# Patient Record
Sex: MALE | Race: OTHER | HISPANIC OR LATINO
[De-identification: names, ages, dates, MRNs, and addresses within clinical notes are randomized per-mention and may not be internally consistent; named-entity substitution may affect disease eponyms.]

---

## 2016-12-31 NOTE — ED PROVIDER NOTE - ATTENDING CONTRIBUTION TO CARE
Paresh:  I have independently evaluated the patient and have documented in the appropriate sections above.  I agree with the exam and plan as noted above.

## 2016-12-31 NOTE — ED ADULT NURSE NOTE - PMH
Agitation    Depression    HLD (hyperlipidemia)    HTN (hypertension)    Stroke    Syncope  2/2013, 9/2010

## 2016-12-31 NOTE — ED PROVIDER NOTE - OBJECTIVE STATEMENT
72 y/o male with hx of stroke with significant residual deficits on soft diet coming in a few hours after peg tube dislodgement. no other complaints. no fever/chills/abd pain/drainage. 72 y/o male with hx of stroke with significant residual deficits on soft diet coming in a few hours after peg tube dislodgement. no other complaints. no fever/chills/abd pain/drainage.    PEG tube. 72 y/o male with hx of stroke with significant residual deficits on soft diet coming in a few hours after peg tube dislodgement. no other complaints. no fever/chills/abd pain/drainage.    PEG tube.    Yoder:  PEG tube malfunction.  No other complaints

## 2016-12-31 NOTE — ED ADULT NURSE NOTE - OBJECTIVE STATEMENT
pt brought in by family for peg tube out.  Pt is nonverbal and family found peg tube out some bleeding noted at the site.  MD attempted to replace in the ED AND SURGERY ALSO CAME DOWN TO REPLACE UNSUCCESSFUL.  ivl PLACED AND LABS SENT Yoko pt brought in by family for peg tube out.  Pt is nonverbal and family found peg tube out some bleeding noted at the site.  MD attempted to replace in the ED AND SURGERY ALSO CAME DOWN TO REPLACE UNSUCCESSFUL.  IV PLACED AND LABS SENT  Pt HAS Significant residuals form past CVA Pt moves extremities but is nonverbal  mPULEORN

## 2016-12-31 NOTE — ED PROVIDER NOTE - PHYSICAL EXAMINATION
Yoder:  General: No distress.  Mentation at baseline.   HEENT: WNL  Chest/Lungs: CTAB, No wheeze, No retractions, No increased work of breathing, Normal rate  Heart: S1S2 RRR, No M/R/G, Pules equal Bilaterally in upper and lower extremities distally  Abd: soft, NT/ND, No guarding, No rebound.  No hernias, no palpable masses.  Extrem: FROM in all joints, no significant edema noted, No ulcers.  Cap refil < 2sec.  Skin: No rash noted, warm dry.  Neuro:   No new focal deficits.  Psychiatric: No evidence of delusions. No SI/HI.

## 2017-01-01 NOTE — H&P ADULT. - PROBLEM SELECTOR PLAN 2
S/P Code stroke today while in ED   Head CT negative for new infarcts, bleed, or hemorrhage  Continue with ASA and Plavix  Neurology to follow  Neuro checks every 4 hours this is being worked up outpatient.  during that Previous hospitalization CT of the abdomen was performed which noted thickening of the gastric wall adjacent to the resection margin concerning for recurrent disease as well as abnormal gastrohepatic adenopathy and several peritoneal masses. The prostate was also enlarged with nodular infiltration of the left seminal vesicle. He underwent an upper endoscopy which noted large hiatal hernia, gastritis, and no endoscopic nodularity. EUS was recommended and was planned with Dr. Bowers.    For his new gastrohepatic adenopathy and peritoneal disease in the left upper   quadrant and pelvis. He is also scheduled to have a prostate biopsy.

## 2017-01-01 NOTE — H&P ADULT. - PROBLEM SELECTOR PLAN 5
Nutrtion consult ordered to address PEG tube  feeding  Aspiration precautions VTE prophylaxis: Heparin 5000 units every 12 hrs

## 2017-01-01 NOTE — H&P ADULT. - COMMENTS
limited ROS but he is denying any complaints. Patient only Oriented x 1.5 so limited ROS but he is denying any complaints.

## 2017-01-01 NOTE — H&P ADULT. - MENTAL STATUS
Awake and non- verbal AAOx1.5 - Oriented to person and place( he could no say he was in the hospital but when given the choice he could identify that he was here)

## 2017-01-01 NOTE — H&P ADULT. - ASSESSMENT
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

## 2017-01-01 NOTE — H&P ADULT. - PROBLEM SELECTOR PLAN 1
WBC 11.6 lactate 6.0 + chills/ rigors  Follow up blood cultures x 2 done in ED  Follow up urine cultures  Received Vancomycin and Zosyn in ED and continue IV antibiotics  Continue IV fluids  [facility] called  CBC in am Case D/W with GI Fellow Dr. Esquivel. Pt to be evaluated to endoscopic reinsertion of PEG but she would also like a Calorie count to see if patient is able to keep up with is nutritional needs with the dysphagia diet.    will get Speech/Swallow re-eval for best diet recommendation, in the meantime will give a Dysphagia 1 diet with honey liquids.

## 2017-01-01 NOTE — H&P ADULT. - HISTORY OF PRESENT ILLNESS
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  Per chart review and per report the patient does eat at home but he calorie count is not sufficient enough and that is why they which to have the PEG tube replaced.    The patient does not have any complaints. Denies any abdominal pain. No nausea, no vomiting, no fever, no chills.     There was no family at bedside. I called family (wife and son) but there was no answer. I left message to clarify patients current diet and wishes regarding PEG placement.

## 2017-01-02 NOTE — DIETITIAN INITIAL EVALUATION ADULT. - NS AS NUTRI INTERV ED CONTENT
Discussed tips to increase calorie intake as well as fluids.  also discussed benefit of MVI as well as Vit D./Recommended modifications

## 2017-01-02 NOTE — DIETITIAN INITIAL EVALUATION ADULT. - PROBLEM SELECTOR PLAN 1
Case D/W with GI Fellow Dr. Esquivel. Pt to be evaluated to endoscopic reinsertion of PEG but she would also like a Calorie count to see if patient is able to keep up with is nutritional needs with the dysphagia diet.    will get Speech/Swallow re-eval for best diet recommendation, in the meantime will give a Dysphagia 1 diet with honey liquids.

## 2017-01-02 NOTE — DIETITIAN INITIAL EVALUATION ADULT. - ENERGY NEEDS
Ht 66 inches,  pounds, low weight 118 pounds in August 2016,  pounds.  BMI 20.7  Dxd with a dislodged PEG tube. Calorie count in progress 01/01-01/03  Skin intact

## 2017-01-02 NOTE — DIETITIAN INITIAL EVALUATION ADULT. - OTHER INFO
Patient referred for calorie counts and assessment after PEG tube displaced.  Patient is mostly nonverbal s/p stroke and patients health care proxy available to assist with nutrition history.  As per family, patient was eating very well at home and wife was very diligent in preparing pureed foods with flavor.  PEG tube in place for water only to ensure hydration needs.  Patient dislikes thickened beverages and only accepts thickened milk.  At home, patient was eating farina, grits, pudding, chicken, beef, potatoes.  Dislikes yogurt.  Loves rice but not allowed at this time.  Hx of a sacral stage 2 wound which is now healed.  Family was scheduled to meet with GI to evaluate removing PEG tube but tube was dislodged few days ago.  Calorie count in progress and awaiting swallow evaluation.  Family bringing in some food from home.  Requests gravy on the side for more flavor as well as Mrs. REJI rothman.  Good weight gain 10 pounds over last 5 months.  Had hx of malnutrition diagnosis last August but this is no longer an issue.

## 2017-01-02 NOTE — DIETITIAN INITIAL EVALUATION ADULT. - PROBLEM SELECTOR PLAN 2
this is being worked up outpatient.  during that Previous hospitalization CT of the abdomen was performed which noted thickening of the gastric wall adjacent to the resection margin concerning for recurrent disease as well as abnormal gastrohepatic adenopathy and several peritoneal masses. The prostate was also enlarged with nodular infiltration of the left seminal vesicle. He underwent an upper endoscopy which noted large hiatal hernia, gastritis, and no endoscopic nodularity. EUS was recommended and was planned with Dr. Bowers.    For his new gastrohepatic adenopathy and peritoneal disease in the left upper   quadrant and pelvis. He is also scheduled to have a prostate biopsy.

## 2017-01-03 NOTE — SWALLOW BEDSIDE ASSESSMENT ADULT - SLP GENERAL OBSERVATIONS
Patient encountered supine in bed, asleep on contact, easily rousable to name. Assessment performed in Maori by this clinician who is a conversational speaker. Pt A&Ox1 (able to state first name only), grossly oriented to time and place (stated January independently, able to state hospital given phonemic cue). Vocal quality WNL in conversation. Pt primarily responsive communicator, producing 1-3 word phrases, 1-2 sentences noted throughout assessment. Possible oral and/or verbal apraxia component given intermittent groping and ability to complete tasks  when not prompted. Affect and eye contact appropriate. Patient encountered supine in bed, asleep on contact, easily rousable to name. Assessment performed in Syriac by this clinician who is a conversational speaker. Upon education re: role of SLP and swallowing assessment, pt noted pulling up hospital gown and pointing to PEG site, stating "yes, this thing." Pt A&Ox1 (able to state first name only), grossly oriented to time and place (stated January independently, able to state hospital given phonemic cue). Vocal quality WNL in conversation. Pt primarily responsive communicator, producing 1-3 word phrases, 1-2 sentences noted throughout assessment. Possible oral and/or verbal apraxia component given intermittent groping and ability to complete tasks  when not prompted. Affect and eye contact appropriate.

## 2017-01-03 NOTE — SWALLOW BEDSIDE ASSESSMENT ADULT - ASR SWALLOW LABIAL MOBILITY
impaired retraction on R impaired retraction on R, impaired pursing given verbal cue, however pt noted pursing lips adequately in conversation; possible apraxia component

## 2017-01-03 NOTE — PHYSICAL THERAPY INITIAL EVALUATION ADULT - DISCHARGE DISPOSITION, PT EVAL
to increase functional mobility , strength, balance, endurane, fall prevention education continue ;pt and pt son Joe via phone aware pt needs assist with all functional activtiy all options discussed ,plan for pt to return home with assist and Home PT , per Joe someone is always home with pt to assist (mother , himself and other family )/home w/ home PT/home w/ assist

## 2017-01-03 NOTE — PHYSICAL THERAPY INITIAL EVALUATION ADULT - GENERAL OBSERVATIONS, REHAB EVAL
pt received in b/s cahir with chair alarm active ; pt nad alert ;pt wished a Happy Birthday recent bday ; pt agreeable for PT , offered pt free  services pt refuse , PT speak Lao phrases ;pt r hand weak from previous CVA ; dressing abd c,d I

## 2017-01-03 NOTE — SWALLOW BEDSIDE ASSESSMENT ADULT - COMMENTS
Dx: 1) PEG status. 2) Gastric mass.  -Being worked up outpatient. Previous hospitalization CT of the abdomen was performed which noted thickening of the gastric wall adjacent to the resection margin concerning for recurrent disease as well as abnormal gastrohepatic adenopathy and several peritoneal masses. The prostate was also enlarged with nodular infiltration of the left seminal vesicle. He underwent an upper endoscopy which noted large hiatal hernia, gastritis, and no endoscopic nodularity. EUS was recommended and was planned with Dr. Bowers. For his new gastrohepatic adenopathy and peritoneal disease in the left upper quadrant and pelvis. He is also scheduled to have a prostate biopsy. HC: 1/2 Seen by GI; given pt able to tolerate some PO would check calorie count, nutriton eval, repeat speech and swallow eval. If evidence of persistent oropharyngeal dysphagia, would replace PEG. Seen by RD, pt is mostly nonverbal s/p stroke and pt's HCP available to assist with nutrition history. As per family, pt was eating very well at home and wife was very diligent in preparing pureed foods with flavor. PEG tube in place for water only to ensure hydration needs. Pt dislikes thickened beverages and only accepts thickened milk. At home, pt was eating farina, grits, pudding, chicken, beef, potatoes. Dislikes yogurt. Loves rice but not allowed at this time. Hx of a sacral stage 2 wound which is now healed.  Family was scheduled to meet with GI to evaluate removing PEG tube but tube was dislodged few days ago. Calorie count in progress and awaiting swallow evaluation. Family bringing in some food from home. Requests gravy on the side for more flavor as well as Mrs. REJI rothman. Good weight gain 10 pounds over last 5 months. Had hx of malnutrition diagnosis last August but this is no longer an issue.    *Refer to Addendum for Swallow Hx* Dx: 1) PEG status. 2) Gastric mass. -Being worked up outpatient. Previous hospitalization CT of the abdomen was performed which noted thickening of the gastric wall adjacent to the resection margin concerning for recurrent disease as well as abnormal gastrohepatic adenopathy and several peritoneal masses. The prostate was also enlarged with nodular infiltration of the left seminal vesicle. He underwent an upper endoscopy which noted large hiatal hernia, gastritis, and no endoscopic nodularity. EUS was recommended and was planned with Dr. Bowers. For his new gastrohepatic adenopathy and peritoneal disease in the left upper quadrant and pelvis. He is also scheduled to have a prostate biopsy. HC: 1/2 Seen by GI; given pt able to tolerate some PO would check calorie count, nutriton eval, repeat speech and swallow eval. If evidence of persistent oropharyngeal dysphagia, would replace PEG. Seen by RD, pt is mostly nonverbal s/p stroke and pt's HCP available to assist with nutrition history. As per family, pt was eating very well at home and wife was very diligent in preparing pureed foods with flavor. PEG tube in place for water only to ensure hydration needs. Pt dislikes thickened beverages and only accepts thickened milk. At home, pt was eating farina, grits, pudding, chicken, beef, potatoes. Dislikes yogurt. Loves rice but not allowed at this time. Hx of a sacral stage 2 wound which is now healed.  Family was scheduled to meet with GI to evaluate removing PEG tube but tube was dislodged few days ago. Calorie count in progress and awaiting swallow evaluation. Family bringing in some food from home. Requests gravy on the side for more flavor as well as Mrs. REJI rothman. Good weight gain 10 pounds over last 5 months. Had hx of malnutrition diagnosis last August but this is no longer an issue.    *Refer to Addendum for Swallow Hx*

## 2017-01-03 NOTE — SWALLOW BEDSIDE ASSESSMENT ADULT - SWALLOW EVAL: DIAGNOSIS
Patient known to this service with h/o dysphagia s/p PEG, on modified diet s/p most recent MBS. Now presents with 1) oral and suspected pharyngeal dysphagia that appears improved from previous exam, characterized by impaired oral management greatest with chewables, delayed pharyngeal swallow trigger, and reduced hyolaryngeal elevation upon palpation. Nectar thickened liquids not trialed given h/o silent aspiration on previous MBS. Patient known to this service with h/o dysphagia s/p PEG, on modified diet s/p most recent MBS. Now presents with 1) oral and suspected pharyngeal dysphagia that appears improved from previous exam, characterized by impaired oral management greatest with chewables, delayed pharyngeal swallow trigger, and reduced hyolaryngeal elevation upon palpation. Given h/o silent penetration with mechanical soft items, would favor objective study to assess candidacy for dietary upgrade. Nectar thickened liquids not trialed given h/o silent aspiration on previous MBS. 2) Expressive and receptive language deficits.

## 2017-01-03 NOTE — PHYSICAL THERAPY INITIAL EVALUATION ADULT - ADDITIONAL COMMENTS
pt lives in [address] floor [address] with rail to enter , pt used std cane PTA ; lives with spouse in apt and spouse works 2 days a week , spouse assist as PRN with adl's and personal care   son Joe [phone] pt representative pt lives in second floor [address] with rail to enter , pt used std cane PTA ; lives with spouse/son Joe and his family  in apt and pt spouse works 2 days a week , spouse assist as PRN with adl's and personal care ; per pt [place] someone is always home to assist ;son informed and pt ,pt needs assist with all functional activity at this time for safety ,mild intermittent unsteady with amb with std cane 80ft x 2 cg of1  ridge Diaz [phone] pt representative/health care agent

## 2017-01-03 NOTE — SWALLOW BEDSIDE ASSESSMENT ADULT - SWALLOW EVAL: PATIENT/FAMILY GOALS STATEMENT
Pt with limited verbal output, however pt initially denied dysphagia 1 diet PTA, stating pt's wife provides regular food. Upon further interview endorses pureed foods PTA. Pt endorses having participated in swallow therapy. D/w pt's son Joe via telephone, endorses dysphagia diet PTA, states food was "pureed but not too thick." Pt has been on an oral diet for several months now, pt's son unsure of exactly how many.

## 2017-01-03 NOTE — SWALLOW BEDSIDE ASSESSMENT ADULT - SWALLOW EVAL: RECOMMENDED FEEDING/EATING TECHNIQUES
alternate food with liquid/position upright (90 degrees)/small sips/bites/check mouth frequently for oral residue/pocketing/maintain upright posture during/after eating for 30 mins/crush medication (when feasible)

## 2017-01-03 NOTE — PHYSICAL THERAPY INITIAL EVALUATION ADULT - PRECAUTIONS/LIMITATIONS, REHAB EVAL
admitted on 12/31s/p PEG tube falling out. admitted on 12/31s/p PEG tube falling out. Patient with Agitation ,Depression , HLD ,HTN, Stroke and Syncope. Met with admitted on 12/31s/p PEG tube falling out. Patient with Agitation ,Depression , HLD ,HTN, Stroke and Syncope. Met with patients wife Lumen and friend at bedside. Patient and wife both Occitan admitted on 12/31s/p PEG tube falling out. Patient with Agitation ,Depression , HLD ,HTN, Stroke and Syncope. per CM note Met with patients wife Lumen and friend at bedside. Patient and wife both Bulgarian  speaking ,pt self medicates and hydrates through PEG and has pureed diet PO ,

## 2017-01-03 NOTE — SWALLOW BEDSIDE ASSESSMENT ADULT - ORAL PHASE
Decreased anterior-posterior movement of the bolus/Delayed oral transit time/Stasis in lateral sulci/trace stasis in R lateral sulcus, cleared via lingual sweep + reswallow vs liquid wash Delayed oral transit time Delayed oral transit time/Decreased anterior-posterior movement of the bolus/min stasis in R lateral sulcus adherent to dentition, requiring lingual sweep and liquid washx3 to clear/Stasis in lateral sulci

## 2017-01-03 NOTE — SWALLOW BEDSIDE ASSESSMENT ADULT - ORAL PREPARATORY PHASE
trace R sided labial residue, delayed clearance possibly suggestive of reduced perioral sensation Within functional limits Decreased mastication ability/Bolus falls into right lateral sulci

## 2017-01-03 NOTE — SWALLOW BEDSIDE ASSESSMENT ADULT - ASR SWALLOW ASPIRATION MONITOR
upper respiratory infection/cough/throat clearing/pneumonia/Monitor for s/s aspiration/laryngeal penetration. If noted:  D/C p.o. intake, provide non-oral nutrition/hydration/meds, and contact this service @ [phone]/fever/change of breathing pattern/gurgly voice

## 2017-01-03 NOTE — PHYSICAL THERAPY INITIAL EVALUATION ADULT - IMPAIRMENTS FOUND, PT EVAL
muscle strength/aerobic capacity/endurance/tone/joint integrity and mobility/gait, locomotion, and balance

## 2017-01-03 NOTE — SWALLOW BEDSIDE ASSESSMENT ADULT - SLP PERTINENT HISTORY OF CURRENT PROBLEM
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 Per chart review and per report the patient does eat at home but he calorie count is not sufficient enough and that is why they which to have the PEG tube replaced. The patient does not have any complaints. Denies any abdominal pain. No N/V, no fever, no chills. Message left with family to clarify patients current diet and wishes regarding PEG placement.

## 2017-01-03 NOTE — PHYSICAL THERAPY INITIAL EVALUATION ADULT - MANUAL MUSCLE TESTING RESULTS, REHAB EVAL
r shoulder 3-/5 , elbow 3-/5 , fingers 2+/5 flexion , extension 3-/5 ; L ue 3+/5 , L LE 3+/5 , R LE 3/5

## 2017-01-03 NOTE — PHYSICAL THERAPY INITIAL EVALUATION ADULT - ACTIVE RANGE OF MOTION EXAMINATION, REHAB EVAL
Right LE Active ROM was WFL (within functional limits)/Left UE Active ROM was WFL (within functional limits)/Left LE Active ROM was WFL (within functional limits)/r shoulder to 90 degrees flexion and abd to 80 degrees , elbow wfl's aarom , finger flexion half the rom aarom , extension aarom to wfl's

## 2017-01-03 NOTE — PHYSICAL THERAPY INITIAL EVALUATION ADULT - BRACE/ORTHOTICS
tried amb with rolling walker pt r hand slip as decrease  strength ; pt amb with std cane 80 ft x2 intermittent mild unsteady need cg of1 pt understood

## 2017-01-03 NOTE — PHYSICAL THERAPY INITIAL EVALUATION ADULT - IMPAIRMENTS CONTRIBUTING TO GAIT DEVIATIONS, PT EVAL
decreased strength/pt amb with std cane 80 ft x2 with cg of1 intermittent close supervision pt intermittent mild unsteady regain balance on own with cg of1 and std cane/impaired motor control/impaired postural control/abnormal muscle tone/impaired balance

## 2017-01-03 NOTE — PHYSICAL THERAPY INITIAL EVALUATION ADULT - IMPAIRED TRANSFERS: SIT/STAND, REHAB EVAL
decreased strength/pt sit-stand with or w/o std cane cg of1 steady/impaired balance/impaired motor control/impaired postural control/abnormal muscle tone

## 2017-01-03 NOTE — PHYSICAL THERAPY INITIAL EVALUATION ADULT - TRANSFER SAFETY CONCERNS NOTED: SIT/STAND, REHAB EVAL
losing balance/decreased weight-shifting ability/decreased balance during turns/decreased step length

## 2017-01-03 NOTE — PHYSICAL THERAPY INITIAL EVALUATION ADULT - GAIT DEVIATIONS NOTED, PT EVAL
decreased weight-shifting ability/decreased stride length/decreased step length/decreased ni/increased time in double stance

## 2017-01-04 NOTE — SWALLOW VFSS/MBS ASSESSMENT ADULT - MODE OF PRESENTATION
fed by clinician spoon/fed by clinician self fed/cup/fed by clinician self fed/cup spoon/fed by clinician/1/4 tsp spoon/fed by clinician/cup/self fed

## 2017-01-04 NOTE — SWALLOW VFSS/MBS ASSESSMENT ADULT - RECOMMENDED CONSISTENCY
Had lengthy discussion with pt's son (in both English and Greek) to determine whether family could safety manage patient's dysphagia at home. After > 60 min discussion, the son indicated that he would like to proceed with PEG replacement and then provide pt with limited QOL oral feeds, upon discharge to home. It was made clear to the son that a p.o. diet could NOT BE RECOMMENDED IF THE FOLLOWING GUIDELINES WERE NOT ADHERED TO. The son acknowledged aspiration risk/related complications if the following diet and plan were not followed: Dysphagia I diet with honey thick fluids. MD, please include these guidelines in the D/C plan as well: 1) Full assist with meals, 2)  ALL food/liquids must be given by teaspoon,  at slow rate 3) Encourage repeat, dry swallows for every bite and sip 4 ) Alternate food and liquids 5) Aspiration precautions. Monitor for s/s aspiration/laryngeal penetration. If noted:  D/C p.o. intake, provide non-oral nutrition/hydration/meds Had lengthy discussion with pt's son (in both English and English) to determine whether family could safety manage patient's dysphagia at home. After > 60 min discussion, the son indicated that he would like to proceed with PEG replacement and then provide pt with limited QOL oral feeds, upon discharge to home. Given the degree of management it will take for safe feeding, it is expected that pt will require supplemental enteral nutrition and hydration. It was made clear to the son that a p.o. diet could NOT BE RECOMMENDED IF THE FOLLOWING GUIDELINES WERE NOT ADHERED TO. The son acknowledged aspiration risk/related complications if the following diet and plan were not followed: Dysphagia I diet with honey thick fluids. MD, please include these guidelines in the D/C plan as well: 1) Full assist with meals, 2)  ALL food/liquids must be given by teaspoon,  at slow rate 3) Encourage repeat, dry swallows for every bite and sip 4 ) Alternate food and liquids 5) Aspiration precautions. Monitor for s/s aspiration/laryngeal penetration. If noted:  D/C p.o. intake, provide non-oral nutrition/hydration/meds

## 2017-01-04 NOTE — SWALLOW VFSS/MBS ASSESSMENT ADULT - ADDITIONAL INFORMATION
There appeared to be straightening of the normal cervical lordosis. There was calcification of the thyroid cartilage. + presence of dental hardware. There appeared to be straightening of the normal cervical lordosis. There was calcification of the thyroid cartilage. + presence of dental hardware.    Disorders: reduced lingual strength/ROM/Rate of motion, reduced BOT to posterior pharyngeal wall contact, delay in trigger of the swallow reflex, reduced hyo-laryngeal excursion, reduced laryngeal closure, reduced pharyngeal contractility, reduced supraglottic sensation, reduced subglottic sensation.     Strategies: pt unable to utilize independently- intake of controlled volumes (tsp), at slow rate, successive swallow, and texture alternation.

## 2017-01-04 NOTE — SWALLOW VFSS/MBS ASSESSMENT ADULT - RESIDUE IN VALLECULAE
Moderate there was spillover of residue from the valleculae to the hypopharynx/Moderate/Severe Mild/Trace Trace/Mild Mild Moderate/spillover of vallecular residue into the hypopharynx post swallow/Mild Moderate/Mild

## 2017-01-04 NOTE — SWALLOW VFSS/MBS ASSESSMENT ADULT - THE ABOVE FINDINGS WERE DISCUSSED WITH
Discussed at length with the patient's son (both in English and then in Yemeni via  phone # [phone]. Reviewed above with Dr. Medina [phone]

## 2017-01-04 NOTE — SWALLOW VFSS/MBS ASSESSMENT ADULT - LARYNGEAL PENETRATION DURING THE SWALLOW - SILENT
Trace/over the superior laryngeal surface of the arytenoids Trace/over the arytenoid over the laryngeal surface of the arytenoids during the swallow, with residue in this location/Trace Trace/over the laryngeal surface of the arytenoid during cup drinking

## 2017-01-04 NOTE — SWALLOW VFSS/MBS ASSESSMENT ADULT - CONSISTENCIES ADMINISTERED
puree thin puree thick honey thick nectar thick single sip via cup/nectar thick mech soft hard solid

## 2017-01-04 NOTE — SWALLOW VFSS/MBS ASSESSMENT ADULT - DIAGNOSTIC IMPRESSIONS
Patient presents with persistent jules-pharyngeal dysphagia with impaired oral management, marked delay in trigger of the swallow reflex, impaired pharyngeal clearance with post swallow residue for all p.o. textures. Airway protection remains compromised with silent aspiration of nectar thick liquids.

## 2017-01-04 NOTE — SWALLOW VFSS/MBS ASSESSMENT ADULT - ORAL PHASE COMMENTS
Oral transit time noted to be 5.6  seconds. Maximal spillover to the level of the valleculae with passive spillover along the posterior surface of the epiglottis. There was at least minimal residue noted in the visualized portion of the oral cavity. Oral transit time noted to be 10.5 seconds. Maximal spillover to the level of the valleculae and pyriform sinuses.  Mild to moderate oral residue Oral transit time noted to be 3.7 seconds. Moderate spillover to the level of the valleculae and along the a-e folds to the pyriform sinuses. There was at least mild residue noted in the visualized region of the oral cavity. Oral transit time noted to be 4.2  seconds. Maximal spillover to the level of the pyriform sinuses Moderate spillover to the level of the pyriform sinuses. There was mild silent laryngeal penetration and aspiration before the swallow was triggered. There was residue in the laryngeal vestibule and ventricle. There was spillover of oral residue to the pharynx There appeared to be at least mild oral residue post swallow. Pharyngeal spillover was WFL for texture; however there appeared to be trace passive overflow from the a-e folds into the superior laryngeal vestibule

## 2017-01-04 NOTE — SWALLOW VFSS/MBS ASSESSMENT ADULT - ORAL PREP COMMENTS
Noted tongue pumping during attempts at bolus formation and oral transit. There was spillover into the lateral sulci Noted tongue pumping during attempts at bolus formation and oral transit. There was spillover into the lateral sulci. Noted tongue pumping during attempts at bolus formation and oral transit. There was spillover into the lateral sulcus. Noted tongue pumping during attempts at bolus formation and oral transit. A majority of bolus preparation and transport took place off fluoro to limit exposure A majority of oral prep and transfer occurred off fluoro to limit exposure.

## 2017-01-04 NOTE — SWALLOW VFSS/MBS ASSESSMENT ADULT - ORAL PHASE
Residue in oral cavity/Reduced anterior - posterior transport/Incomplete tongue to palate contact/Delayed oral transit time Delayed oral transit time/Residue in oral cavity/Incomplete tongue to palate contact/Reduced anterior - posterior transport Reduced anterior - posterior transport/Residue in oral cavity/Incomplete tongue to palate contact Incomplete tongue to palate contact/Delayed oral transit time/Reduced anterior - posterior transport/Uncontrolled bolus / spillover in jules-pharynx/Uncontrolled bolus / spillover in hypopharynx/Residue in oral cavity Reduced anterior - posterior transport/Residue in oral cavity/Uncontrolled bolus / spillover in hypopharynx/Delayed oral transit time/Incomplete tongue to palate contact/Uncontrolled bolus / spillover in jules-pharynx/Laryngeal penetration before swallow - silent Residue in oral cavity/Incomplete tongue to palate contact/Reduced anterior - posterior transport Delayed oral transit time/Residue in oral cavity/Reduced anterior - posterior transport

## 2017-01-06 ENCOUNTER — TRANSCRIPTION ENCOUNTER (OUTPATIENT)
Age: 72
End: 2017-01-06

## 2017-01-06 RX ORDER — CLOPIDOGREL BISULFATE 75 MG/1
1 TABLET, FILM COATED ORAL
Qty: 0 | Refills: 0 | COMMUNITY
Start: 2017-01-06

## 2017-01-06 RX ORDER — MIRTAZAPINE 45 MG/1
1 TABLET, ORALLY DISINTEGRATING ORAL
Qty: 0 | Refills: 0 | COMMUNITY
Start: 2017-01-06

## 2017-01-06 RX ORDER — GABAPENTIN 400 MG/1
1 CAPSULE ORAL
Qty: 0 | Refills: 0 | COMMUNITY
Start: 2017-01-06

## 2017-01-06 RX ORDER — ASPIRIN/CALCIUM CARB/MAGNESIUM 324 MG
1 TABLET ORAL
Qty: 0 | Refills: 0 | COMMUNITY
Start: 2017-01-06

## 2017-01-06 RX ORDER — QUETIAPINE FUMARATE 200 MG/1
1 TABLET, FILM COATED ORAL
Qty: 0 | Refills: 0 | COMMUNITY
Start: 2017-01-06

## 2017-01-06 RX ORDER — LISINOPRIL 2.5 MG/1
1 TABLET ORAL
Qty: 0 | Refills: 0 | COMMUNITY
Start: 2017-01-06

## 2017-01-06 NOTE — DISCHARGE NOTE ADULT - INSTRUCTIONS
dysphagia 1 with honey thickened liquids dysphagia 1 with honey thickened liquids  · Recommended Consistencies	Had lengthy discussion with pt's son (in both English and Tajik) to determine whether family could safety manage patient's dysphagia at home. After > 60 min discussion, the son indicated that he would like to proceed with PEG replacement and then provide pt with limited QOL oral feeds, upon discharge to home. Given the degree of management it will take for safe feeding, it is expected that pt will require supplemental enteral nutrition and hydration. It was made clear to the son that a p.o. diet could NOT BE RECOMMENDED IF THE FOLLOWING GUIDELINES WERE NOT ADHERED TO. The son acknowledged aspiration risk/related complications if the following diet and plan were not followed: Dysphagia I diet with honey thick fluids. MD, please include these guidelines in the D/C plan as well: 1) Full assist with meals, 2)  ALL food/liquids must be given by teaspoon,  at slow rate 3) Encourage repeat, dry swallows for every bite and sip 4 ) Alternate food and liquids 5) Aspiration precautions. Monitor for s/s aspiration/laryngeal penetration. If noted:  D/C p.o. intake, provide non-oral nutrition/hydration/meds dysphagia 1 with honey thickened liquids  · Recommended Consistencies	Had lengthy discussion with pt's son (in both English and Irish) to determine whether family could safety manage patient's dysphagia at home. After > 60 min discussion, the son indicated that he would like to proceed with PEG replacement and then provide pt with limited QOL oral feeds, upon discharge to home. Given the degree of management it will take for safe feeding, it is expected that pt will require supplemental enteral nutrition and hydration. It was made clear to the son that a p.o. diet could NOT BE RECOMMENDED IF THE FOLLOWING GUIDELINES WERE NOT ADHERED TO. The son acknowledged aspiration risk/related complications if the following diet and plan were not followed: Dysphagia I diet with honey thick fluids. MD, please include these guidelines in the D/C plan as well: 1) Full assist with meals, 2)  ALL food/liquids must be given by teaspoon,  at slow rate 3) Encourage repeat, dry swallows for every bite and sip 4 ) Alternate food and liquids 5) Aspiration precautions. Monitor for s/s aspiration/laryngeal penetration. If noted:  D/C p.o. intake, provide non-oral nutrition/hydration/meds  Peg bolus feeds: Jevity 240cc q 6h

## 2017-01-06 NOTE — DISCHARGE NOTE ADULT - PLAN OF CARE
PEG replaced bolus feeds of jevity 240cc q 6h; may use for medications also take prescribed medication; f/u with PCP comfort feed with dysphagia instructions; PEG feeds take prescribed medication; f/u PCP

## 2017-01-06 NOTE — DISCHARGE NOTE ADULT - HOME CARE AGENCY
[facility] [phone] for RN assessment & PT, [place] [id number] [phone] for Jevity feeds. SOC one day after discharge [facility] [phone] for RN assessment & PT SOC one day after discharge

## 2017-01-06 NOTE — DISCHARGE NOTE ADULT - MEDICATION SUMMARY - MEDICATIONS TO TAKE
I will START or STAY ON the medications listed below when I get home from the hospital:    aspirin 81 mg oral tablet, chewable  -- 1 tab(s) by mouth once a day  -- Indication: For antiplatelet    acetaminophen 325 mg oral tablet  -- 2 tab(s) by mouth every 6 hours, As needed, Moderate Pain  -- Indication: For Pain    lisinopril 2.5 mg oral tablet  -- 1 tab(s) by mouth once a day  -- Indication: For blood pressure    gabapentin 300 mg oral capsule  -- 1 cap(s) by mouth 3 times a day  -- Indication: For chronic pain    mirtazapine 15 mg oral tablet  -- 1 tab(s) by mouth once a day  -- Indication: For depression    atorvastatin 80 mg oral tablet  -- 1 tab(s) by mouth once a day (at bedtime)  -- Indication: For cholesterol    clopidogrel 75 mg oral tablet  -- 1 tab(s) by mouth once a day  -- Indication: For blood thinner    QUEtiapine 25 mg oral tablet  -- 1 tab(s) by mouth once a day (at bedtime)  -- Indication: For Psyche med    metoprolol tartrate 75 mg oral tablet  -- 1 tab(s) by mouth 2 times a day  -- Indication: For blood pressure and heart rate    amLODIPine 5 mg oral tablet  -- 1 tab(s) by mouth once a day  -- Indication: For blood pressure    famotidine 20 mg oral tablet  -- 1 tab(s) by mouth once a day  -- Indication: For GI    polyethylene glycol 3350 oral powder for reconstitution  -- 17 gram(s) by mouth once a day  -- Indication: For constipation    senna 8.8 mg/5 mL oral syrup  -- 5 milliliter(s) by mouth once a day (at bedtime)   Hold for loose stools/diarrhea  -- Indication: For stool softener    Doc-Q-Lace 10 mg/mL oral liquid  -- 10 milliliter(s) by mouth 2 times a day  Hold for loose stools/diarrhea   -- Medication should be taken with plenty of water.    -- Indication: For stool softener

## 2017-01-06 NOTE — DISCHARGE NOTE ADULT - HOSPITAL COURSE
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  Per chart review and per report the patient does eat at home but he calorie count is not sufficient enough and that is why they which to have the PEG tube replaced.    The patient does not have any complaints. Denies any abdominal pain. No nausea, no vomiting, no fever, no chills.     There was no family at bedside. I called family (wife and son) but there was no answer. I left message to clarify patients current diet and wishes regarding PEG placement.1/3/ Pt on calorie count to see if PEG needed  1/4 as per dietary pt is meeting calorie needs with po intake; MBS scheduled for 3pm today  pt failed MBS, will likely need PEG, NPO for now  1/6 PEG tube placement today; nutrition consult for feeding/free water  1/8 Nutrition consult; Javity 240 q 6 hours with current diet. ( bolus feed to be started at 100 cc/hr, increased by 50 cc q every bolus to goal of 240.  D/C planning tomorrow  1/9 family needs to be educated on PEG feeds; d/c planning tomorrow

## 2017-01-06 NOTE — DISCHARGE NOTE ADULT - CARE PROVIDER_API CALL
Jose Medina (MD), Internal Medicine  [address] [place] [address]  Phone: [phone]  Fax: [phone]

## 2017-01-06 NOTE — DISCHARGE NOTE ADULT - CARE PLAN
Principal Discharge DX:	PEG (percutaneous endoscopic gastrostomy) adjustment/replacement/removal  Goal:	PEG replaced  Instructions for follow-up, activity and diet:	bolus feeds of jevity 240cc q 6h; may use for medications also  Secondary Diagnosis:	HTN (hypertension)  Instructions for follow-up, activity and diet:	take prescribed medication; f/u with PCP  Secondary Diagnosis:	HLD (hyperlipidemia)  Instructions for follow-up, activity and diet:	take prescribed medication; f/u with PCP  Secondary Diagnosis:	Dysphagia  Instructions for follow-up, activity and diet:	comfort feed with dysphagia instructions; PEG feeds Principal Discharge DX:	PEG (percutaneous endoscopic gastrostomy) adjustment/replacement/removal  Goal:	PEG replaced  Instructions for follow-up, activity and diet:	bolus feeds of jevity 240cc q 6h; may use for medications also  Secondary Diagnosis:	HTN (hypertension)  Instructions for follow-up, activity and diet:	take prescribed medication; f/u with PCP  Secondary Diagnosis:	HLD (hyperlipidemia)  Instructions for follow-up, activity and diet:	take prescribed medication; f/u with PCP  Secondary Diagnosis:	Dysphagia  Instructions for follow-up, activity and diet:	comfort feed with dysphagia instructions; PEG feeds  Secondary Diagnosis:	Stroke  Instructions for follow-up, activity and diet:	take prescribed medication; f/u PCP

## 2017-01-06 NOTE — DISCHARGE NOTE ADULT - PATIENT PORTAL LINK FT
“You can access the [facility] Patient Portal, offered by [facility], by registering with the following website: [email or web address]”

## 2017-01-12 ENCOUNTER — APPOINTMENT (OUTPATIENT)
Dept: RADIOLOGY | Facility: HOSPITAL | Age: 72
End: 2017-01-12

## 2017-01-12 ENCOUNTER — APPOINTMENT (OUTPATIENT)
Dept: SPEECH THERAPY | Facility: HOSPITAL | Age: 72
End: 2017-01-12

## 2017-01-16 ENCOUNTER — RESULT REVIEW (OUTPATIENT)
Age: 72
End: 2017-01-16

## 2017-01-17 ENCOUNTER — APPOINTMENT (OUTPATIENT)
Dept: GASTROENTEROLOGY | Facility: HOSPITAL | Age: 72
End: 2017-01-17

## 2017-01-17 ENCOUNTER — OUTPATIENT (OUTPATIENT)
Dept: OUTPATIENT SERVICES | Facility: HOSPITAL | Age: 72
LOS: 1 days | Discharge: HOME | End: 2017-01-17
Payer: MEDICARE

## 2017-01-17 DIAGNOSIS — D49.0 NEOPLASM OF UNSPECIFIED BEHAVIOR OF DIGESTIVE SYSTEM: Chronic | ICD-10-CM

## 2017-01-17 DIAGNOSIS — R93.3 ABNORMAL FINDINGS ON DIAGNOSTIC IMAGING OF OTHER PARTS OF DIGESTIVE TRACT: ICD-10-CM

## 2017-01-17 DIAGNOSIS — K25.2 ACUTE GASTRIC ULCER WITH BOTH HEMORRHAGE AND PERFORATION: Chronic | ICD-10-CM

## 2017-01-17 PROCEDURE — 43242 EGD US FINE NEEDLE BX/ASPIR: CPT | Mod: GC

## 2017-01-17 PROCEDURE — 88341 IMHCHEM/IMCYTCHM EA ADD ANTB: CPT | Mod: 26,59

## 2017-01-17 PROCEDURE — 88342 IMHCHEM/IMCYTCHM 1ST ANTB: CPT | Mod: 26,59

## 2017-01-17 PROCEDURE — 88173 CYTOPATH EVAL FNA REPORT: CPT | Mod: 26

## 2017-01-17 PROCEDURE — 88305 TISSUE EXAM BY PATHOLOGIST: CPT | Mod: 26

## 2017-01-17 PROCEDURE — 88360 TUMOR IMMUNOHISTOCHEM/MANUAL: CPT | Mod: 26

## 2017-01-22 ENCOUNTER — INPATIENT (INPATIENT)
Facility: HOSPITAL | Age: 72
LOS: 21 days | Discharge: HOME | DRG: 543 | End: 2017-02-13
Attending: INTERNAL MEDICINE | Admitting: INTERNAL MEDICINE
Payer: MEDICARE

## 2017-01-22 VITALS
DIASTOLIC BLOOD PRESSURE: 91 MMHG | SYSTOLIC BLOOD PRESSURE: 161 MMHG | RESPIRATION RATE: 18 BRPM | TEMPERATURE: 97 F | OXYGEN SATURATION: 96 % | HEART RATE: 75 BPM

## 2017-01-22 DIAGNOSIS — K25.2 ACUTE GASTRIC ULCER WITH BOTH HEMORRHAGE AND PERFORATION: Chronic | ICD-10-CM

## 2017-01-22 DIAGNOSIS — D49.0 NEOPLASM OF UNSPECIFIED BEHAVIOR OF DIGESTIVE SYSTEM: Chronic | ICD-10-CM

## 2017-01-22 DIAGNOSIS — I10 ESSENTIAL (PRIMARY) HYPERTENSION: ICD-10-CM

## 2017-01-22 DIAGNOSIS — D49.0 NEOPLASM OF UNSPECIFIED BEHAVIOR OF DIGESTIVE SYSTEM: ICD-10-CM

## 2017-01-22 DIAGNOSIS — F32.9 MAJOR DEPRESSIVE DISORDER, SINGLE EPISODE, UNSPECIFIED: ICD-10-CM

## 2017-01-22 DIAGNOSIS — I63.9 CEREBRAL INFARCTION, UNSPECIFIED: ICD-10-CM

## 2017-01-22 LAB
ALBUMIN SERPL ELPH-MCNC: 4.1 G/DL — SIGNIFICANT CHANGE UP (ref 3.3–5)
ALP SERPL-CCNC: 96 U/L — SIGNIFICANT CHANGE UP (ref 40–120)
ALT FLD-CCNC: 23 U/L RC — SIGNIFICANT CHANGE UP (ref 10–45)
ANION GAP SERPL CALC-SCNC: 12 MMOL/L — SIGNIFICANT CHANGE UP (ref 5–17)
APPEARANCE UR: CLEAR — SIGNIFICANT CHANGE UP
AST SERPL-CCNC: 24 U/L — SIGNIFICANT CHANGE UP (ref 10–40)
BASOPHILS # BLD AUTO: 0 K/UL — SIGNIFICANT CHANGE UP (ref 0–0.2)
BASOPHILS NFR BLD AUTO: 0.8 % — SIGNIFICANT CHANGE UP (ref 0–2)
BILIRUB SERPL-MCNC: 1 MG/DL — SIGNIFICANT CHANGE UP (ref 0.2–1.2)
BILIRUB UR-MCNC: NEGATIVE — SIGNIFICANT CHANGE UP
BUN SERPL-MCNC: 18 MG/DL — SIGNIFICANT CHANGE UP (ref 7–23)
CALCIUM SERPL-MCNC: 9.5 MG/DL — SIGNIFICANT CHANGE UP (ref 8.4–10.5)
CHLORIDE SERPL-SCNC: 101 MMOL/L — SIGNIFICANT CHANGE UP (ref 96–108)
CO2 SERPL-SCNC: 27 MMOL/L — SIGNIFICANT CHANGE UP (ref 22–31)
COLOR SPEC: SIGNIFICANT CHANGE UP
CREAT SERPL-MCNC: 1.18 MG/DL — SIGNIFICANT CHANGE UP (ref 0.5–1.3)
DIFF PNL FLD: NEGATIVE — SIGNIFICANT CHANGE UP
EOSINOPHIL # BLD AUTO: 0.2 K/UL — SIGNIFICANT CHANGE UP (ref 0–0.5)
EOSINOPHIL NFR BLD AUTO: 3.5 % — SIGNIFICANT CHANGE UP (ref 0–6)
GAS PNL BLDV: SIGNIFICANT CHANGE UP
GLUCOSE SERPL-MCNC: 144 MG/DL — HIGH (ref 70–99)
GLUCOSE UR QL: NEGATIVE — SIGNIFICANT CHANGE UP
HCT VFR BLD CALC: 40.6 % — SIGNIFICANT CHANGE UP (ref 39–50)
HGB BLD-MCNC: 13.2 G/DL — SIGNIFICANT CHANGE UP (ref 13–17)
KETONES UR-MCNC: NEGATIVE — SIGNIFICANT CHANGE UP
LEUKOCYTE ESTERASE UR-ACNC: NEGATIVE — SIGNIFICANT CHANGE UP
LIDOCAIN IGE QN: 33 U/L — SIGNIFICANT CHANGE UP (ref 7–60)
LYMPHOCYTES # BLD AUTO: 1.2 K/UL — SIGNIFICANT CHANGE UP (ref 1–3.3)
LYMPHOCYTES # BLD AUTO: 22.5 % — SIGNIFICANT CHANGE UP (ref 13–44)
MCHC RBC-ENTMCNC: 31.1 PG — SIGNIFICANT CHANGE UP (ref 27–34)
MCHC RBC-ENTMCNC: 32.4 GM/DL — SIGNIFICANT CHANGE UP (ref 32–36)
MCV RBC AUTO: 95.7 FL — SIGNIFICANT CHANGE UP (ref 80–100)
MONOCYTES # BLD AUTO: 0.4 K/UL — SIGNIFICANT CHANGE UP (ref 0–0.9)
MONOCYTES NFR BLD AUTO: 7.8 % — SIGNIFICANT CHANGE UP (ref 2–14)
NEUTROPHILS # BLD AUTO: 3.6 K/UL — SIGNIFICANT CHANGE UP (ref 1.8–7.4)
NEUTROPHILS NFR BLD AUTO: 65.4 % — SIGNIFICANT CHANGE UP (ref 43–77)
NITRITE UR-MCNC: NEGATIVE — SIGNIFICANT CHANGE UP
PH UR: 7 — SIGNIFICANT CHANGE UP (ref 4.8–8)
PLATELET # BLD AUTO: 185 K/UL — SIGNIFICANT CHANGE UP (ref 150–400)
POTASSIUM SERPL-MCNC: 4.7 MMOL/L — SIGNIFICANT CHANGE UP (ref 3.5–5.3)
POTASSIUM SERPL-SCNC: 4.7 MMOL/L — SIGNIFICANT CHANGE UP (ref 3.5–5.3)
PROT SERPL-MCNC: 8.1 G/DL — SIGNIFICANT CHANGE UP (ref 6–8.3)
PROT UR-MCNC: 100 MG/DL
RBC # BLD: 4.24 M/UL — SIGNIFICANT CHANGE UP (ref 4.2–5.8)
RBC # FLD: 11.8 % — SIGNIFICANT CHANGE UP (ref 10.3–14.5)
SODIUM SERPL-SCNC: 140 MMOL/L — SIGNIFICANT CHANGE UP (ref 135–145)
SP GR SPEC: 1.01 — LOW (ref 1.01–1.02)
UROBILINOGEN FLD QL: NEGATIVE — SIGNIFICANT CHANGE UP
WBC # BLD: 5.5 K/UL — SIGNIFICANT CHANGE UP (ref 3.8–10.5)
WBC # FLD AUTO: 5.5 K/UL — SIGNIFICANT CHANGE UP (ref 3.8–10.5)

## 2017-01-22 PROCEDURE — 70450 CT HEAD/BRAIN W/O DYE: CPT | Mod: 26

## 2017-01-22 PROCEDURE — 71010: CPT | Mod: 26

## 2017-01-22 PROCEDURE — 99285 EMERGENCY DEPT VISIT HI MDM: CPT

## 2017-01-22 PROCEDURE — 74177 CT ABD & PELVIS W/CONTRAST: CPT | Mod: 26

## 2017-01-22 RX ORDER — AMLODIPINE BESYLATE 2.5 MG/1
5 TABLET ORAL DAILY
Qty: 0 | Refills: 0 | Status: DISCONTINUED | OUTPATIENT
Start: 2017-01-22 | End: 2017-01-23

## 2017-01-22 RX ORDER — MIRTAZAPINE 45 MG/1
15 TABLET, ORALLY DISINTEGRATING ORAL DAILY
Qty: 0 | Refills: 0 | Status: DISCONTINUED | OUTPATIENT
Start: 2017-01-22 | End: 2017-01-23

## 2017-01-22 RX ORDER — CLOPIDOGREL BISULFATE 75 MG/1
75 TABLET, FILM COATED ORAL DAILY
Qty: 0 | Refills: 0 | Status: DISCONTINUED | OUTPATIENT
Start: 2017-01-22 | End: 2017-02-13

## 2017-01-22 RX ORDER — FAMOTIDINE 10 MG/ML
20 INJECTION INTRAVENOUS DAILY
Qty: 0 | Refills: 0 | Status: DISCONTINUED | OUTPATIENT
Start: 2017-01-22 | End: 2017-01-23

## 2017-01-22 RX ORDER — GABAPENTIN 400 MG/1
300 CAPSULE ORAL THREE TIMES A DAY
Qty: 0 | Refills: 0 | Status: DISCONTINUED | OUTPATIENT
Start: 2017-01-22 | End: 2017-01-23

## 2017-01-22 RX ORDER — HEPARIN SODIUM 5000 [USP'U]/ML
5000 INJECTION INTRAVENOUS; SUBCUTANEOUS EVERY 12 HOURS
Qty: 0 | Refills: 0 | Status: DISCONTINUED | OUTPATIENT
Start: 2017-01-22 | End: 2017-02-13

## 2017-01-22 RX ORDER — POLYETHYLENE GLYCOL 3350 17 G/17G
17 POWDER, FOR SOLUTION ORAL DAILY
Qty: 0 | Refills: 0 | Status: DISCONTINUED | OUTPATIENT
Start: 2017-01-22 | End: 2017-01-23

## 2017-01-22 RX ORDER — METOPROLOL TARTRATE 50 MG
50 TABLET ORAL
Qty: 0 | Refills: 0 | Status: DISCONTINUED | OUTPATIENT
Start: 2017-01-22 | End: 2017-01-23

## 2017-01-22 RX ORDER — HYDROMORPHONE HYDROCHLORIDE 2 MG/ML
0.5 INJECTION INTRAMUSCULAR; INTRAVENOUS; SUBCUTANEOUS EVERY 6 HOURS
Qty: 0 | Refills: 0 | Status: DISCONTINUED | OUTPATIENT
Start: 2017-01-22 | End: 2017-01-22

## 2017-01-22 RX ORDER — SODIUM CHLORIDE 9 MG/ML
500 INJECTION, SOLUTION INTRAVENOUS ONCE
Qty: 0 | Refills: 0 | Status: COMPLETED | OUTPATIENT
Start: 2017-01-22 | End: 2017-01-22

## 2017-01-22 RX ORDER — SENNA PLUS 8.6 MG/1
5 TABLET ORAL AT BEDTIME
Qty: 0 | Refills: 0 | Status: DISCONTINUED | OUTPATIENT
Start: 2017-01-22 | End: 2017-01-23

## 2017-01-22 RX ORDER — ASPIRIN/CALCIUM CARB/MAGNESIUM 324 MG
81 TABLET ORAL DAILY
Qty: 0 | Refills: 0 | Status: DISCONTINUED | OUTPATIENT
Start: 2017-01-22 | End: 2017-02-13

## 2017-01-22 RX ORDER — QUETIAPINE FUMARATE 200 MG/1
25 TABLET, FILM COATED ORAL AT BEDTIME
Qty: 0 | Refills: 0 | Status: DISCONTINUED | OUTPATIENT
Start: 2017-01-22 | End: 2017-01-23

## 2017-01-22 RX ORDER — LISINOPRIL 2.5 MG/1
2.5 TABLET ORAL DAILY
Qty: 0 | Refills: 0 | Status: DISCONTINUED | OUTPATIENT
Start: 2017-01-22 | End: 2017-01-23

## 2017-01-22 RX ORDER — MORPHINE SULFATE 50 MG/1
2 CAPSULE, EXTENDED RELEASE ORAL ONCE
Qty: 0 | Refills: 0 | Status: DISCONTINUED | OUTPATIENT
Start: 2017-01-22 | End: 2017-01-22

## 2017-01-22 RX ORDER — ATORVASTATIN CALCIUM 80 MG/1
80 TABLET, FILM COATED ORAL AT BEDTIME
Qty: 0 | Refills: 0 | Status: DISCONTINUED | OUTPATIENT
Start: 2017-01-22 | End: 2017-01-23

## 2017-01-22 RX ORDER — ACETAMINOPHEN 500 MG
650 TABLET ORAL EVERY 6 HOURS
Qty: 0 | Refills: 0 | Status: DISCONTINUED | OUTPATIENT
Start: 2017-01-22 | End: 2017-02-13

## 2017-01-22 RX ORDER — SODIUM CHLORIDE 9 MG/ML
1000 INJECTION INTRAMUSCULAR; INTRAVENOUS; SUBCUTANEOUS
Qty: 0 | Refills: 0 | Status: DISCONTINUED | OUTPATIENT
Start: 2017-01-22 | End: 2017-02-01

## 2017-01-22 RX ADMIN — SODIUM CHLORIDE 500 MILLILITER(S): 9 INJECTION, SOLUTION INTRAVENOUS at 13:34

## 2017-01-22 RX ADMIN — MORPHINE SULFATE 2 MILLIGRAM(S): 50 CAPSULE, EXTENDED RELEASE ORAL at 19:33

## 2017-01-22 RX ADMIN — SODIUM CHLORIDE 50 MILLILITER(S): 9 INJECTION INTRAMUSCULAR; INTRAVENOUS; SUBCUTANEOUS at 22:05

## 2017-01-22 NOTE — ED PROVIDER NOTE - MEDICAL DECISION MAKING DETAILS
Elderly man s/p CVA c peg recently found to have gastric CA pw lethargy and fatigue c confusion.  No organic cause but family c difficulty managing pt at home; will admit for hydration and SW eval.  --BMM

## 2017-01-22 NOTE — ED ADULT NURSE NOTE - OBJECTIVE STATEMENT
[id number] 72 yr old BM brought to Er by family for further eval and tx of not feeling well today. "was shaking earlier'. Hx of CVA in past with residual right sided weakness, slow speech and peg tube. denies pain 2270 72 yr old BM brought to ER by family for further eval and tx of not feeling well today. "was shaking earlier'. Hx of CVA in past with residual right sided weakness, difficulty speaking and peg tube. c/o pain on right side of body 1240 72 yr old BM brought to ER by family for further eval and tx of not feeling well today. "was shaking earlier'. Hx of CVA in past with residual right sided weakness, difficulty speaking and peg tube. c/o pain on right side of abd TTP diffusely. Peg tube noted. Was admitted to hospital last wk because peg tube fell out per family and had to be replaced at that time 1240 72 yr old BM brought to ER by family for further eval and tx of not feeling well today. "was shaking earlier'. Hx of CVA in past with residual right sided weakness, difficulty speaking and peg tube. c/o pain on right side of abd TTP diffusely. Peg tube noted. +BS. Was admitted to hospital last wk because peg tube fell out per family and had to be replaced at that time. coarse breath sounds

## 2017-01-22 NOTE — ED PROVIDER NOTE - OBJECTIVE STATEMENT
71 yo male with PMHx of HTN, HLD, CVA with residual dysphagia s/p PEG, Gastric tumor p/w diffuse abdominal pain and worsening confusion.  The patient's family reports that he had an endoscopy one week ago with biopsy.  Since then he has been c/o diffuse body pain, worse in the RUQ of the abdomen, associated with subjective fevers and cough.  The patient's family reports that he has also been increasingly confused, described as "not making sense." Denies CP, SOB, NVD, dysuria.  The patient's family also reports that he is still tolerating liquids PO, but has had some coughing/choking with liquids recently.

## 2017-01-22 NOTE — ED ADULT NURSE REASSESSMENT NOTE - NS ED NURSE REASSESS COMMENT FT1
pt resting in bed, wife c/o "chest pain all over", PA made aware, repeat ekg done, sinus rhythm, dispo pending

## 2017-01-22 NOTE — H&P ADULT. - PMH
Agitation    Depression    HLD (hyperlipidemia)    HTN (hypertension)    Prostate hypertrophy    Stroke    Syncope  2/2013, 9/2010

## 2017-01-22 NOTE — ED ADULT NURSE REASSESSMENT NOTE - NS ED NURSE REASSESS COMMENT FT1
pt resting in bed, wife c/o pt feeling agitated, however upon assessment, pt appears calm, +tahcypneic but 97% room air, otherwise vitals stable, O2 nasal cannuula applied, pt with mumbling/garbled speech, unintelligible sounds, PA present, awaiting CT

## 2017-01-22 NOTE — ED PROVIDER NOTE - CARE PLAN
Principal Discharge DX:	Gastric tumor Principal Discharge DX:	Gastric tumor  Secondary Diagnosis:	Lethargy  Secondary Diagnosis:	Confusion

## 2017-01-22 NOTE — H&P ADULT. - ASSESSMENT
73 yo male with PMHx of HTN, HLD, CVA with residual dysphagia s/p PEG, Gastric tumor p/w diffuse abdominal pain and worsening confusion.  The patient's family reports that he had an endoscopy one week ago with biopsy.  Since then he has been c/o diffuse body pain, worse in the RUQ of the abdomen, associated with subjective fevers and cough.  The patient's family reports that he has also been increasingly confused, described as "not making sense." Denies CP, SOB, NVD, dysuria.  The patient's family also reports that he is still tolerating liquids PO, but has had some coughing/choking with liquids recently.

## 2017-01-23 LAB
ANION GAP SERPL CALC-SCNC: 15 MMOL/L — SIGNIFICANT CHANGE UP (ref 5–17)
BUN SERPL-MCNC: 15 MG/DL — SIGNIFICANT CHANGE UP (ref 7–23)
CALCIUM SERPL-MCNC: 9.8 MG/DL — SIGNIFICANT CHANGE UP (ref 8.4–10.5)
CHLORIDE SERPL-SCNC: 103 MMOL/L — SIGNIFICANT CHANGE UP (ref 96–108)
CO2 SERPL-SCNC: 23 MMOL/L — SIGNIFICANT CHANGE UP (ref 22–31)
CREAT SERPL-MCNC: 1.05 MG/DL — SIGNIFICANT CHANGE UP (ref 0.5–1.3)
CULTURE RESULTS: SIGNIFICANT CHANGE UP
GLUCOSE SERPL-MCNC: 82 MG/DL — SIGNIFICANT CHANGE UP (ref 70–99)
HCT VFR BLD CALC: 39.6 % — SIGNIFICANT CHANGE UP (ref 39–50)
HGB BLD-MCNC: 13.2 G/DL — SIGNIFICANT CHANGE UP (ref 13–17)
MCHC RBC-ENTMCNC: 30.9 PG — SIGNIFICANT CHANGE UP (ref 27–34)
MCHC RBC-ENTMCNC: 33.3 GM/DL — SIGNIFICANT CHANGE UP (ref 32–36)
MCV RBC AUTO: 92.7 FL — SIGNIFICANT CHANGE UP (ref 80–100)
PLATELET # BLD AUTO: 197 K/UL — SIGNIFICANT CHANGE UP (ref 150–400)
POTASSIUM SERPL-MCNC: 4.3 MMOL/L — SIGNIFICANT CHANGE UP (ref 3.5–5.3)
POTASSIUM SERPL-SCNC: 4.3 MMOL/L — SIGNIFICANT CHANGE UP (ref 3.5–5.3)
RBC # BLD: 4.27 M/UL — SIGNIFICANT CHANGE UP (ref 4.2–5.8)
RBC # FLD: 12.7 % — SIGNIFICANT CHANGE UP (ref 10.3–14.5)
SODIUM SERPL-SCNC: 141 MMOL/L — SIGNIFICANT CHANGE UP (ref 135–145)
SPECIMEN SOURCE: SIGNIFICANT CHANGE UP
WBC # BLD: 4.6 K/UL — SIGNIFICANT CHANGE UP (ref 3.8–10.5)
WBC # FLD AUTO: 4.6 K/UL — SIGNIFICANT CHANGE UP (ref 3.8–10.5)

## 2017-01-23 RX ORDER — GABAPENTIN 400 MG/1
300 CAPSULE ORAL THREE TIMES A DAY
Qty: 0 | Refills: 0 | Status: DISCONTINUED | OUTPATIENT
Start: 2017-01-23 | End: 2017-02-13

## 2017-01-23 RX ORDER — LISINOPRIL 2.5 MG/1
2.5 TABLET ORAL DAILY
Qty: 0 | Refills: 0 | Status: DISCONTINUED | OUTPATIENT
Start: 2017-01-23 | End: 2017-02-09

## 2017-01-23 RX ORDER — QUETIAPINE FUMARATE 200 MG/1
25 TABLET, FILM COATED ORAL AT BEDTIME
Qty: 0 | Refills: 0 | Status: DISCONTINUED | OUTPATIENT
Start: 2017-01-23 | End: 2017-02-13

## 2017-01-23 RX ORDER — ATORVASTATIN CALCIUM 80 MG/1
80 TABLET, FILM COATED ORAL AT BEDTIME
Qty: 0 | Refills: 0 | Status: DISCONTINUED | OUTPATIENT
Start: 2017-01-23 | End: 2017-02-13

## 2017-01-23 RX ORDER — SENNA PLUS 8.6 MG/1
5 TABLET ORAL AT BEDTIME
Qty: 0 | Refills: 0 | Status: DISCONTINUED | OUTPATIENT
Start: 2017-01-23 | End: 2017-02-13

## 2017-01-23 RX ORDER — AMLODIPINE BESYLATE 2.5 MG/1
5 TABLET ORAL DAILY
Qty: 0 | Refills: 0 | Status: DISCONTINUED | OUTPATIENT
Start: 2017-01-23 | End: 2017-02-13

## 2017-01-23 RX ORDER — MIRTAZAPINE 45 MG/1
15 TABLET, ORALLY DISINTEGRATING ORAL DAILY
Qty: 0 | Refills: 0 | Status: DISCONTINUED | OUTPATIENT
Start: 2017-01-23 | End: 2017-02-13

## 2017-01-23 RX ORDER — FAMOTIDINE 10 MG/ML
20 INJECTION INTRAVENOUS DAILY
Qty: 0 | Refills: 0 | Status: DISCONTINUED | OUTPATIENT
Start: 2017-01-23 | End: 2017-02-13

## 2017-01-23 RX ORDER — GABAPENTIN 400 MG/1
300 CAPSULE ORAL THREE TIMES A DAY
Qty: 0 | Refills: 0 | Status: DISCONTINUED | OUTPATIENT
Start: 2017-01-23 | End: 2017-01-23

## 2017-01-23 RX ORDER — METOPROLOL TARTRATE 50 MG
50 TABLET ORAL
Qty: 0 | Refills: 0 | Status: DISCONTINUED | OUTPATIENT
Start: 2017-01-23 | End: 2017-02-13

## 2017-01-23 RX ORDER — POLYETHYLENE GLYCOL 3350 17 G/17G
17 POWDER, FOR SOLUTION ORAL DAILY
Qty: 0 | Refills: 0 | Status: DISCONTINUED | OUTPATIENT
Start: 2017-01-23 | End: 2017-02-13

## 2017-01-23 RX ADMIN — Medication 50 MILLIGRAM(S): at 17:31

## 2017-01-23 RX ADMIN — GABAPENTIN 300 MILLIGRAM(S): 400 CAPSULE ORAL at 21:25

## 2017-01-23 RX ADMIN — QUETIAPINE FUMARATE 25 MILLIGRAM(S): 200 TABLET, FILM COATED ORAL at 21:25

## 2017-01-23 RX ADMIN — AMLODIPINE BESYLATE 5 MILLIGRAM(S): 2.5 TABLET ORAL at 06:16

## 2017-01-23 RX ADMIN — LISINOPRIL 2.5 MILLIGRAM(S): 2.5 TABLET ORAL at 06:16

## 2017-01-23 RX ADMIN — Medication 50 MILLIGRAM(S): at 01:15

## 2017-01-23 RX ADMIN — Medication 81 MILLIGRAM(S): at 11:55

## 2017-01-23 RX ADMIN — HEPARIN SODIUM 5000 UNIT(S): 5000 INJECTION INTRAVENOUS; SUBCUTANEOUS at 06:17

## 2017-01-23 RX ADMIN — POLYETHYLENE GLYCOL 3350 17 GRAM(S): 17 POWDER, FOR SOLUTION ORAL at 13:08

## 2017-01-23 RX ADMIN — FAMOTIDINE 20 MILLIGRAM(S): 10 INJECTION INTRAVENOUS at 11:55

## 2017-01-23 RX ADMIN — HEPARIN SODIUM 5000 UNIT(S): 5000 INJECTION INTRAVENOUS; SUBCUTANEOUS at 17:31

## 2017-01-23 RX ADMIN — SENNA PLUS 5 MILLILITER(S): 8.6 TABLET ORAL at 21:25

## 2017-01-23 RX ADMIN — GABAPENTIN 300 MILLIGRAM(S): 400 CAPSULE ORAL at 13:07

## 2017-01-23 RX ADMIN — ATORVASTATIN CALCIUM 80 MILLIGRAM(S): 80 TABLET, FILM COATED ORAL at 21:25

## 2017-01-23 RX ADMIN — CLOPIDOGREL BISULFATE 75 MILLIGRAM(S): 75 TABLET, FILM COATED ORAL at 11:55

## 2017-01-23 RX ADMIN — GABAPENTIN 300 MILLIGRAM(S): 400 CAPSULE ORAL at 01:10

## 2017-01-23 RX ADMIN — GABAPENTIN 300 MILLIGRAM(S): 400 CAPSULE ORAL at 06:17

## 2017-01-23 RX ADMIN — MIRTAZAPINE 15 MILLIGRAM(S): 45 TABLET, ORALLY DISINTEGRATING ORAL at 13:08

## 2017-01-23 NOTE — PATIENT PROFILE ADULT. - LANGUAGE ASSISTANCE NEEDED
Pt mostly nonverbal, but family accepts/Yes-Patient/Caregiver accepts free interpretation services...

## 2017-01-24 LAB
ANION GAP SERPL CALC-SCNC: 17 MMOL/L — SIGNIFICANT CHANGE UP (ref 5–17)
BUN SERPL-MCNC: 21 MG/DL — SIGNIFICANT CHANGE UP (ref 7–23)
CALCIUM SERPL-MCNC: 9.7 MG/DL — SIGNIFICANT CHANGE UP (ref 8.4–10.5)
CHLORIDE SERPL-SCNC: 104 MMOL/L — SIGNIFICANT CHANGE UP (ref 96–108)
CO2 SERPL-SCNC: 20 MMOL/L — LOW (ref 22–31)
CREAT SERPL-MCNC: 1.16 MG/DL — SIGNIFICANT CHANGE UP (ref 0.5–1.3)
GLUCOSE SERPL-MCNC: 87 MG/DL — SIGNIFICANT CHANGE UP (ref 70–99)
HCT VFR BLD CALC: 36.9 % — LOW (ref 39–50)
HGB BLD-MCNC: 12.4 G/DL — LOW (ref 13–17)
MCHC RBC-ENTMCNC: 30.8 PG — SIGNIFICANT CHANGE UP (ref 27–34)
MCHC RBC-ENTMCNC: 33.6 GM/DL — SIGNIFICANT CHANGE UP (ref 32–36)
MCV RBC AUTO: 91.6 FL — SIGNIFICANT CHANGE UP (ref 80–100)
PLATELET # BLD AUTO: 202 K/UL — SIGNIFICANT CHANGE UP (ref 150–400)
POTASSIUM SERPL-MCNC: 4.1 MMOL/L — SIGNIFICANT CHANGE UP (ref 3.5–5.3)
POTASSIUM SERPL-SCNC: 4.1 MMOL/L — SIGNIFICANT CHANGE UP (ref 3.5–5.3)
RBC # BLD: 4.03 M/UL — LOW (ref 4.2–5.8)
RBC # FLD: 12.6 % — SIGNIFICANT CHANGE UP (ref 10.3–14.5)
SODIUM SERPL-SCNC: 141 MMOL/L — SIGNIFICANT CHANGE UP (ref 135–145)
WBC # BLD: 3.9 K/UL — SIGNIFICANT CHANGE UP (ref 3.8–10.5)
WBC # FLD AUTO: 3.9 K/UL — SIGNIFICANT CHANGE UP (ref 3.8–10.5)

## 2017-01-24 RX ADMIN — SENNA PLUS 5 MILLILITER(S): 8.6 TABLET ORAL at 22:23

## 2017-01-24 RX ADMIN — HEPARIN SODIUM 5000 UNIT(S): 5000 INJECTION INTRAVENOUS; SUBCUTANEOUS at 05:42

## 2017-01-24 RX ADMIN — GABAPENTIN 300 MILLIGRAM(S): 400 CAPSULE ORAL at 13:39

## 2017-01-24 RX ADMIN — GABAPENTIN 300 MILLIGRAM(S): 400 CAPSULE ORAL at 05:35

## 2017-01-24 RX ADMIN — CLOPIDOGREL BISULFATE 75 MILLIGRAM(S): 75 TABLET, FILM COATED ORAL at 13:37

## 2017-01-24 RX ADMIN — AMLODIPINE BESYLATE 5 MILLIGRAM(S): 2.5 TABLET ORAL at 05:35

## 2017-01-24 RX ADMIN — Medication 50 MILLIGRAM(S): at 18:24

## 2017-01-24 RX ADMIN — Medication 50 MILLIGRAM(S): at 05:35

## 2017-01-24 RX ADMIN — LISINOPRIL 2.5 MILLIGRAM(S): 2.5 TABLET ORAL at 05:35

## 2017-01-24 RX ADMIN — POLYETHYLENE GLYCOL 3350 17 GRAM(S): 17 POWDER, FOR SOLUTION ORAL at 13:37

## 2017-01-24 RX ADMIN — MIRTAZAPINE 15 MILLIGRAM(S): 45 TABLET, ORALLY DISINTEGRATING ORAL at 13:37

## 2017-01-24 RX ADMIN — QUETIAPINE FUMARATE 25 MILLIGRAM(S): 200 TABLET, FILM COATED ORAL at 22:23

## 2017-01-24 RX ADMIN — Medication 81 MILLIGRAM(S): at 13:38

## 2017-01-24 RX ADMIN — HEPARIN SODIUM 5000 UNIT(S): 5000 INJECTION INTRAVENOUS; SUBCUTANEOUS at 18:24

## 2017-01-24 RX ADMIN — ATORVASTATIN CALCIUM 80 MILLIGRAM(S): 80 TABLET, FILM COATED ORAL at 22:23

## 2017-01-24 RX ADMIN — GABAPENTIN 300 MILLIGRAM(S): 400 CAPSULE ORAL at 22:23

## 2017-01-24 RX ADMIN — FAMOTIDINE 20 MILLIGRAM(S): 10 INJECTION INTRAVENOUS at 13:38

## 2017-01-24 NOTE — DIETITIAN INITIAL EVALUATION ADULT. - ORAL INTAKE PTA
Po intake PTA unclear at this time. Pt with PEG. Per H&P pt received enteral nutrition PTA. Possible po intake of dysphagia fluids PTA per chart review.

## 2017-01-24 NOTE — PHYSICAL THERAPY INITIAL EVALUATION ADULT - PERTINENT HX OF CURRENT PROBLEM, REHAB EVAL
73 yo male with PMHx of HTN, HLD, CVA with residual dysphagia s/p PEG, Gastric tumor p/w diffuse abdominal pain and worsening confusion.  The patient's family reports that he had an endoscopy one week ago with biopsy.  Since then he has been c/o diffuse body pain, worse in the RUQ of the abdomen, associated with subjective fevers and cough. contd below:

## 2017-01-24 NOTE — DIETITIAN INITIAL EVALUATION ADULT. - OTHER INFO
Nutrition consult received for enteral nutrition recommendations.  Pt admitted with abdominal pain and worsening confusion. Noted with hx including, HTN, HLD, CVA with residual dysphagia s/p PEG, Gastric tumor per H&P. Pt currently NPO with Jevity 1.2 infusing @20ml/hr x24 hrs. No N+V noted. No BM since 1/22 admission. NKFA noted in chart. Pt with hx of dysphagia per previous admission speech/swallow evaluation notes.

## 2017-01-24 NOTE — DIETITIAN INITIAL EVALUATION ADULT. - ENERGY NEEDS
Height: 66 inches, Weight: 131.6 pounds, BMI: 21.2 kg/m2 IBW: 142 pounds (+/-10%), %IBW: 93%  No edema, no pressure ulcers noted in RN flowsheets.

## 2017-01-24 NOTE — PHYSICAL THERAPY INITIAL EVALUATION ADULT - ADDITIONAL COMMENTS
contd from above:  The patient's family reports that he has also been increasingly confused, described as "not making sense." Denies CP, SOB, NVD, dysuria.  The patient's family also reports that he is still tolerating liquids PO, but has had some coughing/choking with liquids recently. CT abd: s/p partial gastrectomy with worsening focal gastric thickening; CT head: (-), CXR(-)    social history: pt poor historian, Luxembourger speaking but appears confused. pt states lives with wife in private house, with stairs and ambulates with straight cane, questionable reliability due to confusion

## 2017-01-24 NOTE — PHYSICAL THERAPY INITIAL EVALUATION ADULT - DISCHARGE DISPOSITION, PT EVAL
home w/ home PT/for balance, gait and strengthening and assistance for all functional activities, rolling walker

## 2017-01-24 NOTE — DIETITIAN INITIAL EVALUATION ADULT. - NS AS NUTRI INTERV ENTERAL NUTRITION
Recommend advancing as tolerated by 10ml/hr q4-6hrs to Jevity 1.2 @ 55ml/hr x 24 hrs. At goal, feed provides: 1584 kcal, 73 g protein (~26.5 kcal/kg, 1.2 g protein/kg based ). Discussed with medicine NP. RD to remain available for further nutritional interventions as indicated/requested by medical team/pt. Recommend advancing as tolerated by 10ml/hr q4-6hrs to Jevity 1.2 @ 55ml/hr x 24 hrs. At goal, feed provides: 1584 kcal, 73 g protein (~26.5 kcal/kg, 1.2 g protein/kg based ). Discussed with medicine NP(#[id number]). RD to remain available for further nutritional interventions as indicated/requested by medical team/pt. Recommend advancing as tolerated by 10ml/hr q4-6hrs to Jevity 1.2 @ 55ml/hr x 24 hrs. At goal, feed provides: 1584 kcal, 73 g protein (~26.5 kcal/kg, 1.2 g protein/kg based on dosing wt of 59.7kg). Discussed with medicine NP(#[id number]). RD to remain available for further nutritional interventions as indicated/requested by medical team/pt.

## 2017-01-24 NOTE — PHYSICAL THERAPY INITIAL EVALUATION ADULT - ACTIVE RANGE OF MOTION EXAMINATION, REHAB EVAL
bilateral  lower extremity Active ROM was WFL (within functional limits)/right shoulder flex 80 degrees, left shoulder flex 100 degrees/bilateral upper extremity Active ROM was WFL (within functional limits)

## 2017-01-24 NOTE — DIETITIAN INITIAL EVALUATION ADULT. - NS FNS REASON FOR WEIGHT CHANG
Weight History: Per previous RD notes, pt with UBW of ~135 pounds. Pt admitted with dosing wt of 131.6 pounds (~3 lb wt gain from January 2, 2017 wt of 128 lb). Per previous RD notes, pt with ~10 lb weight gain from August 2016 to January 2017 (noted with significant wt loss prior to August 2016 admission).

## 2017-01-24 NOTE — DIETITIAN INITIAL EVALUATION ADULT. - PT NOT SOURCE
other (specify)/pt with limited participation in RD interview. per H&P and flowsheets, pt confused and "mostly non-verbal". Pt declined  phone.

## 2017-01-24 NOTE — DIETITIAN INITIAL EVALUATION ADULT. - SOURCE
chart/medical record, including previous RD notes from January 2 2017 and August 2016/patient/other (specify) patient/other (specify)/chart/medical record, including previous RD notes from January 2 2017 and August 2016; RN Case Manager; NP

## 2017-01-25 ENCOUNTER — CHART COPY (OUTPATIENT)
Age: 72
End: 2017-01-25

## 2017-01-25 LAB
ANION GAP SERPL CALC-SCNC: 15 MMOL/L — SIGNIFICANT CHANGE UP (ref 5–17)
BASOPHILS # BLD AUTO: 0.03 K/UL — SIGNIFICANT CHANGE UP (ref 0–0.2)
BASOPHILS NFR BLD AUTO: 0.7 % — SIGNIFICANT CHANGE UP (ref 0–2)
BUN SERPL-MCNC: 17 MG/DL — SIGNIFICANT CHANGE UP (ref 7–23)
CALCIUM SERPL-MCNC: 9.3 MG/DL — SIGNIFICANT CHANGE UP (ref 8.4–10.5)
CHLORIDE SERPL-SCNC: 102 MMOL/L — SIGNIFICANT CHANGE UP (ref 96–108)
CO2 SERPL-SCNC: 22 MMOL/L — SIGNIFICANT CHANGE UP (ref 22–31)
CREAT SERPL-MCNC: 1.12 MG/DL — SIGNIFICANT CHANGE UP (ref 0.5–1.3)
EOSINOPHIL # BLD AUTO: 0.3 K/UL — SIGNIFICANT CHANGE UP (ref 0–0.5)
EOSINOPHIL NFR BLD AUTO: 7.3 % — HIGH (ref 0–6)
GLUCOSE SERPL-MCNC: 114 MG/DL — HIGH (ref 70–99)
HCT VFR BLD CALC: 36.1 % — LOW (ref 39–50)
HGB BLD-MCNC: 12.1 G/DL — LOW (ref 13–17)
IMM GRANULOCYTES NFR BLD AUTO: 0 % — SIGNIFICANT CHANGE UP (ref 0–1.5)
LYMPHOCYTES # BLD AUTO: 1.26 K/UL — SIGNIFICANT CHANGE UP (ref 1–3.3)
LYMPHOCYTES # BLD AUTO: 30.8 % — SIGNIFICANT CHANGE UP (ref 13–44)
MCHC RBC-ENTMCNC: 30.9 PG — SIGNIFICANT CHANGE UP (ref 27–34)
MCHC RBC-ENTMCNC: 33.5 GM/DL — SIGNIFICANT CHANGE UP (ref 32–36)
MCV RBC AUTO: 92.1 FL — SIGNIFICANT CHANGE UP (ref 80–100)
MONOCYTES # BLD AUTO: 0.5 K/UL — SIGNIFICANT CHANGE UP (ref 0–0.9)
MONOCYTES NFR BLD AUTO: 12.2 % — SIGNIFICANT CHANGE UP (ref 2–14)
NEUTROPHILS # BLD AUTO: 2 K/UL — SIGNIFICANT CHANGE UP (ref 1.8–7.4)
NEUTROPHILS NFR BLD AUTO: 49 % — SIGNIFICANT CHANGE UP (ref 43–77)
PLATELET # BLD AUTO: 183 K/UL — SIGNIFICANT CHANGE UP (ref 150–400)
POTASSIUM SERPL-MCNC: 3.7 MMOL/L — SIGNIFICANT CHANGE UP (ref 3.5–5.3)
POTASSIUM SERPL-SCNC: 3.7 MMOL/L — SIGNIFICANT CHANGE UP (ref 3.5–5.3)
PSA FLD-MCNC: 36.19 NG/ML — HIGH (ref 0–4)
RBC # BLD: 3.92 M/UL — LOW (ref 4.2–5.8)
RBC # FLD: 12.7 % — SIGNIFICANT CHANGE UP (ref 10.3–14.5)
SODIUM SERPL-SCNC: 139 MMOL/L — SIGNIFICANT CHANGE UP (ref 135–145)
WBC # BLD: 4.09 K/UL — SIGNIFICANT CHANGE UP (ref 3.8–10.5)
WBC # FLD AUTO: 4.09 K/UL — SIGNIFICANT CHANGE UP (ref 3.8–10.5)

## 2017-01-25 RX ADMIN — AMLODIPINE BESYLATE 5 MILLIGRAM(S): 2.5 TABLET ORAL at 05:56

## 2017-01-25 RX ADMIN — POLYETHYLENE GLYCOL 3350 17 GRAM(S): 17 POWDER, FOR SOLUTION ORAL at 14:37

## 2017-01-25 RX ADMIN — HEPARIN SODIUM 5000 UNIT(S): 5000 INJECTION INTRAVENOUS; SUBCUTANEOUS at 17:20

## 2017-01-25 RX ADMIN — HEPARIN SODIUM 5000 UNIT(S): 5000 INJECTION INTRAVENOUS; SUBCUTANEOUS at 05:56

## 2017-01-25 RX ADMIN — Medication 50 MILLIGRAM(S): at 05:56

## 2017-01-25 RX ADMIN — CLOPIDOGREL BISULFATE 75 MILLIGRAM(S): 75 TABLET, FILM COATED ORAL at 14:37

## 2017-01-25 RX ADMIN — ATORVASTATIN CALCIUM 80 MILLIGRAM(S): 80 TABLET, FILM COATED ORAL at 22:31

## 2017-01-25 RX ADMIN — Medication 81 MILLIGRAM(S): at 14:37

## 2017-01-25 RX ADMIN — FAMOTIDINE 20 MILLIGRAM(S): 10 INJECTION INTRAVENOUS at 14:36

## 2017-01-25 RX ADMIN — GABAPENTIN 300 MILLIGRAM(S): 400 CAPSULE ORAL at 14:36

## 2017-01-25 RX ADMIN — QUETIAPINE FUMARATE 25 MILLIGRAM(S): 200 TABLET, FILM COATED ORAL at 22:31

## 2017-01-25 RX ADMIN — MIRTAZAPINE 15 MILLIGRAM(S): 45 TABLET, ORALLY DISINTEGRATING ORAL at 14:37

## 2017-01-25 RX ADMIN — Medication 50 MILLIGRAM(S): at 17:20

## 2017-01-25 RX ADMIN — LISINOPRIL 2.5 MILLIGRAM(S): 2.5 TABLET ORAL at 05:56

## 2017-01-25 RX ADMIN — SENNA PLUS 5 MILLILITER(S): 8.6 TABLET ORAL at 22:31

## 2017-01-25 RX ADMIN — GABAPENTIN 300 MILLIGRAM(S): 400 CAPSULE ORAL at 22:31

## 2017-01-25 RX ADMIN — GABAPENTIN 300 MILLIGRAM(S): 400 CAPSULE ORAL at 05:57

## 2017-01-25 NOTE — SWALLOW BEDSIDE ASSESSMENT ADULT - SWALLOW EVAL: RECOMMENDED FEEDING/EATING TECHNIQUES
MD, please include these guidelines in the D/C plan as well: 1) Full assist with meals, 2)  ALL food/liquids must be given by teaspoon,  at slow rate 3) Encourage repeat, dry swallows for every bite and sip 4 ) Alternate food and liquids

## 2017-01-25 NOTE — SWALLOW BEDSIDE ASSESSMENT ADULT - SLP PERTINENT HISTORY OF CURRENT PROBLEM
73 yo M with PMH HTN, HLD, CVA with residual dysphagia s/p PEG, Gastric tumor p/w diffuse abdominal pain and worsening confusion. Pt's family reports that he had an endoscopy one week ago with biopsy. Since then he has been c/o diffuse body pain, worse in the RUQ of the abdomen, associated with subjective fevers and cough. Pt's family reports that he has also been increasingly confused, described as "not making sense." Denies CP, SOB, NVD, dysuria. Family also reports that he is still tolerating liquids PO, but has had some coughing/choking with liquids recently. Dx: Gastric tumor.

## 2017-01-25 NOTE — SWALLOW BEDSIDE ASSESSMENT ADULT - ORAL PHASE
trace post swallow residue in lateral sulci, cleared via repeat swallow or liquid wash/Decreased anterior-posterior movement of the bolus/Delayed oral transit time/Stasis in lateral sulci

## 2017-01-25 NOTE — SWALLOW BEDSIDE ASSESSMENT ADULT - DIET PRIOR TO ADMI
Primary means of nutrition/hydration/medications via PEG, +dysphagia 1 with honey thickened liquids QOL feeds

## 2017-01-25 NOTE — SWALLOW BEDSIDE ASSESSMENT ADULT - CONSISTENCIES ADMINISTERED
puree thick/puree thin/honey thick chewables and nectar thickened liquids not administered 2/2 recent MBS findings

## 2017-01-25 NOTE — SWALLOW BEDSIDE ASSESSMENT ADULT - SWALLOW EVAL: RECOMMENDED DIET
Dysphagia 1 with honey thickened liquids with 100% assistance and supervision. Would recommend primary nutrition/hydration/medications be provided via PEG given reported poor PO acceptance and decreased appetite; case d/w EMERALD Falcon RD in agreement. CRISTIAN Cope made aware

## 2017-01-25 NOTE — SWALLOW BEDSIDE ASSESSMENT ADULT - ASR SWALLOW RECOMMEND DIAG
Does not appear clinically indicated given recent MBS 1/4/16. If medical team concerned for aspiration, consider repeat study to r/o silent aspiration as cannot subjectively r/o at bedside.

## 2017-01-25 NOTE — SWALLOW BEDSIDE ASSESSMENT ADULT - SWALLOW EVAL: DIAGNOSIS
Patient known to this service with h/o dysphagia s/p PEG, now presents with oral and pharyngeal dysphagia c/w most recent MBS (1/4/16), characterized by impaired but functional oral management with administered consistencies, delayed pharyngeal swallow trigger, reduced hyolaryngeal elevation upon palpation, and s/s of pharyngeal retention. Patient known to this service with h/o dysphagia s/p PEG, now presents with 1) oral and pharyngeal dysphagia c/w most recent MBS (1/4/16), characterized by impaired but functional oral management with administered consistencies, delayed pharyngeal swallow trigger, reduced hyolaryngeal elevation upon palpation, and s/s of pharyngeal retention. 2) Suspect aphasia. Consider formal speech and language evaluation.

## 2017-01-25 NOTE — SWALLOW BEDSIDE ASSESSMENT ADULT - COMMENTS
HC: 1/22 CXR: Clear lungs. CT Head: No acute intracranial hemorrhage, mass effect, or evidence of acute territorial infarct. Chronic microvascular ischemic changes. Old infarcts suspected as described above. CT A/P: Status post partial gastrectomy with worsening focal gastric thickening at the surgical site, adjacent gastrohepatic lymphadenopathy, enlarging peritoneal masses and new hepatic lesions suggestive of local recurrence and metastatic disease. Stable suprarenal abdominal aneurysm. Markedly distended urinary bladder. Correlate clinically. Per body of report re: LOWER CHEST: Dependent change and atelectasis. The heart is enlarged. 1/23 Seen by Onc, pt with new R liver lsions, other findings as per CT. Will need to obtain further records as to path. Await results. 1/24 Seen by GI, Rx on c eval-> ? bx liver lesion; will defer. Rx outpt f/u with primary GI. C/w PEG feeds. Per Heme/onc, pt with GIST tumor progression and metastasis, should start Gleevac.    *See Addendum for Swallow History**

## 2017-01-25 NOTE — SWALLOW BEDSIDE ASSESSMENT ADULT - ASR SWALLOW ASPIRATION MONITOR
cough/throat clearing/upper respiratory infection/change of breathing pattern/gurgly voice/pneumonia/Monitor for s/s aspiration/laryngeal penetration. If noted:  D/C p.o. intake, provide non-oral nutrition/hydration/meds, and contact this service @ x00/fever

## 2017-01-25 NOTE — SWALLOW BEDSIDE ASSESSMENT ADULT - SLP GENERAL OBSERVATIONS
Pt encountered supine in bed, HOB elevated 30 degrees, receiving tube feeds via PEG. Evaluation performed in Ukrainian by this clinician who is a conversational speaker. Pt positioned upright for purpose of evaluation, TFs held for duration of assessment and reinitiated following. Pt A&Ox1, grossly to time and place (responded in the negative to foil [beach], responded in the affirmative to hospital, stated January as month given phonemic cue). +Paucity of output. Pt with attempts at respond verbally to questions, however word-findings deficits noted. Pt with intermittent groping upon attempts to communicate as well as perform oral tasks, possible verbal and oral apraxia components. Pt able to follow basic 1-step directives with ~75% accuracy. Vocal quality WNL in conversation.

## 2017-01-25 NOTE — SWALLOW BEDSIDE ASSESSMENT ADULT - SWALLOW EVAL: PATIENT/FAMILY GOALS STATEMENT
Case d/w pt's niece Melisa via telephone who called pt's spouse and provided translation for clinician. Niece and spouse endorsed pt/family strict adherence to swallow guidelines recommended s/p MBS (1/4/16) and primary nutrition/hydration/medications provided via PEG. Per niece pt with dx of new tumor, corresponds with decreased PO intake and reported decreased appetite. Pt's spouse provides dysphagia 1 and honey thickened liquid pleasure feeds when requested, however pt has been taking ~3-4 tspns of each per day. Re: H&P report of coughing/choking with liquids, pt's spouse states she misunderstood the question; pt coughs in general; unrelated to PO intake. Also states pt was coughing frequently on day of admission, however had not taken anything by mouth that day. Pt's niece educated at length re: recommendations, niece/spouse in agreement that pt should receive primary nutrition via enteral feeds.

## 2017-01-26 LAB
ANION GAP SERPL CALC-SCNC: 12 MMOL/L — SIGNIFICANT CHANGE UP (ref 5–17)
BUN SERPL-MCNC: 15 MG/DL — SIGNIFICANT CHANGE UP (ref 7–23)
CALCIUM SERPL-MCNC: 9 MG/DL — SIGNIFICANT CHANGE UP (ref 8.4–10.5)
CHLORIDE SERPL-SCNC: 104 MMOL/L — SIGNIFICANT CHANGE UP (ref 96–108)
CO2 SERPL-SCNC: 25 MMOL/L — SIGNIFICANT CHANGE UP (ref 22–31)
CREAT SERPL-MCNC: 0.96 MG/DL — SIGNIFICANT CHANGE UP (ref 0.5–1.3)
GLUCOSE SERPL-MCNC: 107 MG/DL — HIGH (ref 70–99)
HCT VFR BLD CALC: 35.3 % — LOW (ref 39–50)
HGB BLD-MCNC: 11.8 G/DL — LOW (ref 13–17)
MCHC RBC-ENTMCNC: 30.8 PG — SIGNIFICANT CHANGE UP (ref 27–34)
MCHC RBC-ENTMCNC: 33.4 GM/DL — SIGNIFICANT CHANGE UP (ref 32–36)
MCV RBC AUTO: 92.2 FL — SIGNIFICANT CHANGE UP (ref 80–100)
PLATELET # BLD AUTO: 178 K/UL — SIGNIFICANT CHANGE UP (ref 150–400)
POTASSIUM SERPL-MCNC: 3.8 MMOL/L — SIGNIFICANT CHANGE UP (ref 3.5–5.3)
POTASSIUM SERPL-SCNC: 3.8 MMOL/L — SIGNIFICANT CHANGE UP (ref 3.5–5.3)
RBC # BLD: 3.83 M/UL — LOW (ref 4.2–5.8)
RBC # FLD: 12.9 % — SIGNIFICANT CHANGE UP (ref 10.3–14.5)
SODIUM SERPL-SCNC: 141 MMOL/L — SIGNIFICANT CHANGE UP (ref 135–145)
WBC # BLD: 4.47 K/UL — SIGNIFICANT CHANGE UP (ref 3.8–10.5)
WBC # FLD AUTO: 4.47 K/UL — SIGNIFICANT CHANGE UP (ref 3.8–10.5)

## 2017-01-26 RX ORDER — IMATINIB MESYLATE 400 MG/1
400 TABLET, FILM COATED ORAL DAILY
Qty: 0 | Refills: 0 | Status: DISCONTINUED | OUTPATIENT
Start: 2017-01-26 | End: 2017-02-09

## 2017-01-26 RX ADMIN — FAMOTIDINE 20 MILLIGRAM(S): 10 INJECTION INTRAVENOUS at 11:18

## 2017-01-26 RX ADMIN — Medication 50 MILLIGRAM(S): at 17:39

## 2017-01-26 RX ADMIN — ATORVASTATIN CALCIUM 80 MILLIGRAM(S): 80 TABLET, FILM COATED ORAL at 23:18

## 2017-01-26 RX ADMIN — HEPARIN SODIUM 5000 UNIT(S): 5000 INJECTION INTRAVENOUS; SUBCUTANEOUS at 17:39

## 2017-01-26 RX ADMIN — QUETIAPINE FUMARATE 25 MILLIGRAM(S): 200 TABLET, FILM COATED ORAL at 23:19

## 2017-01-26 RX ADMIN — SENNA PLUS 5 MILLILITER(S): 8.6 TABLET ORAL at 23:16

## 2017-01-26 RX ADMIN — Medication 50 MILLIGRAM(S): at 05:25

## 2017-01-26 RX ADMIN — HEPARIN SODIUM 5000 UNIT(S): 5000 INJECTION INTRAVENOUS; SUBCUTANEOUS at 05:25

## 2017-01-26 RX ADMIN — Medication 81 MILLIGRAM(S): at 11:17

## 2017-01-26 RX ADMIN — CLOPIDOGREL BISULFATE 75 MILLIGRAM(S): 75 TABLET, FILM COATED ORAL at 11:18

## 2017-01-26 RX ADMIN — POLYETHYLENE GLYCOL 3350 17 GRAM(S): 17 POWDER, FOR SOLUTION ORAL at 11:19

## 2017-01-26 RX ADMIN — AMLODIPINE BESYLATE 5 MILLIGRAM(S): 2.5 TABLET ORAL at 05:25

## 2017-01-26 RX ADMIN — GABAPENTIN 300 MILLIGRAM(S): 400 CAPSULE ORAL at 23:17

## 2017-01-26 RX ADMIN — MIRTAZAPINE 15 MILLIGRAM(S): 45 TABLET, ORALLY DISINTEGRATING ORAL at 11:19

## 2017-01-26 RX ADMIN — LISINOPRIL 2.5 MILLIGRAM(S): 2.5 TABLET ORAL at 05:25

## 2017-01-26 RX ADMIN — GABAPENTIN 300 MILLIGRAM(S): 400 CAPSULE ORAL at 05:25

## 2017-01-26 RX ADMIN — GABAPENTIN 300 MILLIGRAM(S): 400 CAPSULE ORAL at 13:37

## 2017-01-27 LAB
ANION GAP SERPL CALC-SCNC: 12 MMOL/L — SIGNIFICANT CHANGE UP (ref 5–17)
BUN SERPL-MCNC: 18 MG/DL — SIGNIFICANT CHANGE UP (ref 7–23)
CALCIUM SERPL-MCNC: 8.8 MG/DL — SIGNIFICANT CHANGE UP (ref 8.4–10.5)
CHLORIDE SERPL-SCNC: 102 MMOL/L — SIGNIFICANT CHANGE UP (ref 96–108)
CO2 SERPL-SCNC: 27 MMOL/L — SIGNIFICANT CHANGE UP (ref 22–31)
CREAT SERPL-MCNC: 1.06 MG/DL — SIGNIFICANT CHANGE UP (ref 0.5–1.3)
GLUCOSE SERPL-MCNC: 96 MG/DL — SIGNIFICANT CHANGE UP (ref 70–99)
POTASSIUM SERPL-MCNC: 4.1 MMOL/L — SIGNIFICANT CHANGE UP (ref 3.5–5.3)
POTASSIUM SERPL-SCNC: 4.1 MMOL/L — SIGNIFICANT CHANGE UP (ref 3.5–5.3)
SODIUM SERPL-SCNC: 141 MMOL/L — SIGNIFICANT CHANGE UP (ref 135–145)

## 2017-01-27 RX ADMIN — ATORVASTATIN CALCIUM 80 MILLIGRAM(S): 80 TABLET, FILM COATED ORAL at 21:56

## 2017-01-27 RX ADMIN — GABAPENTIN 300 MILLIGRAM(S): 400 CAPSULE ORAL at 05:51

## 2017-01-27 RX ADMIN — HEPARIN SODIUM 5000 UNIT(S): 5000 INJECTION INTRAVENOUS; SUBCUTANEOUS at 18:36

## 2017-01-27 RX ADMIN — GABAPENTIN 300 MILLIGRAM(S): 400 CAPSULE ORAL at 21:56

## 2017-01-27 RX ADMIN — Medication 50 MILLIGRAM(S): at 18:37

## 2017-01-27 RX ADMIN — SODIUM CHLORIDE 50 MILLILITER(S): 9 INJECTION INTRAMUSCULAR; INTRAVENOUS; SUBCUTANEOUS at 22:55

## 2017-01-27 RX ADMIN — HEPARIN SODIUM 5000 UNIT(S): 5000 INJECTION INTRAVENOUS; SUBCUTANEOUS at 05:51

## 2017-01-27 RX ADMIN — AMLODIPINE BESYLATE 5 MILLIGRAM(S): 2.5 TABLET ORAL at 05:51

## 2017-01-27 RX ADMIN — SENNA PLUS 5 MILLILITER(S): 8.6 TABLET ORAL at 21:56

## 2017-01-27 RX ADMIN — Medication 50 MILLIGRAM(S): at 05:51

## 2017-01-27 RX ADMIN — Medication 81 MILLIGRAM(S): at 13:07

## 2017-01-27 RX ADMIN — CLOPIDOGREL BISULFATE 75 MILLIGRAM(S): 75 TABLET, FILM COATED ORAL at 13:07

## 2017-01-27 RX ADMIN — MIRTAZAPINE 15 MILLIGRAM(S): 45 TABLET, ORALLY DISINTEGRATING ORAL at 13:07

## 2017-01-27 RX ADMIN — QUETIAPINE FUMARATE 25 MILLIGRAM(S): 200 TABLET, FILM COATED ORAL at 21:58

## 2017-01-27 RX ADMIN — LISINOPRIL 2.5 MILLIGRAM(S): 2.5 TABLET ORAL at 05:50

## 2017-01-27 RX ADMIN — POLYETHYLENE GLYCOL 3350 17 GRAM(S): 17 POWDER, FOR SOLUTION ORAL at 13:07

## 2017-01-27 RX ADMIN — FAMOTIDINE 20 MILLIGRAM(S): 10 INJECTION INTRAVENOUS at 13:07

## 2017-01-27 RX ADMIN — IMATINIB MESYLATE 400 MILLIGRAM(S): 400 TABLET, FILM COATED ORAL at 13:07

## 2017-01-27 RX ADMIN — GABAPENTIN 300 MILLIGRAM(S): 400 CAPSULE ORAL at 13:07

## 2017-01-28 RX ADMIN — IMATINIB MESYLATE 400 MILLIGRAM(S): 400 TABLET, FILM COATED ORAL at 13:17

## 2017-01-28 RX ADMIN — AMLODIPINE BESYLATE 5 MILLIGRAM(S): 2.5 TABLET ORAL at 05:58

## 2017-01-28 RX ADMIN — FAMOTIDINE 20 MILLIGRAM(S): 10 INJECTION INTRAVENOUS at 11:40

## 2017-01-28 RX ADMIN — Medication 50 MILLIGRAM(S): at 06:03

## 2017-01-28 RX ADMIN — GABAPENTIN 300 MILLIGRAM(S): 400 CAPSULE ORAL at 13:22

## 2017-01-28 RX ADMIN — Medication 81 MILLIGRAM(S): at 11:39

## 2017-01-28 RX ADMIN — GABAPENTIN 300 MILLIGRAM(S): 400 CAPSULE ORAL at 06:01

## 2017-01-28 RX ADMIN — CLOPIDOGREL BISULFATE 75 MILLIGRAM(S): 75 TABLET, FILM COATED ORAL at 11:40

## 2017-01-28 RX ADMIN — POLYETHYLENE GLYCOL 3350 17 GRAM(S): 17 POWDER, FOR SOLUTION ORAL at 11:41

## 2017-01-28 RX ADMIN — HEPARIN SODIUM 5000 UNIT(S): 5000 INJECTION INTRAVENOUS; SUBCUTANEOUS at 05:58

## 2017-01-28 RX ADMIN — GABAPENTIN 300 MILLIGRAM(S): 400 CAPSULE ORAL at 23:13

## 2017-01-28 RX ADMIN — SENNA PLUS 5 MILLILITER(S): 8.6 TABLET ORAL at 23:23

## 2017-01-28 RX ADMIN — Medication 50 MILLIGRAM(S): at 17:43

## 2017-01-28 RX ADMIN — HEPARIN SODIUM 5000 UNIT(S): 5000 INJECTION INTRAVENOUS; SUBCUTANEOUS at 17:43

## 2017-01-28 RX ADMIN — MIRTAZAPINE 15 MILLIGRAM(S): 45 TABLET, ORALLY DISINTEGRATING ORAL at 13:22

## 2017-01-28 RX ADMIN — ATORVASTATIN CALCIUM 80 MILLIGRAM(S): 80 TABLET, FILM COATED ORAL at 23:12

## 2017-01-28 RX ADMIN — QUETIAPINE FUMARATE 25 MILLIGRAM(S): 200 TABLET, FILM COATED ORAL at 23:13

## 2017-01-28 RX ADMIN — LISINOPRIL 2.5 MILLIGRAM(S): 2.5 TABLET ORAL at 05:58

## 2017-01-29 LAB
ANION GAP SERPL CALC-SCNC: 14 MMOL/L — SIGNIFICANT CHANGE UP (ref 5–17)
BUN SERPL-MCNC: 19 MG/DL — SIGNIFICANT CHANGE UP (ref 7–23)
CALCIUM SERPL-MCNC: 8.6 MG/DL — SIGNIFICANT CHANGE UP (ref 8.4–10.5)
CHLORIDE SERPL-SCNC: 105 MMOL/L — SIGNIFICANT CHANGE UP (ref 96–108)
CO2 SERPL-SCNC: 23 MMOL/L — SIGNIFICANT CHANGE UP (ref 22–31)
CREAT SERPL-MCNC: 1.31 MG/DL — HIGH (ref 0.5–1.3)
GLUCOSE SERPL-MCNC: 105 MG/DL — HIGH (ref 70–99)
HCT VFR BLD CALC: 32.8 % — LOW (ref 39–50)
HGB BLD-MCNC: 10.8 G/DL — LOW (ref 13–17)
MCHC RBC-ENTMCNC: 30.9 PG — SIGNIFICANT CHANGE UP (ref 27–34)
MCHC RBC-ENTMCNC: 32.9 GM/DL — SIGNIFICANT CHANGE UP (ref 32–36)
MCV RBC AUTO: 94 FL — SIGNIFICANT CHANGE UP (ref 80–100)
PLATELET # BLD AUTO: 168 K/UL — SIGNIFICANT CHANGE UP (ref 150–400)
POTASSIUM SERPL-MCNC: 4.4 MMOL/L — SIGNIFICANT CHANGE UP (ref 3.5–5.3)
POTASSIUM SERPL-SCNC: 4.4 MMOL/L — SIGNIFICANT CHANGE UP (ref 3.5–5.3)
RBC # BLD: 3.49 M/UL — LOW (ref 4.2–5.8)
RBC # FLD: 13.3 % — SIGNIFICANT CHANGE UP (ref 10.3–14.5)
SODIUM SERPL-SCNC: 142 MMOL/L — SIGNIFICANT CHANGE UP (ref 135–145)
WBC # BLD: 4.42 K/UL — SIGNIFICANT CHANGE UP (ref 3.8–10.5)
WBC # FLD AUTO: 4.42 K/UL — SIGNIFICANT CHANGE UP (ref 3.8–10.5)

## 2017-01-29 RX ADMIN — Medication 50 MILLIGRAM(S): at 05:20

## 2017-01-29 RX ADMIN — GABAPENTIN 300 MILLIGRAM(S): 400 CAPSULE ORAL at 22:29

## 2017-01-29 RX ADMIN — IMATINIB MESYLATE 400 MILLIGRAM(S): 400 TABLET, FILM COATED ORAL at 13:10

## 2017-01-29 RX ADMIN — POLYETHYLENE GLYCOL 3350 17 GRAM(S): 17 POWDER, FOR SOLUTION ORAL at 13:11

## 2017-01-29 RX ADMIN — Medication 81 MILLIGRAM(S): at 13:10

## 2017-01-29 RX ADMIN — HEPARIN SODIUM 5000 UNIT(S): 5000 INJECTION INTRAVENOUS; SUBCUTANEOUS at 05:19

## 2017-01-29 RX ADMIN — AMLODIPINE BESYLATE 5 MILLIGRAM(S): 2.5 TABLET ORAL at 05:19

## 2017-01-29 RX ADMIN — MIRTAZAPINE 15 MILLIGRAM(S): 45 TABLET, ORALLY DISINTEGRATING ORAL at 13:11

## 2017-01-29 RX ADMIN — GABAPENTIN 300 MILLIGRAM(S): 400 CAPSULE ORAL at 13:10

## 2017-01-29 RX ADMIN — LISINOPRIL 2.5 MILLIGRAM(S): 2.5 TABLET ORAL at 07:34

## 2017-01-29 RX ADMIN — GABAPENTIN 300 MILLIGRAM(S): 400 CAPSULE ORAL at 05:17

## 2017-01-29 RX ADMIN — SENNA PLUS 5 MILLILITER(S): 8.6 TABLET ORAL at 23:09

## 2017-01-29 RX ADMIN — ATORVASTATIN CALCIUM 80 MILLIGRAM(S): 80 TABLET, FILM COATED ORAL at 22:27

## 2017-01-29 RX ADMIN — CLOPIDOGREL BISULFATE 75 MILLIGRAM(S): 75 TABLET, FILM COATED ORAL at 13:10

## 2017-01-29 RX ADMIN — FAMOTIDINE 20 MILLIGRAM(S): 10 INJECTION INTRAVENOUS at 13:10

## 2017-01-29 RX ADMIN — HEPARIN SODIUM 5000 UNIT(S): 5000 INJECTION INTRAVENOUS; SUBCUTANEOUS at 18:30

## 2017-01-29 RX ADMIN — Medication 50 MILLIGRAM(S): at 18:30

## 2017-01-29 RX ADMIN — QUETIAPINE FUMARATE 25 MILLIGRAM(S): 200 TABLET, FILM COATED ORAL at 22:27

## 2017-01-30 LAB
HCT VFR BLD CALC: 34 % — LOW (ref 39–50)
HGB BLD-MCNC: 11.2 G/DL — LOW (ref 13–17)
MCHC RBC-ENTMCNC: 31 PG — SIGNIFICANT CHANGE UP (ref 27–34)
MCHC RBC-ENTMCNC: 32.9 GM/DL — SIGNIFICANT CHANGE UP (ref 32–36)
MCV RBC AUTO: 94.2 FL — SIGNIFICANT CHANGE UP (ref 80–100)
PLATELET # BLD AUTO: 157 K/UL — SIGNIFICANT CHANGE UP (ref 150–400)
RBC # BLD: 3.61 M/UL — LOW (ref 4.2–5.8)
RBC # FLD: 13 % — SIGNIFICANT CHANGE UP (ref 10.3–14.5)
WBC # BLD: 5.73 K/UL — SIGNIFICANT CHANGE UP (ref 3.8–10.5)
WBC # FLD AUTO: 5.73 K/UL — SIGNIFICANT CHANGE UP (ref 3.8–10.5)

## 2017-01-30 PROCEDURE — 74000: CPT | Mod: 26

## 2017-01-30 RX ORDER — HYDROMORPHONE HYDROCHLORIDE 2 MG/ML
0.5 INJECTION INTRAMUSCULAR; INTRAVENOUS; SUBCUTANEOUS EVERY 6 HOURS
Qty: 0 | Refills: 0 | Status: DISCONTINUED | OUTPATIENT
Start: 2017-01-30 | End: 2017-02-02

## 2017-01-30 RX ADMIN — Medication 81 MILLIGRAM(S): at 11:28

## 2017-01-30 RX ADMIN — HEPARIN SODIUM 5000 UNIT(S): 5000 INJECTION INTRAVENOUS; SUBCUTANEOUS at 06:21

## 2017-01-30 RX ADMIN — AMLODIPINE BESYLATE 5 MILLIGRAM(S): 2.5 TABLET ORAL at 06:22

## 2017-01-30 RX ADMIN — Medication 50 MILLIGRAM(S): at 06:21

## 2017-01-30 RX ADMIN — CLOPIDOGREL BISULFATE 75 MILLIGRAM(S): 75 TABLET, FILM COATED ORAL at 11:28

## 2017-01-30 RX ADMIN — GABAPENTIN 300 MILLIGRAM(S): 400 CAPSULE ORAL at 06:20

## 2017-01-30 RX ADMIN — IMATINIB MESYLATE 400 MILLIGRAM(S): 400 TABLET, FILM COATED ORAL at 12:15

## 2017-01-30 RX ADMIN — ATORVASTATIN CALCIUM 80 MILLIGRAM(S): 80 TABLET, FILM COATED ORAL at 21:27

## 2017-01-30 RX ADMIN — Medication 50 MILLIGRAM(S): at 17:45

## 2017-01-30 RX ADMIN — POLYETHYLENE GLYCOL 3350 17 GRAM(S): 17 POWDER, FOR SOLUTION ORAL at 11:29

## 2017-01-30 RX ADMIN — SODIUM CHLORIDE 50 MILLILITER(S): 9 INJECTION INTRAMUSCULAR; INTRAVENOUS; SUBCUTANEOUS at 06:22

## 2017-01-30 RX ADMIN — FAMOTIDINE 20 MILLIGRAM(S): 10 INJECTION INTRAVENOUS at 11:28

## 2017-01-30 RX ADMIN — MIRTAZAPINE 15 MILLIGRAM(S): 45 TABLET, ORALLY DISINTEGRATING ORAL at 13:20

## 2017-01-30 RX ADMIN — GABAPENTIN 300 MILLIGRAM(S): 400 CAPSULE ORAL at 21:27

## 2017-01-30 RX ADMIN — QUETIAPINE FUMARATE 25 MILLIGRAM(S): 200 TABLET, FILM COATED ORAL at 21:27

## 2017-01-30 RX ADMIN — SENNA PLUS 5 MILLILITER(S): 8.6 TABLET ORAL at 21:27

## 2017-01-30 RX ADMIN — GABAPENTIN 300 MILLIGRAM(S): 400 CAPSULE ORAL at 14:16

## 2017-01-30 RX ADMIN — HEPARIN SODIUM 5000 UNIT(S): 5000 INJECTION INTRAVENOUS; SUBCUTANEOUS at 17:45

## 2017-01-30 RX ADMIN — LISINOPRIL 2.5 MILLIGRAM(S): 2.5 TABLET ORAL at 06:21

## 2017-01-31 LAB
ANION GAP SERPL CALC-SCNC: 14 MMOL/L — SIGNIFICANT CHANGE UP (ref 5–17)
BUN SERPL-MCNC: 19 MG/DL — SIGNIFICANT CHANGE UP (ref 7–23)
CALCIUM SERPL-MCNC: 8.5 MG/DL — SIGNIFICANT CHANGE UP (ref 8.4–10.5)
CHLORIDE SERPL-SCNC: 104 MMOL/L — SIGNIFICANT CHANGE UP (ref 96–108)
CO2 SERPL-SCNC: 22 MMOL/L — SIGNIFICANT CHANGE UP (ref 22–31)
CREAT SERPL-MCNC: 1.31 MG/DL — HIGH (ref 0.5–1.3)
GLUCOSE SERPL-MCNC: 93 MG/DL — SIGNIFICANT CHANGE UP (ref 70–99)
HCT VFR BLD CALC: 34.3 % — LOW (ref 39–50)
HGB BLD-MCNC: 11.2 G/DL — LOW (ref 13–17)
MCHC RBC-ENTMCNC: 30.6 PG — SIGNIFICANT CHANGE UP (ref 27–34)
MCHC RBC-ENTMCNC: 32.7 GM/DL — SIGNIFICANT CHANGE UP (ref 32–36)
MCV RBC AUTO: 93.7 FL — SIGNIFICANT CHANGE UP (ref 80–100)
PLATELET # BLD AUTO: 170 K/UL — SIGNIFICANT CHANGE UP (ref 150–400)
POTASSIUM SERPL-MCNC: 4.4 MMOL/L — SIGNIFICANT CHANGE UP (ref 3.5–5.3)
POTASSIUM SERPL-SCNC: 4.4 MMOL/L — SIGNIFICANT CHANGE UP (ref 3.5–5.3)
RBC # BLD: 3.66 M/UL — LOW (ref 4.2–5.8)
RBC # FLD: 13.2 % — SIGNIFICANT CHANGE UP (ref 10.3–14.5)
SODIUM SERPL-SCNC: 140 MMOL/L — SIGNIFICANT CHANGE UP (ref 135–145)
WBC # BLD: 5.04 K/UL — SIGNIFICANT CHANGE UP (ref 3.8–10.5)
WBC # FLD AUTO: 5.04 K/UL — SIGNIFICANT CHANGE UP (ref 3.8–10.5)

## 2017-01-31 RX ADMIN — ATORVASTATIN CALCIUM 80 MILLIGRAM(S): 80 TABLET, FILM COATED ORAL at 22:06

## 2017-01-31 RX ADMIN — GABAPENTIN 300 MILLIGRAM(S): 400 CAPSULE ORAL at 22:05

## 2017-01-31 RX ADMIN — Medication 50 MILLIGRAM(S): at 06:07

## 2017-01-31 RX ADMIN — MIRTAZAPINE 15 MILLIGRAM(S): 45 TABLET, ORALLY DISINTEGRATING ORAL at 13:09

## 2017-01-31 RX ADMIN — LISINOPRIL 2.5 MILLIGRAM(S): 2.5 TABLET ORAL at 06:07

## 2017-01-31 RX ADMIN — HEPARIN SODIUM 5000 UNIT(S): 5000 INJECTION INTRAVENOUS; SUBCUTANEOUS at 06:07

## 2017-01-31 RX ADMIN — FAMOTIDINE 20 MILLIGRAM(S): 10 INJECTION INTRAVENOUS at 13:08

## 2017-01-31 RX ADMIN — GABAPENTIN 300 MILLIGRAM(S): 400 CAPSULE ORAL at 13:52

## 2017-01-31 RX ADMIN — SENNA PLUS 5 MILLILITER(S): 8.6 TABLET ORAL at 22:05

## 2017-01-31 RX ADMIN — HYDROMORPHONE HYDROCHLORIDE 0.5 MILLIGRAM(S): 2 INJECTION INTRAMUSCULAR; INTRAVENOUS; SUBCUTANEOUS at 13:55

## 2017-01-31 RX ADMIN — AMLODIPINE BESYLATE 5 MILLIGRAM(S): 2.5 TABLET ORAL at 06:07

## 2017-01-31 RX ADMIN — HYDROMORPHONE HYDROCHLORIDE 0.5 MILLIGRAM(S): 2 INJECTION INTRAMUSCULAR; INTRAVENOUS; SUBCUTANEOUS at 14:03

## 2017-01-31 RX ADMIN — POLYETHYLENE GLYCOL 3350 17 GRAM(S): 17 POWDER, FOR SOLUTION ORAL at 13:09

## 2017-01-31 RX ADMIN — QUETIAPINE FUMARATE 25 MILLIGRAM(S): 200 TABLET, FILM COATED ORAL at 22:06

## 2017-01-31 RX ADMIN — Medication 81 MILLIGRAM(S): at 13:08

## 2017-01-31 RX ADMIN — Medication 50 MILLIGRAM(S): at 17:23

## 2017-01-31 RX ADMIN — SODIUM CHLORIDE 50 MILLILITER(S): 9 INJECTION INTRAMUSCULAR; INTRAVENOUS; SUBCUTANEOUS at 06:09

## 2017-01-31 RX ADMIN — HEPARIN SODIUM 5000 UNIT(S): 5000 INJECTION INTRAVENOUS; SUBCUTANEOUS at 17:23

## 2017-01-31 RX ADMIN — IMATINIB MESYLATE 400 MILLIGRAM(S): 400 TABLET, FILM COATED ORAL at 13:09

## 2017-01-31 RX ADMIN — GABAPENTIN 300 MILLIGRAM(S): 400 CAPSULE ORAL at 06:08

## 2017-01-31 RX ADMIN — CLOPIDOGREL BISULFATE 75 MILLIGRAM(S): 75 TABLET, FILM COATED ORAL at 13:08

## 2017-02-01 LAB
ANION GAP SERPL CALC-SCNC: 9 MMOL/L — SIGNIFICANT CHANGE UP (ref 5–17)
BUN SERPL-MCNC: 20 MG/DL — SIGNIFICANT CHANGE UP (ref 7–23)
CALCIUM SERPL-MCNC: 8.4 MG/DL — SIGNIFICANT CHANGE UP (ref 8.4–10.5)
CHLORIDE SERPL-SCNC: 105 MMOL/L — SIGNIFICANT CHANGE UP (ref 96–108)
CO2 SERPL-SCNC: 26 MMOL/L — SIGNIFICANT CHANGE UP (ref 22–31)
CREAT SERPL-MCNC: 1.51 MG/DL — HIGH (ref 0.5–1.3)
GLUCOSE SERPL-MCNC: 91 MG/DL — SIGNIFICANT CHANGE UP (ref 70–99)
HCT VFR BLD CALC: 35.5 % — LOW (ref 39–50)
HGB BLD-MCNC: 11.1 G/DL — LOW (ref 13–17)
INR BLD: 1.06 RATIO — SIGNIFICANT CHANGE UP (ref 0.88–1.16)
MCHC RBC-ENTMCNC: 29.9 PG — SIGNIFICANT CHANGE UP (ref 27–34)
MCHC RBC-ENTMCNC: 31.3 GM/DL — LOW (ref 32–36)
MCV RBC AUTO: 95.7 FL — SIGNIFICANT CHANGE UP (ref 80–100)
PLATELET # BLD AUTO: 174 K/UL — SIGNIFICANT CHANGE UP (ref 150–400)
POTASSIUM SERPL-MCNC: 4.6 MMOL/L — SIGNIFICANT CHANGE UP (ref 3.5–5.3)
POTASSIUM SERPL-SCNC: 4.6 MMOL/L — SIGNIFICANT CHANGE UP (ref 3.5–5.3)
PROTHROM AB SERPL-ACNC: 12 SEC — SIGNIFICANT CHANGE UP (ref 10–13.1)
RBC # BLD: 3.71 M/UL — LOW (ref 4.2–5.8)
RBC # FLD: 13.5 % — SIGNIFICANT CHANGE UP (ref 10.3–14.5)
SODIUM SERPL-SCNC: 140 MMOL/L — SIGNIFICANT CHANGE UP (ref 135–145)
WBC # BLD: 5.31 K/UL — SIGNIFICANT CHANGE UP (ref 3.8–10.5)
WBC # FLD AUTO: 5.31 K/UL — SIGNIFICANT CHANGE UP (ref 3.8–10.5)

## 2017-02-01 RX ORDER — SODIUM CHLORIDE 9 MG/ML
1000 INJECTION INTRAMUSCULAR; INTRAVENOUS; SUBCUTANEOUS
Qty: 0 | Refills: 0 | Status: DISCONTINUED | OUTPATIENT
Start: 2017-02-01 | End: 2017-02-01

## 2017-02-01 RX ORDER — SODIUM CHLORIDE 9 MG/ML
1000 INJECTION INTRAMUSCULAR; INTRAVENOUS; SUBCUTANEOUS
Qty: 0 | Refills: 0 | Status: DISCONTINUED | OUTPATIENT
Start: 2017-02-01 | End: 2017-02-02

## 2017-02-01 RX ADMIN — Medication 81 MILLIGRAM(S): at 12:12

## 2017-02-01 RX ADMIN — HEPARIN SODIUM 5000 UNIT(S): 5000 INJECTION INTRAVENOUS; SUBCUTANEOUS at 05:57

## 2017-02-01 RX ADMIN — HEPARIN SODIUM 5000 UNIT(S): 5000 INJECTION INTRAVENOUS; SUBCUTANEOUS at 18:27

## 2017-02-01 RX ADMIN — CLOPIDOGREL BISULFATE 75 MILLIGRAM(S): 75 TABLET, FILM COATED ORAL at 12:11

## 2017-02-01 RX ADMIN — FAMOTIDINE 20 MILLIGRAM(S): 10 INJECTION INTRAVENOUS at 12:11

## 2017-02-01 RX ADMIN — MIRTAZAPINE 15 MILLIGRAM(S): 45 TABLET, ORALLY DISINTEGRATING ORAL at 12:11

## 2017-02-01 RX ADMIN — IMATINIB MESYLATE 400 MILLIGRAM(S): 400 TABLET, FILM COATED ORAL at 12:11

## 2017-02-01 RX ADMIN — QUETIAPINE FUMARATE 25 MILLIGRAM(S): 200 TABLET, FILM COATED ORAL at 21:33

## 2017-02-01 RX ADMIN — ATORVASTATIN CALCIUM 80 MILLIGRAM(S): 80 TABLET, FILM COATED ORAL at 21:32

## 2017-02-01 RX ADMIN — SENNA PLUS 5 MILLILITER(S): 8.6 TABLET ORAL at 21:33

## 2017-02-01 RX ADMIN — SODIUM CHLORIDE 50 MILLILITER(S): 9 INJECTION INTRAMUSCULAR; INTRAVENOUS; SUBCUTANEOUS at 05:57

## 2017-02-01 RX ADMIN — LISINOPRIL 2.5 MILLIGRAM(S): 2.5 TABLET ORAL at 05:57

## 2017-02-01 RX ADMIN — AMLODIPINE BESYLATE 5 MILLIGRAM(S): 2.5 TABLET ORAL at 05:57

## 2017-02-01 RX ADMIN — Medication 50 MILLIGRAM(S): at 05:57

## 2017-02-01 RX ADMIN — Medication 50 MILLIGRAM(S): at 18:27

## 2017-02-01 RX ADMIN — GABAPENTIN 300 MILLIGRAM(S): 400 CAPSULE ORAL at 13:09

## 2017-02-01 RX ADMIN — GABAPENTIN 300 MILLIGRAM(S): 400 CAPSULE ORAL at 21:33

## 2017-02-01 RX ADMIN — SODIUM CHLORIDE 75 MILLILITER(S): 9 INJECTION INTRAMUSCULAR; INTRAVENOUS; SUBCUTANEOUS at 12:27

## 2017-02-01 RX ADMIN — GABAPENTIN 300 MILLIGRAM(S): 400 CAPSULE ORAL at 05:57

## 2017-02-01 RX ADMIN — POLYETHYLENE GLYCOL 3350 17 GRAM(S): 17 POWDER, FOR SOLUTION ORAL at 12:11

## 2017-02-02 LAB
ANION GAP SERPL CALC-SCNC: 8 MMOL/L — SIGNIFICANT CHANGE UP (ref 5–17)
BUN SERPL-MCNC: 18 MG/DL — SIGNIFICANT CHANGE UP (ref 7–23)
CALCIUM SERPL-MCNC: 8.4 MG/DL — SIGNIFICANT CHANGE UP (ref 8.4–10.5)
CHLORIDE SERPL-SCNC: 104 MMOL/L — SIGNIFICANT CHANGE UP (ref 96–108)
CO2 SERPL-SCNC: 27 MMOL/L — SIGNIFICANT CHANGE UP (ref 22–31)
CREAT SERPL-MCNC: 1.26 MG/DL — SIGNIFICANT CHANGE UP (ref 0.5–1.3)
GLUCOSE SERPL-MCNC: 97 MG/DL — SIGNIFICANT CHANGE UP (ref 70–99)
HCT VFR BLD CALC: 34.2 % — LOW (ref 39–50)
HGB BLD-MCNC: 11.1 G/DL — LOW (ref 13–17)
MCHC RBC-ENTMCNC: 30.6 PG — SIGNIFICANT CHANGE UP (ref 27–34)
MCHC RBC-ENTMCNC: 32.5 GM/DL — SIGNIFICANT CHANGE UP (ref 32–36)
MCV RBC AUTO: 94.2 FL — SIGNIFICANT CHANGE UP (ref 80–100)
PLATELET # BLD AUTO: 167 K/UL — SIGNIFICANT CHANGE UP (ref 150–400)
POTASSIUM SERPL-MCNC: 4.3 MMOL/L — SIGNIFICANT CHANGE UP (ref 3.5–5.3)
POTASSIUM SERPL-SCNC: 4.3 MMOL/L — SIGNIFICANT CHANGE UP (ref 3.5–5.3)
RBC # BLD: 3.63 M/UL — LOW (ref 4.2–5.8)
RBC # FLD: 13.5 % — SIGNIFICANT CHANGE UP (ref 10.3–14.5)
SODIUM SERPL-SCNC: 139 MMOL/L — SIGNIFICANT CHANGE UP (ref 135–145)
WBC # BLD: 4.51 K/UL — SIGNIFICANT CHANGE UP (ref 3.8–10.5)
WBC # FLD AUTO: 4.51 K/UL — SIGNIFICANT CHANGE UP (ref 3.8–10.5)

## 2017-02-02 RX ORDER — IMATINIB MESYLATE 400 MG/1
1 TABLET, FILM COATED ORAL
Qty: 0 | Refills: 0 | COMMUNITY
Start: 2017-02-02

## 2017-02-02 RX ORDER — METOPROLOL TARTRATE 50 MG
1 TABLET ORAL
Qty: 30 | Refills: 0 | OUTPATIENT
Start: 2017-02-02 | End: 2017-02-17

## 2017-02-02 RX ORDER — SODIUM CHLORIDE 9 MG/ML
1000 INJECTION INTRAMUSCULAR; INTRAVENOUS; SUBCUTANEOUS
Qty: 0 | Refills: 0 | Status: DISCONTINUED | OUTPATIENT
Start: 2017-02-02 | End: 2017-02-05

## 2017-02-02 RX ADMIN — GABAPENTIN 300 MILLIGRAM(S): 400 CAPSULE ORAL at 21:53

## 2017-02-02 RX ADMIN — Medication 81 MILLIGRAM(S): at 13:07

## 2017-02-02 RX ADMIN — CLOPIDOGREL BISULFATE 75 MILLIGRAM(S): 75 TABLET, FILM COATED ORAL at 13:08

## 2017-02-02 RX ADMIN — MIRTAZAPINE 15 MILLIGRAM(S): 45 TABLET, ORALLY DISINTEGRATING ORAL at 13:08

## 2017-02-02 RX ADMIN — ATORVASTATIN CALCIUM 80 MILLIGRAM(S): 80 TABLET, FILM COATED ORAL at 21:53

## 2017-02-02 RX ADMIN — POLYETHYLENE GLYCOL 3350 17 GRAM(S): 17 POWDER, FOR SOLUTION ORAL at 13:08

## 2017-02-02 RX ADMIN — HEPARIN SODIUM 5000 UNIT(S): 5000 INJECTION INTRAVENOUS; SUBCUTANEOUS at 17:25

## 2017-02-02 RX ADMIN — AMLODIPINE BESYLATE 5 MILLIGRAM(S): 2.5 TABLET ORAL at 06:04

## 2017-02-02 RX ADMIN — LISINOPRIL 2.5 MILLIGRAM(S): 2.5 TABLET ORAL at 06:04

## 2017-02-02 RX ADMIN — Medication 50 MILLIGRAM(S): at 06:04

## 2017-02-02 RX ADMIN — GABAPENTIN 300 MILLIGRAM(S): 400 CAPSULE ORAL at 06:04

## 2017-02-02 RX ADMIN — HYDROMORPHONE HYDROCHLORIDE 0.5 MILLIGRAM(S): 2 INJECTION INTRAMUSCULAR; INTRAVENOUS; SUBCUTANEOUS at 09:35

## 2017-02-02 RX ADMIN — GABAPENTIN 300 MILLIGRAM(S): 400 CAPSULE ORAL at 13:08

## 2017-02-02 RX ADMIN — FAMOTIDINE 20 MILLIGRAM(S): 10 INJECTION INTRAVENOUS at 13:07

## 2017-02-02 RX ADMIN — QUETIAPINE FUMARATE 25 MILLIGRAM(S): 200 TABLET, FILM COATED ORAL at 21:53

## 2017-02-02 RX ADMIN — Medication 50 MILLIGRAM(S): at 17:25

## 2017-02-02 RX ADMIN — HYDROMORPHONE HYDROCHLORIDE 0.5 MILLIGRAM(S): 2 INJECTION INTRAMUSCULAR; INTRAVENOUS; SUBCUTANEOUS at 09:14

## 2017-02-02 RX ADMIN — IMATINIB MESYLATE 400 MILLIGRAM(S): 400 TABLET, FILM COATED ORAL at 13:07

## 2017-02-02 RX ADMIN — HEPARIN SODIUM 5000 UNIT(S): 5000 INJECTION INTRAVENOUS; SUBCUTANEOUS at 06:04

## 2017-02-03 RX ADMIN — GABAPENTIN 300 MILLIGRAM(S): 400 CAPSULE ORAL at 21:34

## 2017-02-03 RX ADMIN — IMATINIB MESYLATE 400 MILLIGRAM(S): 400 TABLET, FILM COATED ORAL at 11:29

## 2017-02-03 RX ADMIN — SENNA PLUS 5 MILLILITER(S): 8.6 TABLET ORAL at 00:19

## 2017-02-03 RX ADMIN — Medication 81 MILLIGRAM(S): at 11:29

## 2017-02-03 RX ADMIN — HEPARIN SODIUM 5000 UNIT(S): 5000 INJECTION INTRAVENOUS; SUBCUTANEOUS at 18:36

## 2017-02-03 RX ADMIN — SENNA PLUS 5 MILLILITER(S): 8.6 TABLET ORAL at 21:34

## 2017-02-03 RX ADMIN — LISINOPRIL 2.5 MILLIGRAM(S): 2.5 TABLET ORAL at 06:15

## 2017-02-03 RX ADMIN — MIRTAZAPINE 15 MILLIGRAM(S): 45 TABLET, ORALLY DISINTEGRATING ORAL at 11:29

## 2017-02-03 RX ADMIN — AMLODIPINE BESYLATE 5 MILLIGRAM(S): 2.5 TABLET ORAL at 06:15

## 2017-02-03 RX ADMIN — ATORVASTATIN CALCIUM 80 MILLIGRAM(S): 80 TABLET, FILM COATED ORAL at 21:34

## 2017-02-03 RX ADMIN — GABAPENTIN 300 MILLIGRAM(S): 400 CAPSULE ORAL at 06:15

## 2017-02-03 RX ADMIN — POLYETHYLENE GLYCOL 3350 17 GRAM(S): 17 POWDER, FOR SOLUTION ORAL at 11:29

## 2017-02-03 RX ADMIN — Medication 50 MILLIGRAM(S): at 06:15

## 2017-02-03 RX ADMIN — HEPARIN SODIUM 5000 UNIT(S): 5000 INJECTION INTRAVENOUS; SUBCUTANEOUS at 06:15

## 2017-02-03 RX ADMIN — FAMOTIDINE 20 MILLIGRAM(S): 10 INJECTION INTRAVENOUS at 11:29

## 2017-02-03 RX ADMIN — QUETIAPINE FUMARATE 25 MILLIGRAM(S): 200 TABLET, FILM COATED ORAL at 21:34

## 2017-02-03 RX ADMIN — GABAPENTIN 300 MILLIGRAM(S): 400 CAPSULE ORAL at 14:15

## 2017-02-03 RX ADMIN — Medication 50 MILLIGRAM(S): at 18:36

## 2017-02-03 RX ADMIN — CLOPIDOGREL BISULFATE 75 MILLIGRAM(S): 75 TABLET, FILM COATED ORAL at 11:29

## 2017-02-04 ENCOUNTER — TRANSCRIPTION ENCOUNTER (OUTPATIENT)
Age: 72
End: 2017-02-04

## 2017-02-04 RX ADMIN — FAMOTIDINE 20 MILLIGRAM(S): 10 INJECTION INTRAVENOUS at 12:50

## 2017-02-04 RX ADMIN — GABAPENTIN 300 MILLIGRAM(S): 400 CAPSULE ORAL at 05:54

## 2017-02-04 RX ADMIN — CLOPIDOGREL BISULFATE 75 MILLIGRAM(S): 75 TABLET, FILM COATED ORAL at 12:51

## 2017-02-04 RX ADMIN — MIRTAZAPINE 15 MILLIGRAM(S): 45 TABLET, ORALLY DISINTEGRATING ORAL at 12:52

## 2017-02-04 RX ADMIN — AMLODIPINE BESYLATE 5 MILLIGRAM(S): 2.5 TABLET ORAL at 05:53

## 2017-02-04 RX ADMIN — HEPARIN SODIUM 5000 UNIT(S): 5000 INJECTION INTRAVENOUS; SUBCUTANEOUS at 18:35

## 2017-02-04 RX ADMIN — LISINOPRIL 2.5 MILLIGRAM(S): 2.5 TABLET ORAL at 05:53

## 2017-02-04 RX ADMIN — ATORVASTATIN CALCIUM 80 MILLIGRAM(S): 80 TABLET, FILM COATED ORAL at 21:52

## 2017-02-04 RX ADMIN — Medication 50 MILLIGRAM(S): at 18:35

## 2017-02-04 RX ADMIN — SENNA PLUS 5 MILLILITER(S): 8.6 TABLET ORAL at 21:52

## 2017-02-04 RX ADMIN — Medication 81 MILLIGRAM(S): at 12:51

## 2017-02-04 RX ADMIN — Medication 50 MILLIGRAM(S): at 05:53

## 2017-02-04 RX ADMIN — GABAPENTIN 300 MILLIGRAM(S): 400 CAPSULE ORAL at 21:52

## 2017-02-04 RX ADMIN — HEPARIN SODIUM 5000 UNIT(S): 5000 INJECTION INTRAVENOUS; SUBCUTANEOUS at 05:54

## 2017-02-04 RX ADMIN — QUETIAPINE FUMARATE 25 MILLIGRAM(S): 200 TABLET, FILM COATED ORAL at 21:52

## 2017-02-04 RX ADMIN — GABAPENTIN 300 MILLIGRAM(S): 400 CAPSULE ORAL at 12:54

## 2017-02-04 RX ADMIN — IMATINIB MESYLATE 400 MILLIGRAM(S): 400 TABLET, FILM COATED ORAL at 18:33

## 2017-02-04 RX ADMIN — POLYETHYLENE GLYCOL 3350 17 GRAM(S): 17 POWDER, FOR SOLUTION ORAL at 12:52

## 2017-02-04 NOTE — DISCHARGE NOTE ADULT - CARE PROVIDER_API CALL
Goldberg, Gary D (MD), Urology  [address] [place] [address]  Phone: [phone]  Fax: [phone] Goldberg, Gary D (MD), Urology  [address] [place] [address] [address]  [place] [address]  Phone: [phone]  Fax: [phone]    Malik Orosco), Hematology; Internal Medicine; Medical Oncology  [facility] [address] [place] [address]  Phone: [phone]  Fax: [phone]    Colin Garcia), Hematology; Internal Medicine; Medical Oncology  [address] [facility] [address] [place] [address]  Phone: [phone]  Fax: [phone]

## 2017-02-04 NOTE — DISCHARGE NOTE ADULT - CARE PLAN
Principal Discharge DX:	Gastric tumor  Goal:	Disease management  Instructions for follow-up, activity and diet:	Continue Gleevec as directed  Take your pain medication as directed for pain management  Secondary Diagnosis:	Dysphagia, unspecified type  Secondary Diagnosis:	Depression  Secondary Diagnosis:	Essential hypertension  Secondary Diagnosis:	Hyperlipidemia, unspecified hyperlipidemia type  Secondary Diagnosis:	Cerebrovascular accident (CVA), unspecified mechanism  Secondary Diagnosis:	Prostate enlargement Principal Discharge DX:	Gastric tumor  Goal:	Disease management  Instructions for follow-up, activity and diet:	Continue Gleevec as directed  Take your pain medication as directed for pain management  Please Follow with Oncology  Secondary Diagnosis:	Dysphagia, unspecified type  Secondary Diagnosis:	Depression  Secondary Diagnosis:	Essential hypertension  Secondary Diagnosis:	Hyperlipidemia, unspecified hyperlipidemia type  Secondary Diagnosis:	Cerebrovascular accident (CVA), unspecified mechanism  Secondary Diagnosis:	Prostate enlargement Principal Discharge DX:	Gastric tumor  Goal:	Disease management  Instructions for follow-up, activity and diet:	Continue Gleevec 400mg oral daily as directed  Take your pain medication as directed for pain management  Please Follow with Oncology Dr Orosco  #[phone] next week please call and make appointment  / Radha  Secondary Diagnosis:	Dysphagia, unspecified type  Instructions for follow-up, activity and diet:	continue Feeding as ordered  Secondary Diagnosis:	Depression  Instructions for follow-up, activity and diet:	Continue with current medication  Secondary Diagnosis:	Essential hypertension  Instructions for follow-up, activity and diet:	Low salt diet  Activity as tolerated.  Take all medication as prescribed.  Follow up with your medical doctor for routine blood pressure monitoring at your next visit.  Notify your doctor if you have any of the following symptoms:   Dizziness, Lightheadedness, Blurry vision, Headache, Chest pain, Shortness of breath  Secondary Diagnosis:	Hyperlipidemia, unspecified hyperlipidemia type  Secondary Diagnosis:	Cerebrovascular accident (CVA), unspecified mechanism  Goal:	History  Instructions for follow-up, activity and diet:	continue with Plavix and Aspirin  Secondary Diagnosis:	Prostate enlargement  Instructions for follow-up, activity and diet:	Please follow up outpatient with Urology call and make appointment Dr Goldberg Principal Discharge DX:	Gastric tumor  Goal:	Disease management  Instructions for follow-up, activity and diet:	Continue Gleevec 400mg oral daily as directed  Take your pain medication as directed for pain management  Please Follow with Oncology Dr Orosco  #[phone] next week please call and make appointment  / Radha  Secondary Diagnosis:	Dysphagia, unspecified type  Instructions for follow-up, activity and diet:	continue Feeding as ordered  Secondary Diagnosis:	Depression  Instructions for follow-up, activity and diet:	Continue with current medication  Secondary Diagnosis:	Essential hypertension  Instructions for follow-up, activity and diet:	Low salt diet  Activity as tolerated.  Take all medication as prescribed.  Follow up with your medical doctor for routine blood pressure monitoring at your next visit.  Notify your doctor if you have any of the following symptoms:   Dizziness, Lightheadedness, Blurry vision, Headache, Chest pain, Shortness of breath  Secondary Diagnosis:	Hyperlipidemia, unspecified hyperlipidemia type  Secondary Diagnosis:	Cerebrovascular accident (CVA), unspecified mechanism  Goal:	History  Instructions for follow-up, activity and diet:	continue with Plavix and Aspirin  Secondary Diagnosis:	Prostate enlargement  Instructions for follow-up, activity and diet:	Please follow up outpatient with Urology call and make appointment Dr Goldberg Principal Discharge DX:	Gastric tumor  Goal:	Disease management  Instructions for follow-up, activity and diet:	Continue Gleevec 400mg oral daily as directed  Take your pain medication as directed for pain management  Please Follow up with PMD/ Gastroenterology call and make appt    Please Follow with Oncology Dr Orosco  #[phone] next week please call and make appointment  / Radha  Secondary Diagnosis:	Dysphagia, unspecified type  Instructions for follow-up, activity and diet:	continue Feeding as ordered  Secondary Diagnosis:	Depression  Instructions for follow-up, activity and diet:	Continue with current medication  Secondary Diagnosis:	Essential hypertension  Instructions for follow-up, activity and diet:	Low salt diet  Activity as tolerated.  Take all medication as prescribed.  Follow up with your medical doctor for routine blood pressure monitoring at your next visit.  Notify your doctor if you have any of the following symptoms:   Dizziness, Lightheadedness, Blurry vision, Headache, Chest pain, Shortness of breath  Secondary Diagnosis:	Hyperlipidemia, unspecified hyperlipidemia type  Secondary Diagnosis:	Cerebrovascular accident (CVA), unspecified mechanism  Goal:	History  Instructions for follow-up, activity and diet:	continue with Plavix and Aspirin  Secondary Diagnosis:	Prostate enlargement  Instructions for follow-up, activity and diet:	Please follow up outpatient with Urology call and make appointment Dr Goldberg  possible biopsy Principal Discharge DX:	Gastric tumor  Goal:	Disease management  Instructions for follow-up, activity and diet:	Continue Gleevec 400mg oral daily as directed  Take your pain medication as directed for pain management  Please Follow up with PMD/ Gastroenterology call and make appt    Please Follow with Oncology Dr Orosco  #[phone] next week please call and make appointment  / Radha  Secondary Diagnosis:	Dysphagia, unspecified type  Instructions for follow-up, activity and diet:	continue Feeding as ordered  Secondary Diagnosis:	Depression  Instructions for follow-up, activity and diet:	Continue with current medication  Secondary Diagnosis:	Essential hypertension  Instructions for follow-up, activity and diet:	Low salt diet  Activity as tolerated.  Take all medication as prescribed.  Follow up with your medical doctor for routine blood pressure monitoring at your next visit.  Notify your doctor if you have any of the following symptoms:   Dizziness, Lightheadedness, Blurry vision, Headache, Chest pain, Shortness of breath  Secondary Diagnosis:	Hyperlipidemia, unspecified hyperlipidemia type  Secondary Diagnosis:	Cerebrovascular accident (CVA), unspecified mechanism  Goal:	History  Instructions for follow-up, activity and diet:	continue with Plavix and Aspirin  Secondary Diagnosis:	Prostate enlargement  Instructions for follow-up, activity and diet:	Please follow up outpatient with Urology call and make appointment Dr Goldberg  possible biopsy Principal Discharge DX:	Gastric tumor  Goal:	Disease management  Instructions for follow-up, activity and diet:	Continue Gleevec 400mg oral daily as directed  Take your pain medication as directed for pain management  Please Follow up with PMD/ Gastroenterology call and make appt    Please Follow with Oncology Dr Orosco  #[phone] next week please call and make appointment  / Radha  Secondary Diagnosis:	Dysphagia, unspecified type  Instructions for follow-up, activity and diet:	continue Feeding as ordered  Secondary Diagnosis:	Depression  Instructions for follow-up, activity and diet:	Continue with current medication  Secondary Diagnosis:	Essential hypertension  Instructions for follow-up, activity and diet:	Low salt diet  Activity as tolerated.  Take all medication as prescribed.  Follow up with your medical doctor for routine blood pressure monitoring at your next visit.  Notify your doctor if you have any of the following symptoms:   Dizziness, Lightheadedness, Blurry vision, Headache, Chest pain, Shortness of breath  Secondary Diagnosis:	Hyperlipidemia, unspecified hyperlipidemia type  Secondary Diagnosis:	Cerebrovascular accident (CVA), unspecified mechanism  Goal:	History  Instructions for follow-up, activity and diet:	continue with Plavix and Aspirin  Secondary Diagnosis:	Prostate enlargement  Instructions for follow-up, activity and diet:	Please follow up outpatient with Urology call and make appointment Dr Goldberg  possible biopsy Principal Discharge DX:	Gastric tumor  Goal:	Disease management  Instructions for follow-up, activity and diet:	Continue Gleevec 400mg oral daily as directed  Take your pain medication as directed for pain management  Please Follow up with PMD/ Gastroenterology call and make appt    Please Follow with Oncology Dr Orosco  #[phone] next week please call and make appointment  / Radha  Secondary Diagnosis:	Dysphagia, unspecified type  Instructions for follow-up, activity and diet:	continue Feeding as ordered  Secondary Diagnosis:	Depression  Instructions for follow-up, activity and diet:	Continue with current medication  Secondary Diagnosis:	Essential hypertension  Instructions for follow-up, activity and diet:	Low salt diet  Activity as tolerated.  Take all medication as prescribed.  Follow up with your medical doctor for routine blood pressure monitoring at your next visit.  Notify your doctor if you have any of the following symptoms:   Dizziness, Lightheadedness, Blurry vision, Headache, Chest pain, Shortness of breath  Secondary Diagnosis:	Hyperlipidemia, unspecified hyperlipidemia type  Secondary Diagnosis:	Cerebrovascular accident (CVA), unspecified mechanism  Goal:	History  Instructions for follow-up, activity and diet:	continue with Plavix and Aspirin  Secondary Diagnosis:	Prostate enlargement  Instructions for follow-up, activity and diet:	Please follow up outpatient with Urology call and make appointment Dr Goldberg  possible biopsy Principal Discharge DX:	Gastric tumor  Goal:	Disease management  Instructions for follow-up, activity and diet:	Continue Gleevec 400mg oral daily as directed  Take your pain medication as directed for pain management  Please Follow up with PMD/ Gastroenterology call and make appt    Please Follow with Oncology Dr Orosco  #[phone] next week please call and make appointment  / Radha  Secondary Diagnosis:	Dysphagia, unspecified type  Instructions for follow-up, activity and diet:	continue Feeding as ordered  Secondary Diagnosis:	Depression  Instructions for follow-up, activity and diet:	Continue with current medication  Secondary Diagnosis:	Essential hypertension  Instructions for follow-up, activity and diet:	Low salt diet  Activity as tolerated.  Take all medication as prescribed.  Follow up with your medical doctor for routine blood pressure monitoring at your next visit.  Notify your doctor if you have any of the following symptoms:   Dizziness, Lightheadedness, Blurry vision, Headache, Chest pain, Shortness of breath  Secondary Diagnosis:	Hyperlipidemia, unspecified hyperlipidemia type  Secondary Diagnosis:	Cerebrovascular accident (CVA), unspecified mechanism  Goal:	History  Instructions for follow-up, activity and diet:	continue with Plavix and Aspirin  Secondary Diagnosis:	Prostate enlargement  Instructions for follow-up, activity and diet:	Please follow up outpatient with Urology call and make appointment Dr Goldberg  possible biopsy Principal Discharge DX:	Gastric tumor  Goal:	Disease management  Instructions for follow-up, activity and diet:	Continue Gleevec 400mg oral daily as directed  Take your pain medication as directed for pain management  Please Follow up with PMD/ Gastroenterology call and make appt    Please Follow with Oncology Dr Orosco  #[phone] next week please call and make appointment  / Radha  Secondary Diagnosis:	Dysphagia, unspecified type  Instructions for follow-up, activity and diet:	continue Feeding as ordered  Secondary Diagnosis:	Depression  Instructions for follow-up, activity and diet:	Continue with current medication  Secondary Diagnosis:	Essential hypertension  Instructions for follow-up, activity and diet:	Low salt diet  Activity as tolerated.  Take all medication as prescribed.  Follow up with your medical doctor for routine blood pressure monitoring at your next visit.  Notify your doctor if you have any of the following symptoms:   Dizziness, Lightheadedness, Blurry vision, Headache, Chest pain, Shortness of breath  Secondary Diagnosis:	Hyperlipidemia, unspecified hyperlipidemia type  Secondary Diagnosis:	Cerebrovascular accident (CVA), unspecified mechanism  Goal:	History  Instructions for follow-up, activity and diet:	continue with Plavix and Aspirin  Secondary Diagnosis:	Prostate enlargement  Instructions for follow-up, activity and diet:	Please follow up outpatient with Urology call and make appointment Dr Goldberg  possible biopsy Principal Discharge DX:	Gastric tumor  Goal:	Disease management  Instructions for follow-up, activity and diet:	Continue Gleevec 400mg oral daily as directed  Take your pain medication as directed for pain management  Please Follow up with PMD/ Gastroenterology call and make appt    Please Follow with Oncology Dr Orosco  #[phone] next week please call and make appointment  / Radha  Secondary Diagnosis:	Dysphagia, unspecified type  Instructions for follow-up, activity and diet:	continue Feeding as ordered  Secondary Diagnosis:	Depression  Instructions for follow-up, activity and diet:	Continue with current medication  Secondary Diagnosis:	Essential hypertension  Instructions for follow-up, activity and diet:	Low salt diet  Activity as tolerated.  Take all medication as prescribed.  Follow up with your medical doctor for routine blood pressure monitoring at your next visit.  Notify your doctor if you have any of the following symptoms:   Dizziness, Lightheadedness, Blurry vision, Headache, Chest pain, Shortness of breath  Secondary Diagnosis:	Hyperlipidemia, unspecified hyperlipidemia type  Secondary Diagnosis:	Cerebrovascular accident (CVA), unspecified mechanism  Goal:	History  Instructions for follow-up, activity and diet:	continue with Plavix and Aspirin  Secondary Diagnosis:	Prostate enlargement  Instructions for follow-up, activity and diet:	Please follow up outpatient with Urology call and make appointment Dr Goldberg  possible biopsy Principal Discharge DX:	Gastric tumor  Goal:	Disease management  Instructions for follow-up, activity and diet:	Continue Gleevec 400mg oral daily as directed  Take your pain medication as directed for pain management  Please Follow up with PMD/ Gastroenterology call and make appt    Please Follow with Oncology Dr Orosco  #[phone] next week please call and make appointment  / Radha  Secondary Diagnosis:	Dysphagia, unspecified type  Instructions for follow-up, activity and diet:	continue Feeding as ordered  Secondary Diagnosis:	Depression  Instructions for follow-up, activity and diet:	Continue with current medication  Secondary Diagnosis:	Essential hypertension  Instructions for follow-up, activity and diet:	Low salt diet  Activity as tolerated.  Take all medication as prescribed.  Follow up with your medical doctor for routine blood pressure monitoring at your next visit.  Notify your doctor if you have any of the following symptoms:   Dizziness, Lightheadedness, Blurry vision, Headache, Chest pain, Shortness of breath  Secondary Diagnosis:	Hyperlipidemia, unspecified hyperlipidemia type  Secondary Diagnosis:	Cerebrovascular accident (CVA), unspecified mechanism  Goal:	History  Instructions for follow-up, activity and diet:	continue with Plavix and Aspirin  Secondary Diagnosis:	Prostate enlargement  Instructions for follow-up, activity and diet:	Please follow up outpatient with Urology call and make appointment Dr Goldberg  possible biopsy Principal Discharge DX:	Gastric tumor  Goal:	Disease management  Instructions for follow-up, activity and diet:	Continue Gleevec 400mg oral daily as directed  Take your pain medication as directed for pain management  Please Follow up with PMD/ Gastroenterology call and make appt    Please Follow with Oncology Dr Orosco  #[phone] next week please call and make appointment  / Radha  Secondary Diagnosis:	Dysphagia, unspecified type  Instructions for follow-up, activity and diet:	continue Feeding as ordered  Secondary Diagnosis:	Depression  Instructions for follow-up, activity and diet:	Continue with current medication  Secondary Diagnosis:	Essential hypertension  Instructions for follow-up, activity and diet:	Low salt diet  Activity as tolerated.  Take all medication as prescribed.  Follow up with your medical doctor for routine blood pressure monitoring at your next visit.  Notify your doctor if you have any of the following symptoms:   Dizziness, Lightheadedness, Blurry vision, Headache, Chest pain, Shortness of breath  Secondary Diagnosis:	Hyperlipidemia, unspecified hyperlipidemia type  Secondary Diagnosis:	Cerebrovascular accident (CVA), unspecified mechanism  Goal:	History  Instructions for follow-up, activity and diet:	continue with Plavix and Aspirin  Secondary Diagnosis:	Prostate enlargement  Instructions for follow-up, activity and diet:	Please follow up outpatient with Urology call and make appointment Dr Goldberg  possible biopsy Principal Discharge DX:	Gastric tumor  Goal:	Disease management  Instructions for follow-up, activity and diet:	Continue Gleevec 400mg oral daily as directed  Take your pain medication as directed for pain management  Please Follow up with PMD/ Gastroenterology call and make appt    Please Follow with Oncology Dr Orosco  #[phone] next week please call and make appointment  / Radha  Secondary Diagnosis:	Dysphagia, unspecified type  Instructions for follow-up, activity and diet:	continue Feeding as ordered  Secondary Diagnosis:	Depression  Instructions for follow-up, activity and diet:	Continue with current medication  Secondary Diagnosis:	Essential hypertension  Instructions for follow-up, activity and diet:	Low salt diet  Activity as tolerated.  Take all medication as prescribed.  Follow up with your medical doctor for routine blood pressure monitoring at your next visit.  Notify your doctor if you have any of the following symptoms:   Dizziness, Lightheadedness, Blurry vision, Headache, Chest pain, Shortness of breath  Secondary Diagnosis:	Hyperlipidemia, unspecified hyperlipidemia type  Secondary Diagnosis:	Cerebrovascular accident (CVA), unspecified mechanism  Goal:	History  Instructions for follow-up, activity and diet:	continue with Plavix and Aspirin  Secondary Diagnosis:	Prostate enlargement  Instructions for follow-up, activity and diet:	Please follow up outpatient with Urology call and make appointment Dr Goldberg  possible biopsy

## 2017-02-04 NOTE — DISCHARGE NOTE ADULT - PATIENT PORTAL LINK FT
“You can access the Follow[facility] Patient Portal, offered by [facility], by registering with the following website: [email or web address]”

## 2017-02-04 NOTE — DISCHARGE NOTE ADULT - CARE PROVIDERS DIRECT ADDRESSES
,[email or web address],DirectAddress_Unknown [email or web address],DirectAddress_Unknown,DirectAddress_Unknown,DirectAddress_Unknown

## 2017-02-04 NOTE — DISCHARGE NOTE ADULT - SECONDARY DIAGNOSIS.
Dysphagia, unspecified type Depression Essential hypertension Hyperlipidemia, unspecified hyperlipidemia type Cerebrovascular accident (CVA), unspecified mechanism Prostate enlargement

## 2017-02-04 NOTE — DISCHARGE NOTE ADULT - PLAN OF CARE
Disease management Continue Gleevec as directed  Take your pain medication as directed for pain management Continue Gleevec as directed  Take your pain medication as directed for pain management  Please Follow with Oncology Continue with current medication Low salt diet  Activity as tolerated.  Take all medication as prescribed.  Follow up with your medical doctor for routine blood pressure monitoring at your next visit.  Notify your doctor if you have any of the following symptoms:   Dizziness, Lightheadedness, Blurry vision, Headache, Chest pain, Shortness of breath continue Feeding as ordered continue with Plavix and Aspirin History Please follow up outpatient with Urology call and make appointment Dr Goldberg Continue Gleevec 400mg oral daily as directed  Take your pain medication as directed for pain management  Please Follow with Oncology Dr Orosco  #[phone] next week please call and make appointment  / Radha Please follow up outpatient with Urology call and make appointment Dr Goldberg  possible biopsy Continue Gleevec 400mg oral daily as directed  Take your pain medication as directed for pain management  Please Follow up with PMD/ Gastroenterology call and make appt    Please Follow with Oncology Dr Orosco  #[phone] next week please call and make appointment  / Radha

## 2017-02-04 NOTE — DISCHARGE NOTE ADULT - HOME CARE AGENCY
[facility] home care. assessment. [facility]. [facility]Corrie  infusion services to follow up with bolus peg feeding

## 2017-02-04 NOTE — DISCHARGE NOTE ADULT - ADDITIONAL INSTRUCTIONS
Home Care  arrangement Done Home Care  arrangement Done with home PT   Discussed with Patient's  Niece Anthony regarding Chemo medication Gleevec to follow up with Dr Orosco at the office   Medication did Prescribe   did Speak to Dr Garcia make aware

## 2017-02-04 NOTE — DISCHARGE NOTE ADULT - MEDICATION SUMMARY - MEDICATIONS TO TAKE
I will START or STAY ON the medications listed below when I get home from the hospital:    aspirin 81 mg oral tablet, chewable  -- 1 tab(s) by mouth once a day  -- Indication: For Cerebrovascular accident (CVA), unspecified mechanism    acetaminophen 325 mg oral tablet  -- 2 tab(s) by mouth every 6 hours, As needed, pain   -- Indication: For Pain    lisinopril 2.5 mg oral tablet  -- 1 tab(s) by mouth once a day  -- Indication: For Essential hypertension    gabapentin 300 mg oral capsule  -- 1 cap(s) by mouth 3 times a day  -- Indication: For anticonvulsant     mirtazapine 15 mg oral tablet  -- 1 tab(s) by mouth once a day  -- Indication: For antidepressant     atorvastatin 80 mg oral tablet  -- 1 tab(s) by mouth once a day (at bedtime)  -- Indication: For High cholestrol     clopidogrel 75 mg oral tablet  -- 1 tab(s) by mouth once a day  -- Indication: For Cerebrovascular accident (CVA), unspecified mechanism    QUEtiapine 25 mg oral tablet  -- 1 tab(s) by mouth once a day (at bedtime)  -- Indication: For Depression    imatinib 400 mg oral tablet  -- 1 tab(s) by mouth once a day  -- Indication: For antineoplastic    metoprolol tartrate 50 mg oral tablet  -- 1 tab(s) by mouth 2 times a day  -- Indication: For High blood pressure    amLODIPine 5 mg oral tablet  -- 1 tab(s) by mouth once a day  -- Indication: For High blood pressure     famotidine 20 mg oral tablet  -- 1 tab(s) by mouth once a day  -- Indication: For GERD    polyethylene glycol 3350 oral powder for reconstitution  -- 17 gram(s) by mouth once a day  -- Indication: For Constipation    senna 8.8 mg/5 mL oral syrup  -- 5 milliliter(s) by mouth once a day (at bedtime)   Hold for loose stools/diarrhea  -- Indication: For Constipation I will START or STAY ON the medications listed below when I get home from the hospital:    aspirin 81 mg oral tablet, chewable  -- 1 tab(s) by mouth once a day  -- Indication: For Cerebrovascular accident (CVA), unspecified mechanism    acetaminophen 325 mg oral tablet  -- 2 tab(s) by mouth every 6 hours, As needed, pain   -- Indication: For Pain    lisinopril 2.5 mg oral tablet  -- 1 tab(s) by mouth once a day  -- Indication: For Essential hypertension    gabapentin 300 mg oral capsule  -- 1 cap(s) by mouth 3 times a day  -- Indication: For anticonvulsant     mirtazapine 15 mg oral tablet  -- 1 tab(s) by mouth once a day  -- Indication: For antidepressant     atorvastatin 80 mg oral tablet  -- 1 tab(s) by mouth once a day (at bedtime)  -- Indication: For High cholestrol     clopidogrel 75 mg oral tablet  -- 1 tab(s) by mouth once a day  -- Indication: For Cerebrovascular accident (CVA), unspecified mechanism    QUEtiapine 25 mg oral tablet  -- 1 tab(s) by mouth once a day (at bedtime)  -- Indication: For Depression    imatinib 400 mg oral tablet  -- 1 tab(s) by mouth once a day  -- Indication: For Gastric tumor    metoprolol tartrate 50 mg oral tablet  -- 1 tab(s) by mouth 2 times a day  -- Indication: For High blood pressure    amLODIPine 5 mg oral tablet  -- 1 tab(s) by mouth once a day  -- Indication: For High blood pressure     famotidine 20 mg oral tablet  -- 1 tab(s) by mouth once a day  -- Indication: For GERD    polyethylene glycol 3350 oral powder for reconstitution  -- 17 gram(s) by mouth once a day  -- Indication: For Constipation    senna 8.8 mg/5 mL oral syrup  -- 5 milliliter(s) by mouth once a day (at bedtime)   Hold for loose stools/diarrhea  -- Indication: For Constipation I will START or STAY ON the medications listed below when I get home from the hospital:    aspirin 81 mg oral tablet, chewable  -- 1 tab(s) by mouth once a day  -- Indication: For Cerebrovascular accident (CVA), unspecified mechanism    acetaminophen 325 mg oral tablet  -- 2 tab(s) by mouth every 6 hours, As needed, pain   -- Indication: For Pain    acetaminophen-oxyCODONE 325 mg-5 mg oral tablet  -- 2 tab(s) by mouth every 6 hours, As Needed MDD:8  -- Caution federal law prohibits the transfer of this drug to any person other  than the person for whom it was prescribed.  May cause drowsiness.  Alcohol may intensify this effect.  Use care when operating dangerous machinery.  This prescription cannot be refilled.  This product contains acetaminophen.  Do not use  with any other product containing acetaminophen to prevent possible liver damage.  Using more of this medication than prescribed may cause serious breathing problems.    -- Indication: For Pain    lisinopril 2.5 mg oral tablet  -- 1 tab(s) by mouth once a day  -- Indication: For Essential hypertension    gabapentin 300 mg oral capsule  -- 1 cap(s) by mouth 3 times a day  -- Indication: For anticonvulsant     mirtazapine 15 mg oral tablet  -- 1 tab(s) by mouth once a day  -- Indication: For antidepressant     atorvastatin 80 mg oral tablet  -- 1 tab(s) by mouth once a day (at bedtime)  -- Indication: For High cholestrol     clopidogrel 75 mg oral tablet  -- 1 tab(s) by mouth once a day  -- Indication: For Cerebrovascular accident (CVA), unspecified mechanism    QUEtiapine 25 mg oral tablet  -- 1 tab(s) by mouth once a day (at bedtime)  -- Indication: For Depression    imatinib 400 mg oral tablet  -- 1 tab(s) by mouth once a day  -- Indication: For Gastric tumor    metoprolol tartrate 50 mg oral tablet  -- 1 tab(s) by mouth 2 times a day  -- Indication: For High blood pressure    amLODIPine 5 mg oral tablet  -- 1 tab(s) by mouth once a day  -- Indication: For High blood pressure     famotidine 20 mg oral tablet  -- 1 tab(s) by mouth once a day  -- Indication: For GERD    polyethylene glycol 3350 oral powder for reconstitution  -- 17 gram(s) by mouth once a day  -- Indication: For Constipation    senna 8.8 mg/5 mL oral syrup  -- 5 milliliter(s) by mouth once a day (at bedtime)   Hold for loose stools/diarrhea  -- Indication: For Constipation

## 2017-02-04 NOTE — DISCHARGE NOTE ADULT - HOSPITAL COURSE
To be completed by attending physician 73 y/o male  p/w diffuse abdominal pain and worsening confusion.  The patient's family reports that he had an endoscopy one week ago with biopsy.  Since then he has been c/o diffuse body pain, worse in the RUQ of the abdomen, associated with subjective fevers and cough.  The patient's family reports that he has also been increasingly confused, described as "not making sense." Denies CP, SOB, NVD, dysuria.  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 ( h/o gastrectomy)  1/23   -      Onc consult; tumor was resected in past; records to be obtained            -      await result of final path from [facility] ( done 1/17) >>>( )   1/24   -      Gastric Tumur awaiting path report, [facility] consult pending, H/H 12.4/ 36.9  1/25:  -      ***Family meeting planned tomorrow, per heme/onc planned to start Gleevac                              tx,  possible prostate bx   1/26   -       started on chemo per heme/onc             -       Urology - check PVR, ordaz if >400 cc, bx as outpatient of prostate                     PVR <400  1/30   -       Positive  GIST; on gleevec            -       Attg;  Xray Abd ( Abd pain) ( )   2/1     -       Abd Xray: Nonobstructive bowel gas pattern.            -       RAOUL; IVF x 24 hours then reevaluate  2/2     -       plan for discharge home with home PT on Saturday once tolerate bolus feed                    Tube feeding changed to bolus            -       pt has to tolerate bolus feeding for 24 hours before 	dischage.            -       Dr. Goldberg ( ) paged  2/2 niece requesting prostate Bx in patient- Dr. Goldberg to call niece.   2/4    -       Tolerating tube feeding well. Wife has Flu. Family asked hold off d/c until                     tomorrow.            -       ***Plan for discharge home tomorrow            -        Home care set up for tommorrow. tube feeding fomula to be delivered home                        Tues or  Wendesday.            -        *****pt only has 2-3 bottles of fomula. please give Jevity, amt for 2-3 days.  2/5     -       ****Discharge cancel seconday topatient confused   with fever 100.4 UA ,                     Blood culture , Ceftriaxone oredre discussed with Dr Zakia Huntley evaluated patient ordered CT head negative             -       CT Head -negative  IMPRESSION: No evidence for acute infarct or acute                                    hemorrhage. 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 If the                                    patient has new and persistent symptoms, consider short interval                                    follow-up head CT or brain MRI follow-up.  2/5     -      NIGHT: febrile with chills. Started vancomycin x1, zosyn q 8hrs. Labs, bcx sent.                       -      UA neg, Ucx pending. CT chest and abdomen per Dr. Medina. [facility] ID                    consult called.            -      RVP neg  2/6     -      CT Abdomen /pelvic - IMPRESSION: No intra-abdominal abscess. Bladder wall                                                      thickening versus underdistention. Correlate with                                                      urinalysis.Trace right pleural effusion. Status post                                                      gastrectomy with gastric wall thickening at the resection                                                     site with bulky peritoneal masses, gastrohepatic                                          ******    lymphadenopathy, and hepatic lesions consistent with                                                                       recurrent metastatic disease. Stable thoracic/suprarenal                                                     abdominal aortic aneurysm.  2/6      -      CT- chest - IMPRESSION: No intra-abdominal abscess. Bladder wall thickening                                                              versus underdistention.Correlate with urinalysis.                                                             Trace right pleural effusion. Status post gastrectomy                                                              with gastric wall thickening at the resection site                                                             with bulky peritoneal masses, gastrohepatic                                                              lymphadenopathy, and hepatic lesions consistent                                                              with recurrent metastatic disease.            -      Stable thoracic/suprarenal abdominal aortic aneurysm.            -      restart Feeding            -      blood culture pending f/u infectious disease   2/7     -      Pending MRI r/o CVA             -      Antibiotic d/c as per ID            -      blood culture negative   2/7     -      MRI - IMPRESSION: No mass effect. No definite evidence for the presence of                                                      vasogenic edema. No intracranial hemorrhage. No acute                                                    infarct. Interval progression of now confluent white matter                                                    T2 and FLAIR hyperintense signal. This may represent a                                                    combination of progressive white matter microvascular i                                                   schemic disease and posttreatment changes.  2/9 fever 100.7-BCx UCx send Zosyn started. Gleevac on Hold.  2/10 : ID : if fever persist consider CT C/A/P. c/w zosyn   Influenza A : tamiflu started ( ID aware)Oncology - Dr. Kan[facility] called       PMHx:         HTN, HLD, CVA with residual dysphagia s/p PEG, Gastric tumor               Dx:        Abd pain                     Gastric tumor                     Positive  GIST; on gleevec                     Influenza A - tamiflu

## 2017-02-04 NOTE — DISCHARGE NOTE ADULT - INSTRUCTIONS
Pureed diet with honey consistency liquid  Jevity 1.2 [place] 220ml every 4hours ( 24 hour total volume 1320 ml) Pureed diet with honey consistency liquid  Jevity 1.2 Preston 220ml every 6hours ( 24 hour total volume 1320 ml)

## 2017-02-05 LAB
ANION GAP SERPL CALC-SCNC: 13 MMOL/L — SIGNIFICANT CHANGE UP (ref 5–17)
BASE EXCESS BLDV CALC-SCNC: -0.6 MMOL/L — SIGNIFICANT CHANGE UP (ref -2–2)
BUN SERPL-MCNC: 20 MG/DL — SIGNIFICANT CHANGE UP (ref 7–23)
CALCIUM SERPL-MCNC: 8.6 MG/DL — SIGNIFICANT CHANGE UP (ref 8.4–10.5)
CHLORIDE SERPL-SCNC: 104 MMOL/L — SIGNIFICANT CHANGE UP (ref 96–108)
CO2 BLDV-SCNC: 24 MMOL/L — SIGNIFICANT CHANGE UP (ref 22–30)
CO2 SERPL-SCNC: 21 MMOL/L — LOW (ref 22–31)
CREAT SERPL-MCNC: 1.33 MG/DL — HIGH (ref 0.5–1.3)
GAS PNL BLDV: SIGNIFICANT CHANGE UP
GLUCOSE SERPL-MCNC: 117 MG/DL — HIGH (ref 70–99)
HCO3 BLDV-SCNC: 23 MMOL/L — SIGNIFICANT CHANGE UP (ref 21–29)
HCT VFR BLD CALC: 32.5 % — LOW (ref 39–50)
HCT VFR BLD CALC: 35.7 % — LOW (ref 39–50)
HGB BLD-MCNC: 11 G/DL — LOW (ref 13–17)
HGB BLD-MCNC: 11.6 G/DL — LOW (ref 13–17)
LACTATE SERPL-SCNC: 3.2 MMOL/L — HIGH (ref 0.7–2)
MCHC RBC-ENTMCNC: 31 PG — SIGNIFICANT CHANGE UP (ref 27–34)
MCHC RBC-ENTMCNC: 32.4 PG — SIGNIFICANT CHANGE UP (ref 27–34)
MCHC RBC-ENTMCNC: 32.5 GM/DL — SIGNIFICANT CHANGE UP (ref 32–36)
MCHC RBC-ENTMCNC: 33.9 GM/DL — SIGNIFICANT CHANGE UP (ref 32–36)
MCV RBC AUTO: 95.5 FL — SIGNIFICANT CHANGE UP (ref 80–100)
MCV RBC AUTO: 95.5 FL — SIGNIFICANT CHANGE UP (ref 80–100)
PCO2 BLDV: 34 MMHG — LOW (ref 35–50)
PH BLDV: 7.44 — SIGNIFICANT CHANGE UP (ref 7.35–7.45)
PLATELET # BLD AUTO: 141 K/UL — LOW (ref 150–400)
PLATELET # BLD AUTO: 184 K/UL — SIGNIFICANT CHANGE UP (ref 150–400)
PO2 BLDV: 51 MMHG — HIGH (ref 25–45)
POTASSIUM SERPL-MCNC: 4 MMOL/L — SIGNIFICANT CHANGE UP (ref 3.5–5.3)
POTASSIUM SERPL-SCNC: 4 MMOL/L — SIGNIFICANT CHANGE UP (ref 3.5–5.3)
PROCALCITONIN SERPL-MCNC: 102.5 NG/ML — SIGNIFICANT CHANGE UP (ref 0–0.05)
RAPID RVP RESULT: SIGNIFICANT CHANGE UP
RBC # BLD: 3.4 M/UL — LOW (ref 4.2–5.8)
RBC # BLD: 3.74 M/UL — LOW (ref 4.2–5.8)
RBC # FLD: 12.9 % — SIGNIFICANT CHANGE UP (ref 10.3–14.5)
RBC # FLD: 13.8 % — SIGNIFICANT CHANGE UP (ref 10.3–14.5)
SAO2 % BLDV: 85 % — SIGNIFICANT CHANGE UP (ref 67–88)
SODIUM SERPL-SCNC: 138 MMOL/L — SIGNIFICANT CHANGE UP (ref 135–145)
WBC # BLD: 4.38 K/UL — SIGNIFICANT CHANGE UP (ref 3.8–10.5)
WBC # BLD: 8.6 K/UL — SIGNIFICANT CHANGE UP (ref 3.8–10.5)
WBC # FLD AUTO: 4.38 K/UL — SIGNIFICANT CHANGE UP (ref 3.8–10.5)
WBC # FLD AUTO: 8.6 K/UL — SIGNIFICANT CHANGE UP (ref 3.8–10.5)

## 2017-02-05 PROCEDURE — 70450 CT HEAD/BRAIN W/O DYE: CPT | Mod: 26

## 2017-02-05 PROCEDURE — 71250 CT THORAX DX C-: CPT | Mod: 26

## 2017-02-05 PROCEDURE — 99222 1ST HOSP IP/OBS MODERATE 55: CPT

## 2017-02-05 PROCEDURE — 74176 CT ABD & PELVIS W/O CONTRAST: CPT | Mod: 26

## 2017-02-05 RX ORDER — PIPERACILLIN AND TAZOBACTAM 4; .5 G/20ML; G/20ML
3.38 INJECTION, POWDER, LYOPHILIZED, FOR SOLUTION INTRAVENOUS EVERY 8 HOURS
Qty: 0 | Refills: 0 | Status: DISCONTINUED | OUTPATIENT
Start: 2017-02-05 | End: 2017-02-07

## 2017-02-05 RX ORDER — ACETAMINOPHEN 500 MG
650 TABLET ORAL ONCE
Qty: 0 | Refills: 0 | Status: COMPLETED | OUTPATIENT
Start: 2017-02-05 | End: 2017-02-05

## 2017-02-05 RX ORDER — IMATINIB MESYLATE 400 MG/1
1 TABLET, FILM COATED ORAL
Qty: 5 | Refills: 0 | OUTPATIENT
Start: 2017-02-05 | End: 2017-02-10

## 2017-02-05 RX ORDER — VANCOMYCIN HCL 1 G
1000 VIAL (EA) INTRAVENOUS ONCE
Qty: 0 | Refills: 0 | Status: COMPLETED | OUTPATIENT
Start: 2017-02-05 | End: 2017-02-05

## 2017-02-05 RX ORDER — HALOPERIDOL DECANOATE 100 MG/ML
0.5 INJECTION INTRAMUSCULAR EVERY 6 HOURS
Qty: 0 | Refills: 0 | Status: DISCONTINUED | OUTPATIENT
Start: 2017-02-05 | End: 2017-02-13

## 2017-02-05 RX ORDER — CEFTRIAXONE 500 MG/1
INJECTION, POWDER, FOR SOLUTION INTRAMUSCULAR; INTRAVENOUS
Qty: 0 | Refills: 0 | Status: DISCONTINUED | OUTPATIENT
Start: 2017-02-05 | End: 2017-02-05

## 2017-02-05 RX ORDER — SODIUM CHLORIDE 9 MG/ML
1000 INJECTION INTRAMUSCULAR; INTRAVENOUS; SUBCUTANEOUS
Qty: 0 | Refills: 0 | Status: DISCONTINUED | OUTPATIENT
Start: 2017-02-05 | End: 2017-02-07

## 2017-02-05 RX ORDER — PIPERACILLIN AND TAZOBACTAM 4; .5 G/20ML; G/20ML
3.38 INJECTION, POWDER, LYOPHILIZED, FOR SOLUTION INTRAVENOUS ONCE
Qty: 0 | Refills: 0 | Status: COMPLETED | OUTPATIENT
Start: 2017-02-05 | End: 2017-02-06

## 2017-02-05 RX ORDER — ACETAMINOPHEN 500 MG
2 TABLET ORAL
Qty: 0 | Refills: 0 | COMMUNITY
Start: 2017-02-05

## 2017-02-05 RX ORDER — QUETIAPINE FUMARATE 200 MG/1
25 TABLET, FILM COATED ORAL EVERY 6 HOURS
Qty: 0 | Refills: 0 | Status: DISCONTINUED | OUTPATIENT
Start: 2017-02-05 | End: 2017-02-13

## 2017-02-05 RX ORDER — SODIUM CHLORIDE 9 MG/ML
1000 INJECTION INTRAMUSCULAR; INTRAVENOUS; SUBCUTANEOUS ONCE
Qty: 0 | Refills: 0 | Status: COMPLETED | OUTPATIENT
Start: 2017-02-05 | End: 2017-02-05

## 2017-02-05 RX ORDER — CEFTRIAXONE 500 MG/1
1 INJECTION, POWDER, FOR SOLUTION INTRAMUSCULAR; INTRAVENOUS ONCE
Qty: 0 | Refills: 0 | Status: COMPLETED | OUTPATIENT
Start: 2017-02-05 | End: 2017-02-05

## 2017-02-05 RX ADMIN — CLOPIDOGREL BISULFATE 75 MILLIGRAM(S): 75 TABLET, FILM COATED ORAL at 12:29

## 2017-02-05 RX ADMIN — Medication 125 MILLIGRAM(S): at 22:02

## 2017-02-05 RX ADMIN — POLYETHYLENE GLYCOL 3350 17 GRAM(S): 17 POWDER, FOR SOLUTION ORAL at 12:30

## 2017-02-05 RX ADMIN — HEPARIN SODIUM 5000 UNIT(S): 5000 INJECTION INTRAVENOUS; SUBCUTANEOUS at 05:42

## 2017-02-05 RX ADMIN — CEFTRIAXONE 100 GRAM(S): 500 INJECTION, POWDER, FOR SOLUTION INTRAMUSCULAR; INTRAVENOUS at 19:52

## 2017-02-05 RX ADMIN — MIRTAZAPINE 15 MILLIGRAM(S): 45 TABLET, ORALLY DISINTEGRATING ORAL at 12:30

## 2017-02-05 RX ADMIN — GABAPENTIN 300 MILLIGRAM(S): 400 CAPSULE ORAL at 13:41

## 2017-02-05 RX ADMIN — Medication 50 MILLIGRAM(S): at 17:57

## 2017-02-05 RX ADMIN — GABAPENTIN 300 MILLIGRAM(S): 400 CAPSULE ORAL at 22:22

## 2017-02-05 RX ADMIN — ATORVASTATIN CALCIUM 80 MILLIGRAM(S): 80 TABLET, FILM COATED ORAL at 22:22

## 2017-02-05 RX ADMIN — GABAPENTIN 300 MILLIGRAM(S): 400 CAPSULE ORAL at 05:42

## 2017-02-05 RX ADMIN — Medication 50 MILLIGRAM(S): at 05:42

## 2017-02-05 RX ADMIN — Medication 650 MILLIGRAM(S): at 17:57

## 2017-02-05 RX ADMIN — SENNA PLUS 5 MILLILITER(S): 8.6 TABLET ORAL at 22:22

## 2017-02-05 RX ADMIN — Medication 650 MILLIGRAM(S): at 21:03

## 2017-02-05 RX ADMIN — FAMOTIDINE 20 MILLIGRAM(S): 10 INJECTION INTRAVENOUS at 12:29

## 2017-02-05 RX ADMIN — IMATINIB MESYLATE 400 MILLIGRAM(S): 400 TABLET, FILM COATED ORAL at 13:41

## 2017-02-05 RX ADMIN — QUETIAPINE FUMARATE 25 MILLIGRAM(S): 200 TABLET, FILM COATED ORAL at 22:22

## 2017-02-05 RX ADMIN — LISINOPRIL 2.5 MILLIGRAM(S): 2.5 TABLET ORAL at 05:42

## 2017-02-05 RX ADMIN — HEPARIN SODIUM 5000 UNIT(S): 5000 INJECTION INTRAVENOUS; SUBCUTANEOUS at 17:58

## 2017-02-05 RX ADMIN — AMLODIPINE BESYLATE 5 MILLIGRAM(S): 2.5 TABLET ORAL at 05:42

## 2017-02-05 RX ADMIN — Medication 81 MILLIGRAM(S): at 12:29

## 2017-02-06 LAB
ANION GAP SERPL CALC-SCNC: 13 MMOL/L — SIGNIFICANT CHANGE UP (ref 5–17)
APPEARANCE UR: CLEAR — SIGNIFICANT CHANGE UP
BASOPHILS # BLD AUTO: 0.03 K/UL — SIGNIFICANT CHANGE UP (ref 0–0.2)
BASOPHILS NFR BLD AUTO: 0.3 % — SIGNIFICANT CHANGE UP (ref 0–2)
BILIRUB UR-MCNC: NEGATIVE — SIGNIFICANT CHANGE UP
BUN SERPL-MCNC: 23 MG/DL — SIGNIFICANT CHANGE UP (ref 7–23)
CALCIUM SERPL-MCNC: 8.2 MG/DL — LOW (ref 8.4–10.5)
CHLORIDE SERPL-SCNC: 104 MMOL/L — SIGNIFICANT CHANGE UP (ref 96–108)
CO2 SERPL-SCNC: 20 MMOL/L — LOW (ref 22–31)
COLOR SPEC: YELLOW — SIGNIFICANT CHANGE UP
CREAT SERPL-MCNC: 1.54 MG/DL — HIGH (ref 0.5–1.3)
DIFF PNL FLD: NEGATIVE — SIGNIFICANT CHANGE UP
EOSINOPHIL # BLD AUTO: 0 K/UL — SIGNIFICANT CHANGE UP (ref 0–0.5)
EOSINOPHIL NFR BLD AUTO: 0 % — SIGNIFICANT CHANGE UP (ref 0–6)
GLUCOSE SERPL-MCNC: 90 MG/DL — SIGNIFICANT CHANGE UP (ref 70–99)
GLUCOSE UR QL: NEGATIVE MG/DL — SIGNIFICANT CHANGE UP
HCT VFR BLD CALC: 31.5 % — LOW (ref 39–50)
HGB BLD-MCNC: 10.1 G/DL — LOW (ref 13–17)
IMM GRANULOCYTES NFR BLD AUTO: 0.1 % — SIGNIFICANT CHANGE UP (ref 0–1.5)
KETONES UR-MCNC: NEGATIVE — SIGNIFICANT CHANGE UP
LACTATE SERPL-SCNC: 0.9 MMOL/L — SIGNIFICANT CHANGE UP (ref 0.7–2)
LEUKOCYTE ESTERASE UR-ACNC: NEGATIVE — SIGNIFICANT CHANGE UP
LYMPHOCYTES # BLD AUTO: 0.6 K/UL — LOW (ref 1–3.3)
LYMPHOCYTES # BLD AUTO: 6.2 % — LOW (ref 13–44)
MCHC RBC-ENTMCNC: 30.2 PG — SIGNIFICANT CHANGE UP (ref 27–34)
MCHC RBC-ENTMCNC: 32.1 GM/DL — SIGNIFICANT CHANGE UP (ref 32–36)
MCV RBC AUTO: 94.3 FL — SIGNIFICANT CHANGE UP (ref 80–100)
MONOCYTES # BLD AUTO: 0.95 K/UL — HIGH (ref 0–0.9)
MONOCYTES NFR BLD AUTO: 9.8 % — SIGNIFICANT CHANGE UP (ref 2–14)
NEUTROPHILS # BLD AUTO: 8.15 K/UL — HIGH (ref 1.8–7.4)
NEUTROPHILS NFR BLD AUTO: 83.6 % — HIGH (ref 43–77)
NITRITE UR-MCNC: NEGATIVE — SIGNIFICANT CHANGE UP
PH UR: 5.5 — SIGNIFICANT CHANGE UP (ref 5–8)
PLATELET # BLD AUTO: 155 K/UL — SIGNIFICANT CHANGE UP (ref 150–400)
POTASSIUM SERPL-MCNC: 4.6 MMOL/L — SIGNIFICANT CHANGE UP (ref 3.5–5.3)
POTASSIUM SERPL-SCNC: 4.6 MMOL/L — SIGNIFICANT CHANGE UP (ref 3.5–5.3)
PROT UR-MCNC: 100 MG/DL
RBC # BLD: 3.34 M/UL — LOW (ref 4.2–5.8)
RBC # FLD: 14.2 % — SIGNIFICANT CHANGE UP (ref 10.3–14.5)
SODIUM SERPL-SCNC: 137 MMOL/L — SIGNIFICANT CHANGE UP (ref 135–145)
SP GR SPEC: 1.02 — SIGNIFICANT CHANGE UP (ref 1.01–1.02)
UROBILINOGEN FLD QL: 1 MG/DL — SIGNIFICANT CHANGE UP
WBC # BLD: 9.19 K/UL — SIGNIFICANT CHANGE UP (ref 3.8–10.5)
WBC # FLD AUTO: 9.19 K/UL — SIGNIFICANT CHANGE UP (ref 3.8–10.5)

## 2017-02-06 PROCEDURE — 99232 SBSQ HOSP IP/OBS MODERATE 35: CPT

## 2017-02-06 PROCEDURE — 99222 1ST HOSP IP/OBS MODERATE 55: CPT | Mod: GC

## 2017-02-06 RX ADMIN — PIPERACILLIN AND TAZOBACTAM 25 GRAM(S): 4; .5 INJECTION, POWDER, LYOPHILIZED, FOR SOLUTION INTRAVENOUS at 14:53

## 2017-02-06 RX ADMIN — LISINOPRIL 2.5 MILLIGRAM(S): 2.5 TABLET ORAL at 12:43

## 2017-02-06 RX ADMIN — MIRTAZAPINE 15 MILLIGRAM(S): 45 TABLET, ORALLY DISINTEGRATING ORAL at 12:44

## 2017-02-06 RX ADMIN — PIPERACILLIN AND TAZOBACTAM 25 GRAM(S): 4; .5 INJECTION, POWDER, LYOPHILIZED, FOR SOLUTION INTRAVENOUS at 21:19

## 2017-02-06 RX ADMIN — PIPERACILLIN AND TAZOBACTAM 200 GRAM(S): 4; .5 INJECTION, POWDER, LYOPHILIZED, FOR SOLUTION INTRAVENOUS at 01:06

## 2017-02-06 RX ADMIN — HEPARIN SODIUM 5000 UNIT(S): 5000 INJECTION INTRAVENOUS; SUBCUTANEOUS at 17:50

## 2017-02-06 RX ADMIN — GABAPENTIN 300 MILLIGRAM(S): 400 CAPSULE ORAL at 05:39

## 2017-02-06 RX ADMIN — QUETIAPINE FUMARATE 25 MILLIGRAM(S): 200 TABLET, FILM COATED ORAL at 21:19

## 2017-02-06 RX ADMIN — GABAPENTIN 300 MILLIGRAM(S): 400 CAPSULE ORAL at 21:19

## 2017-02-06 RX ADMIN — PIPERACILLIN AND TAZOBACTAM 25 GRAM(S): 4; .5 INJECTION, POWDER, LYOPHILIZED, FOR SOLUTION INTRAVENOUS at 05:39

## 2017-02-06 RX ADMIN — SODIUM CHLORIDE 1000 MILLILITER(S): 9 INJECTION INTRAMUSCULAR; INTRAVENOUS; SUBCUTANEOUS at 00:22

## 2017-02-06 RX ADMIN — HEPARIN SODIUM 5000 UNIT(S): 5000 INJECTION INTRAVENOUS; SUBCUTANEOUS at 05:39

## 2017-02-06 RX ADMIN — ATORVASTATIN CALCIUM 80 MILLIGRAM(S): 80 TABLET, FILM COATED ORAL at 21:19

## 2017-02-06 RX ADMIN — GABAPENTIN 300 MILLIGRAM(S): 400 CAPSULE ORAL at 14:55

## 2017-02-06 RX ADMIN — Medication 50 MILLIGRAM(S): at 05:40

## 2017-02-06 RX ADMIN — IMATINIB MESYLATE 400 MILLIGRAM(S): 400 TABLET, FILM COATED ORAL at 12:44

## 2017-02-06 RX ADMIN — SENNA PLUS 5 MILLILITER(S): 8.6 TABLET ORAL at 21:19

## 2017-02-06 RX ADMIN — Medication 81 MILLIGRAM(S): at 12:44

## 2017-02-06 RX ADMIN — Medication 50 MILLIGRAM(S): at 17:57

## 2017-02-06 RX ADMIN — CLOPIDOGREL BISULFATE 75 MILLIGRAM(S): 75 TABLET, FILM COATED ORAL at 12:44

## 2017-02-06 RX ADMIN — QUETIAPINE FUMARATE 25 MILLIGRAM(S): 200 TABLET, FILM COATED ORAL at 17:58

## 2017-02-06 RX ADMIN — POLYETHYLENE GLYCOL 3350 17 GRAM(S): 17 POWDER, FOR SOLUTION ORAL at 12:43

## 2017-02-06 RX ADMIN — FAMOTIDINE 20 MILLIGRAM(S): 10 INJECTION INTRAVENOUS at 12:44

## 2017-02-06 NOTE — PROVIDER CONTACT NOTE (OTHER) - ACTION/TREATMENT ORDERED:
Will draw stat labs, put ice packs on pts groin and under axillae, pt will go for CT scan of abdomen, tube feeds to be held. Will await more orders. Will continue to monitor. Will draw stat labs, will give tylenol, put ice packs on pts groin and under axillae, pt will go for CT scan of abdomen, tube feeds to be held. Will await more orders. Will continue to monitor.

## 2017-02-06 NOTE — PROVIDER CONTACT NOTE (OTHER) - ACTION/TREATMENT ORDERED:
Will give pt more cold packs, wait for 1L NS bolus to finish, wait for vanco to finish infusing, hang zozyn and then call NP with updated vitals in one hour. Will continue to monitor.

## 2017-02-07 LAB
ANION GAP SERPL CALC-SCNC: 9 MMOL/L — SIGNIFICANT CHANGE UP (ref 5–17)
BUN SERPL-MCNC: 18 MG/DL — SIGNIFICANT CHANGE UP (ref 7–23)
CALCIUM SERPL-MCNC: 8.4 MG/DL — SIGNIFICANT CHANGE UP (ref 8.4–10.5)
CHLORIDE SERPL-SCNC: 107 MMOL/L — SIGNIFICANT CHANGE UP (ref 96–108)
CO2 SERPL-SCNC: 24 MMOL/L — SIGNIFICANT CHANGE UP (ref 22–31)
CREAT SERPL-MCNC: 1.61 MG/DL — HIGH (ref 0.5–1.3)
GLUCOSE SERPL-MCNC: 94 MG/DL — SIGNIFICANT CHANGE UP (ref 70–99)
HCT VFR BLD CALC: 33.4 % — LOW (ref 39–50)
HGB BLD-MCNC: 10.5 G/DL — LOW (ref 13–17)
MCHC RBC-ENTMCNC: 29.7 PG — SIGNIFICANT CHANGE UP (ref 27–34)
MCHC RBC-ENTMCNC: 31.4 GM/DL — LOW (ref 32–36)
MCV RBC AUTO: 94.6 FL — SIGNIFICANT CHANGE UP (ref 80–100)
PLATELET # BLD AUTO: 159 K/UL — SIGNIFICANT CHANGE UP (ref 150–400)
POTASSIUM SERPL-MCNC: 4.3 MMOL/L — SIGNIFICANT CHANGE UP (ref 3.5–5.3)
POTASSIUM SERPL-SCNC: 4.3 MMOL/L — SIGNIFICANT CHANGE UP (ref 3.5–5.3)
RBC # BLD: 3.53 M/UL — LOW (ref 4.2–5.8)
RBC # FLD: 14.5 % — SIGNIFICANT CHANGE UP (ref 10.3–14.5)
SODIUM SERPL-SCNC: 140 MMOL/L — SIGNIFICANT CHANGE UP (ref 135–145)
WBC # BLD: 7 K/UL — SIGNIFICANT CHANGE UP (ref 3.8–10.5)
WBC # FLD AUTO: 7 K/UL — SIGNIFICANT CHANGE UP (ref 3.8–10.5)

## 2017-02-07 PROCEDURE — 99232 SBSQ HOSP IP/OBS MODERATE 35: CPT

## 2017-02-07 PROCEDURE — 70551 MRI BRAIN STEM W/O DYE: CPT | Mod: 26

## 2017-02-07 RX ORDER — SODIUM CHLORIDE 9 MG/ML
1000 INJECTION INTRAMUSCULAR; INTRAVENOUS; SUBCUTANEOUS
Qty: 0 | Refills: 0 | Status: DISCONTINUED | OUTPATIENT
Start: 2017-02-07 | End: 2017-02-11

## 2017-02-07 RX ADMIN — SODIUM CHLORIDE 60 MILLILITER(S): 9 INJECTION INTRAMUSCULAR; INTRAVENOUS; SUBCUTANEOUS at 12:27

## 2017-02-07 RX ADMIN — PIPERACILLIN AND TAZOBACTAM 25 GRAM(S): 4; .5 INJECTION, POWDER, LYOPHILIZED, FOR SOLUTION INTRAVENOUS at 13:00

## 2017-02-07 RX ADMIN — GABAPENTIN 300 MILLIGRAM(S): 400 CAPSULE ORAL at 13:00

## 2017-02-07 RX ADMIN — LISINOPRIL 2.5 MILLIGRAM(S): 2.5 TABLET ORAL at 06:08

## 2017-02-07 RX ADMIN — AMLODIPINE BESYLATE 5 MILLIGRAM(S): 2.5 TABLET ORAL at 06:08

## 2017-02-07 RX ADMIN — IMATINIB MESYLATE 400 MILLIGRAM(S): 400 TABLET, FILM COATED ORAL at 12:28

## 2017-02-07 RX ADMIN — Medication 81 MILLIGRAM(S): at 12:27

## 2017-02-07 RX ADMIN — Medication 50 MILLIGRAM(S): at 06:08

## 2017-02-07 RX ADMIN — POLYETHYLENE GLYCOL 3350 17 GRAM(S): 17 POWDER, FOR SOLUTION ORAL at 12:27

## 2017-02-07 RX ADMIN — HEPARIN SODIUM 5000 UNIT(S): 5000 INJECTION INTRAVENOUS; SUBCUTANEOUS at 06:08

## 2017-02-07 RX ADMIN — Medication 50 MILLIGRAM(S): at 17:00

## 2017-02-07 RX ADMIN — PIPERACILLIN AND TAZOBACTAM 25 GRAM(S): 4; .5 INJECTION, POWDER, LYOPHILIZED, FOR SOLUTION INTRAVENOUS at 06:08

## 2017-02-07 RX ADMIN — ATORVASTATIN CALCIUM 80 MILLIGRAM(S): 80 TABLET, FILM COATED ORAL at 23:54

## 2017-02-07 RX ADMIN — GABAPENTIN 300 MILLIGRAM(S): 400 CAPSULE ORAL at 06:08

## 2017-02-07 RX ADMIN — MIRTAZAPINE 15 MILLIGRAM(S): 45 TABLET, ORALLY DISINTEGRATING ORAL at 12:27

## 2017-02-07 RX ADMIN — HEPARIN SODIUM 5000 UNIT(S): 5000 INJECTION INTRAVENOUS; SUBCUTANEOUS at 17:00

## 2017-02-07 RX ADMIN — QUETIAPINE FUMARATE 25 MILLIGRAM(S): 200 TABLET, FILM COATED ORAL at 23:56

## 2017-02-07 RX ADMIN — CLOPIDOGREL BISULFATE 75 MILLIGRAM(S): 75 TABLET, FILM COATED ORAL at 12:27

## 2017-02-07 RX ADMIN — FAMOTIDINE 20 MILLIGRAM(S): 10 INJECTION INTRAVENOUS at 12:27

## 2017-02-07 RX ADMIN — GABAPENTIN 300 MILLIGRAM(S): 400 CAPSULE ORAL at 23:54

## 2017-02-08 LAB
ANION GAP SERPL CALC-SCNC: 15 MMOL/L — SIGNIFICANT CHANGE UP (ref 5–17)
BUN SERPL-MCNC: 15 MG/DL — SIGNIFICANT CHANGE UP (ref 7–23)
CALCIUM SERPL-MCNC: 9 MG/DL — SIGNIFICANT CHANGE UP (ref 8.4–10.5)
CHLORIDE SERPL-SCNC: 107 MMOL/L — SIGNIFICANT CHANGE UP (ref 96–108)
CO2 SERPL-SCNC: 19 MMOL/L — LOW (ref 22–31)
CREAT SERPL-MCNC: 1.43 MG/DL — HIGH (ref 0.5–1.3)
GLUCOSE SERPL-MCNC: 71 MG/DL — SIGNIFICANT CHANGE UP (ref 70–99)
HCT VFR BLD CALC: 35.2 % — LOW (ref 39–50)
HGB BLD-MCNC: 11.4 G/DL — LOW (ref 13–17)
MCHC RBC-ENTMCNC: 30.6 PG — SIGNIFICANT CHANGE UP (ref 27–34)
MCHC RBC-ENTMCNC: 32.4 GM/DL — SIGNIFICANT CHANGE UP (ref 32–36)
MCV RBC AUTO: 94.4 FL — SIGNIFICANT CHANGE UP (ref 80–100)
PLATELET # BLD AUTO: 176 K/UL — SIGNIFICANT CHANGE UP (ref 150–400)
POTASSIUM SERPL-MCNC: 4.3 MMOL/L — SIGNIFICANT CHANGE UP (ref 3.5–5.3)
POTASSIUM SERPL-SCNC: 4.3 MMOL/L — SIGNIFICANT CHANGE UP (ref 3.5–5.3)
RBC # BLD: 3.73 M/UL — LOW (ref 4.2–5.8)
RBC # FLD: 14.5 % — SIGNIFICANT CHANGE UP (ref 10.3–14.5)
SODIUM SERPL-SCNC: 141 MMOL/L — SIGNIFICANT CHANGE UP (ref 135–145)
WBC # BLD: 5.28 K/UL — SIGNIFICANT CHANGE UP (ref 3.8–10.5)
WBC # FLD AUTO: 5.28 K/UL — SIGNIFICANT CHANGE UP (ref 3.8–10.5)

## 2017-02-08 PROCEDURE — 99232 SBSQ HOSP IP/OBS MODERATE 35: CPT

## 2017-02-08 RX ORDER — OXYCODONE HYDROCHLORIDE 5 MG/1
2 TABLET ORAL
Qty: 56 | Refills: 0 | OUTPATIENT
Start: 2017-02-08 | End: 2017-02-15

## 2017-02-08 RX ORDER — OXYCODONE HYDROCHLORIDE 5 MG/1
0 TABLET ORAL
Qty: 56 | Refills: 0 | COMMUNITY
Start: 2017-02-08 | End: 2017-02-15

## 2017-02-08 RX ADMIN — GABAPENTIN 300 MILLIGRAM(S): 400 CAPSULE ORAL at 05:28

## 2017-02-08 RX ADMIN — ATORVASTATIN CALCIUM 80 MILLIGRAM(S): 80 TABLET, FILM COATED ORAL at 22:02

## 2017-02-08 RX ADMIN — Medication 81 MILLIGRAM(S): at 11:44

## 2017-02-08 RX ADMIN — GABAPENTIN 300 MILLIGRAM(S): 400 CAPSULE ORAL at 22:02

## 2017-02-08 RX ADMIN — HEPARIN SODIUM 5000 UNIT(S): 5000 INJECTION INTRAVENOUS; SUBCUTANEOUS at 05:28

## 2017-02-08 RX ADMIN — Medication 50 MILLIGRAM(S): at 05:28

## 2017-02-08 RX ADMIN — LISINOPRIL 2.5 MILLIGRAM(S): 2.5 TABLET ORAL at 05:29

## 2017-02-08 RX ADMIN — FAMOTIDINE 20 MILLIGRAM(S): 10 INJECTION INTRAVENOUS at 11:44

## 2017-02-08 RX ADMIN — Medication 50 MILLIGRAM(S): at 17:25

## 2017-02-08 RX ADMIN — CLOPIDOGREL BISULFATE 75 MILLIGRAM(S): 75 TABLET, FILM COATED ORAL at 11:43

## 2017-02-08 RX ADMIN — IMATINIB MESYLATE 400 MILLIGRAM(S): 400 TABLET, FILM COATED ORAL at 11:44

## 2017-02-08 RX ADMIN — POLYETHYLENE GLYCOL 3350 17 GRAM(S): 17 POWDER, FOR SOLUTION ORAL at 11:44

## 2017-02-08 RX ADMIN — GABAPENTIN 300 MILLIGRAM(S): 400 CAPSULE ORAL at 14:05

## 2017-02-08 RX ADMIN — AMLODIPINE BESYLATE 5 MILLIGRAM(S): 2.5 TABLET ORAL at 05:28

## 2017-02-08 RX ADMIN — MIRTAZAPINE 15 MILLIGRAM(S): 45 TABLET, ORALLY DISINTEGRATING ORAL at 11:43

## 2017-02-08 RX ADMIN — HEPARIN SODIUM 5000 UNIT(S): 5000 INJECTION INTRAVENOUS; SUBCUTANEOUS at 17:25

## 2017-02-08 RX ADMIN — SENNA PLUS 5 MILLILITER(S): 8.6 TABLET ORAL at 22:01

## 2017-02-08 RX ADMIN — QUETIAPINE FUMARATE 25 MILLIGRAM(S): 200 TABLET, FILM COATED ORAL at 22:01

## 2017-02-08 NOTE — PROVIDER CONTACT NOTE (OTHER) - ACTION/TREATMENT ORDERED:
NP notified, instructed to not discharge patient at this time, pt will most likely be discharged on Friday, family notified.

## 2017-02-08 NOTE — PROVIDER CONTACT NOTE (OTHER) - ACTION/TREATMENT ORDERED:
Dr. Astudillo saw pt. It may be possible withdrawl. Percocet ordered to help relieve pain. will continue to monitor pt

## 2017-02-09 LAB
ANION GAP SERPL CALC-SCNC: 10 MMOL/L — SIGNIFICANT CHANGE UP (ref 5–17)
APPEARANCE UR: CLEAR — SIGNIFICANT CHANGE UP
BASOPHILS # BLD AUTO: 0.1 K/UL — SIGNIFICANT CHANGE UP (ref 0–0.2)
BASOPHILS NFR BLD AUTO: 1.5 % — SIGNIFICANT CHANGE UP (ref 0–2)
BILIRUB UR-MCNC: NEGATIVE — SIGNIFICANT CHANGE UP
BUN SERPL-MCNC: 14 MG/DL — SIGNIFICANT CHANGE UP (ref 7–23)
CALCIUM SERPL-MCNC: 9.4 MG/DL — SIGNIFICANT CHANGE UP (ref 8.4–10.5)
CHLORIDE SERPL-SCNC: 105 MMOL/L — SIGNIFICANT CHANGE UP (ref 96–108)
CO2 SERPL-SCNC: 27 MMOL/L — SIGNIFICANT CHANGE UP (ref 22–31)
COLOR SPEC: YELLOW — SIGNIFICANT CHANGE UP
CREAT SERPL-MCNC: 1.63 MG/DL — HIGH (ref 0.5–1.3)
DIFF PNL FLD: NEGATIVE — SIGNIFICANT CHANGE UP
EOSINOPHIL # BLD AUTO: 0.1 K/UL — SIGNIFICANT CHANGE UP (ref 0–0.5)
EOSINOPHIL NFR BLD AUTO: 2 % — SIGNIFICANT CHANGE UP (ref 0–6)
GLUCOSE SERPL-MCNC: 88 MG/DL — SIGNIFICANT CHANGE UP (ref 70–99)
GLUCOSE UR QL: NEGATIVE — SIGNIFICANT CHANGE UP
HCT VFR BLD CALC: 35 % — LOW (ref 39–50)
HGB BLD-MCNC: 11.8 G/DL — LOW (ref 13–17)
KETONES UR-MCNC: NEGATIVE — SIGNIFICANT CHANGE UP
LACTATE SERPL-SCNC: 1 MMOL/L — SIGNIFICANT CHANGE UP (ref 0.7–2)
LEUKOCYTE ESTERASE UR-ACNC: NEGATIVE — SIGNIFICANT CHANGE UP
LYMPHOCYTES # BLD AUTO: 1.4 K/UL — SIGNIFICANT CHANGE UP (ref 1–3.3)
LYMPHOCYTES # BLD AUTO: 29.3 % — SIGNIFICANT CHANGE UP (ref 13–44)
MCHC RBC-ENTMCNC: 32 PG — SIGNIFICANT CHANGE UP (ref 27–34)
MCHC RBC-ENTMCNC: 33.6 GM/DL — SIGNIFICANT CHANGE UP (ref 32–36)
MCV RBC AUTO: 95.3 FL — SIGNIFICANT CHANGE UP (ref 80–100)
MONOCYTES # BLD AUTO: 0.8 K/UL — SIGNIFICANT CHANGE UP (ref 0–0.9)
MONOCYTES NFR BLD AUTO: 15.6 % — HIGH (ref 2–14)
NEUTROPHILS # BLD AUTO: 2.5 K/UL — SIGNIFICANT CHANGE UP (ref 1.8–7.4)
NEUTROPHILS NFR BLD AUTO: 51.6 % — SIGNIFICANT CHANGE UP (ref 43–77)
NITRITE UR-MCNC: NEGATIVE — SIGNIFICANT CHANGE UP
PH UR: 6 — SIGNIFICANT CHANGE UP (ref 4.8–8)
PLATELET # BLD AUTO: 153 K/UL — SIGNIFICANT CHANGE UP (ref 150–400)
POTASSIUM SERPL-MCNC: 4.4 MMOL/L — SIGNIFICANT CHANGE UP (ref 3.5–5.3)
POTASSIUM SERPL-SCNC: 4.4 MMOL/L — SIGNIFICANT CHANGE UP (ref 3.5–5.3)
PROCALCITONIN SERPL-MCNC: 28.97 NG/ML — HIGH (ref 0–0.05)
PROT UR-MCNC: 100 MG/DL
RBC # BLD: 3.67 M/UL — LOW (ref 4.2–5.8)
RBC # FLD: 14 % — SIGNIFICANT CHANGE UP (ref 10.3–14.5)
SODIUM SERPL-SCNC: 142 MMOL/L — SIGNIFICANT CHANGE UP (ref 135–145)
SP GR SPEC: 1.02 — SIGNIFICANT CHANGE UP (ref 1.01–1.02)
UROBILINOGEN FLD QL: NEGATIVE — SIGNIFICANT CHANGE UP
WBC # BLD: 4.9 K/UL — SIGNIFICANT CHANGE UP (ref 3.8–10.5)
WBC # FLD AUTO: 4.9 K/UL — SIGNIFICANT CHANGE UP (ref 3.8–10.5)

## 2017-02-09 PROCEDURE — 99232 SBSQ HOSP IP/OBS MODERATE 35: CPT

## 2017-02-09 PROCEDURE — 71010: CPT | Mod: 26

## 2017-02-09 RX ORDER — SODIUM CHLORIDE 9 MG/ML
1000 INJECTION INTRAMUSCULAR; INTRAVENOUS; SUBCUTANEOUS
Qty: 0 | Refills: 0 | Status: DISCONTINUED | OUTPATIENT
Start: 2017-02-09 | End: 2017-02-11

## 2017-02-09 RX ORDER — PIPERACILLIN AND TAZOBACTAM 4; .5 G/20ML; G/20ML
3.38 INJECTION, POWDER, LYOPHILIZED, FOR SOLUTION INTRAVENOUS ONCE
Qty: 0 | Refills: 0 | Status: COMPLETED | OUTPATIENT
Start: 2017-02-09 | End: 2017-02-09

## 2017-02-09 RX ORDER — ACETAMINOPHEN 500 MG
650 TABLET ORAL ONCE
Qty: 0 | Refills: 0 | Status: COMPLETED | OUTPATIENT
Start: 2017-02-09 | End: 2017-02-09

## 2017-02-09 RX ORDER — IBUPROFEN 200 MG
400 TABLET ORAL ONCE
Qty: 0 | Refills: 0 | Status: COMPLETED | OUTPATIENT
Start: 2017-02-09 | End: 2017-02-09

## 2017-02-09 RX ORDER — PIPERACILLIN AND TAZOBACTAM 4; .5 G/20ML; G/20ML
3.38 INJECTION, POWDER, LYOPHILIZED, FOR SOLUTION INTRAVENOUS EVERY 8 HOURS
Qty: 0 | Refills: 0 | Status: DISCONTINUED | OUTPATIENT
Start: 2017-02-09 | End: 2017-02-13

## 2017-02-09 RX ADMIN — FAMOTIDINE 20 MILLIGRAM(S): 10 INJECTION INTRAVENOUS at 12:50

## 2017-02-09 RX ADMIN — MIRTAZAPINE 15 MILLIGRAM(S): 45 TABLET, ORALLY DISINTEGRATING ORAL at 12:50

## 2017-02-09 RX ADMIN — Medication 400 MILLIGRAM(S): at 14:13

## 2017-02-09 RX ADMIN — Medication 50 MILLIGRAM(S): at 17:44

## 2017-02-09 RX ADMIN — PIPERACILLIN AND TAZOBACTAM 25 GRAM(S): 4; .5 INJECTION, POWDER, LYOPHILIZED, FOR SOLUTION INTRAVENOUS at 17:44

## 2017-02-09 RX ADMIN — HEPARIN SODIUM 5000 UNIT(S): 5000 INJECTION INTRAVENOUS; SUBCUTANEOUS at 05:34

## 2017-02-09 RX ADMIN — HEPARIN SODIUM 5000 UNIT(S): 5000 INJECTION INTRAVENOUS; SUBCUTANEOUS at 17:44

## 2017-02-09 RX ADMIN — GABAPENTIN 300 MILLIGRAM(S): 400 CAPSULE ORAL at 05:34

## 2017-02-09 RX ADMIN — Medication 81 MILLIGRAM(S): at 12:50

## 2017-02-09 RX ADMIN — GABAPENTIN 300 MILLIGRAM(S): 400 CAPSULE ORAL at 12:50

## 2017-02-09 RX ADMIN — ATORVASTATIN CALCIUM 80 MILLIGRAM(S): 80 TABLET, FILM COATED ORAL at 21:12

## 2017-02-09 RX ADMIN — QUETIAPINE FUMARATE 25 MILLIGRAM(S): 200 TABLET, FILM COATED ORAL at 21:12

## 2017-02-09 RX ADMIN — Medication 400 MILLIGRAM(S): at 13:43

## 2017-02-09 RX ADMIN — PIPERACILLIN AND TAZOBACTAM 200 GRAM(S): 4; .5 INJECTION, POWDER, LYOPHILIZED, FOR SOLUTION INTRAVENOUS at 10:51

## 2017-02-09 RX ADMIN — Medication 650 MILLIGRAM(S): at 01:48

## 2017-02-09 RX ADMIN — GABAPENTIN 300 MILLIGRAM(S): 400 CAPSULE ORAL at 21:12

## 2017-02-09 RX ADMIN — SENNA PLUS 5 MILLILITER(S): 8.6 TABLET ORAL at 21:12

## 2017-02-09 RX ADMIN — AMLODIPINE BESYLATE 5 MILLIGRAM(S): 2.5 TABLET ORAL at 05:34

## 2017-02-09 RX ADMIN — IMATINIB MESYLATE 400 MILLIGRAM(S): 400 TABLET, FILM COATED ORAL at 12:50

## 2017-02-09 RX ADMIN — SODIUM CHLORIDE 75 MILLILITER(S): 9 INJECTION INTRAMUSCULAR; INTRAVENOUS; SUBCUTANEOUS at 10:51

## 2017-02-09 RX ADMIN — Medication 50 MILLIGRAM(S): at 05:33

## 2017-02-09 RX ADMIN — LISINOPRIL 2.5 MILLIGRAM(S): 2.5 TABLET ORAL at 05:33

## 2017-02-09 RX ADMIN — CLOPIDOGREL BISULFATE 75 MILLIGRAM(S): 75 TABLET, FILM COATED ORAL at 12:50

## 2017-02-09 NOTE — PROVIDER CONTACT NOTE (OTHER) - ASSESSMENT
Pt returned from CT scan. Pt temp 101.7. All other VSS. Pt resting comfortably. No signs of fever present. Pt is not shaking.
Pt temp 101.7. Pt is shivering and moaning. BP is 189/93. All other VSS. Pt is voiding adequately. Pt recently received ceftriaxone and blood cultures were drawn last shift. Pt began vomiting. Tube Feeds were held.
Pt. appears in NAD, no rigors present
Pt. w/ noticeable rigors
Rigors apparent  Pt. c/o generalized pain
Pt stable and ready for discharge other than not having adequate supplies for home.
pt shaking and moaning and mumbling his words

## 2017-02-09 NOTE — PROVIDER CONTACT NOTE (OTHER) - ACTION/TREATMENT ORDERED:
NP will order cooling blanket, ice packs applied until then, no further action required at present time, will continue to monitor.

## 2017-02-09 NOTE — PROVIDER CONTACT NOTE (OTHER) - ACTION/TREATMENT ORDERED:
NP notified, instructed to not give another dose of Tylenol, instructed to utilize cooling blanket and recheck temp, will continue to monitor patient.

## 2017-02-09 NOTE — PROVIDER CONTACT NOTE (OTHER) - ACTION/TREATMENT ORDERED:
Percocet given for pain and fever reduction, NP in to see pt. and will pan culture pt., no further action required at present time, will continue to monitor.

## 2017-02-09 NOTE — PROVIDER CONTACT NOTE (OTHER) - ACTION/TREATMENT ORDERED:
NP notified, will order one time dose of Tylenol for pt, currently awaiting order, will continue to monitor patient.

## 2017-02-09 NOTE — PROVIDER CONTACT NOTE (OTHER) - ACTION/TREATMENT ORDERED:
NP in to see pt., motrin 400mg x1 ordered and given as prescribed, will re-check temp. in 1 hour, no further action required at present time, will continue to monitor.

## 2017-02-10 LAB
ALBUMIN SERPL ELPH-MCNC: 2.6 G/DL — LOW (ref 3.3–5)
ALP SERPL-CCNC: 69 U/L — SIGNIFICANT CHANGE UP (ref 40–120)
ALT FLD-CCNC: 19 U/L — SIGNIFICANT CHANGE UP (ref 10–45)
ANION GAP SERPL CALC-SCNC: 12 MMOL/L — SIGNIFICANT CHANGE UP (ref 5–17)
AST SERPL-CCNC: 33 U/L — SIGNIFICANT CHANGE UP (ref 10–40)
BASOPHILS # BLD AUTO: 0.03 K/UL — SIGNIFICANT CHANGE UP (ref 0–0.2)
BASOPHILS NFR BLD AUTO: 0.6 % — SIGNIFICANT CHANGE UP (ref 0–2)
BILIRUB SERPL-MCNC: 0.9 MG/DL — SIGNIFICANT CHANGE UP (ref 0.2–1.2)
BUN SERPL-MCNC: 15 MG/DL — SIGNIFICANT CHANGE UP (ref 7–23)
CALCIUM SERPL-MCNC: 8.2 MG/DL — LOW (ref 8.4–10.5)
CHLORIDE SERPL-SCNC: 106 MMOL/L — SIGNIFICANT CHANGE UP (ref 96–108)
CO2 SERPL-SCNC: 20 MMOL/L — LOW (ref 22–31)
CREAT SERPL-MCNC: 1.61 MG/DL — HIGH (ref 0.5–1.3)
CULTURE RESULTS: SIGNIFICANT CHANGE UP
EOSINOPHIL # BLD AUTO: 0.02 K/UL — SIGNIFICANT CHANGE UP (ref 0–0.5)
EOSINOPHIL NFR BLD AUTO: 0.4 % — SIGNIFICANT CHANGE UP (ref 0–6)
FLUAV H1 2009 PAND RNA SPEC QL NAA+PROBE: DETECTED
GLUCOSE SERPL-MCNC: 97 MG/DL — SIGNIFICANT CHANGE UP (ref 70–99)
HCT VFR BLD CALC: 31.4 % — LOW (ref 39–50)
HGB BLD-MCNC: 10.2 G/DL — LOW (ref 13–17)
IMM GRANULOCYTES NFR BLD AUTO: 0.2 % — SIGNIFICANT CHANGE UP (ref 0–1.5)
LYMPHOCYTES # BLD AUTO: 1.01 K/UL — SIGNIFICANT CHANGE UP (ref 1–3.3)
LYMPHOCYTES # BLD AUTO: 21.9 % — SIGNIFICANT CHANGE UP (ref 13–44)
MCHC RBC-ENTMCNC: 30.7 PG — SIGNIFICANT CHANGE UP (ref 27–34)
MCHC RBC-ENTMCNC: 32.5 GM/DL — SIGNIFICANT CHANGE UP (ref 32–36)
MCV RBC AUTO: 94.6 FL — SIGNIFICANT CHANGE UP (ref 80–100)
MONOCYTES # BLD AUTO: 0.74 K/UL — SIGNIFICANT CHANGE UP (ref 0–0.9)
MONOCYTES NFR BLD AUTO: 16 % — HIGH (ref 2–14)
NEUTROPHILS # BLD AUTO: 2.81 K/UL — SIGNIFICANT CHANGE UP (ref 1.8–7.4)
NEUTROPHILS NFR BLD AUTO: 60.9 % — SIGNIFICANT CHANGE UP (ref 43–77)
PLATELET # BLD AUTO: 151 K/UL — SIGNIFICANT CHANGE UP (ref 150–400)
POTASSIUM SERPL-MCNC: 4 MMOL/L — SIGNIFICANT CHANGE UP (ref 3.5–5.3)
POTASSIUM SERPL-SCNC: 4 MMOL/L — SIGNIFICANT CHANGE UP (ref 3.5–5.3)
PROCALCITONIN SERPL-MCNC: 27.02 NG/ML — HIGH (ref 0–0.05)
PROT SERPL-MCNC: 6 G/DL — SIGNIFICANT CHANGE UP (ref 6–8.3)
RAPID RVP RESULT: DETECTED
RBC # BLD: 3.32 M/UL — LOW (ref 4.2–5.8)
RBC # FLD: 14.6 % — HIGH (ref 10.3–14.5)
SODIUM SERPL-SCNC: 138 MMOL/L — SIGNIFICANT CHANGE UP (ref 135–145)
SPECIMEN SOURCE: SIGNIFICANT CHANGE UP
WBC # BLD: 4.62 K/UL — SIGNIFICANT CHANGE UP (ref 3.8–10.5)
WBC # FLD AUTO: 4.62 K/UL — SIGNIFICANT CHANGE UP (ref 3.8–10.5)

## 2017-02-10 PROCEDURE — 99232 SBSQ HOSP IP/OBS MODERATE 35: CPT

## 2017-02-10 RX ADMIN — FAMOTIDINE 20 MILLIGRAM(S): 10 INJECTION INTRAVENOUS at 14:23

## 2017-02-10 RX ADMIN — SENNA PLUS 5 MILLILITER(S): 8.6 TABLET ORAL at 22:17

## 2017-02-10 RX ADMIN — Medication 50 MILLIGRAM(S): at 05:02

## 2017-02-10 RX ADMIN — PIPERACILLIN AND TAZOBACTAM 25 GRAM(S): 4; .5 INJECTION, POWDER, LYOPHILIZED, FOR SOLUTION INTRAVENOUS at 20:00

## 2017-02-10 RX ADMIN — CLOPIDOGREL BISULFATE 75 MILLIGRAM(S): 75 TABLET, FILM COATED ORAL at 14:23

## 2017-02-10 RX ADMIN — HEPARIN SODIUM 5000 UNIT(S): 5000 INJECTION INTRAVENOUS; SUBCUTANEOUS at 05:02

## 2017-02-10 RX ADMIN — QUETIAPINE FUMARATE 25 MILLIGRAM(S): 200 TABLET, FILM COATED ORAL at 22:14

## 2017-02-10 RX ADMIN — HEPARIN SODIUM 5000 UNIT(S): 5000 INJECTION INTRAVENOUS; SUBCUTANEOUS at 18:19

## 2017-02-10 RX ADMIN — Medication 30 MILLIGRAM(S): at 22:15

## 2017-02-10 RX ADMIN — Medication 650 MILLIGRAM(S): at 09:37

## 2017-02-10 RX ADMIN — MIRTAZAPINE 15 MILLIGRAM(S): 45 TABLET, ORALLY DISINTEGRATING ORAL at 14:23

## 2017-02-10 RX ADMIN — ATORVASTATIN CALCIUM 80 MILLIGRAM(S): 80 TABLET, FILM COATED ORAL at 22:14

## 2017-02-10 RX ADMIN — Medication 50 MILLIGRAM(S): at 18:19

## 2017-02-10 RX ADMIN — POLYETHYLENE GLYCOL 3350 17 GRAM(S): 17 POWDER, FOR SOLUTION ORAL at 14:24

## 2017-02-10 RX ADMIN — GABAPENTIN 300 MILLIGRAM(S): 400 CAPSULE ORAL at 14:24

## 2017-02-10 RX ADMIN — GABAPENTIN 300 MILLIGRAM(S): 400 CAPSULE ORAL at 05:02

## 2017-02-10 RX ADMIN — PIPERACILLIN AND TAZOBACTAM 25 GRAM(S): 4; .5 INJECTION, POWDER, LYOPHILIZED, FOR SOLUTION INTRAVENOUS at 01:14

## 2017-02-10 RX ADMIN — GABAPENTIN 300 MILLIGRAM(S): 400 CAPSULE ORAL at 22:18

## 2017-02-10 RX ADMIN — AMLODIPINE BESYLATE 5 MILLIGRAM(S): 2.5 TABLET ORAL at 05:02

## 2017-02-10 RX ADMIN — PIPERACILLIN AND TAZOBACTAM 25 GRAM(S): 4; .5 INJECTION, POWDER, LYOPHILIZED, FOR SOLUTION INTRAVENOUS at 09:37

## 2017-02-10 RX ADMIN — Medication 81 MILLIGRAM(S): at 14:22

## 2017-02-10 NOTE — PROVIDER CONTACT NOTE (OTHER) - SITUATION
pt with elevated temp of 38.3/100.4
Pt has discharge order entered, while discussing discharge instructions w/ patient's family they stated they did not have tube feed supplies at home yet.
Pt shaking, moaning in bed. pt mumbling/garbling his words. In Citizen of the [place] pt able to state he is in pain
Pt's temp 100.2, pt appears to be shaking. All other VSS.
Pt's temp 100.9, all other VSS. Pt was given 2 percocet at 4 am. Cooling blanket ordered.
Pt's temp 101.4, all other VSS.
Pt. febrile (100.7 F) w/ rigors
Pt. remains febrile despite motrin  Temp. 101.9F  Not due for tylenol yet (4pm)
Pt. w/ temp. 100.9F

## 2017-02-10 NOTE — PROVIDER CONTACT NOTE (CRITICAL VALUE NOTIFICATION) - ACTION/TREATMENT ORDERED:
CRISTIAN Elmore aware, pending orders for Tamiflu and droplet contact isolation, pending isolation room, PPE applied to patient, will continue to monitor.

## 2017-02-10 NOTE — PROVIDER CONTACT NOTE (OTHER) - RECOMMENDATIONS
CRISTIAN Jacobs made aware.
Cooling blanket
Cooling blanket and recheck temp?
Cooling blanket and recheck?
NP Seminiow made aware
Tylenol?
possibly percocet to help ease/calm pt

## 2017-02-10 NOTE — PROVIDER CONTACT NOTE (CRITICAL VALUE NOTIFICATION) - SITUATION
Patient had RVP specimen sent and was founded to be positive with Influenza AH3. Patient has been febrile over the past 24 hrs. Pt family stated that they were tested and positive for the flu.

## 2017-02-10 NOTE — PROVIDER CONTACT NOTE (OTHER) - DATE AND TIME:
05-Feb-2017 17:22
05-Feb-2017 21:50
06-Feb-2017 00:15
08-Feb-2017 08:00
08-Feb-2017 17:45
09-Feb-2017 01:44
09-Feb-2017 09:40
09-Feb-2017 13:15
09-Feb-2017 14:52
09-Feb-2017 21:00
10-Feb-2017 04:33

## 2017-02-10 NOTE — PROVIDER CONTACT NOTE (OTHER) - NAME OF MD/NP/PA/DO NOTIFIED:
MD Astudillo
CRISTIAN George
CRISTIAN George
CRISTIAN Reyes
CRISTIAN Thrasher
Medicine NP
NP Semianow
NP Semianow
mile sevilla NP

## 2017-02-10 NOTE — PROVIDER CONTACT NOTE (OTHER) - ACTION/TREATMENT ORDERED:
NP notified, since percocet was just given instructed to use cooling blanket and recheck temp, will continue to monitor.

## 2017-02-10 NOTE — PROVIDER CONTACT NOTE (OTHER) - REASON
Pt is pending discharge, pt's family states they do not have supplies for tube feeds
Pt temp high
Pt temp still elevated
Pt's temp 100.2, pt shaking
Pt's temp 100.9
Pt's temp is 101.4
Pt. febrile w/ rigors
Pt. febrile w/ rigors again
Pt. remains febrile despite motrin
Pt shaking in bed, moaning

## 2017-02-11 LAB
ANION GAP SERPL CALC-SCNC: 12 MMOL/L — SIGNIFICANT CHANGE UP (ref 5–17)
BASOPHILS # BLD AUTO: 0.04 K/UL — SIGNIFICANT CHANGE UP (ref 0–0.2)
BASOPHILS NFR BLD AUTO: 0.7 % — SIGNIFICANT CHANGE UP (ref 0–2)
BUN SERPL-MCNC: 15 MG/DL — SIGNIFICANT CHANGE UP (ref 7–23)
CALCIUM SERPL-MCNC: 8.5 MG/DL — SIGNIFICANT CHANGE UP (ref 8.4–10.5)
CHLORIDE SERPL-SCNC: 106 MMOL/L — SIGNIFICANT CHANGE UP (ref 96–108)
CO2 SERPL-SCNC: 21 MMOL/L — LOW (ref 22–31)
CREAT SERPL-MCNC: 1.4 MG/DL — HIGH (ref 0.5–1.3)
CULTURE RESULTS: SIGNIFICANT CHANGE UP
CULTURE RESULTS: SIGNIFICANT CHANGE UP
EOSINOPHIL # BLD AUTO: 0.12 K/UL — SIGNIFICANT CHANGE UP (ref 0–0.5)
EOSINOPHIL NFR BLD AUTO: 2.1 % — SIGNIFICANT CHANGE UP (ref 0–6)
GLUCOSE SERPL-MCNC: 96 MG/DL — SIGNIFICANT CHANGE UP (ref 70–99)
HCT VFR BLD CALC: 29.6 % — LOW (ref 39–50)
HGB BLD-MCNC: 9.9 G/DL — LOW (ref 13–17)
IMM GRANULOCYTES NFR BLD AUTO: 0.3 % — SIGNIFICANT CHANGE UP (ref 0–1.5)
LYMPHOCYTES # BLD AUTO: 1.8 K/UL — SIGNIFICANT CHANGE UP (ref 1–3.3)
LYMPHOCYTES # BLD AUTO: 31 % — SIGNIFICANT CHANGE UP (ref 13–44)
MCHC RBC-ENTMCNC: 31.3 PG — SIGNIFICANT CHANGE UP (ref 27–34)
MCHC RBC-ENTMCNC: 33.4 GM/DL — SIGNIFICANT CHANGE UP (ref 32–36)
MCV RBC AUTO: 93.7 FL — SIGNIFICANT CHANGE UP (ref 80–100)
MONOCYTES # BLD AUTO: 0.64 K/UL — SIGNIFICANT CHANGE UP (ref 0–0.9)
MONOCYTES NFR BLD AUTO: 11 % — SIGNIFICANT CHANGE UP (ref 2–14)
NEUTROPHILS # BLD AUTO: 3.19 K/UL — SIGNIFICANT CHANGE UP (ref 1.8–7.4)
NEUTROPHILS NFR BLD AUTO: 54.9 % — SIGNIFICANT CHANGE UP (ref 43–77)
PLATELET # BLD AUTO: 142 K/UL — LOW (ref 150–400)
POTASSIUM SERPL-MCNC: 3.9 MMOL/L — SIGNIFICANT CHANGE UP (ref 3.5–5.3)
POTASSIUM SERPL-SCNC: 3.9 MMOL/L — SIGNIFICANT CHANGE UP (ref 3.5–5.3)
RBC # BLD: 3.16 M/UL — LOW (ref 4.2–5.8)
RBC # FLD: 14.6 % — HIGH (ref 10.3–14.5)
SODIUM SERPL-SCNC: 139 MMOL/L — SIGNIFICANT CHANGE UP (ref 135–145)
SPECIMEN SOURCE: SIGNIFICANT CHANGE UP
SPECIMEN SOURCE: SIGNIFICANT CHANGE UP
WBC # BLD: 5.81 K/UL — SIGNIFICANT CHANGE UP (ref 3.8–10.5)
WBC # FLD AUTO: 5.81 K/UL — SIGNIFICANT CHANGE UP (ref 3.8–10.5)

## 2017-02-11 PROCEDURE — 99232 SBSQ HOSP IP/OBS MODERATE 35: CPT

## 2017-02-11 RX ADMIN — Medication 81 MILLIGRAM(S): at 11:32

## 2017-02-11 RX ADMIN — HEPARIN SODIUM 5000 UNIT(S): 5000 INJECTION INTRAVENOUS; SUBCUTANEOUS at 17:08

## 2017-02-11 RX ADMIN — FAMOTIDINE 20 MILLIGRAM(S): 10 INJECTION INTRAVENOUS at 11:32

## 2017-02-11 RX ADMIN — Medication 50 MILLIGRAM(S): at 05:02

## 2017-02-11 RX ADMIN — PIPERACILLIN AND TAZOBACTAM 25 GRAM(S): 4; .5 INJECTION, POWDER, LYOPHILIZED, FOR SOLUTION INTRAVENOUS at 02:27

## 2017-02-11 RX ADMIN — PIPERACILLIN AND TAZOBACTAM 25 GRAM(S): 4; .5 INJECTION, POWDER, LYOPHILIZED, FOR SOLUTION INTRAVENOUS at 17:08

## 2017-02-11 RX ADMIN — Medication 30 MILLIGRAM(S): at 17:13

## 2017-02-11 RX ADMIN — SENNA PLUS 5 MILLILITER(S): 8.6 TABLET ORAL at 21:20

## 2017-02-11 RX ADMIN — MIRTAZAPINE 15 MILLIGRAM(S): 45 TABLET, ORALLY DISINTEGRATING ORAL at 21:20

## 2017-02-11 RX ADMIN — Medication 30 MILLIGRAM(S): at 05:02

## 2017-02-11 RX ADMIN — GABAPENTIN 300 MILLIGRAM(S): 400 CAPSULE ORAL at 05:02

## 2017-02-11 RX ADMIN — GABAPENTIN 300 MILLIGRAM(S): 400 CAPSULE ORAL at 13:11

## 2017-02-11 RX ADMIN — CLOPIDOGREL BISULFATE 75 MILLIGRAM(S): 75 TABLET, FILM COATED ORAL at 11:33

## 2017-02-11 RX ADMIN — ATORVASTATIN CALCIUM 80 MILLIGRAM(S): 80 TABLET, FILM COATED ORAL at 21:20

## 2017-02-11 RX ADMIN — QUETIAPINE FUMARATE 25 MILLIGRAM(S): 200 TABLET, FILM COATED ORAL at 21:20

## 2017-02-11 RX ADMIN — HEPARIN SODIUM 5000 UNIT(S): 5000 INJECTION INTRAVENOUS; SUBCUTANEOUS at 05:02

## 2017-02-11 RX ADMIN — AMLODIPINE BESYLATE 5 MILLIGRAM(S): 2.5 TABLET ORAL at 05:01

## 2017-02-11 RX ADMIN — POLYETHYLENE GLYCOL 3350 17 GRAM(S): 17 POWDER, FOR SOLUTION ORAL at 11:32

## 2017-02-11 RX ADMIN — PIPERACILLIN AND TAZOBACTAM 25 GRAM(S): 4; .5 INJECTION, POWDER, LYOPHILIZED, FOR SOLUTION INTRAVENOUS at 10:10

## 2017-02-11 RX ADMIN — Medication 50 MILLIGRAM(S): at 17:08

## 2017-02-11 RX ADMIN — GABAPENTIN 300 MILLIGRAM(S): 400 CAPSULE ORAL at 21:20

## 2017-02-12 RX ORDER — IMATINIB MESYLATE 400 MG/1
400 TABLET, FILM COATED ORAL DAILY
Qty: 0 | Refills: 0 | Status: DISCONTINUED | OUTPATIENT
Start: 2017-02-13 | End: 2017-02-13

## 2017-02-12 RX ADMIN — POLYETHYLENE GLYCOL 3350 17 GRAM(S): 17 POWDER, FOR SOLUTION ORAL at 11:02

## 2017-02-12 RX ADMIN — SENNA PLUS 5 MILLILITER(S): 8.6 TABLET ORAL at 22:21

## 2017-02-12 RX ADMIN — HEPARIN SODIUM 5000 UNIT(S): 5000 INJECTION INTRAVENOUS; SUBCUTANEOUS at 17:00

## 2017-02-12 RX ADMIN — PIPERACILLIN AND TAZOBACTAM 25 GRAM(S): 4; .5 INJECTION, POWDER, LYOPHILIZED, FOR SOLUTION INTRAVENOUS at 09:05

## 2017-02-12 RX ADMIN — GABAPENTIN 300 MILLIGRAM(S): 400 CAPSULE ORAL at 22:21

## 2017-02-12 RX ADMIN — Medication 650 MILLIGRAM(S): at 22:22

## 2017-02-12 RX ADMIN — Medication 50 MILLIGRAM(S): at 17:01

## 2017-02-12 RX ADMIN — Medication 81 MILLIGRAM(S): at 11:02

## 2017-02-12 RX ADMIN — GABAPENTIN 300 MILLIGRAM(S): 400 CAPSULE ORAL at 05:47

## 2017-02-12 RX ADMIN — PIPERACILLIN AND TAZOBACTAM 25 GRAM(S): 4; .5 INJECTION, POWDER, LYOPHILIZED, FOR SOLUTION INTRAVENOUS at 03:11

## 2017-02-12 RX ADMIN — GABAPENTIN 300 MILLIGRAM(S): 400 CAPSULE ORAL at 13:02

## 2017-02-12 RX ADMIN — AMLODIPINE BESYLATE 5 MILLIGRAM(S): 2.5 TABLET ORAL at 05:47

## 2017-02-12 RX ADMIN — QUETIAPINE FUMARATE 25 MILLIGRAM(S): 200 TABLET, FILM COATED ORAL at 22:21

## 2017-02-12 RX ADMIN — Medication 30 MILLIGRAM(S): at 17:01

## 2017-02-12 RX ADMIN — PIPERACILLIN AND TAZOBACTAM 25 GRAM(S): 4; .5 INJECTION, POWDER, LYOPHILIZED, FOR SOLUTION INTRAVENOUS at 19:03

## 2017-02-12 RX ADMIN — CLOPIDOGREL BISULFATE 75 MILLIGRAM(S): 75 TABLET, FILM COATED ORAL at 11:02

## 2017-02-12 RX ADMIN — FAMOTIDINE 20 MILLIGRAM(S): 10 INJECTION INTRAVENOUS at 11:02

## 2017-02-12 RX ADMIN — Medication 50 MILLIGRAM(S): at 05:48

## 2017-02-12 RX ADMIN — ATORVASTATIN CALCIUM 80 MILLIGRAM(S): 80 TABLET, FILM COATED ORAL at 22:21

## 2017-02-12 RX ADMIN — HEPARIN SODIUM 5000 UNIT(S): 5000 INJECTION INTRAVENOUS; SUBCUTANEOUS at 05:47

## 2017-02-12 RX ADMIN — Medication 30 MILLIGRAM(S): at 05:59

## 2017-02-12 RX ADMIN — MIRTAZAPINE 15 MILLIGRAM(S): 45 TABLET, ORALLY DISINTEGRATING ORAL at 22:21

## 2017-02-13 VITALS — HEART RATE: 68 BPM | OXYGEN SATURATION: 97 % | RESPIRATION RATE: 18 BRPM

## 2017-02-13 LAB
ANION GAP SERPL CALC-SCNC: 13 MMOL/L — SIGNIFICANT CHANGE UP (ref 5–17)
BUN SERPL-MCNC: 15 MG/DL — SIGNIFICANT CHANGE UP (ref 7–23)
CALCIUM SERPL-MCNC: 8.8 MG/DL — SIGNIFICANT CHANGE UP (ref 8.4–10.5)
CHLORIDE SERPL-SCNC: 102 MMOL/L — SIGNIFICANT CHANGE UP (ref 96–108)
CO2 SERPL-SCNC: 23 MMOL/L — SIGNIFICANT CHANGE UP (ref 22–31)
CREAT SERPL-MCNC: 1.3 MG/DL — SIGNIFICANT CHANGE UP (ref 0.5–1.3)
GLUCOSE SERPL-MCNC: 93 MG/DL — SIGNIFICANT CHANGE UP (ref 70–99)
HCT VFR BLD CALC: 32.5 % — LOW (ref 39–50)
HGB BLD-MCNC: 10.8 G/DL — LOW (ref 13–17)
MCHC RBC-ENTMCNC: 31.7 PG — SIGNIFICANT CHANGE UP (ref 27–34)
MCHC RBC-ENTMCNC: 33.2 GM/DL — SIGNIFICANT CHANGE UP (ref 32–36)
MCV RBC AUTO: 95.7 FL — SIGNIFICANT CHANGE UP (ref 80–100)
PLATELET # BLD AUTO: 163 K/UL — SIGNIFICANT CHANGE UP (ref 150–400)
POTASSIUM SERPL-MCNC: 4.7 MMOL/L — SIGNIFICANT CHANGE UP (ref 3.5–5.3)
POTASSIUM SERPL-SCNC: 4.7 MMOL/L — SIGNIFICANT CHANGE UP (ref 3.5–5.3)
RBC # BLD: 3.39 M/UL — LOW (ref 4.2–5.8)
RBC # FLD: 13.2 % — SIGNIFICANT CHANGE UP (ref 10.3–14.5)
SODIUM SERPL-SCNC: 138 MMOL/L — SIGNIFICANT CHANGE UP (ref 135–145)
WBC # BLD: 4.7 K/UL — SIGNIFICANT CHANGE UP (ref 3.8–10.5)
WBC # FLD AUTO: 4.7 K/UL — SIGNIFICANT CHANGE UP (ref 3.8–10.5)

## 2017-02-13 PROCEDURE — 80053 COMPREHEN METABOLIC PANEL: CPT

## 2017-02-13 PROCEDURE — 85014 HEMATOCRIT: CPT

## 2017-02-13 PROCEDURE — 83605 ASSAY OF LACTIC ACID: CPT

## 2017-02-13 PROCEDURE — 82435 ASSAY OF BLOOD CHLORIDE: CPT

## 2017-02-13 PROCEDURE — 87633 RESP VIRUS 12-25 TARGETS: CPT

## 2017-02-13 PROCEDURE — 87798 DETECT AGENT NOS DNA AMP: CPT

## 2017-02-13 PROCEDURE — 82947 ASSAY GLUCOSE BLOOD QUANT: CPT

## 2017-02-13 PROCEDURE — 87581 M.PNEUMON DNA AMP PROBE: CPT

## 2017-02-13 PROCEDURE — 99232 SBSQ HOSP IP/OBS MODERATE 35: CPT

## 2017-02-13 PROCEDURE — 71250 CT THORAX DX C-: CPT

## 2017-02-13 PROCEDURE — 97110 THERAPEUTIC EXERCISES: CPT

## 2017-02-13 PROCEDURE — 85027 COMPLETE CBC AUTOMATED: CPT

## 2017-02-13 PROCEDURE — 82330 ASSAY OF CALCIUM: CPT

## 2017-02-13 PROCEDURE — 92610 EVALUATE SWALLOWING FUNCTION: CPT

## 2017-02-13 PROCEDURE — 93005 ELECTROCARDIOGRAM TRACING: CPT

## 2017-02-13 PROCEDURE — 70551 MRI BRAIN STEM W/O DYE: CPT

## 2017-02-13 PROCEDURE — 84295 ASSAY OF SERUM SODIUM: CPT

## 2017-02-13 PROCEDURE — 84132 ASSAY OF SERUM POTASSIUM: CPT

## 2017-02-13 PROCEDURE — 87486 CHLMYD PNEUM DNA AMP PROBE: CPT

## 2017-02-13 PROCEDURE — 97162 PT EVAL MOD COMPLEX 30 MIN: CPT

## 2017-02-13 PROCEDURE — 85610 PROTHROMBIN TIME: CPT

## 2017-02-13 PROCEDURE — 81001 URINALYSIS AUTO W/SCOPE: CPT

## 2017-02-13 PROCEDURE — 74177 CT ABD & PELVIS W/CONTRAST: CPT

## 2017-02-13 PROCEDURE — 70450 CT HEAD/BRAIN W/O DYE: CPT

## 2017-02-13 PROCEDURE — G0103: CPT

## 2017-02-13 PROCEDURE — 99285 EMERGENCY DEPT VISIT HI MDM: CPT | Mod: 25

## 2017-02-13 PROCEDURE — 74018 RADEX ABDOMEN 1 VIEW: CPT

## 2017-02-13 PROCEDURE — 74176 CT ABD & PELVIS W/O CONTRAST: CPT

## 2017-02-13 PROCEDURE — 82803 BLOOD GASES ANY COMBINATION: CPT

## 2017-02-13 PROCEDURE — 84145 PROCALCITONIN (PCT): CPT

## 2017-02-13 PROCEDURE — 97116 GAIT TRAINING THERAPY: CPT

## 2017-02-13 PROCEDURE — 87086 URINE CULTURE/COLONY COUNT: CPT

## 2017-02-13 PROCEDURE — 87040 BLOOD CULTURE FOR BACTERIA: CPT

## 2017-02-13 PROCEDURE — 71045 X-RAY EXAM CHEST 1 VIEW: CPT

## 2017-02-13 PROCEDURE — 80048 BASIC METABOLIC PNL TOTAL CA: CPT

## 2017-02-13 PROCEDURE — 83690 ASSAY OF LIPASE: CPT

## 2017-02-13 PROCEDURE — 97112 NEUROMUSCULAR REEDUCATION: CPT

## 2017-02-13 RX ADMIN — GABAPENTIN 300 MILLIGRAM(S): 400 CAPSULE ORAL at 15:25

## 2017-02-13 RX ADMIN — FAMOTIDINE 20 MILLIGRAM(S): 10 INJECTION INTRAVENOUS at 11:48

## 2017-02-13 RX ADMIN — CLOPIDOGREL BISULFATE 75 MILLIGRAM(S): 75 TABLET, FILM COATED ORAL at 11:48

## 2017-02-13 RX ADMIN — Medication 30 MILLIGRAM(S): at 05:53

## 2017-02-13 RX ADMIN — IMATINIB MESYLATE 400 MILLIGRAM(S): 400 TABLET, FILM COATED ORAL at 11:47

## 2017-02-13 RX ADMIN — Medication 50 MILLIGRAM(S): at 05:53

## 2017-02-13 RX ADMIN — PIPERACILLIN AND TAZOBACTAM 25 GRAM(S): 4; .5 INJECTION, POWDER, LYOPHILIZED, FOR SOLUTION INTRAVENOUS at 05:53

## 2017-02-13 RX ADMIN — Medication 81 MILLIGRAM(S): at 11:48

## 2017-02-13 RX ADMIN — GABAPENTIN 300 MILLIGRAM(S): 400 CAPSULE ORAL at 05:52

## 2017-02-13 RX ADMIN — HEPARIN SODIUM 5000 UNIT(S): 5000 INJECTION INTRAVENOUS; SUBCUTANEOUS at 05:53

## 2017-02-13 RX ADMIN — AMLODIPINE BESYLATE 5 MILLIGRAM(S): 2.5 TABLET ORAL at 05:53

## 2017-02-13 RX ADMIN — POLYETHYLENE GLYCOL 3350 17 GRAM(S): 17 POWDER, FOR SOLUTION ORAL at 11:48

## 2017-02-14 LAB
CULTURE RESULTS: SIGNIFICANT CHANGE UP
CULTURE RESULTS: SIGNIFICANT CHANGE UP
SPECIMEN SOURCE: SIGNIFICANT CHANGE UP
SPECIMEN SOURCE: SIGNIFICANT CHANGE UP

## 2017-03-16 PROBLEM — N40.0 BENIGN PROSTATIC HYPERPLASIA WITHOUT LOWER URINARY TRACT SYMPTOMS: Chronic | Status: ACTIVE | Noted: 2017-01-22

## 2017-03-22 ENCOUNTER — APPOINTMENT (OUTPATIENT)
Dept: VASCULAR SURGERY | Facility: CLINIC | Age: 72
End: 2017-03-22

## 2017-03-26 ENCOUNTER — APPOINTMENT (OUTPATIENT)
Dept: CT IMAGING | Facility: IMAGING CENTER | Age: 72
End: 2017-03-26

## 2017-03-26 ENCOUNTER — OUTPATIENT (OUTPATIENT)
Dept: OUTPATIENT SERVICES | Facility: HOSPITAL | Age: 72
LOS: 1 days | End: 2017-03-26
Payer: MEDICARE

## 2017-03-26 DIAGNOSIS — D49.0 NEOPLASM OF UNSPECIFIED BEHAVIOR OF DIGESTIVE SYSTEM: Chronic | ICD-10-CM

## 2017-03-26 DIAGNOSIS — K25.2 ACUTE GASTRIC ULCER WITH BOTH HEMORRHAGE AND PERFORATION: Chronic | ICD-10-CM

## 2017-03-26 DIAGNOSIS — Z00.8 ENCOUNTER FOR OTHER GENERAL EXAMINATION: ICD-10-CM

## 2017-03-26 PROCEDURE — 74176 CT ABD & PELVIS W/O CONTRAST: CPT

## 2017-03-29 ENCOUNTER — MESSAGE (OUTPATIENT)
Age: 72
End: 2017-03-29

## 2017-04-02 ENCOUNTER — INPATIENT (INPATIENT)
Facility: HOSPITAL | Age: 72
LOS: 14 days | Discharge: HOME | DRG: 542 | End: 2017-04-17
Attending: INTERNAL MEDICINE | Admitting: INTERNAL MEDICINE
Payer: MEDICARE

## 2017-04-02 VITALS
OXYGEN SATURATION: 100 % | RESPIRATION RATE: 16 BRPM | SYSTOLIC BLOOD PRESSURE: 162 MMHG | HEART RATE: 72 BPM | DIASTOLIC BLOOD PRESSURE: 92 MMHG | TEMPERATURE: 98 F

## 2017-04-02 DIAGNOSIS — K25.2 ACUTE GASTRIC ULCER WITH BOTH HEMORRHAGE AND PERFORATION: Chronic | ICD-10-CM

## 2017-04-02 DIAGNOSIS — R10.9 UNSPECIFIED ABDOMINAL PAIN: ICD-10-CM

## 2017-04-02 DIAGNOSIS — I10 ESSENTIAL (PRIMARY) HYPERTENSION: ICD-10-CM

## 2017-04-02 DIAGNOSIS — F32.9 MAJOR DEPRESSIVE DISORDER, SINGLE EPISODE, UNSPECIFIED: ICD-10-CM

## 2017-04-02 DIAGNOSIS — D49.0 NEOPLASM OF UNSPECIFIED BEHAVIOR OF DIGESTIVE SYSTEM: ICD-10-CM

## 2017-04-02 DIAGNOSIS — R33.9 RETENTION OF URINE, UNSPECIFIED: ICD-10-CM

## 2017-04-02 DIAGNOSIS — N40.0 BENIGN PROSTATIC HYPERPLASIA WITHOUT LOWER URINARY TRACT SYMPTOMS: ICD-10-CM

## 2017-04-02 DIAGNOSIS — D49.0 NEOPLASM OF UNSPECIFIED BEHAVIOR OF DIGESTIVE SYSTEM: Chronic | ICD-10-CM

## 2017-04-02 LAB
ALBUMIN SERPL ELPH-MCNC: 3.8 G/DL — SIGNIFICANT CHANGE UP (ref 3.3–5)
ALP SERPL-CCNC: 71 U/L — SIGNIFICANT CHANGE UP (ref 40–120)
ALT FLD-CCNC: 11 U/L RC — SIGNIFICANT CHANGE UP (ref 10–45)
ANION GAP SERPL CALC-SCNC: 11 MMOL/L — SIGNIFICANT CHANGE UP (ref 5–17)
APPEARANCE UR: CLEAR — SIGNIFICANT CHANGE UP
APTT BLD: 31 SEC — SIGNIFICANT CHANGE UP (ref 27.5–37.4)
AST SERPL-CCNC: 20 U/L — SIGNIFICANT CHANGE UP (ref 10–40)
BASE EXCESS BLDV CALC-SCNC: 0.5 MMOL/L — SIGNIFICANT CHANGE UP (ref -2–2)
BASOPHILS # BLD AUTO: 0.1 K/UL — SIGNIFICANT CHANGE UP (ref 0–0.2)
BASOPHILS NFR BLD AUTO: 0.7 % — SIGNIFICANT CHANGE UP (ref 0–2)
BILIRUB SERPL-MCNC: 1 MG/DL — SIGNIFICANT CHANGE UP (ref 0.2–1.2)
BILIRUB UR-MCNC: NEGATIVE — SIGNIFICANT CHANGE UP
BUN SERPL-MCNC: 18 MG/DL — SIGNIFICANT CHANGE UP (ref 7–23)
CA-I SERPL-SCNC: 1.3 MMOL/L — SIGNIFICANT CHANGE UP (ref 1.12–1.3)
CALCIUM SERPL-MCNC: 9.3 MG/DL — SIGNIFICANT CHANGE UP (ref 8.4–10.5)
CHLORIDE BLDV-SCNC: 106 MMOL/L — SIGNIFICANT CHANGE UP (ref 96–108)
CHLORIDE SERPL-SCNC: 103 MMOL/L — SIGNIFICANT CHANGE UP (ref 96–108)
CO2 BLDV-SCNC: 28 MMOL/L — SIGNIFICANT CHANGE UP (ref 22–30)
CO2 SERPL-SCNC: 26 MMOL/L — SIGNIFICANT CHANGE UP (ref 22–31)
COLOR SPEC: SIGNIFICANT CHANGE UP
CREAT SERPL-MCNC: 1.7 MG/DL — HIGH (ref 0.5–1.3)
DIFF PNL FLD: NEGATIVE — SIGNIFICANT CHANGE UP
EOSINOPHIL # BLD AUTO: 0.3 K/UL — SIGNIFICANT CHANGE UP (ref 0–0.5)
EOSINOPHIL NFR BLD AUTO: 4.6 % — SIGNIFICANT CHANGE UP (ref 0–6)
GAS PNL BLDV: 140 MMOL/L — SIGNIFICANT CHANGE UP (ref 136–145)
GAS PNL BLDV: SIGNIFICANT CHANGE UP
GAS PNL BLDV: SIGNIFICANT CHANGE UP
GLUCOSE BLDV-MCNC: 92 MG/DL — SIGNIFICANT CHANGE UP (ref 70–99)
GLUCOSE SERPL-MCNC: 102 MG/DL — HIGH (ref 70–99)
GLUCOSE UR QL: NEGATIVE — SIGNIFICANT CHANGE UP
HCO3 BLDV-SCNC: 26 MMOL/L — SIGNIFICANT CHANGE UP (ref 21–29)
HCT VFR BLD CALC: 35.4 % — LOW (ref 39–50)
HCT VFR BLDA CALC: 36 % — LOW (ref 39–50)
HGB BLD CALC-MCNC: 11.7 G/DL — LOW (ref 13–17)
HGB BLD-MCNC: 12 G/DL — LOW (ref 13–17)
INR BLD: 1.12 RATIO — SIGNIFICANT CHANGE UP (ref 0.88–1.16)
KETONES UR-MCNC: NEGATIVE — SIGNIFICANT CHANGE UP
LACTATE BLDV-MCNC: 1.9 MMOL/L — SIGNIFICANT CHANGE UP (ref 0.7–2)
LEUKOCYTE ESTERASE UR-ACNC: NEGATIVE — SIGNIFICANT CHANGE UP
LIDOCAIN IGE QN: 69 U/L — HIGH (ref 7–60)
LYMPHOCYTES # BLD AUTO: 2.1 K/UL — SIGNIFICANT CHANGE UP (ref 1–3.3)
LYMPHOCYTES # BLD AUTO: 28.7 % — SIGNIFICANT CHANGE UP (ref 13–44)
MCHC RBC-ENTMCNC: 33.8 GM/DL — SIGNIFICANT CHANGE UP (ref 32–36)
MCHC RBC-ENTMCNC: 34.1 PG — HIGH (ref 27–34)
MCV RBC AUTO: 101 FL — HIGH (ref 80–100)
MONOCYTES # BLD AUTO: 0.6 K/UL — SIGNIFICANT CHANGE UP (ref 0–0.9)
MONOCYTES NFR BLD AUTO: 8.1 % — SIGNIFICANT CHANGE UP (ref 2–14)
NEUTROPHILS # BLD AUTO: 4.3 K/UL — SIGNIFICANT CHANGE UP (ref 1.8–7.4)
NEUTROPHILS NFR BLD AUTO: 57.9 % — SIGNIFICANT CHANGE UP (ref 43–77)
NITRITE UR-MCNC: NEGATIVE — SIGNIFICANT CHANGE UP
OTHER CELLS CSF MANUAL: 6 ML/DL — LOW (ref 18–22)
PCO2 BLDV: 49 MMHG — SIGNIFICANT CHANGE UP (ref 35–50)
PH BLDV: 7.35 — SIGNIFICANT CHANGE UP (ref 7.35–7.45)
PH UR: 6.5 — SIGNIFICANT CHANGE UP (ref 4.8–8)
PLATELET # BLD AUTO: 213 K/UL — SIGNIFICANT CHANGE UP (ref 150–400)
PO2 BLDV: 28 MMHG — SIGNIFICANT CHANGE UP (ref 25–45)
POTASSIUM BLDV-SCNC: 4.5 MMOL/L — SIGNIFICANT CHANGE UP (ref 3.5–5)
POTASSIUM SERPL-MCNC: 4.8 MMOL/L — SIGNIFICANT CHANGE UP (ref 3.5–5.3)
POTASSIUM SERPL-SCNC: 4.8 MMOL/L — SIGNIFICANT CHANGE UP (ref 3.5–5.3)
PROT SERPL-MCNC: 7.6 G/DL — SIGNIFICANT CHANGE UP (ref 6–8.3)
PROT UR-MCNC: SIGNIFICANT CHANGE UP
PROTHROM AB SERPL-ACNC: 12.2 SEC — SIGNIFICANT CHANGE UP (ref 9.8–12.7)
RBC # BLD: 3.51 M/UL — LOW (ref 4.2–5.8)
RBC # FLD: 15.4 % — HIGH (ref 10.3–14.5)
SAO2 % BLDV: 39 % — LOW (ref 67–88)
SODIUM SERPL-SCNC: 140 MMOL/L — SIGNIFICANT CHANGE UP (ref 135–145)
SP GR SPEC: 1.01 — LOW (ref 1.01–1.02)
UROBILINOGEN FLD QL: NEGATIVE — SIGNIFICANT CHANGE UP
WBC # BLD: 7.4 K/UL — SIGNIFICANT CHANGE UP (ref 3.8–10.5)
WBC # FLD AUTO: 7.4 K/UL — SIGNIFICANT CHANGE UP (ref 3.8–10.5)
WBC UR QL: SIGNIFICANT CHANGE UP /HPF (ref 0–5)

## 2017-04-02 PROCEDURE — 49465 FLUORO EXAM OF G/COLON TUBE: CPT

## 2017-04-02 PROCEDURE — 99285 EMERGENCY DEPT VISIT HI MDM: CPT

## 2017-04-02 PROCEDURE — 74176 CT ABD & PELVIS W/O CONTRAST: CPT | Mod: 26

## 2017-04-02 RX ORDER — ASPIRIN/CALCIUM CARB/MAGNESIUM 324 MG
81 TABLET ORAL DAILY
Qty: 0 | Refills: 0 | Status: DISCONTINUED | OUTPATIENT
Start: 2017-04-02 | End: 2017-04-06

## 2017-04-02 RX ORDER — SODIUM CHLORIDE 9 MG/ML
1000 INJECTION INTRAMUSCULAR; INTRAVENOUS; SUBCUTANEOUS ONCE
Qty: 0 | Refills: 0 | Status: COMPLETED | OUTPATIENT
Start: 2017-04-02 | End: 2017-04-02

## 2017-04-02 RX ORDER — HYDROMORPHONE HYDROCHLORIDE 2 MG/ML
1 INJECTION INTRAMUSCULAR; INTRAVENOUS; SUBCUTANEOUS EVERY 6 HOURS
Qty: 0 | Refills: 0 | Status: DISCONTINUED | OUTPATIENT
Start: 2017-04-02 | End: 2017-04-09

## 2017-04-02 RX ORDER — MORPHINE SULFATE 50 MG/1
2 CAPSULE, EXTENDED RELEASE ORAL ONCE
Qty: 0 | Refills: 0 | Status: DISCONTINUED | OUTPATIENT
Start: 2017-04-02 | End: 2017-04-02

## 2017-04-02 RX ORDER — ACETAMINOPHEN 500 MG
650 TABLET ORAL EVERY 6 HOURS
Qty: 0 | Refills: 0 | Status: DISCONTINUED | OUTPATIENT
Start: 2017-04-02 | End: 2017-04-17

## 2017-04-02 RX ORDER — MORPHINE SULFATE 50 MG/1
4 CAPSULE, EXTENDED RELEASE ORAL ONCE
Qty: 0 | Refills: 0 | Status: DISCONTINUED | OUTPATIENT
Start: 2017-04-02 | End: 2017-04-10

## 2017-04-02 RX ORDER — PANTOPRAZOLE SODIUM 20 MG/1
40 TABLET, DELAYED RELEASE ORAL DAILY
Qty: 0 | Refills: 0 | Status: DISCONTINUED | OUTPATIENT
Start: 2017-04-02 | End: 2017-04-17

## 2017-04-02 RX ORDER — GABAPENTIN 400 MG/1
300 CAPSULE ORAL THREE TIMES A DAY
Qty: 0 | Refills: 0 | Status: DISCONTINUED | OUTPATIENT
Start: 2017-04-02 | End: 2017-04-17

## 2017-04-02 RX ORDER — HEPARIN SODIUM 5000 [USP'U]/ML
5000 INJECTION INTRAVENOUS; SUBCUTANEOUS EVERY 12 HOURS
Qty: 0 | Refills: 0 | Status: DISCONTINUED | OUTPATIENT
Start: 2017-04-02 | End: 2017-04-17

## 2017-04-02 RX ORDER — POLYETHYLENE GLYCOL 3350 17 G/17G
17 POWDER, FOR SOLUTION ORAL DAILY
Qty: 0 | Refills: 0 | Status: DISCONTINUED | OUTPATIENT
Start: 2017-04-02 | End: 2017-04-17

## 2017-04-02 RX ORDER — ATORVASTATIN CALCIUM 80 MG/1
80 TABLET, FILM COATED ORAL AT BEDTIME
Qty: 0 | Refills: 0 | Status: DISCONTINUED | OUTPATIENT
Start: 2017-04-02 | End: 2017-04-17

## 2017-04-02 RX ORDER — CLOPIDOGREL BISULFATE 75 MG/1
75 TABLET, FILM COATED ORAL DAILY
Qty: 0 | Refills: 0 | Status: DISCONTINUED | OUTPATIENT
Start: 2017-04-02 | End: 2017-04-06

## 2017-04-02 RX ORDER — QUETIAPINE FUMARATE 200 MG/1
25 TABLET, FILM COATED ORAL AT BEDTIME
Qty: 0 | Refills: 0 | Status: DISCONTINUED | OUTPATIENT
Start: 2017-04-02 | End: 2017-04-17

## 2017-04-02 RX ORDER — AMLODIPINE BESYLATE 2.5 MG/1
5 TABLET ORAL DAILY
Qty: 0 | Refills: 0 | Status: DISCONTINUED | OUTPATIENT
Start: 2017-04-02 | End: 2017-04-17

## 2017-04-02 RX ORDER — LISINOPRIL 2.5 MG/1
2.5 TABLET ORAL DAILY
Qty: 0 | Refills: 0 | Status: DISCONTINUED | OUTPATIENT
Start: 2017-04-02 | End: 2017-04-17

## 2017-04-02 RX ORDER — METOPROLOL TARTRATE 50 MG
50 TABLET ORAL
Qty: 0 | Refills: 0 | Status: DISCONTINUED | OUTPATIENT
Start: 2017-04-02 | End: 2017-04-17

## 2017-04-02 RX ORDER — MIRTAZAPINE 45 MG/1
15 TABLET, ORALLY DISINTEGRATING ORAL DAILY
Qty: 0 | Refills: 0 | Status: DISCONTINUED | OUTPATIENT
Start: 2017-04-02 | End: 2017-04-17

## 2017-04-02 RX ORDER — MORPHINE SULFATE 50 MG/1
4 CAPSULE, EXTENDED RELEASE ORAL ONCE
Qty: 0 | Refills: 0 | Status: DISCONTINUED | OUTPATIENT
Start: 2017-04-02 | End: 2017-04-02

## 2017-04-02 RX ORDER — SODIUM CHLORIDE 9 MG/ML
1000 INJECTION INTRAMUSCULAR; INTRAVENOUS; SUBCUTANEOUS
Qty: 0 | Refills: 0 | Status: DISCONTINUED | OUTPATIENT
Start: 2017-04-02 | End: 2017-04-17

## 2017-04-02 RX ORDER — SENNA PLUS 8.6 MG/1
5 TABLET ORAL AT BEDTIME
Qty: 0 | Refills: 0 | Status: DISCONTINUED | OUTPATIENT
Start: 2017-04-02 | End: 2017-04-17

## 2017-04-02 RX ADMIN — MORPHINE SULFATE 4 MILLIGRAM(S): 50 CAPSULE, EXTENDED RELEASE ORAL at 17:41

## 2017-04-02 RX ADMIN — MORPHINE SULFATE 2 MILLIGRAM(S): 50 CAPSULE, EXTENDED RELEASE ORAL at 14:40

## 2017-04-02 RX ADMIN — MORPHINE SULFATE 2 MILLIGRAM(S): 50 CAPSULE, EXTENDED RELEASE ORAL at 15:08

## 2017-04-02 RX ADMIN — MORPHINE SULFATE 2 MILLIGRAM(S): 50 CAPSULE, EXTENDED RELEASE ORAL at 15:00

## 2017-04-02 RX ADMIN — SODIUM CHLORIDE 1000 MILLILITER(S): 9 INJECTION INTRAMUSCULAR; INTRAVENOUS; SUBCUTANEOUS at 13:00

## 2017-04-02 RX ADMIN — MORPHINE SULFATE 4 MILLIGRAM(S): 50 CAPSULE, EXTENDED RELEASE ORAL at 18:13

## 2017-04-02 RX ADMIN — MORPHINE SULFATE 2 MILLIGRAM(S): 50 CAPSULE, EXTENDED RELEASE ORAL at 14:05

## 2017-04-02 NOTE — ED PROVIDER NOTE - NS ED MD SCRIBE ATTENDING SCRIBE SECTIONS
PHYSICAL EXAM/REVIEW OF SYSTEMS/DISPOSITION/PAST MEDICAL/SURGICAL/SOCIAL HISTORY/HIV/HISTORY OF PRESENT ILLNESS/VITAL SIGNS( Pullset)

## 2017-04-02 NOTE — ED PROVIDER NOTE - OBJECTIVE STATEMENT
72 year old male patient with PEG tube and pmhx of HTN, CVA, stomach cancer, prostate cancer presents to the ED c/o abdominal pain since this morning. Patient is nonverbal due to past stroke. Wife is Vincentian speaking only. Translation by home health aide. Patient was given medication through a peg tube, but patient has pain whenever the PEG tube is used. Patient had the tube replaced in January.   Denies fever, vomiting, diarrhea, cough.

## 2017-04-02 NOTE — H&P ADULT. - ASSESSMENT
72 year old male patient with PEG tube and pmhx of HTN, CVA, stomach cancer, prostate cancer presents to the ED c/o abdominal pain since this morning. Patient is nonverbal due to past stroke. Wife is Indian speaking only. Translation by home health aide. Patient was given medication through a peg tube, but patient has pain whenever the PEG tube is used. Patient had the tube replaced in January.

## 2017-04-02 NOTE — ED PROVIDER NOTE - MEDICAL DECISION MAKING DETAILS
patient with known gastric cancer p/w abdominal pain more associated with tube feeding. plan to eval and correct tube placement with xray. eval for worsening tumor burden with ct abd/pel. check blood work, ua, and reassess.

## 2017-04-02 NOTE — ED ADULT NURSE REASSESSMENT NOTE - NS ED NURSE REASSESS COMMENT FT1
1400 Pt yelling in pain c/o increasing abd pain and right arm pain. Looks uncomfortable. wife states pt has chronic arm pain bilat since stroke in may  2016. Dr Neto sun pt. 1415 to CT via stretcher

## 2017-04-02 NOTE — ED ADULT NURSE NOTE - OBJECTIVE STATEMENT
1145 72 yr old male brought to ER by family for further eval and tx of abd pain. Has PEG tube. S/P CVA May 2016 with residual difficulty walking, aphasia and bilat arm weakness. R weaker than left. c/o pain earlier today when meds were administered by family via peg tube. abd soft. sore to palp periumbillical region

## 2017-04-02 NOTE — ED ADULT NURSE REASSESSMENT NOTE - NS ED NURSE REASSESS COMMENT FT1
Received report from Polina BASHIR RN. Safety checked. Partial relief of pain after pain medication, Comfort care provided. Pt encouraged to call for assist when needed. pending CT, family at bedside.

## 2017-04-02 NOTE — ED PROVIDER NOTE - PROGRESS NOTE DETAILS
Feeding tube study confirmed in place with contrast x-ray. Patient with elevated creatinine. Will do non IV contrast CT scan. Alyssa with Dr. Orosco. Discussed CT findings. Need for admission. Will admit to Dr. Medina ordaz placed by RN - return of 500 cc yellow/clear urine

## 2017-04-02 NOTE — ED ADULT NURSE REASSESSMENT NOTE - NS ED NURSE REASSESS COMMENT FT1
Myers Catheter placed using sterile technique with 2RNs present for urinary retention. Clear and yellow urine noted.

## 2017-04-02 NOTE — ED PROVIDER NOTE - CARE PLAN
Principal Discharge DX:	Abdominal pain  Secondary Diagnosis:	Urinary retention  Secondary Diagnosis:	Gastric tumor

## 2017-04-02 NOTE — H&P ADULT. - HISTORY OF PRESENT ILLNESS
72 year old male patient with PEG tube and pmhx of HTN, CVA, stomach cancer, prostate cancer presents to the ED c/o abdominal pain since this morning. Patient is nonverbal due to past stroke. Wife is [place]ian speaking only. Translation by home health aide. Patient was given medication through a peg tube, but patient has pain whenever the PEG tube is used. Patient had the tube replaced in January.

## 2017-04-03 LAB
ANION GAP SERPL CALC-SCNC: 17 MMOL/L — SIGNIFICANT CHANGE UP (ref 5–17)
BUN SERPL-MCNC: 16 MG/DL — SIGNIFICANT CHANGE UP (ref 7–23)
CALCIUM SERPL-MCNC: 8.9 MG/DL — SIGNIFICANT CHANGE UP (ref 8.4–10.5)
CHLORIDE SERPL-SCNC: 102 MMOL/L — SIGNIFICANT CHANGE UP (ref 96–108)
CO2 SERPL-SCNC: 20 MMOL/L — LOW (ref 22–31)
CREAT SERPL-MCNC: 1.53 MG/DL — HIGH (ref 0.5–1.3)
GLUCOSE SERPL-MCNC: 133 MG/DL — HIGH (ref 70–99)
HCT VFR BLD CALC: 36.1 % — LOW (ref 39–50)
HGB BLD-MCNC: 11.8 G/DL — LOW (ref 13–17)
MCHC RBC-ENTMCNC: 31.9 PG — SIGNIFICANT CHANGE UP (ref 27–34)
MCHC RBC-ENTMCNC: 32.7 GM/DL — SIGNIFICANT CHANGE UP (ref 32–36)
MCV RBC AUTO: 97.6 FL — SIGNIFICANT CHANGE UP (ref 80–100)
PLATELET # BLD AUTO: 259 K/UL — SIGNIFICANT CHANGE UP (ref 150–400)
POTASSIUM SERPL-MCNC: 4.3 MMOL/L — SIGNIFICANT CHANGE UP (ref 3.5–5.3)
POTASSIUM SERPL-SCNC: 4.3 MMOL/L — SIGNIFICANT CHANGE UP (ref 3.5–5.3)
RBC # BLD: 3.7 M/UL — LOW (ref 4.2–5.8)
RBC # FLD: 17 % — HIGH (ref 10.3–14.5)
SODIUM SERPL-SCNC: 139 MMOL/L — SIGNIFICANT CHANGE UP (ref 135–145)
WBC # BLD: 6.93 K/UL — SIGNIFICANT CHANGE UP (ref 3.8–10.5)
WBC # FLD AUTO: 6.93 K/UL — SIGNIFICANT CHANGE UP (ref 3.8–10.5)

## 2017-04-03 RX ORDER — IMATINIB MESYLATE 400 MG/1
400 TABLET, FILM COATED ORAL DAILY
Qty: 0 | Refills: 0 | Status: DISCONTINUED | OUTPATIENT
Start: 2017-04-03 | End: 2017-04-06

## 2017-04-03 RX ADMIN — QUETIAPINE FUMARATE 25 MILLIGRAM(S): 200 TABLET, FILM COATED ORAL at 22:36

## 2017-04-03 RX ADMIN — Medication 50 MILLIGRAM(S): at 18:26

## 2017-04-03 RX ADMIN — GABAPENTIN 300 MILLIGRAM(S): 400 CAPSULE ORAL at 22:36

## 2017-04-03 RX ADMIN — Medication 50 MILLIGRAM(S): at 05:26

## 2017-04-03 RX ADMIN — HYDROMORPHONE HYDROCHLORIDE 1 MILLIGRAM(S): 2 INJECTION INTRAMUSCULAR; INTRAVENOUS; SUBCUTANEOUS at 01:03

## 2017-04-03 RX ADMIN — IMATINIB MESYLATE 400 MILLIGRAM(S): 400 TABLET, FILM COATED ORAL at 18:25

## 2017-04-03 RX ADMIN — HYDROMORPHONE HYDROCHLORIDE 1 MILLIGRAM(S): 2 INJECTION INTRAMUSCULAR; INTRAVENOUS; SUBCUTANEOUS at 01:18

## 2017-04-03 RX ADMIN — POLYETHYLENE GLYCOL 3350 17 GRAM(S): 17 POWDER, FOR SOLUTION ORAL at 11:52

## 2017-04-03 RX ADMIN — HEPARIN SODIUM 5000 UNIT(S): 5000 INJECTION INTRAVENOUS; SUBCUTANEOUS at 05:25

## 2017-04-03 RX ADMIN — AMLODIPINE BESYLATE 5 MILLIGRAM(S): 2.5 TABLET ORAL at 05:27

## 2017-04-03 RX ADMIN — PANTOPRAZOLE SODIUM 40 MILLIGRAM(S): 20 TABLET, DELAYED RELEASE ORAL at 11:52

## 2017-04-03 RX ADMIN — MIRTAZAPINE 15 MILLIGRAM(S): 45 TABLET, ORALLY DISINTEGRATING ORAL at 11:52

## 2017-04-03 RX ADMIN — ATORVASTATIN CALCIUM 80 MILLIGRAM(S): 80 TABLET, FILM COATED ORAL at 22:36

## 2017-04-03 RX ADMIN — GABAPENTIN 300 MILLIGRAM(S): 400 CAPSULE ORAL at 05:26

## 2017-04-03 RX ADMIN — LISINOPRIL 2.5 MILLIGRAM(S): 2.5 TABLET ORAL at 05:26

## 2017-04-03 RX ADMIN — Medication 81 MILLIGRAM(S): at 11:52

## 2017-04-03 RX ADMIN — SENNA PLUS 5 MILLILITER(S): 8.6 TABLET ORAL at 22:36

## 2017-04-03 RX ADMIN — GABAPENTIN 300 MILLIGRAM(S): 400 CAPSULE ORAL at 14:18

## 2017-04-03 RX ADMIN — HEPARIN SODIUM 5000 UNIT(S): 5000 INJECTION INTRAVENOUS; SUBCUTANEOUS at 18:26

## 2017-04-03 RX ADMIN — CLOPIDOGREL BISULFATE 75 MILLIGRAM(S): 75 TABLET, FILM COATED ORAL at 11:52

## 2017-04-03 NOTE — DIETITIAN INITIAL EVALUATION ADULT. - OTHER INFO
Patient referred for tube feeding recommendations.  Patient currently receiving Bolus feeds Jevity 1.2 as well as a Dysphagia 1 diet with honey thick liquids.  As per RN, patient is tolerating bolus PEG feeds and no complaint of discomfort.  Patient noted to accept a waffle at breakfast but nothing else.  When asked if hungry, patient shook head yes but refused yogurt or milkshake on tray.  Skin intact.  No complaints of N/V/D.  Based on review of EMR, weight is stable at usual 128 pounds.  Diagnostic testing shows that PEG is in place and intact.  Positive ordaz in place with dark colored urine output.  Encourage fluid flushes. Accepting po diet for pleasure with supplemental feeds.

## 2017-04-03 NOTE — DIETITIAN INITIAL EVALUATION ADULT. - NS AS NUTRI INTERV ENTERAL NUTRITION
Volume/Schedule/PEG feeds Jevity 1.2 220cc every 6 hours bolus to provide 1056 kcals and 52 gms protein./Composition

## 2017-04-03 NOTE — DIETITIAN INITIAL EVALUATION ADULT. - ENERGY NEEDS
HT 66 inches, .10 pounds,  pounds,  pounds, BMI 20.6  Skin intact, DXD with Gastric tumor, urinary retention, abd pain at PEG

## 2017-04-04 LAB
ANION GAP SERPL CALC-SCNC: 13 MMOL/L — SIGNIFICANT CHANGE UP (ref 5–17)
BUN SERPL-MCNC: 15 MG/DL — SIGNIFICANT CHANGE UP (ref 7–23)
CALCIUM SERPL-MCNC: 9.3 MG/DL — SIGNIFICANT CHANGE UP (ref 8.4–10.5)
CHLORIDE SERPL-SCNC: 105 MMOL/L — SIGNIFICANT CHANGE UP (ref 96–108)
CO2 SERPL-SCNC: 24 MMOL/L — SIGNIFICANT CHANGE UP (ref 22–31)
CREAT SERPL-MCNC: 1.23 MG/DL — SIGNIFICANT CHANGE UP (ref 0.5–1.3)
GLUCOSE SERPL-MCNC: 90 MG/DL — SIGNIFICANT CHANGE UP (ref 70–99)
POTASSIUM SERPL-MCNC: 4.1 MMOL/L — SIGNIFICANT CHANGE UP (ref 3.5–5.3)
POTASSIUM SERPL-SCNC: 4.1 MMOL/L — SIGNIFICANT CHANGE UP (ref 3.5–5.3)
SODIUM SERPL-SCNC: 142 MMOL/L — SIGNIFICANT CHANGE UP (ref 135–145)

## 2017-04-04 PROCEDURE — 99223 1ST HOSP IP/OBS HIGH 75: CPT

## 2017-04-04 RX ADMIN — PANTOPRAZOLE SODIUM 40 MILLIGRAM(S): 20 TABLET, DELAYED RELEASE ORAL at 12:32

## 2017-04-04 RX ADMIN — HEPARIN SODIUM 5000 UNIT(S): 5000 INJECTION INTRAVENOUS; SUBCUTANEOUS at 06:17

## 2017-04-04 RX ADMIN — SENNA PLUS 5 MILLILITER(S): 8.6 TABLET ORAL at 21:35

## 2017-04-04 RX ADMIN — IMATINIB MESYLATE 400 MILLIGRAM(S): 400 TABLET, FILM COATED ORAL at 12:32

## 2017-04-04 RX ADMIN — CLOPIDOGREL BISULFATE 75 MILLIGRAM(S): 75 TABLET, FILM COATED ORAL at 12:32

## 2017-04-04 RX ADMIN — Medication 81 MILLIGRAM(S): at 12:32

## 2017-04-04 RX ADMIN — AMLODIPINE BESYLATE 5 MILLIGRAM(S): 2.5 TABLET ORAL at 06:17

## 2017-04-04 RX ADMIN — SODIUM CHLORIDE 50 MILLILITER(S): 9 INJECTION INTRAMUSCULAR; INTRAVENOUS; SUBCUTANEOUS at 06:18

## 2017-04-04 RX ADMIN — QUETIAPINE FUMARATE 25 MILLIGRAM(S): 200 TABLET, FILM COATED ORAL at 21:35

## 2017-04-04 RX ADMIN — SODIUM CHLORIDE 50 MILLILITER(S): 9 INJECTION INTRAMUSCULAR; INTRAVENOUS; SUBCUTANEOUS at 18:57

## 2017-04-04 RX ADMIN — GABAPENTIN 300 MILLIGRAM(S): 400 CAPSULE ORAL at 12:32

## 2017-04-04 RX ADMIN — Medication 50 MILLIGRAM(S): at 06:17

## 2017-04-04 RX ADMIN — GABAPENTIN 300 MILLIGRAM(S): 400 CAPSULE ORAL at 21:35

## 2017-04-04 RX ADMIN — HEPARIN SODIUM 5000 UNIT(S): 5000 INJECTION INTRAVENOUS; SUBCUTANEOUS at 18:56

## 2017-04-04 RX ADMIN — MIRTAZAPINE 15 MILLIGRAM(S): 45 TABLET, ORALLY DISINTEGRATING ORAL at 12:32

## 2017-04-04 RX ADMIN — Medication 50 MILLIGRAM(S): at 18:56

## 2017-04-04 RX ADMIN — GABAPENTIN 300 MILLIGRAM(S): 400 CAPSULE ORAL at 06:17

## 2017-04-04 RX ADMIN — ATORVASTATIN CALCIUM 80 MILLIGRAM(S): 80 TABLET, FILM COATED ORAL at 21:35

## 2017-04-04 RX ADMIN — LISINOPRIL 2.5 MILLIGRAM(S): 2.5 TABLET ORAL at 06:17

## 2017-04-05 LAB — PSA FLD-MCNC: 38.39 NG/ML — HIGH (ref 0–4)

## 2017-04-05 RX ADMIN — IMATINIB MESYLATE 400 MILLIGRAM(S): 400 TABLET, FILM COATED ORAL at 11:14

## 2017-04-05 RX ADMIN — PANTOPRAZOLE SODIUM 40 MILLIGRAM(S): 20 TABLET, DELAYED RELEASE ORAL at 11:15

## 2017-04-05 RX ADMIN — HYDROMORPHONE HYDROCHLORIDE 1 MILLIGRAM(S): 2 INJECTION INTRAMUSCULAR; INTRAVENOUS; SUBCUTANEOUS at 20:54

## 2017-04-05 RX ADMIN — Medication 81 MILLIGRAM(S): at 11:14

## 2017-04-05 RX ADMIN — Medication 50 MILLIGRAM(S): at 06:20

## 2017-04-05 RX ADMIN — QUETIAPINE FUMARATE 25 MILLIGRAM(S): 200 TABLET, FILM COATED ORAL at 20:57

## 2017-04-05 RX ADMIN — POLYETHYLENE GLYCOL 3350 17 GRAM(S): 17 POWDER, FOR SOLUTION ORAL at 11:15

## 2017-04-05 RX ADMIN — LISINOPRIL 2.5 MILLIGRAM(S): 2.5 TABLET ORAL at 06:20

## 2017-04-05 RX ADMIN — HEPARIN SODIUM 5000 UNIT(S): 5000 INJECTION INTRAVENOUS; SUBCUTANEOUS at 17:16

## 2017-04-05 RX ADMIN — HYDROMORPHONE HYDROCHLORIDE 1 MILLIGRAM(S): 2 INJECTION INTRAMUSCULAR; INTRAVENOUS; SUBCUTANEOUS at 21:09

## 2017-04-05 RX ADMIN — MIRTAZAPINE 15 MILLIGRAM(S): 45 TABLET, ORALLY DISINTEGRATING ORAL at 11:14

## 2017-04-05 RX ADMIN — AMLODIPINE BESYLATE 5 MILLIGRAM(S): 2.5 TABLET ORAL at 06:20

## 2017-04-05 RX ADMIN — SODIUM CHLORIDE 50 MILLILITER(S): 9 INJECTION INTRAMUSCULAR; INTRAVENOUS; SUBCUTANEOUS at 19:03

## 2017-04-05 RX ADMIN — SENNA PLUS 5 MILLILITER(S): 8.6 TABLET ORAL at 20:57

## 2017-04-05 RX ADMIN — GABAPENTIN 300 MILLIGRAM(S): 400 CAPSULE ORAL at 20:57

## 2017-04-05 RX ADMIN — CLOPIDOGREL BISULFATE 75 MILLIGRAM(S): 75 TABLET, FILM COATED ORAL at 11:14

## 2017-04-05 RX ADMIN — GABAPENTIN 300 MILLIGRAM(S): 400 CAPSULE ORAL at 06:20

## 2017-04-05 RX ADMIN — ATORVASTATIN CALCIUM 80 MILLIGRAM(S): 80 TABLET, FILM COATED ORAL at 20:56

## 2017-04-05 RX ADMIN — Medication 50 MILLIGRAM(S): at 17:16

## 2017-04-05 RX ADMIN — GABAPENTIN 300 MILLIGRAM(S): 400 CAPSULE ORAL at 11:28

## 2017-04-05 RX ADMIN — HEPARIN SODIUM 5000 UNIT(S): 5000 INJECTION INTRAVENOUS; SUBCUTANEOUS at 06:21

## 2017-04-06 ENCOUNTER — TRANSCRIPTION ENCOUNTER (OUTPATIENT)
Age: 72
End: 2017-04-06

## 2017-04-06 RX ORDER — IMATINIB MESYLATE 400 MG/1
400 TABLET, FILM COATED ORAL DAILY
Qty: 0 | Refills: 0 | Status: DISCONTINUED | OUTPATIENT
Start: 2017-04-06 | End: 2017-04-17

## 2017-04-06 RX ADMIN — GABAPENTIN 300 MILLIGRAM(S): 400 CAPSULE ORAL at 22:56

## 2017-04-06 RX ADMIN — CLOPIDOGREL BISULFATE 75 MILLIGRAM(S): 75 TABLET, FILM COATED ORAL at 13:56

## 2017-04-06 RX ADMIN — GABAPENTIN 300 MILLIGRAM(S): 400 CAPSULE ORAL at 06:10

## 2017-04-06 RX ADMIN — HYDROMORPHONE HYDROCHLORIDE 1 MILLIGRAM(S): 2 INJECTION INTRAMUSCULAR; INTRAVENOUS; SUBCUTANEOUS at 10:15

## 2017-04-06 RX ADMIN — AMLODIPINE BESYLATE 5 MILLIGRAM(S): 2.5 TABLET ORAL at 06:10

## 2017-04-06 RX ADMIN — HEPARIN SODIUM 5000 UNIT(S): 5000 INJECTION INTRAVENOUS; SUBCUTANEOUS at 06:10

## 2017-04-06 RX ADMIN — MIRTAZAPINE 15 MILLIGRAM(S): 45 TABLET, ORALLY DISINTEGRATING ORAL at 13:57

## 2017-04-06 RX ADMIN — ATORVASTATIN CALCIUM 80 MILLIGRAM(S): 80 TABLET, FILM COATED ORAL at 22:56

## 2017-04-06 RX ADMIN — Medication 50 MILLIGRAM(S): at 06:10

## 2017-04-06 RX ADMIN — Medication 50 MILLIGRAM(S): at 18:20

## 2017-04-06 RX ADMIN — SODIUM CHLORIDE 50 MILLILITER(S): 9 INJECTION INTRAMUSCULAR; INTRAVENOUS; SUBCUTANEOUS at 18:21

## 2017-04-06 RX ADMIN — HEPARIN SODIUM 5000 UNIT(S): 5000 INJECTION INTRAVENOUS; SUBCUTANEOUS at 18:20

## 2017-04-06 RX ADMIN — SENNA PLUS 5 MILLILITER(S): 8.6 TABLET ORAL at 22:56

## 2017-04-06 RX ADMIN — IMATINIB MESYLATE 400 MILLIGRAM(S): 400 TABLET, FILM COATED ORAL at 13:57

## 2017-04-06 RX ADMIN — HYDROMORPHONE HYDROCHLORIDE 1 MILLIGRAM(S): 2 INJECTION INTRAMUSCULAR; INTRAVENOUS; SUBCUTANEOUS at 09:56

## 2017-04-06 RX ADMIN — Medication 81 MILLIGRAM(S): at 13:56

## 2017-04-06 RX ADMIN — PANTOPRAZOLE SODIUM 40 MILLIGRAM(S): 20 TABLET, DELAYED RELEASE ORAL at 13:57

## 2017-04-06 RX ADMIN — GABAPENTIN 300 MILLIGRAM(S): 400 CAPSULE ORAL at 13:56

## 2017-04-06 RX ADMIN — QUETIAPINE FUMARATE 25 MILLIGRAM(S): 200 TABLET, FILM COATED ORAL at 22:56

## 2017-04-06 RX ADMIN — POLYETHYLENE GLYCOL 3350 17 GRAM(S): 17 POWDER, FOR SOLUTION ORAL at 13:57

## 2017-04-06 RX ADMIN — LISINOPRIL 2.5 MILLIGRAM(S): 2.5 TABLET ORAL at 06:10

## 2017-04-06 NOTE — DISCHARGE NOTE ADULT - HOSPITAL COURSE
to be completed by attending 72 year old male patient with PEG tube and pmhx of HTN, CVA, stomach cancer, prostate cancer presents to the ED c/o abdominal pain since this morning.Patient was given medication through a peg tube, but patient has pain whenever the PEG tube is used. Patient had the tube replaced in January. 4/4   Pending urology consult ( x) f/u full consult  4/5   DC ordaz as per Dr Medina, TOV, dc plan today or tomorrow, CM aware           Voided.  4/8/17 OR on the 4/10  4/9/17 prostrate biopsy on 4/10  4/10 prostate biopsy in am 4/11 - pre-op bx prep- D/w Dr. Goldberg - ceftin 500 mg po bid and bactrim ds po bid and amikacin 500mg iv on call for biopsy - pt does not have to be NPO as per Dr. Goldberg   4/11 Biopsy not done, radiologist Dr. Figueroa to discuss case with Dr. Medina/Dr. Goldberg. Pt is not with confirmed dx of Prostate cancer, needs biopsy to confirm. Family requesting GI house called and done no intervention PEG in good position   4/12 Pt for Prostate Biopsy tomorrow . Called ace Vincent in order to inform her she will need to consent for biopsy in AM. No answer left message please f/u in AM. Discussed with spouse as well will make effort to be here in the AM, she may sign consent with  as well   4/13 pt with increasing agitation- vitals stable, ativan given as d/w attending   MRI head ordered, pykeo consult called by attending (  )  psych- rec: haldol 0.5 mg iv prn for agitation if QTC <500 and continue with seroquel, if QTC> 500 then use  depacon 250 mg IV q8hrsd prn for agitation - monitor EKG -QTC - 426   pt is s/p prostate biopsy  ?- restarting asa/plavix    4/14 d/w attending - restart plavix and asa tomorrow, f/u MRI head results(  )   awaiting pathology from prostate biopsy   heme- continue with chemo, monitor bmp  4/16 d/c planning   Dx :Gastric Tumor Prostate cancer         Urinary Rentention         Abdominal pain

## 2017-04-06 NOTE — DISCHARGE NOTE ADULT - PLAN OF CARE
likely related to gastric tumor. continue to take pain medications as needed   Peg tube was evaluated to ensure proper placement   follow up with GI improved follow up with Dr. Orocso  continue to take chemotherapy medications as prescribe SEEK IMMEDIATE CARE IF  You have thoughts about hurting yourself or others.  You lose touch with reality (have psychotic symptoms).  You are taking medicine for depression and have a serious side effect follow up with the urologist for biopsy results   You have an enlarged prostate gland which gets bigger as men get older - it is a very common problem and has nothing to do with prostate cancer  Call your doctor if you are urinating more frequently, have trouble starting to urinate, have weak stream, urine leaking or dribbling, and feeling as though bladder is not empty after urination  Your doctor will monitor your prostate with a rectal exam as well as urine or blood testing  You can help yourself by reducing the amount of fluid you drink before going to bed, limiting the amount of alcohol & caffeine you drink   Avoid cold & allergy medication that contain decongestants or antihistamines which make BPH symptoms worse  You can also "double void" by waiting a moment after urinating & trying again  Take your medication as prescribed - one medication helps to relax the muscle around the urethra and the other medication you may take prevents the prostate from growing more or even shrinking the prostate Low salt diet  Activity as tolerated.  Take all medication as prescribed.  Follow up with your medical doctor for routine blood pressure monitoring at your next visit.  Notify your doctor if you have any of the following symptoms:   Dizziness, Lightheadedness, Blurry vision, Headache, Chest pain, Shortness of breath Continue with your cholesterol medications. Eat a heart healthy diet that is low in saturated fats and salt, and includes whole grains, fruits, vegetables and lean protein; exercise regularly (consult with your physician or cardiologist first); maintain a heart healthy weight; if you smoke - quit (A resource to help you stop smoking is the [facility] – phone number 161-399-9534.). Continue to follow with your primary physician or cardiologist.  Seek medical help for dizziness, Lightheadedness, Blurry vision, Headache, Chest pain, Shortness of breath

## 2017-04-06 NOTE — DISCHARGE NOTE ADULT - CARE PROVIDERS DIRECT ADDRESSES
,DirectAddress_Unknown,DirectAddress_Unknown,[email or web address],DirectAddress_Unknown,DirectAddress_Unknown

## 2017-04-06 NOTE — DISCHARGE NOTE ADULT - MEDICATION SUMMARY - MEDICATIONS TO CHANGE
I will SWITCH the dose or number of times a day I take the medications listed below when I get home from the hospital:    acetaminophen-oxyCODONE 325 mg-5 mg oral tablet  -- 1-2 tab(s) by mouth every 6 hours, As Needed  -- Caution federal law prohibits the transfer of this drug to any person other  than the person for whom it was prescribed.  May cause drowsiness.  Alcohol may intensify this effect.  Use care when operating dangerous machinery.  This prescription cannot be refilled.  This product contains acetaminophen.  Do not use  with any other product containing acetaminophen to prevent possible liver damage.  Using more of this medication than prescribed may cause serious breathing problems.

## 2017-04-06 NOTE — DISCHARGE NOTE ADULT - CARE PROVIDER_API CALL
Jose Medina), Internal Medicine  [address] [place] [address]  Phone: [phone]  Fax: [phone]    Malik Orosco (MD), Hematology; Internal Medicine; Medical Oncology  [address] [facility] [address] [address]  [place] [address]  Phone: [phone]  Fax: [phone]    Goldberg, Gary D (MD), Urology  [address] [facility] [address]  [place] [address]  Phone: [phone]  Fax: [phone]    [facility]  Phone: (   )    -  Fax: (   )    - Jose Medina), Internal Medicine  [address] [place] [address]  Phone: [phone]  Fax: [phone]    Malik Orosco (MD), Hematology; Internal Medicine; Medical Oncology  [address] [facility] [address]  [place] [address]  Phone: [phone]  Fax: [phone]    Goldberg, Gary D (MD), Urology  [address] [place] [address] [address]  [place] [address]  Phone: [phone]  Fax: [phone]    Gi clinic,   [address] [place] [address]  Phone: [phone]  Fax: [phone]  Phone: (   )    -  Fax: (   )    -

## 2017-04-06 NOTE — DISCHARGE NOTE ADULT - CARE PLAN
Principal Discharge DX:	Generalized abdominal pain  Secondary Diagnosis:	Gastric tumor  Secondary Diagnosis:	Depression, unspecified depression type  Secondary Diagnosis:	Essential hypertension  Secondary Diagnosis:	Prostate hypertrophy Principal Discharge DX:	Generalized abdominal pain  Goal:	improved  Instructions for follow-up, activity and diet:	likely related to gastric tumor. continue to take pain medications as needed   Peg tube was evaluated to ensure proper placement   follow up with GI  Secondary Diagnosis:	Gastric tumor  Instructions for follow-up, activity and diet:	follow up with Dr. Orosco  continue to take chemotherapy medications as prescribe  Secondary Diagnosis:	Depression, unspecified depression type  Instructions for follow-up, activity and diet:	SEEK IMMEDIATE CARE IF  You have thoughts about hurting yourself or others.  You lose touch with reality (have psychotic symptoms).  You are taking medicine for depression and have a serious side effect  Secondary Diagnosis:	Essential hypertension  Instructions for follow-up, activity and diet:	Low salt diet  Activity as tolerated.  Take all medication as prescribed.  Follow up with your medical doctor for routine blood pressure monitoring at your next visit.  Notify your doctor if you have any of the following symptoms:   Dizziness, Lightheadedness, Blurry vision, Headache, Chest pain, Shortness of breath  Secondary Diagnosis:	Prostate hypertrophy  Instructions for follow-up, activity and diet:	follow up with the urologist for biopsy results   You have an enlarged prostate gland which gets bigger as men get older - it is a very common problem and has nothing to do with prostate cancer  Call your doctor if you are urinating more frequently, have trouble starting to urinate, have weak stream, urine leaking or dribbling, and feeling as though bladder is not empty after urination  Your doctor will monitor your prostate with a rectal exam as well as urine or blood testing  You can help yourself by reducing the amount of fluid you drink before going to bed, limiting the amount of alcohol & caffeine you drink   Avoid cold & allergy medication that contain decongestants or antihistamines which make BPH symptoms worse  You can also "double void" by waiting a moment after urinating & trying again  Take your medication as prescribed - one medication helps to relax the muscle around the urethra and the other medication you may take prevents the prostate from growing more or even shrinking the prostate  Secondary Diagnosis:	Hyperlipidemia, unspecified hyperlipidemia type  Instructions for follow-up, activity and diet:	Continue with your cholesterol medications. Eat a heart healthy diet that is low in saturated fats and salt, and includes whole grains, fruits, vegetables and lean protein; exercise regularly (consult with your physician or cardiologist first); maintain a heart healthy weight; if you smoke - quit (A resource to help you stop smoking is the [facility] – phone number [phone].). Continue to follow with your primary physician or cardiologist.  Seek medical help for dizziness, Lightheadedness, Blurry vision, Headache, Chest pain, Shortness of breath Principal Discharge DX:	Generalized abdominal pain  Goal:	improved  Instructions for follow-up, activity and diet:	likely related to gastric tumor. continue to take pain medications as needed   Peg tube was evaluated to ensure proper placement   follow up with GI  Secondary Diagnosis:	Gastric tumor  Instructions for follow-up, activity and diet:	follow up with Dr. Orosco  continue to take chemotherapy medications as prescribe  Secondary Diagnosis:	Depression, unspecified depression type  Instructions for follow-up, activity and diet:	SEEK IMMEDIATE CARE IF  You have thoughts about hurting yourself or others.  You lose touch with reality (have psychotic symptoms).  You are taking medicine for depression and have a serious side effect  Secondary Diagnosis:	Essential hypertension  Instructions for follow-up, activity and diet:	Low salt diet  Activity as tolerated.  Take all medication as prescribed.  Follow up with your medical doctor for routine blood pressure monitoring at your next visit.  Notify your doctor if you have any of the following symptoms:   Dizziness, Lightheadedness, Blurry vision, Headache, Chest pain, Shortness of breath  Secondary Diagnosis:	Prostate hypertrophy  Instructions for follow-up, activity and diet:	follow up with the urologist for biopsy results   You have an enlarged prostate gland which gets bigger as men get older - it is a very common problem and has nothing to do with prostate cancer  Call your doctor if you are urinating more frequently, have trouble starting to urinate, have weak stream, urine leaking or dribbling, and feeling as though bladder is not empty after urination  Your doctor will monitor your prostate with a rectal exam as well as urine or blood testing  You can help yourself by reducing the amount of fluid you drink before going to bed, limiting the amount of alcohol & caffeine you drink   Avoid cold & allergy medication that contain decongestants or antihistamines which make BPH symptoms worse  You can also "double void" by waiting a moment after urinating & trying again  Take your medication as prescribed - one medication helps to relax the muscle around the urethra and the other medication you may take prevents the prostate from growing more or even shrinking the prostate  Secondary Diagnosis:	Hyperlipidemia, unspecified hyperlipidemia type  Instructions for follow-up, activity and diet:	Continue with your cholesterol medications. Eat a heart healthy diet that is low in saturated fats and salt, and includes whole grains, fruits, vegetables and lean protein; exercise regularly (consult with your physician or cardiologist first); maintain a heart healthy weight; if you smoke - quit (A resource to help you stop smoking is the [facility] – phone number [phone].). Continue to follow with your primary physician or cardiologist.  Seek medical help for dizziness, Lightheadedness, Blurry vision, Headache, Chest pain, Shortness of breath Principal Discharge DX:	Generalized abdominal pain  Goal:	improved  Instructions for follow-up, activity and diet:	likely related to gastric tumor. continue to take pain medications as needed   Peg tube was evaluated to ensure proper placement   follow up with GI  Secondary Diagnosis:	Gastric tumor  Instructions for follow-up, activity and diet:	follow up with Dr. Orosco  continue to take chemotherapy medications as prescribe  Secondary Diagnosis:	Depression, unspecified depression type  Instructions for follow-up, activity and diet:	SEEK IMMEDIATE CARE IF  You have thoughts about hurting yourself or others.  You lose touch with reality (have psychotic symptoms).  You are taking medicine for depression and have a serious side effect  Secondary Diagnosis:	Essential hypertension  Instructions for follow-up, activity and diet:	Low salt diet  Activity as tolerated.  Take all medication as prescribed.  Follow up with your medical doctor for routine blood pressure monitoring at your next visit.  Notify your doctor if you have any of the following symptoms:   Dizziness, Lightheadedness, Blurry vision, Headache, Chest pain, Shortness of breath  Secondary Diagnosis:	Prostate hypertrophy  Instructions for follow-up, activity and diet:	follow up with the urologist for biopsy results   You have an enlarged prostate gland which gets bigger as men get older - it is a very common problem and has nothing to do with prostate cancer  Call your doctor if you are urinating more frequently, have trouble starting to urinate, have weak stream, urine leaking or dribbling, and feeling as though bladder is not empty after urination  Your doctor will monitor your prostate with a rectal exam as well as urine or blood testing  You can help yourself by reducing the amount of fluid you drink before going to bed, limiting the amount of alcohol & caffeine you drink   Avoid cold & allergy medication that contain decongestants or antihistamines which make BPH symptoms worse  You can also "double void" by waiting a moment after urinating & trying again  Take your medication as prescribed - one medication helps to relax the muscle around the urethra and the other medication you may take prevents the prostate from growing more or even shrinking the prostate  Secondary Diagnosis:	Hyperlipidemia, unspecified hyperlipidemia type  Instructions for follow-up, activity and diet:	Continue with your cholesterol medications. Eat a heart healthy diet that is low in saturated fats and salt, and includes whole grains, fruits, vegetables and lean protein; exercise regularly (consult with your physician or cardiologist first); maintain a heart healthy weight; if you smoke - quit (A resource to help you stop smoking is the [facility] – phone number [phone].). Continue to follow with your primary physician or cardiologist.  Seek medical help for dizziness, Lightheadedness, Blurry vision, Headache, Chest pain, Shortness of breath Principal Discharge DX:	Generalized abdominal pain  Goal:	improved  Instructions for follow-up, activity and diet:	likely related to gastric tumor. continue to take pain medications as needed   Peg tube was evaluated to ensure proper placement   follow up with GI  Secondary Diagnosis:	Gastric tumor  Instructions for follow-up, activity and diet:	follow up with Dr. Orosco  continue to take chemotherapy medications as prescribe  Secondary Diagnosis:	Depression, unspecified depression type  Instructions for follow-up, activity and diet:	SEEK IMMEDIATE CARE IF  You have thoughts about hurting yourself or others.  You lose touch with reality (have psychotic symptoms).  You are taking medicine for depression and have a serious side effect  Secondary Diagnosis:	Essential hypertension  Instructions for follow-up, activity and diet:	Low salt diet  Activity as tolerated.  Take all medication as prescribed.  Follow up with your medical doctor for routine blood pressure monitoring at your next visit.  Notify your doctor if you have any of the following symptoms:   Dizziness, Lightheadedness, Blurry vision, Headache, Chest pain, Shortness of breath  Secondary Diagnosis:	Prostate hypertrophy  Instructions for follow-up, activity and diet:	follow up with the urologist for biopsy results   You have an enlarged prostate gland which gets bigger as men get older - it is a very common problem and has nothing to do with prostate cancer  Call your doctor if you are urinating more frequently, have trouble starting to urinate, have weak stream, urine leaking or dribbling, and feeling as though bladder is not empty after urination  Your doctor will monitor your prostate with a rectal exam as well as urine or blood testing  You can help yourself by reducing the amount of fluid you drink before going to bed, limiting the amount of alcohol & caffeine you drink   Avoid cold & allergy medication that contain decongestants or antihistamines which make BPH symptoms worse  You can also "double void" by waiting a moment after urinating & trying again  Take your medication as prescribed - one medication helps to relax the muscle around the urethra and the other medication you may take prevents the prostate from growing more or even shrinking the prostate  Secondary Diagnosis:	Hyperlipidemia, unspecified hyperlipidemia type  Instructions for follow-up, activity and diet:	Continue with your cholesterol medications. Eat a heart healthy diet that is low in saturated fats and salt, and includes whole grains, fruits, vegetables and lean protein; exercise regularly (consult with your physician or cardiologist first); maintain a heart healthy weight; if you smoke - quit (A resource to help you stop smoking is the [facility] – phone number 516-752-6111.). Continue to follow with your primary physician or cardiologist.  Seek medical help for dizziness, Lightheadedness, Blurry vision, Headache, Chest pain, Shortness of breath

## 2017-04-06 NOTE — DISCHARGE NOTE ADULT - PROVIDER TOKENS
TOKEN:'[id number]:'[id number]:'[id number]',FREE:[LAST:[House [facility]],PHONE:[(   )    -],FAX:[(   )    -],ADDRESS:[[facility]]] TOKEN:'[id number]',FREE:[LAST:[Gi clinic],PHONE:[(   )    -],FAX:[(   )    -],ADDRESS:[[address] [place] [address]  Phone: [phone]  Fax: [phone]]]

## 2017-04-06 NOTE — DISCHARGE NOTE ADULT - PATIENT PORTAL LINK FT
“You can access the Follow[facility] Patient Portal, offered by [facility], by registering with the following website: [email or web address]”

## 2017-04-06 NOTE — DISCHARGE NOTE ADULT - ADDITIONAL INSTRUCTIONS
follow up with PCP- Dr. Medina  follow up with Dr. Orosco - Oncologist   follow up with Dr. Goldberg- Urologist

## 2017-04-06 NOTE — DISCHARGE NOTE ADULT - SECONDARY DIAGNOSIS.
Gastric tumor Depression, unspecified depression type Essential hypertension Prostate hypertrophy Hyperlipidemia, unspecified hyperlipidemia type

## 2017-04-06 NOTE — DISCHARGE NOTE ADULT - MEDICATION SUMMARY - MEDICATIONS TO STOP TAKING
I will STOP taking the medications listed below when I get home from the hospital:    acetaminophen-oxyCODONE 325 mg-5 mg oral tablet  -- 2 tab(s) by mouth every 6 hours, As Needed MDD:8  -- Caution federal law prohibits the transfer of this drug to any person other  than the person for whom it was prescribed.  May cause drowsiness.  Alcohol may intensify this effect.  Use care when operating dangerous machinery.  This prescription cannot be refilled.  This product contains acetaminophen.  Do not use  with any other product containing acetaminophen to prevent possible liver damage.  Using more of this medication than prescribed may cause serious breathing problems.

## 2017-04-06 NOTE — DISCHARGE NOTE ADULT - MEDICATION SUMMARY - MEDICATIONS TO TAKE
I will START or STAY ON the medications listed below when I get home from the hospital:    aspirin 81 mg oral tablet, chewable  -- 1 tab(s) by mouth once a day  -- Indication: For CVA    acetaminophen 325 mg oral tablet  -- 2 tab(s) by mouth every 6 hours, As needed, For Temp greater than 38.5 C (101.3 F)  -- Indication: For PAIN    acetaminophen-oxyCODONE 325 mg-5 mg oral tablet  -- 2 tab(s) by mouth every 6 hours, As needed, Moderate Pain (4 - 6) MDD:4  -- Indication: For Abdominal pain    lisinopril 2.5 mg oral tablet  -- 1 tab(s) by mouth once a day  -- Indication: For HTN (hypertension)    gabapentin 300 mg oral capsule  -- 1 cap(s) by mouth 3 times a day  -- Indication: For Abdominal pain    mirtazapine 15 mg oral tablet  -- 1 tab(s) by mouth once a day (at bedtime)  -- Indication: For Depression, unspecified depression type    atorvastatin 80 mg oral tablet  -- 1 tab(s) by mouth once a day (at bedtime)  -- Indication: For HdL    clopidogrel 75 mg oral tablet  -- 1 tab(s) by mouth once a day  -- Indication: For CVA    QUEtiapine 25 mg oral tablet  -- 1 tab(s) by mouth once a day (at bedtime)  -- Indication: For Depression    imatinib 400 mg oral tablet  -- 1 tab(s) by mouth once a day  -- Indication: For Gastric tumor    metoprolol tartrate 50 mg oral tablet  -- 1 tab(s) by mouth 2 times a day  -- Indication: For HTN (hypertension)    amLODIPine 5 mg oral tablet  -- 1 tab(s) by mouth once a day  -- Indication: For HTN (hypertension)    cefuroxime 500 mg oral tablet  -- 1 tab(s) by mouth every 12 hours  -- Indication: For ANTIBIOTIC    polyethylene glycol 3350 oral powder for reconstitution  -- 17 gram(s) by mouth once a day  -- Indication: For constipation    sulfamethoxazole-trimethoprim 800 mg-160 mg oral tablet  -- 1 tab(s) by mouth 2 times a day  -- Indication: For ANTIBIOTIC    pantoprazole 40 mg oral delayed release tablet  -- 1 tab(s) by mouth once a day  -- It is very important that you take or use this exactly as directed.  Do not skip doses or discontinue unless directed by your doctor.  Obtain medical advice before taking any non-prescription drugs as some may affect the action of this medication.  Swallow whole.  Do not crush.    -- Indication: For reflux

## 2017-04-07 LAB
ANION GAP SERPL CALC-SCNC: 13 MMOL/L — SIGNIFICANT CHANGE UP (ref 5–17)
BUN SERPL-MCNC: 16 MG/DL — SIGNIFICANT CHANGE UP (ref 7–23)
CALCIUM SERPL-MCNC: 8.3 MG/DL — LOW (ref 8.4–10.5)
CHLORIDE SERPL-SCNC: 108 MMOL/L — SIGNIFICANT CHANGE UP (ref 96–108)
CO2 SERPL-SCNC: 23 MMOL/L — SIGNIFICANT CHANGE UP (ref 22–31)
CREAT SERPL-MCNC: 1.21 MG/DL — SIGNIFICANT CHANGE UP (ref 0.5–1.3)
GLUCOSE SERPL-MCNC: 88 MG/DL — SIGNIFICANT CHANGE UP (ref 70–99)
HCT VFR BLD CALC: 30.1 % — LOW (ref 39–50)
HGB BLD-MCNC: 9.9 G/DL — LOW (ref 13–17)
MCHC RBC-ENTMCNC: 32.7 PG — SIGNIFICANT CHANGE UP (ref 27–34)
MCHC RBC-ENTMCNC: 32.9 GM/DL — SIGNIFICANT CHANGE UP (ref 32–36)
MCV RBC AUTO: 99.3 FL — SIGNIFICANT CHANGE UP (ref 80–100)
PLATELET # BLD AUTO: 189 K/UL — SIGNIFICANT CHANGE UP (ref 150–400)
POTASSIUM SERPL-MCNC: 3.9 MMOL/L — SIGNIFICANT CHANGE UP (ref 3.5–5.3)
POTASSIUM SERPL-SCNC: 3.9 MMOL/L — SIGNIFICANT CHANGE UP (ref 3.5–5.3)
RBC # BLD: 3.03 M/UL — LOW (ref 4.2–5.8)
RBC # FLD: 16.3 % — HIGH (ref 10.3–14.5)
SODIUM SERPL-SCNC: 144 MMOL/L — SIGNIFICANT CHANGE UP (ref 135–145)
WBC # BLD: 3.41 K/UL — LOW (ref 3.8–10.5)
WBC # FLD AUTO: 3.41 K/UL — LOW (ref 3.8–10.5)

## 2017-04-07 RX ADMIN — HEPARIN SODIUM 5000 UNIT(S): 5000 INJECTION INTRAVENOUS; SUBCUTANEOUS at 19:19

## 2017-04-07 RX ADMIN — GABAPENTIN 300 MILLIGRAM(S): 400 CAPSULE ORAL at 05:37

## 2017-04-07 RX ADMIN — HYDROMORPHONE HYDROCHLORIDE 1 MILLIGRAM(S): 2 INJECTION INTRAMUSCULAR; INTRAVENOUS; SUBCUTANEOUS at 18:20

## 2017-04-07 RX ADMIN — PANTOPRAZOLE SODIUM 40 MILLIGRAM(S): 20 TABLET, DELAYED RELEASE ORAL at 15:15

## 2017-04-07 RX ADMIN — IMATINIB MESYLATE 400 MILLIGRAM(S): 400 TABLET, FILM COATED ORAL at 15:17

## 2017-04-07 RX ADMIN — MIRTAZAPINE 15 MILLIGRAM(S): 45 TABLET, ORALLY DISINTEGRATING ORAL at 15:17

## 2017-04-07 RX ADMIN — LISINOPRIL 2.5 MILLIGRAM(S): 2.5 TABLET ORAL at 05:36

## 2017-04-07 RX ADMIN — ATORVASTATIN CALCIUM 80 MILLIGRAM(S): 80 TABLET, FILM COATED ORAL at 21:51

## 2017-04-07 RX ADMIN — GABAPENTIN 300 MILLIGRAM(S): 400 CAPSULE ORAL at 21:52

## 2017-04-07 RX ADMIN — POLYETHYLENE GLYCOL 3350 17 GRAM(S): 17 POWDER, FOR SOLUTION ORAL at 15:16

## 2017-04-07 RX ADMIN — AMLODIPINE BESYLATE 5 MILLIGRAM(S): 2.5 TABLET ORAL at 05:36

## 2017-04-07 RX ADMIN — Medication 50 MILLIGRAM(S): at 19:23

## 2017-04-07 RX ADMIN — GABAPENTIN 300 MILLIGRAM(S): 400 CAPSULE ORAL at 15:16

## 2017-04-07 RX ADMIN — QUETIAPINE FUMARATE 25 MILLIGRAM(S): 200 TABLET, FILM COATED ORAL at 21:59

## 2017-04-07 RX ADMIN — Medication 50 MILLIGRAM(S): at 05:37

## 2017-04-07 RX ADMIN — HYDROMORPHONE HYDROCHLORIDE 1 MILLIGRAM(S): 2 INJECTION INTRAMUSCULAR; INTRAVENOUS; SUBCUTANEOUS at 18:35

## 2017-04-07 RX ADMIN — HEPARIN SODIUM 5000 UNIT(S): 5000 INJECTION INTRAVENOUS; SUBCUTANEOUS at 05:36

## 2017-04-07 RX ADMIN — HYDROMORPHONE HYDROCHLORIDE 1 MILLIGRAM(S): 2 INJECTION INTRAMUSCULAR; INTRAVENOUS; SUBCUTANEOUS at 13:23

## 2017-04-07 RX ADMIN — HYDROMORPHONE HYDROCHLORIDE 1 MILLIGRAM(S): 2 INJECTION INTRAMUSCULAR; INTRAVENOUS; SUBCUTANEOUS at 13:38

## 2017-04-08 RX ADMIN — ATORVASTATIN CALCIUM 80 MILLIGRAM(S): 80 TABLET, FILM COATED ORAL at 22:46

## 2017-04-08 RX ADMIN — PANTOPRAZOLE SODIUM 40 MILLIGRAM(S): 20 TABLET, DELAYED RELEASE ORAL at 13:50

## 2017-04-08 RX ADMIN — GABAPENTIN 300 MILLIGRAM(S): 400 CAPSULE ORAL at 13:46

## 2017-04-08 RX ADMIN — HEPARIN SODIUM 5000 UNIT(S): 5000 INJECTION INTRAVENOUS; SUBCUTANEOUS at 05:29

## 2017-04-08 RX ADMIN — HYDROMORPHONE HYDROCHLORIDE 1 MILLIGRAM(S): 2 INJECTION INTRAMUSCULAR; INTRAVENOUS; SUBCUTANEOUS at 11:14

## 2017-04-08 RX ADMIN — HYDROMORPHONE HYDROCHLORIDE 1 MILLIGRAM(S): 2 INJECTION INTRAMUSCULAR; INTRAVENOUS; SUBCUTANEOUS at 11:30

## 2017-04-08 RX ADMIN — POLYETHYLENE GLYCOL 3350 17 GRAM(S): 17 POWDER, FOR SOLUTION ORAL at 13:46

## 2017-04-08 RX ADMIN — HYDROMORPHONE HYDROCHLORIDE 1 MILLIGRAM(S): 2 INJECTION INTRAMUSCULAR; INTRAVENOUS; SUBCUTANEOUS at 22:24

## 2017-04-08 RX ADMIN — SODIUM CHLORIDE 50 MILLILITER(S): 9 INJECTION INTRAMUSCULAR; INTRAVENOUS; SUBCUTANEOUS at 11:15

## 2017-04-08 RX ADMIN — GABAPENTIN 300 MILLIGRAM(S): 400 CAPSULE ORAL at 22:45

## 2017-04-08 RX ADMIN — Medication 50 MILLIGRAM(S): at 05:32

## 2017-04-08 RX ADMIN — MIRTAZAPINE 15 MILLIGRAM(S): 45 TABLET, ORALLY DISINTEGRATING ORAL at 13:46

## 2017-04-08 RX ADMIN — Medication 50 MILLIGRAM(S): at 17:38

## 2017-04-08 RX ADMIN — QUETIAPINE FUMARATE 25 MILLIGRAM(S): 200 TABLET, FILM COATED ORAL at 22:46

## 2017-04-08 RX ADMIN — IMATINIB MESYLATE 400 MILLIGRAM(S): 400 TABLET, FILM COATED ORAL at 13:46

## 2017-04-08 RX ADMIN — HEPARIN SODIUM 5000 UNIT(S): 5000 INJECTION INTRAVENOUS; SUBCUTANEOUS at 17:38

## 2017-04-08 RX ADMIN — AMLODIPINE BESYLATE 5 MILLIGRAM(S): 2.5 TABLET ORAL at 05:29

## 2017-04-08 RX ADMIN — SODIUM CHLORIDE 50 MILLILITER(S): 9 INJECTION INTRAMUSCULAR; INTRAVENOUS; SUBCUTANEOUS at 05:32

## 2017-04-08 RX ADMIN — HYDROMORPHONE HYDROCHLORIDE 1 MILLIGRAM(S): 2 INJECTION INTRAMUSCULAR; INTRAVENOUS; SUBCUTANEOUS at 22:54

## 2017-04-08 RX ADMIN — GABAPENTIN 300 MILLIGRAM(S): 400 CAPSULE ORAL at 05:28

## 2017-04-08 RX ADMIN — SODIUM CHLORIDE 50 MILLILITER(S): 9 INJECTION INTRAMUSCULAR; INTRAVENOUS; SUBCUTANEOUS at 13:47

## 2017-04-08 RX ADMIN — LISINOPRIL 2.5 MILLIGRAM(S): 2.5 TABLET ORAL at 05:29

## 2017-04-09 RX ADMIN — Medication 50 MILLIGRAM(S): at 06:35

## 2017-04-09 RX ADMIN — QUETIAPINE FUMARATE 25 MILLIGRAM(S): 200 TABLET, FILM COATED ORAL at 21:04

## 2017-04-09 RX ADMIN — HYDROMORPHONE HYDROCHLORIDE 1 MILLIGRAM(S): 2 INJECTION INTRAMUSCULAR; INTRAVENOUS; SUBCUTANEOUS at 17:56

## 2017-04-09 RX ADMIN — PANTOPRAZOLE SODIUM 40 MILLIGRAM(S): 20 TABLET, DELAYED RELEASE ORAL at 18:15

## 2017-04-09 RX ADMIN — MIRTAZAPINE 15 MILLIGRAM(S): 45 TABLET, ORALLY DISINTEGRATING ORAL at 18:06

## 2017-04-09 RX ADMIN — IMATINIB MESYLATE 400 MILLIGRAM(S): 400 TABLET, FILM COATED ORAL at 18:05

## 2017-04-09 RX ADMIN — ATORVASTATIN CALCIUM 80 MILLIGRAM(S): 80 TABLET, FILM COATED ORAL at 21:03

## 2017-04-09 RX ADMIN — HYDROMORPHONE HYDROCHLORIDE 1 MILLIGRAM(S): 2 INJECTION INTRAMUSCULAR; INTRAVENOUS; SUBCUTANEOUS at 07:42

## 2017-04-09 RX ADMIN — HYDROMORPHONE HYDROCHLORIDE 1 MILLIGRAM(S): 2 INJECTION INTRAMUSCULAR; INTRAVENOUS; SUBCUTANEOUS at 23:56

## 2017-04-09 RX ADMIN — GABAPENTIN 300 MILLIGRAM(S): 400 CAPSULE ORAL at 18:07

## 2017-04-09 RX ADMIN — HYDROMORPHONE HYDROCHLORIDE 1 MILLIGRAM(S): 2 INJECTION INTRAMUSCULAR; INTRAVENOUS; SUBCUTANEOUS at 07:57

## 2017-04-09 RX ADMIN — Medication 50 MILLIGRAM(S): at 18:08

## 2017-04-09 RX ADMIN — GABAPENTIN 300 MILLIGRAM(S): 400 CAPSULE ORAL at 06:33

## 2017-04-09 RX ADMIN — LISINOPRIL 2.5 MILLIGRAM(S): 2.5 TABLET ORAL at 06:34

## 2017-04-09 RX ADMIN — GABAPENTIN 300 MILLIGRAM(S): 400 CAPSULE ORAL at 21:04

## 2017-04-09 RX ADMIN — POLYETHYLENE GLYCOL 3350 17 GRAM(S): 17 POWDER, FOR SOLUTION ORAL at 18:06

## 2017-04-09 RX ADMIN — AMLODIPINE BESYLATE 5 MILLIGRAM(S): 2.5 TABLET ORAL at 06:34

## 2017-04-09 RX ADMIN — HEPARIN SODIUM 5000 UNIT(S): 5000 INJECTION INTRAVENOUS; SUBCUTANEOUS at 06:35

## 2017-04-09 RX ADMIN — HEPARIN SODIUM 5000 UNIT(S): 5000 INJECTION INTRAVENOUS; SUBCUTANEOUS at 18:07

## 2017-04-10 RX ORDER — AMIKACIN SULFATE 250 MG/ML
500 INJECTION, SOLUTION INTRAMUSCULAR; INTRAVENOUS ONCE
Qty: 0 | Refills: 0 | Status: COMPLETED | OUTPATIENT
Start: 2017-04-10 | End: 2017-04-13

## 2017-04-10 RX ORDER — HYDROMORPHONE HYDROCHLORIDE 2 MG/ML
1 INJECTION INTRAMUSCULAR; INTRAVENOUS; SUBCUTANEOUS EVERY 6 HOURS
Qty: 0 | Refills: 0 | Status: DISCONTINUED | OUTPATIENT
Start: 2017-04-10 | End: 2017-04-14

## 2017-04-10 RX ORDER — CEFUROXIME AXETIL 250 MG
500 TABLET ORAL EVERY 12 HOURS
Qty: 0 | Refills: 0 | Status: DISCONTINUED | OUTPATIENT
Start: 2017-04-10 | End: 2017-04-17

## 2017-04-10 RX ADMIN — AMLODIPINE BESYLATE 5 MILLIGRAM(S): 2.5 TABLET ORAL at 06:14

## 2017-04-10 RX ADMIN — HYDROMORPHONE HYDROCHLORIDE 1 MILLIGRAM(S): 2 INJECTION INTRAMUSCULAR; INTRAVENOUS; SUBCUTANEOUS at 08:55

## 2017-04-10 RX ADMIN — SENNA PLUS 5 MILLILITER(S): 8.6 TABLET ORAL at 23:16

## 2017-04-10 RX ADMIN — HYDROMORPHONE HYDROCHLORIDE 1 MILLIGRAM(S): 2 INJECTION INTRAMUSCULAR; INTRAVENOUS; SUBCUTANEOUS at 18:52

## 2017-04-10 RX ADMIN — HYDROMORPHONE HYDROCHLORIDE 1 MILLIGRAM(S): 2 INJECTION INTRAMUSCULAR; INTRAVENOUS; SUBCUTANEOUS at 19:00

## 2017-04-10 RX ADMIN — Medication 50 MILLIGRAM(S): at 18:46

## 2017-04-10 RX ADMIN — HYDROMORPHONE HYDROCHLORIDE 1 MILLIGRAM(S): 2 INJECTION INTRAMUSCULAR; INTRAVENOUS; SUBCUTANEOUS at 09:10

## 2017-04-10 RX ADMIN — IMATINIB MESYLATE 400 MILLIGRAM(S): 400 TABLET, FILM COATED ORAL at 11:30

## 2017-04-10 RX ADMIN — GABAPENTIN 300 MILLIGRAM(S): 400 CAPSULE ORAL at 15:00

## 2017-04-10 RX ADMIN — QUETIAPINE FUMARATE 25 MILLIGRAM(S): 200 TABLET, FILM COATED ORAL at 23:16

## 2017-04-10 RX ADMIN — POLYETHYLENE GLYCOL 3350 17 GRAM(S): 17 POWDER, FOR SOLUTION ORAL at 11:38

## 2017-04-10 RX ADMIN — MIRTAZAPINE 15 MILLIGRAM(S): 45 TABLET, ORALLY DISINTEGRATING ORAL at 11:31

## 2017-04-10 RX ADMIN — HYDROMORPHONE HYDROCHLORIDE 1 MILLIGRAM(S): 2 INJECTION INTRAMUSCULAR; INTRAVENOUS; SUBCUTANEOUS at 01:30

## 2017-04-10 RX ADMIN — PANTOPRAZOLE SODIUM 40 MILLIGRAM(S): 20 TABLET, DELAYED RELEASE ORAL at 11:30

## 2017-04-10 RX ADMIN — MORPHINE SULFATE 4 MILLIGRAM(S): 50 CAPSULE, EXTENDED RELEASE ORAL at 02:26

## 2017-04-10 RX ADMIN — MORPHINE SULFATE 4 MILLIGRAM(S): 50 CAPSULE, EXTENDED RELEASE ORAL at 03:26

## 2017-04-10 RX ADMIN — LISINOPRIL 2.5 MILLIGRAM(S): 2.5 TABLET ORAL at 06:14

## 2017-04-10 RX ADMIN — GABAPENTIN 300 MILLIGRAM(S): 400 CAPSULE ORAL at 06:13

## 2017-04-10 RX ADMIN — Medication 50 MILLIGRAM(S): at 06:14

## 2017-04-10 RX ADMIN — HEPARIN SODIUM 5000 UNIT(S): 5000 INJECTION INTRAVENOUS; SUBCUTANEOUS at 06:14

## 2017-04-10 RX ADMIN — HEPARIN SODIUM 5000 UNIT(S): 5000 INJECTION INTRAVENOUS; SUBCUTANEOUS at 18:45

## 2017-04-10 RX ADMIN — GABAPENTIN 300 MILLIGRAM(S): 400 CAPSULE ORAL at 23:16

## 2017-04-10 RX ADMIN — ATORVASTATIN CALCIUM 80 MILLIGRAM(S): 80 TABLET, FILM COATED ORAL at 23:16

## 2017-04-11 ENCOUNTER — APPOINTMENT (OUTPATIENT)
Dept: ULTRASOUND IMAGING | Facility: HOSPITAL | Age: 72
End: 2017-04-11

## 2017-04-11 LAB
ANION GAP SERPL CALC-SCNC: 13 MMOL/L — SIGNIFICANT CHANGE UP (ref 5–17)
BASOPHILS # BLD AUTO: 0.03 K/UL — SIGNIFICANT CHANGE UP (ref 0–0.2)
BASOPHILS NFR BLD AUTO: 0.7 % — SIGNIFICANT CHANGE UP (ref 0–2)
BUN SERPL-MCNC: 15 MG/DL — SIGNIFICANT CHANGE UP (ref 7–23)
CALCIUM SERPL-MCNC: 8.8 MG/DL — SIGNIFICANT CHANGE UP (ref 8.4–10.5)
CHLORIDE SERPL-SCNC: 102 MMOL/L — SIGNIFICANT CHANGE UP (ref 96–108)
CO2 SERPL-SCNC: 24 MMOL/L — SIGNIFICANT CHANGE UP (ref 22–31)
CREAT SERPL-MCNC: 1.76 MG/DL — HIGH (ref 0.5–1.3)
EOSINOPHIL # BLD AUTO: 0.31 K/UL — SIGNIFICANT CHANGE UP (ref 0–0.5)
EOSINOPHIL NFR BLD AUTO: 6.9 % — HIGH (ref 0–6)
GLUCOSE SERPL-MCNC: 73 MG/DL — SIGNIFICANT CHANGE UP (ref 70–99)
HCT VFR BLD CALC: 31.9 % — LOW (ref 39–50)
HGB BLD-MCNC: 10.7 G/DL — LOW (ref 13–17)
IMM GRANULOCYTES NFR BLD AUTO: 0 % — SIGNIFICANT CHANGE UP (ref 0–1.5)
LYMPHOCYTES # BLD AUTO: 1.87 K/UL — SIGNIFICANT CHANGE UP (ref 1–3.3)
LYMPHOCYTES # BLD AUTO: 41.8 % — SIGNIFICANT CHANGE UP (ref 13–44)
MCHC RBC-ENTMCNC: 32.8 PG — SIGNIFICANT CHANGE UP (ref 27–34)
MCHC RBC-ENTMCNC: 33.5 GM/DL — SIGNIFICANT CHANGE UP (ref 32–36)
MCV RBC AUTO: 97.9 FL — SIGNIFICANT CHANGE UP (ref 80–100)
MONOCYTES # BLD AUTO: 0.45 K/UL — SIGNIFICANT CHANGE UP (ref 0–0.9)
MONOCYTES NFR BLD AUTO: 10.1 % — SIGNIFICANT CHANGE UP (ref 2–14)
NEUTROPHILS # BLD AUTO: 1.81 K/UL — SIGNIFICANT CHANGE UP (ref 1.8–7.4)
NEUTROPHILS NFR BLD AUTO: 40.5 % — LOW (ref 43–77)
PLATELET # BLD AUTO: 187 K/UL — SIGNIFICANT CHANGE UP (ref 150–400)
POTASSIUM SERPL-MCNC: 4 MMOL/L — SIGNIFICANT CHANGE UP (ref 3.5–5.3)
POTASSIUM SERPL-SCNC: 4 MMOL/L — SIGNIFICANT CHANGE UP (ref 3.5–5.3)
RBC # BLD: 3.26 M/UL — LOW (ref 4.2–5.8)
RBC # FLD: 15.8 % — HIGH (ref 10.3–14.5)
SODIUM SERPL-SCNC: 139 MMOL/L — SIGNIFICANT CHANGE UP (ref 135–145)
WBC # BLD: 4.47 K/UL — SIGNIFICANT CHANGE UP (ref 3.8–10.5)
WBC # FLD AUTO: 4.47 K/UL — SIGNIFICANT CHANGE UP (ref 3.8–10.5)

## 2017-04-11 RX ADMIN — SENNA PLUS 5 MILLILITER(S): 8.6 TABLET ORAL at 21:26

## 2017-04-11 RX ADMIN — HEPARIN SODIUM 5000 UNIT(S): 5000 INJECTION INTRAVENOUS; SUBCUTANEOUS at 06:44

## 2017-04-11 RX ADMIN — GABAPENTIN 300 MILLIGRAM(S): 400 CAPSULE ORAL at 06:43

## 2017-04-11 RX ADMIN — Medication 1 TABLET(S): at 18:57

## 2017-04-11 RX ADMIN — GABAPENTIN 300 MILLIGRAM(S): 400 CAPSULE ORAL at 21:26

## 2017-04-11 RX ADMIN — LISINOPRIL 2.5 MILLIGRAM(S): 2.5 TABLET ORAL at 06:44

## 2017-04-11 RX ADMIN — MIRTAZAPINE 15 MILLIGRAM(S): 45 TABLET, ORALLY DISINTEGRATING ORAL at 13:19

## 2017-04-11 RX ADMIN — Medication 50 MILLIGRAM(S): at 18:57

## 2017-04-11 RX ADMIN — AMLODIPINE BESYLATE 5 MILLIGRAM(S): 2.5 TABLET ORAL at 06:44

## 2017-04-11 RX ADMIN — Medication 500 MILLIGRAM(S): at 06:43

## 2017-04-11 RX ADMIN — ATORVASTATIN CALCIUM 80 MILLIGRAM(S): 80 TABLET, FILM COATED ORAL at 21:26

## 2017-04-11 RX ADMIN — QUETIAPINE FUMARATE 25 MILLIGRAM(S): 200 TABLET, FILM COATED ORAL at 21:26

## 2017-04-11 RX ADMIN — HEPARIN SODIUM 5000 UNIT(S): 5000 INJECTION INTRAVENOUS; SUBCUTANEOUS at 18:57

## 2017-04-11 RX ADMIN — IMATINIB MESYLATE 400 MILLIGRAM(S): 400 TABLET, FILM COATED ORAL at 13:12

## 2017-04-11 RX ADMIN — POLYETHYLENE GLYCOL 3350 17 GRAM(S): 17 POWDER, FOR SOLUTION ORAL at 13:17

## 2017-04-11 RX ADMIN — HYDROMORPHONE HYDROCHLORIDE 1 MILLIGRAM(S): 2 INJECTION INTRAMUSCULAR; INTRAVENOUS; SUBCUTANEOUS at 20:25

## 2017-04-11 RX ADMIN — HYDROMORPHONE HYDROCHLORIDE 1 MILLIGRAM(S): 2 INJECTION INTRAMUSCULAR; INTRAVENOUS; SUBCUTANEOUS at 13:12

## 2017-04-11 RX ADMIN — HYDROMORPHONE HYDROCHLORIDE 1 MILLIGRAM(S): 2 INJECTION INTRAMUSCULAR; INTRAVENOUS; SUBCUTANEOUS at 13:27

## 2017-04-11 RX ADMIN — Medication 1 TABLET(S): at 06:43

## 2017-04-11 RX ADMIN — HYDROMORPHONE HYDROCHLORIDE 1 MILLIGRAM(S): 2 INJECTION INTRAMUSCULAR; INTRAVENOUS; SUBCUTANEOUS at 20:10

## 2017-04-11 RX ADMIN — GABAPENTIN 300 MILLIGRAM(S): 400 CAPSULE ORAL at 13:17

## 2017-04-11 RX ADMIN — Medication 500 MILLIGRAM(S): at 18:56

## 2017-04-11 RX ADMIN — Medication 50 MILLIGRAM(S): at 06:43

## 2017-04-11 RX ADMIN — PANTOPRAZOLE SODIUM 40 MILLIGRAM(S): 20 TABLET, DELAYED RELEASE ORAL at 13:18

## 2017-04-12 ENCOUNTER — RESULT REVIEW (OUTPATIENT)
Age: 72
End: 2017-04-12

## 2017-04-12 RX ADMIN — HYDROMORPHONE HYDROCHLORIDE 1 MILLIGRAM(S): 2 INJECTION INTRAMUSCULAR; INTRAVENOUS; SUBCUTANEOUS at 11:53

## 2017-04-12 RX ADMIN — AMLODIPINE BESYLATE 5 MILLIGRAM(S): 2.5 TABLET ORAL at 05:58

## 2017-04-12 RX ADMIN — SODIUM CHLORIDE 50 MILLILITER(S): 9 INJECTION INTRAMUSCULAR; INTRAVENOUS; SUBCUTANEOUS at 08:16

## 2017-04-12 RX ADMIN — HYDROMORPHONE HYDROCHLORIDE 1 MILLIGRAM(S): 2 INJECTION INTRAMUSCULAR; INTRAVENOUS; SUBCUTANEOUS at 11:23

## 2017-04-12 RX ADMIN — HEPARIN SODIUM 5000 UNIT(S): 5000 INJECTION INTRAVENOUS; SUBCUTANEOUS at 17:42

## 2017-04-12 RX ADMIN — Medication 1 TABLET(S): at 05:58

## 2017-04-12 RX ADMIN — GABAPENTIN 300 MILLIGRAM(S): 400 CAPSULE ORAL at 21:02

## 2017-04-12 RX ADMIN — GABAPENTIN 300 MILLIGRAM(S): 400 CAPSULE ORAL at 12:40

## 2017-04-12 RX ADMIN — HYDROMORPHONE HYDROCHLORIDE 1 MILLIGRAM(S): 2 INJECTION INTRAMUSCULAR; INTRAVENOUS; SUBCUTANEOUS at 21:20

## 2017-04-12 RX ADMIN — HYDROMORPHONE HYDROCHLORIDE 1 MILLIGRAM(S): 2 INJECTION INTRAMUSCULAR; INTRAVENOUS; SUBCUTANEOUS at 20:59

## 2017-04-12 RX ADMIN — LISINOPRIL 2.5 MILLIGRAM(S): 2.5 TABLET ORAL at 05:58

## 2017-04-12 RX ADMIN — Medication 500 MILLIGRAM(S): at 05:58

## 2017-04-12 RX ADMIN — Medication 50 MILLIGRAM(S): at 17:44

## 2017-04-12 RX ADMIN — POLYETHYLENE GLYCOL 3350 17 GRAM(S): 17 POWDER, FOR SOLUTION ORAL at 11:26

## 2017-04-12 RX ADMIN — HEPARIN SODIUM 5000 UNIT(S): 5000 INJECTION INTRAVENOUS; SUBCUTANEOUS at 05:59

## 2017-04-12 RX ADMIN — ATORVASTATIN CALCIUM 80 MILLIGRAM(S): 80 TABLET, FILM COATED ORAL at 21:02

## 2017-04-12 RX ADMIN — Medication 500 MILLIGRAM(S): at 17:42

## 2017-04-12 RX ADMIN — SENNA PLUS 5 MILLILITER(S): 8.6 TABLET ORAL at 21:02

## 2017-04-12 RX ADMIN — Medication 50 MILLIGRAM(S): at 05:58

## 2017-04-12 RX ADMIN — GABAPENTIN 300 MILLIGRAM(S): 400 CAPSULE ORAL at 05:58

## 2017-04-12 RX ADMIN — Medication 1 TABLET(S): at 17:43

## 2017-04-12 RX ADMIN — QUETIAPINE FUMARATE 25 MILLIGRAM(S): 200 TABLET, FILM COATED ORAL at 21:02

## 2017-04-12 RX ADMIN — MIRTAZAPINE 15 MILLIGRAM(S): 45 TABLET, ORALLY DISINTEGRATING ORAL at 12:35

## 2017-04-12 RX ADMIN — IMATINIB MESYLATE 400 MILLIGRAM(S): 400 TABLET, FILM COATED ORAL at 11:25

## 2017-04-12 RX ADMIN — PANTOPRAZOLE SODIUM 40 MILLIGRAM(S): 20 TABLET, DELAYED RELEASE ORAL at 11:26

## 2017-04-13 LAB
ANION GAP SERPL CALC-SCNC: 13 MMOL/L — SIGNIFICANT CHANGE UP (ref 5–17)
BUN SERPL-MCNC: 21 MG/DL — SIGNIFICANT CHANGE UP (ref 7–23)
CALCIUM SERPL-MCNC: 9.1 MG/DL — SIGNIFICANT CHANGE UP (ref 8.4–10.5)
CHLORIDE SERPL-SCNC: 105 MMOL/L — SIGNIFICANT CHANGE UP (ref 96–108)
CO2 SERPL-SCNC: 23 MMOL/L — SIGNIFICANT CHANGE UP (ref 22–31)
CREAT SERPL-MCNC: 1.55 MG/DL — HIGH (ref 0.5–1.3)
GLUCOSE SERPL-MCNC: 107 MG/DL — HIGH (ref 70–99)
HCT VFR BLD CALC: 33.6 % — LOW (ref 39–50)
HGB BLD-MCNC: 11.1 G/DL — LOW (ref 13–17)
MCHC RBC-ENTMCNC: 33 GM/DL — SIGNIFICANT CHANGE UP (ref 32–36)
MCHC RBC-ENTMCNC: 33.3 PG — SIGNIFICANT CHANGE UP (ref 27–34)
MCV RBC AUTO: 101 FL — HIGH (ref 80–100)
PLATELET # BLD AUTO: 148 K/UL — LOW (ref 150–400)
POTASSIUM SERPL-MCNC: 4.8 MMOL/L — SIGNIFICANT CHANGE UP (ref 3.5–5.3)
POTASSIUM SERPL-SCNC: 4.8 MMOL/L — SIGNIFICANT CHANGE UP (ref 3.5–5.3)
RBC # BLD: 3.34 M/UL — LOW (ref 4.2–5.8)
RBC # FLD: 14.7 % — HIGH (ref 10.3–14.5)
SODIUM SERPL-SCNC: 141 MMOL/L — SIGNIFICANT CHANGE UP (ref 135–145)
WBC # BLD: 4 K/UL — SIGNIFICANT CHANGE UP (ref 3.8–10.5)
WBC # FLD AUTO: 4 K/UL — SIGNIFICANT CHANGE UP (ref 3.8–10.5)

## 2017-04-13 PROCEDURE — 93010 ELECTROCARDIOGRAM REPORT: CPT

## 2017-04-13 PROCEDURE — 88305 TISSUE EXAM BY PATHOLOGIST: CPT | Mod: 26

## 2017-04-13 PROCEDURE — 99223 1ST HOSP IP/OBS HIGH 75: CPT

## 2017-04-13 PROCEDURE — 55700: CPT

## 2017-04-13 PROCEDURE — 76942 ECHO GUIDE FOR BIOPSY: CPT | Mod: 26

## 2017-04-13 RX ORDER — HALOPERIDOL DECANOATE 100 MG/ML
0.5 INJECTION INTRAMUSCULAR EVERY 6 HOURS
Qty: 0 | Refills: 0 | Status: DISCONTINUED | OUTPATIENT
Start: 2017-04-13 | End: 2017-04-17

## 2017-04-13 RX ADMIN — QUETIAPINE FUMARATE 25 MILLIGRAM(S): 200 TABLET, FILM COATED ORAL at 21:04

## 2017-04-13 RX ADMIN — IMATINIB MESYLATE 400 MILLIGRAM(S): 400 TABLET, FILM COATED ORAL at 13:01

## 2017-04-13 RX ADMIN — PANTOPRAZOLE SODIUM 40 MILLIGRAM(S): 20 TABLET, DELAYED RELEASE ORAL at 13:01

## 2017-04-13 RX ADMIN — SENNA PLUS 5 MILLILITER(S): 8.6 TABLET ORAL at 21:04

## 2017-04-13 RX ADMIN — Medication 50 MILLIGRAM(S): at 17:54

## 2017-04-13 RX ADMIN — Medication 500 MILLIGRAM(S): at 17:54

## 2017-04-13 RX ADMIN — AMIKACIN SULFATE 102 MILLIGRAM(S): 250 INJECTION, SOLUTION INTRAMUSCULAR; INTRAVENOUS at 13:00

## 2017-04-13 RX ADMIN — GABAPENTIN 300 MILLIGRAM(S): 400 CAPSULE ORAL at 13:02

## 2017-04-13 RX ADMIN — POLYETHYLENE GLYCOL 3350 17 GRAM(S): 17 POWDER, FOR SOLUTION ORAL at 13:02

## 2017-04-13 RX ADMIN — Medication 1 TABLET(S): at 17:53

## 2017-04-13 RX ADMIN — AMLODIPINE BESYLATE 5 MILLIGRAM(S): 2.5 TABLET ORAL at 05:55

## 2017-04-13 RX ADMIN — Medication 1 TABLET(S): at 06:12

## 2017-04-13 RX ADMIN — Medication 50 MILLIGRAM(S): at 05:55

## 2017-04-13 RX ADMIN — HYDROMORPHONE HYDROCHLORIDE 1 MILLIGRAM(S): 2 INJECTION INTRAMUSCULAR; INTRAVENOUS; SUBCUTANEOUS at 11:00

## 2017-04-13 RX ADMIN — HYDROMORPHONE HYDROCHLORIDE 1 MILLIGRAM(S): 2 INJECTION INTRAMUSCULAR; INTRAVENOUS; SUBCUTANEOUS at 10:35

## 2017-04-13 RX ADMIN — Medication 0.5 MILLIGRAM(S): at 11:37

## 2017-04-13 RX ADMIN — SODIUM CHLORIDE 50 MILLILITER(S): 9 INJECTION INTRAMUSCULAR; INTRAVENOUS; SUBCUTANEOUS at 05:56

## 2017-04-13 RX ADMIN — LISINOPRIL 2.5 MILLIGRAM(S): 2.5 TABLET ORAL at 05:55

## 2017-04-13 RX ADMIN — MIRTAZAPINE 15 MILLIGRAM(S): 45 TABLET, ORALLY DISINTEGRATING ORAL at 13:02

## 2017-04-13 RX ADMIN — HEPARIN SODIUM 5000 UNIT(S): 5000 INJECTION INTRAVENOUS; SUBCUTANEOUS at 05:55

## 2017-04-13 RX ADMIN — Medication 500 MILLIGRAM(S): at 05:55

## 2017-04-13 RX ADMIN — ATORVASTATIN CALCIUM 80 MILLIGRAM(S): 80 TABLET, FILM COATED ORAL at 21:04

## 2017-04-13 RX ADMIN — GABAPENTIN 300 MILLIGRAM(S): 400 CAPSULE ORAL at 05:55

## 2017-04-13 RX ADMIN — GABAPENTIN 300 MILLIGRAM(S): 400 CAPSULE ORAL at 21:04

## 2017-04-14 LAB
ANION GAP SERPL CALC-SCNC: 11 MMOL/L — SIGNIFICANT CHANGE UP (ref 5–17)
BUN SERPL-MCNC: 17 MG/DL — SIGNIFICANT CHANGE UP (ref 7–23)
CALCIUM SERPL-MCNC: 8.5 MG/DL — SIGNIFICANT CHANGE UP (ref 8.4–10.5)
CHLORIDE SERPL-SCNC: 102 MMOL/L — SIGNIFICANT CHANGE UP (ref 96–108)
CO2 SERPL-SCNC: 24 MMOL/L — SIGNIFICANT CHANGE UP (ref 22–31)
CREAT SERPL-MCNC: 1.62 MG/DL — HIGH (ref 0.5–1.3)
GLUCOSE SERPL-MCNC: 78 MG/DL — SIGNIFICANT CHANGE UP (ref 70–99)
HCT VFR BLD CALC: 31.4 % — LOW (ref 39–50)
HGB BLD-MCNC: 10.3 G/DL — LOW (ref 13–17)
MCHC RBC-ENTMCNC: 32.2 PG — SIGNIFICANT CHANGE UP (ref 27–34)
MCHC RBC-ENTMCNC: 32.8 GM/DL — SIGNIFICANT CHANGE UP (ref 32–36)
MCV RBC AUTO: 98.1 FL — SIGNIFICANT CHANGE UP (ref 80–100)
PLATELET # BLD AUTO: 170 K/UL — SIGNIFICANT CHANGE UP (ref 150–400)
POTASSIUM SERPL-MCNC: 4.7 MMOL/L — SIGNIFICANT CHANGE UP (ref 3.5–5.3)
POTASSIUM SERPL-SCNC: 4.7 MMOL/L — SIGNIFICANT CHANGE UP (ref 3.5–5.3)
RBC # BLD: 3.2 M/UL — LOW (ref 4.2–5.8)
RBC # FLD: 15.9 % — HIGH (ref 10.3–14.5)
SODIUM SERPL-SCNC: 137 MMOL/L — SIGNIFICANT CHANGE UP (ref 135–145)
WBC # BLD: 5.49 K/UL — SIGNIFICANT CHANGE UP (ref 3.8–10.5)
WBC # FLD AUTO: 5.49 K/UL — SIGNIFICANT CHANGE UP (ref 3.8–10.5)

## 2017-04-14 PROCEDURE — 70551 MRI BRAIN STEM W/O DYE: CPT | Mod: 26

## 2017-04-14 RX ADMIN — Medication 500 MILLIGRAM(S): at 17:58

## 2017-04-14 RX ADMIN — LISINOPRIL 2.5 MILLIGRAM(S): 2.5 TABLET ORAL at 05:29

## 2017-04-14 RX ADMIN — HYDROMORPHONE HYDROCHLORIDE 1 MILLIGRAM(S): 2 INJECTION INTRAMUSCULAR; INTRAVENOUS; SUBCUTANEOUS at 19:16

## 2017-04-14 RX ADMIN — HEPARIN SODIUM 5000 UNIT(S): 5000 INJECTION INTRAVENOUS; SUBCUTANEOUS at 05:29

## 2017-04-14 RX ADMIN — QUETIAPINE FUMARATE 25 MILLIGRAM(S): 200 TABLET, FILM COATED ORAL at 21:14

## 2017-04-14 RX ADMIN — AMLODIPINE BESYLATE 5 MILLIGRAM(S): 2.5 TABLET ORAL at 05:29

## 2017-04-14 RX ADMIN — HYDROMORPHONE HYDROCHLORIDE 1 MILLIGRAM(S): 2 INJECTION INTRAMUSCULAR; INTRAVENOUS; SUBCUTANEOUS at 13:34

## 2017-04-14 RX ADMIN — ATORVASTATIN CALCIUM 80 MILLIGRAM(S): 80 TABLET, FILM COATED ORAL at 21:13

## 2017-04-14 RX ADMIN — SODIUM CHLORIDE 50 MILLILITER(S): 9 INJECTION INTRAMUSCULAR; INTRAVENOUS; SUBCUTANEOUS at 21:16

## 2017-04-14 RX ADMIN — SENNA PLUS 5 MILLILITER(S): 8.6 TABLET ORAL at 21:13

## 2017-04-14 RX ADMIN — IMATINIB MESYLATE 400 MILLIGRAM(S): 400 TABLET, FILM COATED ORAL at 15:21

## 2017-04-14 RX ADMIN — MIRTAZAPINE 15 MILLIGRAM(S): 45 TABLET, ORALLY DISINTEGRATING ORAL at 15:23

## 2017-04-14 RX ADMIN — HYDROMORPHONE HYDROCHLORIDE 1 MILLIGRAM(S): 2 INJECTION INTRAMUSCULAR; INTRAVENOUS; SUBCUTANEOUS at 13:19

## 2017-04-14 RX ADMIN — PANTOPRAZOLE SODIUM 40 MILLIGRAM(S): 20 TABLET, DELAYED RELEASE ORAL at 15:23

## 2017-04-14 RX ADMIN — HEPARIN SODIUM 5000 UNIT(S): 5000 INJECTION INTRAVENOUS; SUBCUTANEOUS at 17:58

## 2017-04-14 RX ADMIN — GABAPENTIN 300 MILLIGRAM(S): 400 CAPSULE ORAL at 05:29

## 2017-04-14 RX ADMIN — GABAPENTIN 300 MILLIGRAM(S): 400 CAPSULE ORAL at 16:00

## 2017-04-14 RX ADMIN — HALOPERIDOL DECANOATE 0.5 MILLIGRAM(S): 100 INJECTION INTRAMUSCULAR at 17:56

## 2017-04-14 RX ADMIN — Medication 1 TABLET(S): at 05:29

## 2017-04-14 RX ADMIN — Medication 500 MILLIGRAM(S): at 05:29

## 2017-04-14 RX ADMIN — GABAPENTIN 300 MILLIGRAM(S): 400 CAPSULE ORAL at 21:16

## 2017-04-14 RX ADMIN — Medication 50 MILLIGRAM(S): at 17:58

## 2017-04-14 RX ADMIN — Medication 50 MILLIGRAM(S): at 05:29

## 2017-04-14 RX ADMIN — Medication 1 TABLET(S): at 17:58

## 2017-04-14 RX ADMIN — POLYETHYLENE GLYCOL 3350 17 GRAM(S): 17 POWDER, FOR SOLUTION ORAL at 15:22

## 2017-04-14 RX ADMIN — HYDROMORPHONE HYDROCHLORIDE 1 MILLIGRAM(S): 2 INJECTION INTRAMUSCULAR; INTRAVENOUS; SUBCUTANEOUS at 19:01

## 2017-04-15 LAB
ANION GAP SERPL CALC-SCNC: 12 MMOL/L — SIGNIFICANT CHANGE UP (ref 5–17)
BUN SERPL-MCNC: 15 MG/DL — SIGNIFICANT CHANGE UP (ref 7–23)
CALCIUM SERPL-MCNC: 8.5 MG/DL — SIGNIFICANT CHANGE UP (ref 8.4–10.5)
CHLORIDE SERPL-SCNC: 107 MMOL/L — SIGNIFICANT CHANGE UP (ref 96–108)
CO2 SERPL-SCNC: 18 MMOL/L — LOW (ref 22–31)
CREAT SERPL-MCNC: 1.33 MG/DL — HIGH (ref 0.5–1.3)
GLUCOSE SERPL-MCNC: 132 MG/DL — HIGH (ref 70–99)
HCT VFR BLD CALC: 29 % — LOW (ref 39–50)
HGB BLD-MCNC: 9.6 G/DL — LOW (ref 13–17)
MCHC RBC-ENTMCNC: 32.4 PG — SIGNIFICANT CHANGE UP (ref 27–34)
MCHC RBC-ENTMCNC: 33.1 GM/DL — SIGNIFICANT CHANGE UP (ref 32–36)
MCV RBC AUTO: 98 FL — SIGNIFICANT CHANGE UP (ref 80–100)
PLATELET # BLD AUTO: 157 K/UL — SIGNIFICANT CHANGE UP (ref 150–400)
POTASSIUM SERPL-MCNC: 4.3 MMOL/L — SIGNIFICANT CHANGE UP (ref 3.5–5.3)
POTASSIUM SERPL-SCNC: 4.3 MMOL/L — SIGNIFICANT CHANGE UP (ref 3.5–5.3)
RBC # BLD: 2.96 M/UL — LOW (ref 4.2–5.8)
RBC # FLD: 16 % — HIGH (ref 10.3–14.5)
SODIUM SERPL-SCNC: 137 MMOL/L — SIGNIFICANT CHANGE UP (ref 135–145)
WBC # BLD: 4.47 K/UL — SIGNIFICANT CHANGE UP (ref 3.8–10.5)
WBC # FLD AUTO: 4.47 K/UL — SIGNIFICANT CHANGE UP (ref 3.8–10.5)

## 2017-04-15 RX ORDER — ASPIRIN/CALCIUM CARB/MAGNESIUM 324 MG
81 TABLET ORAL DAILY
Qty: 0 | Refills: 0 | Status: DISCONTINUED | OUTPATIENT
Start: 2017-04-15 | End: 2017-04-17

## 2017-04-15 RX ORDER — CLOPIDOGREL BISULFATE 75 MG/1
75 TABLET, FILM COATED ORAL DAILY
Qty: 0 | Refills: 0 | Status: DISCONTINUED | OUTPATIENT
Start: 2017-04-15 | End: 2017-04-17

## 2017-04-15 RX ADMIN — GABAPENTIN 300 MILLIGRAM(S): 400 CAPSULE ORAL at 13:21

## 2017-04-15 RX ADMIN — Medication 1 TABLET(S): at 06:14

## 2017-04-15 RX ADMIN — Medication 500 MILLIGRAM(S): at 06:18

## 2017-04-15 RX ADMIN — Medication 50 MILLIGRAM(S): at 06:17

## 2017-04-15 RX ADMIN — HEPARIN SODIUM 5000 UNIT(S): 5000 INJECTION INTRAVENOUS; SUBCUTANEOUS at 06:17

## 2017-04-15 RX ADMIN — MIRTAZAPINE 15 MILLIGRAM(S): 45 TABLET, ORALLY DISINTEGRATING ORAL at 12:01

## 2017-04-15 RX ADMIN — SODIUM CHLORIDE 50 MILLILITER(S): 9 INJECTION INTRAMUSCULAR; INTRAVENOUS; SUBCUTANEOUS at 21:39

## 2017-04-15 RX ADMIN — QUETIAPINE FUMARATE 25 MILLIGRAM(S): 200 TABLET, FILM COATED ORAL at 21:39

## 2017-04-15 RX ADMIN — AMLODIPINE BESYLATE 5 MILLIGRAM(S): 2.5 TABLET ORAL at 06:17

## 2017-04-15 RX ADMIN — ATORVASTATIN CALCIUM 80 MILLIGRAM(S): 80 TABLET, FILM COATED ORAL at 21:39

## 2017-04-15 RX ADMIN — Medication 81 MILLIGRAM(S): at 12:06

## 2017-04-15 RX ADMIN — Medication 500 MILLIGRAM(S): at 18:20

## 2017-04-15 RX ADMIN — LISINOPRIL 2.5 MILLIGRAM(S): 2.5 TABLET ORAL at 06:18

## 2017-04-15 RX ADMIN — GABAPENTIN 300 MILLIGRAM(S): 400 CAPSULE ORAL at 06:17

## 2017-04-15 RX ADMIN — PANTOPRAZOLE SODIUM 40 MILLIGRAM(S): 20 TABLET, DELAYED RELEASE ORAL at 12:00

## 2017-04-15 RX ADMIN — SODIUM CHLORIDE 50 MILLILITER(S): 9 INJECTION INTRAMUSCULAR; INTRAVENOUS; SUBCUTANEOUS at 07:00

## 2017-04-15 RX ADMIN — POLYETHYLENE GLYCOL 3350 17 GRAM(S): 17 POWDER, FOR SOLUTION ORAL at 12:01

## 2017-04-15 RX ADMIN — GABAPENTIN 300 MILLIGRAM(S): 400 CAPSULE ORAL at 21:39

## 2017-04-15 RX ADMIN — Medication 50 MILLIGRAM(S): at 18:20

## 2017-04-15 RX ADMIN — CLOPIDOGREL BISULFATE 75 MILLIGRAM(S): 75 TABLET, FILM COATED ORAL at 12:06

## 2017-04-15 RX ADMIN — SENNA PLUS 5 MILLILITER(S): 8.6 TABLET ORAL at 21:39

## 2017-04-15 RX ADMIN — IMATINIB MESYLATE 400 MILLIGRAM(S): 400 TABLET, FILM COATED ORAL at 11:59

## 2017-04-15 RX ADMIN — HEPARIN SODIUM 5000 UNIT(S): 5000 INJECTION INTRAVENOUS; SUBCUTANEOUS at 18:20

## 2017-04-15 RX ADMIN — Medication 1 TABLET(S): at 18:20

## 2017-04-16 LAB
ANION GAP SERPL CALC-SCNC: 14 MMOL/L — SIGNIFICANT CHANGE UP (ref 5–17)
BUN SERPL-MCNC: 15 MG/DL — SIGNIFICANT CHANGE UP (ref 7–23)
CALCIUM SERPL-MCNC: 9.1 MG/DL — SIGNIFICANT CHANGE UP (ref 8.4–10.5)
CHLORIDE SERPL-SCNC: 103 MMOL/L — SIGNIFICANT CHANGE UP (ref 96–108)
CO2 SERPL-SCNC: 20 MMOL/L — LOW (ref 22–31)
CREAT SERPL-MCNC: 1.33 MG/DL — HIGH (ref 0.5–1.3)
GLUCOSE SERPL-MCNC: 86 MG/DL — SIGNIFICANT CHANGE UP (ref 70–99)
HCT VFR BLD CALC: 30.5 % — LOW (ref 39–50)
HGB BLD-MCNC: 10 G/DL — LOW (ref 13–17)
MCHC RBC-ENTMCNC: 32.2 PG — SIGNIFICANT CHANGE UP (ref 27–34)
MCHC RBC-ENTMCNC: 32.8 GM/DL — SIGNIFICANT CHANGE UP (ref 32–36)
MCV RBC AUTO: 98.1 FL — SIGNIFICANT CHANGE UP (ref 80–100)
PLATELET # BLD AUTO: 159 K/UL — SIGNIFICANT CHANGE UP (ref 150–400)
POTASSIUM SERPL-MCNC: 4.6 MMOL/L — SIGNIFICANT CHANGE UP (ref 3.5–5.3)
POTASSIUM SERPL-SCNC: 4.6 MMOL/L — SIGNIFICANT CHANGE UP (ref 3.5–5.3)
RBC # BLD: 3.11 M/UL — LOW (ref 4.2–5.8)
RBC # FLD: 16 % — HIGH (ref 10.3–14.5)
SODIUM SERPL-SCNC: 137 MMOL/L — SIGNIFICANT CHANGE UP (ref 135–145)
WBC # BLD: 4.04 K/UL — SIGNIFICANT CHANGE UP (ref 3.8–10.5)
WBC # FLD AUTO: 4.04 K/UL — SIGNIFICANT CHANGE UP (ref 3.8–10.5)

## 2017-04-16 RX ADMIN — HEPARIN SODIUM 5000 UNIT(S): 5000 INJECTION INTRAVENOUS; SUBCUTANEOUS at 05:38

## 2017-04-16 RX ADMIN — Medication 500 MILLIGRAM(S): at 05:37

## 2017-04-16 RX ADMIN — Medication 50 MILLIGRAM(S): at 17:58

## 2017-04-16 RX ADMIN — HEPARIN SODIUM 5000 UNIT(S): 5000 INJECTION INTRAVENOUS; SUBCUTANEOUS at 17:58

## 2017-04-16 RX ADMIN — SODIUM CHLORIDE 50 MILLILITER(S): 9 INJECTION INTRAMUSCULAR; INTRAVENOUS; SUBCUTANEOUS at 01:49

## 2017-04-16 RX ADMIN — Medication 500 MILLIGRAM(S): at 17:58

## 2017-04-16 RX ADMIN — SODIUM CHLORIDE 50 MILLILITER(S): 9 INJECTION INTRAMUSCULAR; INTRAVENOUS; SUBCUTANEOUS at 21:36

## 2017-04-16 RX ADMIN — SODIUM CHLORIDE 50 MILLILITER(S): 9 INJECTION INTRAMUSCULAR; INTRAVENOUS; SUBCUTANEOUS at 17:58

## 2017-04-16 RX ADMIN — IMATINIB MESYLATE 400 MILLIGRAM(S): 400 TABLET, FILM COATED ORAL at 13:07

## 2017-04-16 RX ADMIN — CLOPIDOGREL BISULFATE 75 MILLIGRAM(S): 75 TABLET, FILM COATED ORAL at 13:07

## 2017-04-16 RX ADMIN — Medication 1 TABLET(S): at 17:58

## 2017-04-16 RX ADMIN — Medication 81 MILLIGRAM(S): at 13:07

## 2017-04-16 RX ADMIN — Medication 50 MILLIGRAM(S): at 05:37

## 2017-04-16 RX ADMIN — QUETIAPINE FUMARATE 25 MILLIGRAM(S): 200 TABLET, FILM COATED ORAL at 21:36

## 2017-04-16 RX ADMIN — ATORVASTATIN CALCIUM 80 MILLIGRAM(S): 80 TABLET, FILM COATED ORAL at 21:36

## 2017-04-16 RX ADMIN — Medication 1 TABLET(S): at 05:37

## 2017-04-16 RX ADMIN — PANTOPRAZOLE SODIUM 40 MILLIGRAM(S): 20 TABLET, DELAYED RELEASE ORAL at 13:07

## 2017-04-16 RX ADMIN — GABAPENTIN 300 MILLIGRAM(S): 400 CAPSULE ORAL at 13:07

## 2017-04-16 RX ADMIN — MIRTAZAPINE 15 MILLIGRAM(S): 45 TABLET, ORALLY DISINTEGRATING ORAL at 13:07

## 2017-04-16 RX ADMIN — SENNA PLUS 5 MILLILITER(S): 8.6 TABLET ORAL at 21:36

## 2017-04-16 RX ADMIN — GABAPENTIN 300 MILLIGRAM(S): 400 CAPSULE ORAL at 05:37

## 2017-04-16 RX ADMIN — AMLODIPINE BESYLATE 5 MILLIGRAM(S): 2.5 TABLET ORAL at 05:37

## 2017-04-16 RX ADMIN — SODIUM CHLORIDE 50 MILLILITER(S): 9 INJECTION INTRAMUSCULAR; INTRAVENOUS; SUBCUTANEOUS at 05:37

## 2017-04-16 RX ADMIN — LISINOPRIL 2.5 MILLIGRAM(S): 2.5 TABLET ORAL at 05:37

## 2017-04-16 RX ADMIN — GABAPENTIN 300 MILLIGRAM(S): 400 CAPSULE ORAL at 21:36

## 2017-04-17 VITALS
HEART RATE: 83 BPM | DIASTOLIC BLOOD PRESSURE: 83 MMHG | RESPIRATION RATE: 18 BRPM | OXYGEN SATURATION: 94 % | TEMPERATURE: 97 F | SYSTOLIC BLOOD PRESSURE: 138 MMHG

## 2017-04-17 LAB
ANION GAP SERPL CALC-SCNC: 12 MMOL/L — SIGNIFICANT CHANGE UP (ref 5–17)
BUN SERPL-MCNC: 16 MG/DL — SIGNIFICANT CHANGE UP (ref 7–23)
CALCIUM SERPL-MCNC: 9.1 MG/DL — SIGNIFICANT CHANGE UP (ref 8.4–10.5)
CHLORIDE SERPL-SCNC: 105 MMOL/L — SIGNIFICANT CHANGE UP (ref 96–108)
CO2 SERPL-SCNC: 19 MMOL/L — LOW (ref 22–31)
CREAT SERPL-MCNC: 1.41 MG/DL — HIGH (ref 0.5–1.3)
GLUCOSE SERPL-MCNC: 118 MG/DL — HIGH (ref 70–99)
POTASSIUM SERPL-MCNC: 4.6 MMOL/L — SIGNIFICANT CHANGE UP (ref 3.5–5.3)
POTASSIUM SERPL-SCNC: 4.6 MMOL/L — SIGNIFICANT CHANGE UP (ref 3.5–5.3)
SODIUM SERPL-SCNC: 136 MMOL/L — SIGNIFICANT CHANGE UP (ref 135–145)
SURGICAL PATHOLOGY STUDY: SIGNIFICANT CHANGE UP

## 2017-04-17 PROCEDURE — 80048 BASIC METABOLIC PNL TOTAL CA: CPT

## 2017-04-17 PROCEDURE — 51702 INSERT TEMP BLADDER CATH: CPT

## 2017-04-17 PROCEDURE — 80053 COMPREHEN METABOLIC PANEL: CPT

## 2017-04-17 PROCEDURE — 84295 ASSAY OF SERUM SODIUM: CPT

## 2017-04-17 PROCEDURE — 81001 URINALYSIS AUTO W/SCOPE: CPT

## 2017-04-17 PROCEDURE — 84132 ASSAY OF SERUM POTASSIUM: CPT

## 2017-04-17 PROCEDURE — 49465 FLUORO EXAM OF G/COLON TUBE: CPT

## 2017-04-17 PROCEDURE — 83605 ASSAY OF LACTIC ACID: CPT

## 2017-04-17 PROCEDURE — 99285 EMERGENCY DEPT VISIT HI MDM: CPT | Mod: 25

## 2017-04-17 PROCEDURE — 96376 TX/PRO/DX INJ SAME DRUG ADON: CPT | Mod: XU

## 2017-04-17 PROCEDURE — 55700: CPT

## 2017-04-17 PROCEDURE — 88305 TISSUE EXAM BY PATHOLOGIST: CPT

## 2017-04-17 PROCEDURE — 82330 ASSAY OF CALCIUM: CPT

## 2017-04-17 PROCEDURE — 83690 ASSAY OF LIPASE: CPT

## 2017-04-17 PROCEDURE — 82803 BLOOD GASES ANY COMBINATION: CPT

## 2017-04-17 PROCEDURE — 93005 ELECTROCARDIOGRAM TRACING: CPT

## 2017-04-17 PROCEDURE — 70551 MRI BRAIN STEM W/O DYE: CPT

## 2017-04-17 PROCEDURE — 85014 HEMATOCRIT: CPT

## 2017-04-17 PROCEDURE — 96374 THER/PROPH/DIAG INJ IV PUSH: CPT | Mod: XU

## 2017-04-17 PROCEDURE — 85610 PROTHROMBIN TIME: CPT

## 2017-04-17 PROCEDURE — 74176 CT ABD & PELVIS W/O CONTRAST: CPT

## 2017-04-17 PROCEDURE — 76942 ECHO GUIDE FOR BIOPSY: CPT

## 2017-04-17 PROCEDURE — 82435 ASSAY OF BLOOD CHLORIDE: CPT

## 2017-04-17 PROCEDURE — 82947 ASSAY GLUCOSE BLOOD QUANT: CPT

## 2017-04-17 PROCEDURE — G0103: CPT

## 2017-04-17 PROCEDURE — 85730 THROMBOPLASTIN TIME PARTIAL: CPT

## 2017-04-17 PROCEDURE — 85027 COMPLETE CBC AUTOMATED: CPT

## 2017-04-17 RX ORDER — CEFUROXIME AXETIL 250 MG
1 TABLET ORAL
Qty: 6 | Refills: 0 | OUTPATIENT
Start: 2017-04-17 | End: 2017-04-20

## 2017-04-17 RX ORDER — AMLODIPINE BESYLATE 2.5 MG/1
1 TABLET ORAL
Qty: 30 | Refills: 0 | OUTPATIENT
Start: 2017-04-17 | End: 2017-05-17

## 2017-04-17 RX ORDER — PANTOPRAZOLE SODIUM 20 MG/1
1 TABLET, DELAYED RELEASE ORAL
Qty: 30 | Refills: 0 | OUTPATIENT
Start: 2017-04-17 | End: 2017-05-17

## 2017-04-17 RX ORDER — METOPROLOL TARTRATE 50 MG
1 TABLET ORAL
Qty: 30 | Refills: 0 | COMMUNITY

## 2017-04-17 RX ORDER — ACETAMINOPHEN 500 MG
2 TABLET ORAL
Qty: 0 | Refills: 0 | COMMUNITY
Start: 2017-04-17

## 2017-04-17 RX ORDER — OXYCODONE HYDROCHLORIDE 5 MG/1
2 TABLET ORAL
Qty: 40 | Refills: 0 | OUTPATIENT
Start: 2017-04-17 | End: 2017-04-22

## 2017-04-17 RX ORDER — POLYETHYLENE GLYCOL 3350 17 G/17G
17 POWDER, FOR SOLUTION ORAL
Qty: 0 | Refills: 0 | COMMUNITY
Start: 2017-04-17

## 2017-04-17 RX ORDER — METOPROLOL TARTRATE 50 MG
1 TABLET ORAL
Qty: 60 | Refills: 0 | OUTPATIENT
Start: 2017-04-17 | End: 2017-05-17

## 2017-04-17 RX ORDER — ATORVASTATIN CALCIUM 80 MG/1
1 TABLET, FILM COATED ORAL
Qty: 30 | Refills: 0 | OUTPATIENT
Start: 2017-04-17 | End: 2017-05-17

## 2017-04-17 RX ADMIN — GABAPENTIN 300 MILLIGRAM(S): 400 CAPSULE ORAL at 05:24

## 2017-04-17 RX ADMIN — LISINOPRIL 2.5 MILLIGRAM(S): 2.5 TABLET ORAL at 05:24

## 2017-04-17 RX ADMIN — Medication 500 MILLIGRAM(S): at 05:24

## 2017-04-17 RX ADMIN — Medication 50 MILLIGRAM(S): at 05:24

## 2017-04-17 RX ADMIN — HEPARIN SODIUM 5000 UNIT(S): 5000 INJECTION INTRAVENOUS; SUBCUTANEOUS at 05:24

## 2017-04-17 RX ADMIN — HALOPERIDOL DECANOATE 0.5 MILLIGRAM(S): 100 INJECTION INTRAMUSCULAR at 11:17

## 2017-04-17 RX ADMIN — AMLODIPINE BESYLATE 5 MILLIGRAM(S): 2.5 TABLET ORAL at 05:25

## 2017-04-17 RX ADMIN — Medication 1 TABLET(S): at 05:24

## 2017-04-28 ENCOUNTER — INPATIENT (INPATIENT)
Facility: HOSPITAL | Age: 72
LOS: 6 days | Discharge: HOME | DRG: 543 | End: 2017-05-05
Attending: INTERNAL MEDICINE | Admitting: INTERNAL MEDICINE
Payer: MEDICARE

## 2017-04-28 VITALS
HEART RATE: 72 BPM | RESPIRATION RATE: 16 BRPM | DIASTOLIC BLOOD PRESSURE: 70 MMHG | OXYGEN SATURATION: 98 % | SYSTOLIC BLOOD PRESSURE: 108 MMHG

## 2017-04-28 DIAGNOSIS — K25.2 ACUTE GASTRIC ULCER WITH BOTH HEMORRHAGE AND PERFORATION: Chronic | ICD-10-CM

## 2017-04-28 DIAGNOSIS — N39.0 URINARY TRACT INFECTION, SITE NOT SPECIFIED: ICD-10-CM

## 2017-04-28 DIAGNOSIS — D49.0 NEOPLASM OF UNSPECIFIED BEHAVIOR OF DIGESTIVE SYSTEM: Chronic | ICD-10-CM

## 2017-04-28 DIAGNOSIS — C61 MALIGNANT NEOPLASM OF PROSTATE: ICD-10-CM

## 2017-04-28 DIAGNOSIS — I63.9 CEREBRAL INFARCTION, UNSPECIFIED: ICD-10-CM

## 2017-04-28 DIAGNOSIS — I10 ESSENTIAL (PRIMARY) HYPERTENSION: ICD-10-CM

## 2017-04-28 DIAGNOSIS — R10.9 UNSPECIFIED ABDOMINAL PAIN: ICD-10-CM

## 2017-04-28 DIAGNOSIS — D49.0 NEOPLASM OF UNSPECIFIED BEHAVIOR OF DIGESTIVE SYSTEM: ICD-10-CM

## 2017-04-28 LAB
ALBUMIN SERPL ELPH-MCNC: 3.5 G/DL — SIGNIFICANT CHANGE UP (ref 3.3–5)
ALP SERPL-CCNC: 92 U/L — SIGNIFICANT CHANGE UP (ref 40–120)
ALT FLD-CCNC: 18 U/L RC — SIGNIFICANT CHANGE UP (ref 10–45)
ANION GAP SERPL CALC-SCNC: 12 MMOL/L — SIGNIFICANT CHANGE UP (ref 5–17)
APPEARANCE UR: CLEAR — SIGNIFICANT CHANGE UP
AST SERPL-CCNC: 21 U/L — SIGNIFICANT CHANGE UP (ref 10–40)
BACTERIA # UR AUTO: ABNORMAL /HPF
BASE EXCESS BLDV CALC-SCNC: 1.3 MMOL/L — SIGNIFICANT CHANGE UP (ref -2–2)
BASOPHILS # BLD AUTO: 0 K/UL — SIGNIFICANT CHANGE UP (ref 0–0.2)
BASOPHILS NFR BLD AUTO: 0.4 % — SIGNIFICANT CHANGE UP (ref 0–2)
BILIRUB SERPL-MCNC: 1 MG/DL — SIGNIFICANT CHANGE UP (ref 0.2–1.2)
BILIRUB UR-MCNC: NEGATIVE — SIGNIFICANT CHANGE UP
BUN SERPL-MCNC: 20 MG/DL — SIGNIFICANT CHANGE UP (ref 7–23)
CA-I SERPL-SCNC: 1.21 MMOL/L — SIGNIFICANT CHANGE UP (ref 1.12–1.3)
CALCIUM SERPL-MCNC: 8.7 MG/DL — SIGNIFICANT CHANGE UP (ref 8.4–10.5)
CHLORIDE BLDV-SCNC: 104 MMOL/L — SIGNIFICANT CHANGE UP (ref 96–108)
CHLORIDE SERPL-SCNC: 102 MMOL/L — SIGNIFICANT CHANGE UP (ref 96–108)
CO2 BLDV-SCNC: 27 MMOL/L — SIGNIFICANT CHANGE UP (ref 22–30)
CO2 SERPL-SCNC: 25 MMOL/L — SIGNIFICANT CHANGE UP (ref 22–31)
COLOR SPEC: SIGNIFICANT CHANGE UP
CREAT SERPL-MCNC: 1.72 MG/DL — HIGH (ref 0.5–1.3)
DIFF PNL FLD: NEGATIVE — SIGNIFICANT CHANGE UP
EOSINOPHIL # BLD AUTO: 0.1 K/UL — SIGNIFICANT CHANGE UP (ref 0–0.5)
EOSINOPHIL NFR BLD AUTO: 1.9 % — SIGNIFICANT CHANGE UP (ref 0–6)
EPI CELLS # UR: ABNORMAL /HPF
GAS PNL BLDV: 135 MMOL/L — LOW (ref 136–145)
GAS PNL BLDV: SIGNIFICANT CHANGE UP
GLUCOSE BLDV-MCNC: 140 MG/DL — HIGH (ref 70–99)
GLUCOSE SERPL-MCNC: 145 MG/DL — HIGH (ref 70–99)
GLUCOSE UR QL: NEGATIVE — SIGNIFICANT CHANGE UP
HCO3 BLDV-SCNC: 26 MMOL/L — SIGNIFICANT CHANGE UP (ref 21–29)
HCT VFR BLD CALC: 31.5 % — LOW (ref 39–50)
HCT VFR BLDA CALC: 31 % — LOW (ref 39–50)
HGB BLD CALC-MCNC: 10.2 G/DL — LOW (ref 13–17)
HGB BLD-MCNC: 10.7 G/DL — LOW (ref 13–17)
KETONES UR-MCNC: NEGATIVE — SIGNIFICANT CHANGE UP
LACTATE BLDV-MCNC: 1.2 MMOL/L — SIGNIFICANT CHANGE UP (ref 0.7–2)
LEUKOCYTE ESTERASE UR-ACNC: ABNORMAL
LIDOCAIN IGE QN: 126 U/L — HIGH (ref 7–60)
LYMPHOCYTES # BLD AUTO: 1.1 K/UL — SIGNIFICANT CHANGE UP (ref 1–3.3)
LYMPHOCYTES # BLD AUTO: 18.8 % — SIGNIFICANT CHANGE UP (ref 13–44)
MCHC RBC-ENTMCNC: 33.9 GM/DL — SIGNIFICANT CHANGE UP (ref 32–36)
MCHC RBC-ENTMCNC: 34.5 PG — HIGH (ref 27–34)
MCV RBC AUTO: 102 FL — HIGH (ref 80–100)
MONOCYTES # BLD AUTO: 0.5 K/UL — SIGNIFICANT CHANGE UP (ref 0–0.9)
MONOCYTES NFR BLD AUTO: 9.1 % — SIGNIFICANT CHANGE UP (ref 2–14)
NEUTROPHILS # BLD AUTO: 4.2 K/UL — SIGNIFICANT CHANGE UP (ref 1.8–7.4)
NEUTROPHILS NFR BLD AUTO: 69.9 % — SIGNIFICANT CHANGE UP (ref 43–77)
NITRITE UR-MCNC: POSITIVE
PCO2 BLDV: 45 MMHG — SIGNIFICANT CHANGE UP (ref 35–50)
PH BLDV: 7.38 — SIGNIFICANT CHANGE UP (ref 7.35–7.45)
PH UR: 7 — SIGNIFICANT CHANGE UP (ref 5–8)
PLATELET # BLD AUTO: 143 K/UL — LOW (ref 150–400)
PO2 BLDV: 28 MMHG — SIGNIFICANT CHANGE UP (ref 25–45)
POTASSIUM BLDV-SCNC: 4 MMOL/L — SIGNIFICANT CHANGE UP (ref 3.5–5)
POTASSIUM SERPL-MCNC: 4.2 MMOL/L — SIGNIFICANT CHANGE UP (ref 3.5–5.3)
POTASSIUM SERPL-SCNC: 4.2 MMOL/L — SIGNIFICANT CHANGE UP (ref 3.5–5.3)
PROT SERPL-MCNC: 6.6 G/DL — SIGNIFICANT CHANGE UP (ref 6–8.3)
PROT UR-MCNC: SIGNIFICANT CHANGE UP
RBC # BLD: 3.09 M/UL — LOW (ref 4.2–5.8)
RBC # FLD: 14.1 % — SIGNIFICANT CHANGE UP (ref 10.3–14.5)
RBC CASTS # UR COMP ASSIST: SIGNIFICANT CHANGE UP /HPF (ref 0–2)
SAO2 % BLDV: 42 % — LOW (ref 67–88)
SODIUM SERPL-SCNC: 139 MMOL/L — SIGNIFICANT CHANGE UP (ref 135–145)
SP GR SPEC: 1.01 — LOW (ref 1.01–1.02)
UROBILINOGEN FLD QL: NEGATIVE — SIGNIFICANT CHANGE UP
WBC # BLD: 6 K/UL — SIGNIFICANT CHANGE UP (ref 3.8–10.5)
WBC # FLD AUTO: 6 K/UL — SIGNIFICANT CHANGE UP (ref 3.8–10.5)
WBC UR QL: >50 /HPF (ref 0–5)

## 2017-04-28 PROCEDURE — 99285 EMERGENCY DEPT VISIT HI MDM: CPT | Mod: 25

## 2017-04-28 PROCEDURE — 74176 CT ABD & PELVIS W/O CONTRAST: CPT | Mod: 26

## 2017-04-28 PROCEDURE — 93010 ELECTROCARDIOGRAM REPORT: CPT

## 2017-04-28 PROCEDURE — 71010: CPT | Mod: 26

## 2017-04-28 PROCEDURE — 99222 1ST HOSP IP/OBS MODERATE 55: CPT | Mod: GC

## 2017-04-28 RX ORDER — LISINOPRIL 2.5 MG/1
2.5 TABLET ORAL DAILY
Qty: 0 | Refills: 0 | Status: DISCONTINUED | OUTPATIENT
Start: 2017-04-28 | End: 2017-04-29

## 2017-04-28 RX ORDER — GABAPENTIN 400 MG/1
300 CAPSULE ORAL THREE TIMES A DAY
Qty: 0 | Refills: 0 | Status: DISCONTINUED | OUTPATIENT
Start: 2017-04-28 | End: 2017-04-29

## 2017-04-28 RX ORDER — ASPIRIN/CALCIUM CARB/MAGNESIUM 324 MG
81 TABLET ORAL DAILY
Qty: 0 | Refills: 0 | Status: DISCONTINUED | OUTPATIENT
Start: 2017-04-28 | End: 2017-05-05

## 2017-04-28 RX ORDER — POLYETHYLENE GLYCOL 3350 17 G/17G
17 POWDER, FOR SOLUTION ORAL DAILY
Qty: 0 | Refills: 0 | Status: DISCONTINUED | OUTPATIENT
Start: 2017-04-28 | End: 2017-05-05

## 2017-04-28 RX ORDER — METOPROLOL TARTRATE 50 MG
100 TABLET ORAL
Qty: 0 | Refills: 0 | Status: DISCONTINUED | OUTPATIENT
Start: 2017-04-28 | End: 2017-04-29

## 2017-04-28 RX ORDER — MORPHINE SULFATE 50 MG/1
4 CAPSULE, EXTENDED RELEASE ORAL EVERY 6 HOURS
Qty: 0 | Refills: 0 | Status: DISCONTINUED | OUTPATIENT
Start: 2017-04-28 | End: 2017-05-05

## 2017-04-28 RX ORDER — CEFTRIAXONE 500 MG/1
1 INJECTION, POWDER, FOR SOLUTION INTRAMUSCULAR; INTRAVENOUS EVERY 24 HOURS
Qty: 0 | Refills: 0 | Status: DISCONTINUED | OUTPATIENT
Start: 2017-04-29 | End: 2017-05-05

## 2017-04-28 RX ORDER — CEFTRIAXONE 500 MG/1
INJECTION, POWDER, FOR SOLUTION INTRAMUSCULAR; INTRAVENOUS
Qty: 0 | Refills: 0 | Status: DISCONTINUED | OUTPATIENT
Start: 2017-04-28 | End: 2017-05-05

## 2017-04-28 RX ORDER — QUETIAPINE FUMARATE 200 MG/1
25 TABLET, FILM COATED ORAL AT BEDTIME
Qty: 0 | Refills: 0 | Status: DISCONTINUED | OUTPATIENT
Start: 2017-04-28 | End: 2017-04-29

## 2017-04-28 RX ORDER — ONDANSETRON 8 MG/1
4 TABLET, FILM COATED ORAL ONCE
Qty: 0 | Refills: 0 | Status: COMPLETED | OUTPATIENT
Start: 2017-04-28 | End: 2017-04-28

## 2017-04-28 RX ORDER — CEFTRIAXONE 500 MG/1
1 INJECTION, POWDER, FOR SOLUTION INTRAMUSCULAR; INTRAVENOUS ONCE
Qty: 0 | Refills: 0 | Status: COMPLETED | OUTPATIENT
Start: 2017-04-28 | End: 2017-04-28

## 2017-04-28 RX ORDER — MORPHINE SULFATE 50 MG/1
4 CAPSULE, EXTENDED RELEASE ORAL ONCE
Qty: 0 | Refills: 0 | Status: DISCONTINUED | OUTPATIENT
Start: 2017-04-28 | End: 2017-04-28

## 2017-04-28 RX ORDER — PANTOPRAZOLE SODIUM 20 MG/1
40 TABLET, DELAYED RELEASE ORAL DAILY
Qty: 0 | Refills: 0 | Status: DISCONTINUED | OUTPATIENT
Start: 2017-04-28 | End: 2017-05-02

## 2017-04-28 RX ORDER — SODIUM CHLORIDE 9 MG/ML
1000 INJECTION, SOLUTION INTRAVENOUS
Qty: 0 | Refills: 0 | Status: DISCONTINUED | OUTPATIENT
Start: 2017-04-28 | End: 2017-05-05

## 2017-04-28 RX ORDER — SODIUM CHLORIDE 9 MG/ML
3 INJECTION INTRAMUSCULAR; INTRAVENOUS; SUBCUTANEOUS ONCE
Qty: 0 | Refills: 0 | Status: COMPLETED | OUTPATIENT
Start: 2017-04-28 | End: 2017-04-28

## 2017-04-28 RX ORDER — CLOPIDOGREL BISULFATE 75 MG/1
75 TABLET, FILM COATED ORAL DAILY
Qty: 0 | Refills: 0 | Status: DISCONTINUED | OUTPATIENT
Start: 2017-04-28 | End: 2017-05-05

## 2017-04-28 RX ORDER — HEPARIN SODIUM 5000 [USP'U]/ML
5000 INJECTION INTRAVENOUS; SUBCUTANEOUS EVERY 12 HOURS
Qty: 0 | Refills: 0 | Status: DISCONTINUED | OUTPATIENT
Start: 2017-04-28 | End: 2017-05-05

## 2017-04-28 RX ORDER — ACETAMINOPHEN 500 MG
650 TABLET ORAL EVERY 6 HOURS
Qty: 0 | Refills: 0 | Status: DISCONTINUED | OUTPATIENT
Start: 2017-04-28 | End: 2017-05-05

## 2017-04-28 RX ORDER — AMLODIPINE BESYLATE 2.5 MG/1
5 TABLET ORAL DAILY
Qty: 0 | Refills: 0 | Status: DISCONTINUED | OUTPATIENT
Start: 2017-04-28 | End: 2017-04-29

## 2017-04-28 RX ORDER — SODIUM CHLORIDE 9 MG/ML
1000 INJECTION INTRAMUSCULAR; INTRAVENOUS; SUBCUTANEOUS ONCE
Qty: 0 | Refills: 0 | Status: COMPLETED | OUTPATIENT
Start: 2017-04-28 | End: 2017-04-28

## 2017-04-28 RX ORDER — MIRTAZAPINE 45 MG/1
15 TABLET, ORALLY DISINTEGRATING ORAL AT BEDTIME
Qty: 0 | Refills: 0 | Status: DISCONTINUED | OUTPATIENT
Start: 2017-04-28 | End: 2017-04-29

## 2017-04-28 RX ORDER — ATORVASTATIN CALCIUM 80 MG/1
80 TABLET, FILM COATED ORAL AT BEDTIME
Qty: 0 | Refills: 0 | Status: DISCONTINUED | OUTPATIENT
Start: 2017-04-28 | End: 2017-05-05

## 2017-04-28 RX ADMIN — MORPHINE SULFATE 4 MILLIGRAM(S): 50 CAPSULE, EXTENDED RELEASE ORAL at 18:52

## 2017-04-28 RX ADMIN — GABAPENTIN 300 MILLIGRAM(S): 400 CAPSULE ORAL at 21:40

## 2017-04-28 RX ADMIN — MORPHINE SULFATE 4 MILLIGRAM(S): 50 CAPSULE, EXTENDED RELEASE ORAL at 14:57

## 2017-04-28 RX ADMIN — SODIUM CHLORIDE 1000 MILLILITER(S): 9 INJECTION INTRAMUSCULAR; INTRAVENOUS; SUBCUTANEOUS at 10:43

## 2017-04-28 RX ADMIN — SODIUM CHLORIDE 75 MILLILITER(S): 9 INJECTION, SOLUTION INTRAVENOUS at 18:57

## 2017-04-28 RX ADMIN — QUETIAPINE FUMARATE 25 MILLIGRAM(S): 200 TABLET, FILM COATED ORAL at 21:39

## 2017-04-28 RX ADMIN — ONDANSETRON 4 MILLIGRAM(S): 8 TABLET, FILM COATED ORAL at 10:43

## 2017-04-28 RX ADMIN — MIRTAZAPINE 15 MILLIGRAM(S): 45 TABLET, ORALLY DISINTEGRATING ORAL at 21:41

## 2017-04-28 RX ADMIN — SODIUM CHLORIDE 3 MILLILITER(S): 9 INJECTION INTRAMUSCULAR; INTRAVENOUS; SUBCUTANEOUS at 10:20

## 2017-04-28 RX ADMIN — CEFTRIAXONE 100 GRAM(S): 500 INJECTION, POWDER, FOR SOLUTION INTRAMUSCULAR; INTRAVENOUS at 18:55

## 2017-04-28 RX ADMIN — SODIUM CHLORIDE 75 MILLILITER(S): 9 INJECTION, SOLUTION INTRAVENOUS at 19:54

## 2017-04-28 RX ADMIN — MORPHINE SULFATE 4 MILLIGRAM(S): 50 CAPSULE, EXTENDED RELEASE ORAL at 19:28

## 2017-04-28 RX ADMIN — ATORVASTATIN CALCIUM 80 MILLIGRAM(S): 80 TABLET, FILM COATED ORAL at 21:39

## 2017-04-28 RX ADMIN — MORPHINE SULFATE 4 MILLIGRAM(S): 50 CAPSULE, EXTENDED RELEASE ORAL at 16:00

## 2017-04-28 NOTE — ED PROVIDER NOTE - NS ED MD SCRIBE ATTENDING SCRIBE SECTIONS
DISPOSITION/HISTORY OF PRESENT ILLNESS/HIV/RESULTS/PAST MEDICAL/SURGICAL/SOCIAL HISTORY/PHYSICAL EXAM/REVIEW OF SYSTEMS

## 2017-04-28 NOTE — ED PROVIDER NOTE - ABDOMINAL EXAM
peg in place, no tenderness or guarding rebounding, positive bowel sounds, and poor skin tugor peg in place, no tenderness or guarding rebounding, positive bowel sounds

## 2017-04-28 NOTE — ED PROVIDER NOTE - MEDICAL DECISION MAKING DETAILS
72 year old male with no know gastric peg tube presents with nausea vomiting after chemo. Clinically dehydrated.  PLAN: treat with labs, rehydrate, and possible admissions for same. 72 year old male with know gastric peg tube presents with nausea and vomiting after chemo. Clinically dehydrated.  PLAN: treat with labs, rehydrate, and possible admissions for same.

## 2017-04-28 NOTE — ED PROVIDER NOTE - OBJECTIVE STATEMENT
72 year old male with PMHx of PEG tube, Hypertension, prostate and stomach cancer, hyperlipidemia, and stroke presents to ED c/o two episodes of vomiting this morning after receiving oral chemo. His son states that pt was feeling dizzy before vomiting. Recent hospitalization in the past 10 days. Hx obtained  from family, pt is non-verbal.     PCP- Dr. Medina

## 2017-04-28 NOTE — ED PROVIDER NOTE - PROGRESS NOTE DETAILS
Dr. Medina was paged Dr. Medina was paged and contacted and requested an abdominal CT to r/o obstruction. Wet read from rads no bowel obstruction, recurrence of ca. Discussed with Dr Carol spence. Ryan gi paged.

## 2017-04-28 NOTE — ED ADULT NURSE NOTE - OBJECTIVE STATEMENT
1015 72 yr old HM brought to ER via ambulance on stretcher for further eval and tx of weakness and vomiting x 1 this AM. Hx of "stomach cancer" Has PEG tube. Chemo given via PEG by family this AM. Awake. Difficulty speaking since stroke 1 yr ago per family and residual right arm weakness. Skin W&D. Lungs clear. PEG tube noted at center of abd. No fever or chills. Stage 1 decub at buttocks. abd soft Non TTP. +BS. Pt speaks Thai. Hx through family. Denies pain at present

## 2017-04-28 NOTE — H&P ADULT. - HISTORY OF PRESENT ILLNESS
72 year old male with PMHx of PEG tube, Hypertension, prostate and stomach cancer, hyperlipidemia, and stroke presents to ED c/o two episodes of vomiting this morning after receiving oral chemo. His son states that pt was feeling dizzy before vomiting. Recent hospitalization in the past 10 days. Hx obtained  from family, pt is non-verbal.

## 2017-04-28 NOTE — ED ADULT NURSE REASSESSMENT NOTE - NS ED NURSE REASSESS COMMENT FT1
1120 1st half of contrast given via PEG tube for CT. Pt c/o severe pain 15 minutes later. Dr Carver notified. 2nd half not given. 1230 To CT via srtretcher

## 2017-04-28 NOTE — ED ADULT NURSE NOTE - ED STAT RN HANDOFF DETAILS
handoff given to oncoming RN's Thaddeus Canales and Sabina Tomlin. Dr Sunil shepard pt handoff given to oncoming RN's Thaddeus Canales and  Sophy Tomlin. Dr Sunil shepard pt

## 2017-04-29 RX ORDER — AMLODIPINE BESYLATE 2.5 MG/1
5 TABLET ORAL DAILY
Qty: 0 | Refills: 0 | Status: DISCONTINUED | OUTPATIENT
Start: 2017-04-29 | End: 2017-05-05

## 2017-04-29 RX ORDER — MIRTAZAPINE 45 MG/1
15 TABLET, ORALLY DISINTEGRATING ORAL AT BEDTIME
Qty: 0 | Refills: 0 | Status: DISCONTINUED | OUTPATIENT
Start: 2017-04-29 | End: 2017-05-05

## 2017-04-29 RX ORDER — GABAPENTIN 400 MG/1
300 CAPSULE ORAL THREE TIMES A DAY
Qty: 0 | Refills: 0 | Status: DISCONTINUED | OUTPATIENT
Start: 2017-04-29 | End: 2017-04-29

## 2017-04-29 RX ORDER — GABAPENTIN 400 MG/1
300 CAPSULE ORAL THREE TIMES A DAY
Qty: 0 | Refills: 0 | Status: DISCONTINUED | OUTPATIENT
Start: 2017-04-29 | End: 2017-05-05

## 2017-04-29 RX ORDER — LISINOPRIL 2.5 MG/1
2.5 TABLET ORAL DAILY
Qty: 0 | Refills: 0 | Status: DISCONTINUED | OUTPATIENT
Start: 2017-04-29 | End: 2017-05-05

## 2017-04-29 RX ORDER — METOPROLOL TARTRATE 50 MG
100 TABLET ORAL
Qty: 0 | Refills: 0 | Status: DISCONTINUED | OUTPATIENT
Start: 2017-04-29 | End: 2017-05-05

## 2017-04-29 RX ORDER — QUETIAPINE FUMARATE 200 MG/1
25 TABLET, FILM COATED ORAL AT BEDTIME
Qty: 0 | Refills: 0 | Status: DISCONTINUED | OUTPATIENT
Start: 2017-04-29 | End: 2017-05-05

## 2017-04-29 RX ADMIN — QUETIAPINE FUMARATE 25 MILLIGRAM(S): 200 TABLET, FILM COATED ORAL at 22:25

## 2017-04-29 RX ADMIN — GABAPENTIN 300 MILLIGRAM(S): 400 CAPSULE ORAL at 06:22

## 2017-04-29 RX ADMIN — SODIUM CHLORIDE 75 MILLILITER(S): 9 INJECTION, SOLUTION INTRAVENOUS at 22:26

## 2017-04-29 RX ADMIN — Medication 100 MILLIGRAM(S): at 06:17

## 2017-04-29 RX ADMIN — LISINOPRIL 2.5 MILLIGRAM(S): 2.5 TABLET ORAL at 06:17

## 2017-04-29 RX ADMIN — MIRTAZAPINE 15 MILLIGRAM(S): 45 TABLET, ORALLY DISINTEGRATING ORAL at 22:25

## 2017-04-29 RX ADMIN — PANTOPRAZOLE SODIUM 40 MILLIGRAM(S): 20 TABLET, DELAYED RELEASE ORAL at 12:37

## 2017-04-29 RX ADMIN — GABAPENTIN 300 MILLIGRAM(S): 400 CAPSULE ORAL at 22:25

## 2017-04-29 RX ADMIN — MORPHINE SULFATE 4 MILLIGRAM(S): 50 CAPSULE, EXTENDED RELEASE ORAL at 06:50

## 2017-04-29 RX ADMIN — AMLODIPINE BESYLATE 5 MILLIGRAM(S): 2.5 TABLET ORAL at 06:17

## 2017-04-29 RX ADMIN — CLOPIDOGREL BISULFATE 75 MILLIGRAM(S): 75 TABLET, FILM COATED ORAL at 12:37

## 2017-04-29 RX ADMIN — MORPHINE SULFATE 4 MILLIGRAM(S): 50 CAPSULE, EXTENDED RELEASE ORAL at 06:34

## 2017-04-29 RX ADMIN — HEPARIN SODIUM 5000 UNIT(S): 5000 INJECTION INTRAVENOUS; SUBCUTANEOUS at 17:41

## 2017-04-29 RX ADMIN — HEPARIN SODIUM 5000 UNIT(S): 5000 INJECTION INTRAVENOUS; SUBCUTANEOUS at 06:18

## 2017-04-29 RX ADMIN — SODIUM CHLORIDE 75 MILLILITER(S): 9 INJECTION, SOLUTION INTRAVENOUS at 17:42

## 2017-04-29 RX ADMIN — Medication 81 MILLIGRAM(S): at 12:37

## 2017-04-29 RX ADMIN — ATORVASTATIN CALCIUM 80 MILLIGRAM(S): 80 TABLET, FILM COATED ORAL at 22:25

## 2017-04-29 RX ADMIN — Medication 100 MILLIGRAM(S): at 17:41

## 2017-04-29 RX ADMIN — CEFTRIAXONE 100 GRAM(S): 500 INJECTION, POWDER, FOR SOLUTION INTRAMUSCULAR; INTRAVENOUS at 17:41

## 2017-04-30 LAB
ANION GAP SERPL CALC-SCNC: 12 MMOL/L — SIGNIFICANT CHANGE UP (ref 5–17)
BUN SERPL-MCNC: 13 MG/DL — SIGNIFICANT CHANGE UP (ref 7–23)
CALCIUM SERPL-MCNC: 8.7 MG/DL — SIGNIFICANT CHANGE UP (ref 8.4–10.5)
CHLORIDE SERPL-SCNC: 106 MMOL/L — SIGNIFICANT CHANGE UP (ref 96–108)
CO2 SERPL-SCNC: 22 MMOL/L — SIGNIFICANT CHANGE UP (ref 22–31)
CREAT SERPL-MCNC: 1.18 MG/DL — SIGNIFICANT CHANGE UP (ref 0.5–1.3)
GLUCOSE SERPL-MCNC: 115 MG/DL — HIGH (ref 70–99)
HCT VFR BLD CALC: 29.4 % — LOW (ref 39–50)
HGB BLD-MCNC: 10.1 G/DL — LOW (ref 13–17)
MCHC RBC-ENTMCNC: 33.9 PG — SIGNIFICANT CHANGE UP (ref 27–34)
MCHC RBC-ENTMCNC: 34.4 GM/DL — SIGNIFICANT CHANGE UP (ref 32–36)
MCV RBC AUTO: 98.7 FL — SIGNIFICANT CHANGE UP (ref 80–100)
PLATELET # BLD AUTO: 143 K/UL — LOW (ref 150–400)
POTASSIUM SERPL-MCNC: 3.8 MMOL/L — SIGNIFICANT CHANGE UP (ref 3.5–5.3)
POTASSIUM SERPL-SCNC: 3.8 MMOL/L — SIGNIFICANT CHANGE UP (ref 3.5–5.3)
RBC # BLD: 2.98 M/UL — LOW (ref 4.2–5.8)
RBC # FLD: 15.5 % — HIGH (ref 10.3–14.5)
SODIUM SERPL-SCNC: 140 MMOL/L — SIGNIFICANT CHANGE UP (ref 135–145)
WBC # BLD: 5.23 K/UL — SIGNIFICANT CHANGE UP (ref 3.8–10.5)
WBC # FLD AUTO: 5.23 K/UL — SIGNIFICANT CHANGE UP (ref 3.8–10.5)

## 2017-04-30 RX ADMIN — MIRTAZAPINE 15 MILLIGRAM(S): 45 TABLET, ORALLY DISINTEGRATING ORAL at 22:31

## 2017-04-30 RX ADMIN — Medication 100 MILLIGRAM(S): at 18:24

## 2017-04-30 RX ADMIN — HEPARIN SODIUM 5000 UNIT(S): 5000 INJECTION INTRAVENOUS; SUBCUTANEOUS at 05:50

## 2017-04-30 RX ADMIN — CEFTRIAXONE 100 GRAM(S): 500 INJECTION, POWDER, FOR SOLUTION INTRAMUSCULAR; INTRAVENOUS at 18:24

## 2017-04-30 RX ADMIN — MORPHINE SULFATE 4 MILLIGRAM(S): 50 CAPSULE, EXTENDED RELEASE ORAL at 20:42

## 2017-04-30 RX ADMIN — AMLODIPINE BESYLATE 5 MILLIGRAM(S): 2.5 TABLET ORAL at 05:50

## 2017-04-30 RX ADMIN — PANTOPRAZOLE SODIUM 40 MILLIGRAM(S): 20 TABLET, DELAYED RELEASE ORAL at 12:00

## 2017-04-30 RX ADMIN — GABAPENTIN 300 MILLIGRAM(S): 400 CAPSULE ORAL at 22:32

## 2017-04-30 RX ADMIN — Medication 100 MILLIGRAM(S): at 05:51

## 2017-04-30 RX ADMIN — MORPHINE SULFATE 4 MILLIGRAM(S): 50 CAPSULE, EXTENDED RELEASE ORAL at 20:27

## 2017-04-30 RX ADMIN — CLOPIDOGREL BISULFATE 75 MILLIGRAM(S): 75 TABLET, FILM COATED ORAL at 14:45

## 2017-04-30 RX ADMIN — SODIUM CHLORIDE 75 MILLILITER(S): 9 INJECTION, SOLUTION INTRAVENOUS at 20:27

## 2017-04-30 RX ADMIN — GABAPENTIN 300 MILLIGRAM(S): 400 CAPSULE ORAL at 05:50

## 2017-04-30 RX ADMIN — GABAPENTIN 300 MILLIGRAM(S): 400 CAPSULE ORAL at 14:44

## 2017-04-30 RX ADMIN — Medication 81 MILLIGRAM(S): at 14:45

## 2017-04-30 RX ADMIN — QUETIAPINE FUMARATE 25 MILLIGRAM(S): 200 TABLET, FILM COATED ORAL at 22:31

## 2017-04-30 RX ADMIN — HEPARIN SODIUM 5000 UNIT(S): 5000 INJECTION INTRAVENOUS; SUBCUTANEOUS at 18:24

## 2017-04-30 RX ADMIN — ATORVASTATIN CALCIUM 80 MILLIGRAM(S): 80 TABLET, FILM COATED ORAL at 22:31

## 2017-04-30 RX ADMIN — LISINOPRIL 2.5 MILLIGRAM(S): 2.5 TABLET ORAL at 05:50

## 2017-05-01 ENCOUNTER — TRANSCRIPTION ENCOUNTER (OUTPATIENT)
Age: 72
End: 2017-05-01

## 2017-05-01 LAB — CULTURE RESULTS: SIGNIFICANT CHANGE UP

## 2017-05-01 RX ADMIN — CLOPIDOGREL BISULFATE 75 MILLIGRAM(S): 75 TABLET, FILM COATED ORAL at 12:12

## 2017-05-01 RX ADMIN — LISINOPRIL 2.5 MILLIGRAM(S): 2.5 TABLET ORAL at 06:38

## 2017-05-01 RX ADMIN — PANTOPRAZOLE SODIUM 40 MILLIGRAM(S): 20 TABLET, DELAYED RELEASE ORAL at 12:12

## 2017-05-01 RX ADMIN — HEPARIN SODIUM 5000 UNIT(S): 5000 INJECTION INTRAVENOUS; SUBCUTANEOUS at 19:03

## 2017-05-01 RX ADMIN — QUETIAPINE FUMARATE 25 MILLIGRAM(S): 200 TABLET, FILM COATED ORAL at 21:45

## 2017-05-01 RX ADMIN — Medication 100 MILLIGRAM(S): at 06:38

## 2017-05-01 RX ADMIN — Medication 81 MILLIGRAM(S): at 12:12

## 2017-05-01 RX ADMIN — SODIUM CHLORIDE 75 MILLILITER(S): 9 INJECTION, SOLUTION INTRAVENOUS at 21:46

## 2017-05-01 RX ADMIN — HEPARIN SODIUM 5000 UNIT(S): 5000 INJECTION INTRAVENOUS; SUBCUTANEOUS at 06:38

## 2017-05-01 RX ADMIN — SODIUM CHLORIDE 75 MILLILITER(S): 9 INJECTION, SOLUTION INTRAVENOUS at 06:38

## 2017-05-01 RX ADMIN — Medication 100 MILLIGRAM(S): at 19:03

## 2017-05-01 RX ADMIN — ATORVASTATIN CALCIUM 80 MILLIGRAM(S): 80 TABLET, FILM COATED ORAL at 21:45

## 2017-05-01 RX ADMIN — CEFTRIAXONE 100 GRAM(S): 500 INJECTION, POWDER, FOR SOLUTION INTRAMUSCULAR; INTRAVENOUS at 19:03

## 2017-05-01 RX ADMIN — GABAPENTIN 300 MILLIGRAM(S): 400 CAPSULE ORAL at 06:39

## 2017-05-01 RX ADMIN — MIRTAZAPINE 15 MILLIGRAM(S): 45 TABLET, ORALLY DISINTEGRATING ORAL at 21:45

## 2017-05-01 RX ADMIN — GABAPENTIN 300 MILLIGRAM(S): 400 CAPSULE ORAL at 21:45

## 2017-05-01 RX ADMIN — GABAPENTIN 300 MILLIGRAM(S): 400 CAPSULE ORAL at 15:15

## 2017-05-01 RX ADMIN — AMLODIPINE BESYLATE 5 MILLIGRAM(S): 2.5 TABLET ORAL at 06:38

## 2017-05-01 NOTE — DISCHARGE NOTE ADULT - CARE PROVIDER_API CALL
Malik Orosco), Hematology; Internal Medicine; Medical Oncology  [address] [facility] [address]  [place] [address]  Phone: [phone]  Fax: [phone]    Goldberg, Gary D (MD), Urology  [address] [place] [address] [address]  [place] [address]  Phone: [phone]  Fax: [phone] Malik Orosco), Hematology; Internal Medicine; Medical Oncology  [address] [facility] [address]  [place] [address]  Phone: [phone]  Fax: [phone]    Goldberg, Gary D (MD), Urology  [address] [place] [address] [address]  [place] [address]  Phone: [phone]  Fax: [phone]    Jose Medina), Internal Medicine  [address] [place] [address]  Phone: [phone]  Fax: [phone]

## 2017-05-01 NOTE — DISCHARGE NOTE ADULT - PROVIDER TOKENS
TOKEN:'[id number]',TOKEN:'[id number]' TOKEN:'[id number]:'[id number]'

## 2017-05-01 NOTE — DISCHARGE NOTE ADULT - PLAN OF CARE
resolution of symptoms symptoms resolved  tolerating tube feedings  follow up with oncology for resumption of chemo follow up with oncology Follow up with your medical doctor to establish long term blood pressure treatment goals. follow up with urology for further recommendations about prostate cancer treatment HOME CARE INSTRUCTIONS  f you were prescribed antibiotics, take them exactly as your caregiver instructs you. Finish the medication even if you feel better after you have only taken some of the medication.  Drink enough water and fluids to keep your urine clear or pale yellow.  Avoid caffeine, tea, and carbonated beverages. They tend to irritate your bladder.  Empty your bladder often. Avoid holding urine for long periods of time.  Empty your bladder before and after sexual intercourse.  After a bowel movement, women should cleanse from front to back. Use each tissue only once.  SEEK MEDICAL CARE IF:  You have back pain.  You develop a fever.  Your symptoms do not begin to resolve within 3 days.  SEEK IMMEDIATE MEDICAL CARE IF:  You have severe back pain or lower abdominal pain.  You develop chills.  You have nausea or vomiting.  You have continued burning or discomfort with urination. stable completed antibiotics

## 2017-05-01 NOTE — DISCHARGE NOTE ADULT - PATIENT PORTAL LINK FT
“You can access the [facility] Patient Portal, offered by [facility], by registering with the following website: [email or web address]”

## 2017-05-01 NOTE — DISCHARGE NOTE ADULT - CARE PROVIDERS DIRECT ADDRESSES
,DirectAddress_Unknown,[email or web address],DirectAddress_Unknown

## 2017-05-01 NOTE — DISCHARGE NOTE ADULT - CARE PLAN
Principal Discharge DX:	Abdominal pain with vomiting  Goal:	resolution of symptoms  Instructions for follow-up, activity and diet:	symptoms resolved  tolerating tube feedings  follow up with oncology for resumption of chemo  Secondary Diagnosis:	Gastric tumor  Instructions for follow-up, activity and diet:	follow up with oncology  Secondary Diagnosis:	HTN (hypertension)  Instructions for follow-up, activity and diet:	Follow up with your medical doctor to establish long term blood pressure treatment goals.  Secondary Diagnosis:	Prostate cancer  Instructions for follow-up, activity and diet:	follow up with urology for further recommendations about prostate cancer treatment  Secondary Diagnosis:	UTI (urinary tract infection)  Instructions for follow-up, activity and diet:	HOME CARE INSTRUCTIONS  f you were prescribed antibiotics, take them exactly as your caregiver instructs you. Finish the medication even if you feel better after you have only taken some of the medication.  Drink enough water and fluids to keep your urine clear or pale yellow.  Avoid caffeine, tea, and carbonated beverages. They tend to irritate your bladder.  Empty your bladder often. Avoid holding urine for long periods of time.  Empty your bladder before and after sexual intercourse.  After a bowel movement, women should cleanse from front to back. Use each tissue only once.  SEEK MEDICAL CARE IF:  You have back pain.  You develop a fever.  Your symptoms do not begin to resolve within 3 days.  SEEK IMMEDIATE MEDICAL CARE IF:  You have severe back pain or lower abdominal pain.  You develop chills.  You have nausea or vomiting.  You have continued burning or discomfort with urination. Principal Discharge DX:	Abdominal pain with vomiting  Goal:	resolution of symptoms  Instructions for follow-up, activity and diet:	symptoms resolved  tolerating tube feedings  follow up with oncology for resumption of chemo  Secondary Diagnosis:	Gastric tumor  Goal:	stable  Instructions for follow-up, activity and diet:	follow up with oncology  Secondary Diagnosis:	HTN (hypertension)  Goal:	stable  Instructions for follow-up, activity and diet:	Follow up with your medical doctor to establish long term blood pressure treatment goals.  Secondary Diagnosis:	Prostate cancer  Goal:	stable  Instructions for follow-up, activity and diet:	follow up with urology for further recommendations about prostate cancer treatment  Secondary Diagnosis:	UTI (urinary tract infection)  Goal:	completed antibiotics  Instructions for follow-up, activity and diet:	HOME CARE INSTRUCTIONS  f you were prescribed antibiotics, take them exactly as your caregiver instructs you. Finish the medication even if you feel better after you have only taken some of the medication.  Drink enough water and fluids to keep your urine clear or pale yellow.  Avoid caffeine, tea, and carbonated beverages. They tend to irritate your bladder.  Empty your bladder often. Avoid holding urine for long periods of time.  Empty your bladder before and after sexual intercourse.  After a bowel movement, women should cleanse from front to back. Use each tissue only once.  SEEK MEDICAL CARE IF:  You have back pain.  You develop a fever.  Your symptoms do not begin to resolve within 3 days.  SEEK IMMEDIATE MEDICAL CARE IF:  You have severe back pain or lower abdominal pain.  You develop chills.  You have nausea or vomiting.  You have continued burning or discomfort with urination.

## 2017-05-01 NOTE — DISCHARGE NOTE ADULT - HOSPITAL COURSE
to be completed by attending 72 year old male with PMHx of PEG tube, Hypertension, prostate and stomach cancer, hyperlipidemia, and stroke presents to ED c/o two episodes of vomiting this morning after receiving oral chemo. His son states that pt was feeling dizzy before vomiting. Recent hospitalization in the past 10 days. Hx obtained  from family, pt is non-verbalAdmit - 4/28 - vomiting / abdominal pain / UTI / gastric cancer    5/1 - d/c home in am?  5/1 - night - urine c/s w/ >[id number] Enterococcus Faecium           vanco resistant and Ecoli ([id number]) / ID Dr. Gonzales called for consult   5/2 - per ID started on IV dapto.  Can go on PO Linazeolid if his remeron is stopped for 5           days.  There is a black box on linazeolid and remeron.  Please d/w Dr. BETTS  5/3 - Bone scan pending ?? mets    5/4 - D/C in am, f/u mag / phos    5/5 - 5:30 am - pt refusing to have mag/phos drawn last night           refusing am labs this morning          discharged home today  - d/w Dr. Medina

## 2017-05-01 NOTE — DISCHARGE NOTE ADULT - NS AS DC STROKE ED MATERIALS
Call 911 for Stroke/Risk Factors for Stroke/Stroke Education Booklet/Prescribed Medications/Need for Followup After Discharge/Stroke Warning Signs and Symptoms

## 2017-05-01 NOTE — DISCHARGE NOTE ADULT - MEDICATION SUMMARY - MEDICATIONS TO TAKE
I will START or STAY ON the medications listed below when I get home from the hospital:    acetaminophen 325 mg oral tablet  -- 2 tab(s) by gastrostomy tube every 6 hours, As Needed -For Temp greater than 38.5 C (101.3 F)  -- Indication: For fever     Aspirin Enteric Coated 81 mg oral delayed release tablet  -- 1 tab(s) by gastrostomy tube once a day  -- Indication: For CAD     oxycodone-acetaminophen 5mg-325mg oral tablet  -- 2 tab(s) by gastrostomy tube every 6 hours  -- Indication: For Pain     lisinopril 2.5 mg oral tablet  -- 1 tab(s) by gastrostomy tube once a day  -- Indication: For Heart     gabapentin 300 mg oral capsule  -- 1 cap(s) by gastrostomy tube 3 times a day  -- Indication: For Pain     mirtazapine 15 mg oral tablet  -- 1 tab(s) by gastrostomy tube once a day (at bedtime)  -- Indication: For Sleep aid     atorvastatin 80 mg oral tablet  -- 1 tab(s) by gastrostomy tube once a day  -- Indication: For cholesterol     clopidogrel 75 mg oral tablet  -- 1 tab(s) by gastrostomy tube once a day  -- Indication: For Heart     QUEtiapine 25 mg oral tablet  -- 1 tab(s) by gastrostomy tube once a day (at bedtime)  -- Indication: For mood     imatinib 400 mg oral tablet  -- 1 tab(s) by gastrostomy tube once a day  -- Indication: For chemotherapy     metoprolol tartrate 100 mg oral tablet  -- 1 tab(s) by gastrostomy tube 2 times a day  -- Indication: For Heart     amLODIPine 5 mg oral tablet  -- 1 tab(s) by gastrostomy tube once a day  -- Indication: For Heart     polyethylene glycol 3350 oral powder for reconstitution  -- 17 gram(s) by gastrostomy tube once a day, As Needed  -- Indication: For constipation     pantoprazole 40 mg oral delayed release tablet  -- 1 tab(s) by gastrostomy tube once a day  -- Indication: For GI

## 2017-05-01 NOTE — DISCHARGE NOTE ADULT - NS AS DC FOLLOWUP STROKE INST
Influenza vaccination (VIS Pub Date: August 19, 2014)/Stroke (includes: TIA/SAH/ICH/Ischemic Stroke)/Smoking Cessation Smoking Cessation/Influenza vaccination (VIS Pub Date: August 19, 2014)

## 2017-05-02 LAB
ANION GAP SERPL CALC-SCNC: 12 MMOL/L — SIGNIFICANT CHANGE UP (ref 5–17)
BUN SERPL-MCNC: 10 MG/DL — SIGNIFICANT CHANGE UP (ref 7–23)
CALCIUM SERPL-MCNC: 9.7 MG/DL — SIGNIFICANT CHANGE UP (ref 8.4–10.5)
CHLORIDE SERPL-SCNC: 99 MMOL/L — SIGNIFICANT CHANGE UP (ref 96–108)
CO2 SERPL-SCNC: 26 MMOL/L — SIGNIFICANT CHANGE UP (ref 22–31)
CREAT SERPL-MCNC: 1.02 MG/DL — SIGNIFICANT CHANGE UP (ref 0.5–1.3)
CULTURE RESULTS: SIGNIFICANT CHANGE UP
GLUCOSE SERPL-MCNC: 110 MG/DL — HIGH (ref 70–99)
HCT VFR BLD CALC: 36.6 % — LOW (ref 39–50)
HGB BLD-MCNC: 12.2 G/DL — LOW (ref 13–17)
MCHC RBC-ENTMCNC: 33.4 GM/DL — SIGNIFICANT CHANGE UP (ref 32–36)
MCHC RBC-ENTMCNC: 34 PG — SIGNIFICANT CHANGE UP (ref 27–34)
MCV RBC AUTO: 102 FL — HIGH (ref 80–100)
ORGANISM # SPEC MICROSCOPIC CNT: SIGNIFICANT CHANGE UP
ORGANISM # SPEC MICROSCOPIC CNT: SIGNIFICANT CHANGE UP
PLATELET # BLD AUTO: 160 K/UL — SIGNIFICANT CHANGE UP (ref 150–400)
POTASSIUM SERPL-MCNC: 4.5 MMOL/L — SIGNIFICANT CHANGE UP (ref 3.5–5.3)
POTASSIUM SERPL-SCNC: 4.5 MMOL/L — SIGNIFICANT CHANGE UP (ref 3.5–5.3)
RBC # BLD: 3.59 M/UL — LOW (ref 4.2–5.8)
RBC # FLD: 13.6 % — SIGNIFICANT CHANGE UP (ref 10.3–14.5)
SODIUM SERPL-SCNC: 137 MMOL/L — SIGNIFICANT CHANGE UP (ref 135–145)
WBC # BLD: 5 K/UL — SIGNIFICANT CHANGE UP (ref 3.8–10.5)
WBC # FLD AUTO: 5 K/UL — SIGNIFICANT CHANGE UP (ref 3.8–10.5)

## 2017-05-02 RX ORDER — PANTOPRAZOLE SODIUM 20 MG/1
40 TABLET, DELAYED RELEASE ORAL
Qty: 0 | Refills: 0 | Status: DISCONTINUED | OUTPATIENT
Start: 2017-05-02 | End: 2017-05-02

## 2017-05-02 RX ORDER — DAPTOMYCIN 500 MG/10ML
INJECTION, POWDER, LYOPHILIZED, FOR SOLUTION INTRAVENOUS
Qty: 0 | Refills: 0 | Status: DISCONTINUED | OUTPATIENT
Start: 2017-05-02 | End: 2017-05-05

## 2017-05-02 RX ORDER — OXYCODONE HYDROCHLORIDE 5 MG/1
10 TABLET ORAL ONCE
Qty: 0 | Refills: 0 | Status: DISCONTINUED | OUTPATIENT
Start: 2017-05-02 | End: 2017-05-02

## 2017-05-02 RX ORDER — DAPTOMYCIN 500 MG/10ML
230 INJECTION, POWDER, LYOPHILIZED, FOR SOLUTION INTRAVENOUS ONCE
Qty: 0 | Refills: 0 | Status: COMPLETED | OUTPATIENT
Start: 2017-05-02 | End: 2017-05-02

## 2017-05-02 RX ORDER — DAPTOMYCIN 500 MG/10ML
230 INJECTION, POWDER, LYOPHILIZED, FOR SOLUTION INTRAVENOUS EVERY 24 HOURS
Qty: 0 | Refills: 0 | Status: DISCONTINUED | OUTPATIENT
Start: 2017-05-03 | End: 2017-05-05

## 2017-05-02 RX ORDER — PANTOPRAZOLE SODIUM 20 MG/1
40 TABLET, DELAYED RELEASE ORAL DAILY
Qty: 0 | Refills: 0 | Status: DISCONTINUED | OUTPATIENT
Start: 2017-05-02 | End: 2017-05-03

## 2017-05-02 RX ADMIN — GABAPENTIN 300 MILLIGRAM(S): 400 CAPSULE ORAL at 06:06

## 2017-05-02 RX ADMIN — GABAPENTIN 300 MILLIGRAM(S): 400 CAPSULE ORAL at 16:14

## 2017-05-02 RX ADMIN — HEPARIN SODIUM 5000 UNIT(S): 5000 INJECTION INTRAVENOUS; SUBCUTANEOUS at 17:49

## 2017-05-02 RX ADMIN — SODIUM CHLORIDE 75 MILLILITER(S): 9 INJECTION, SOLUTION INTRAVENOUS at 22:37

## 2017-05-02 RX ADMIN — CEFTRIAXONE 100 GRAM(S): 500 INJECTION, POWDER, FOR SOLUTION INTRAMUSCULAR; INTRAVENOUS at 17:49

## 2017-05-02 RX ADMIN — AMLODIPINE BESYLATE 5 MILLIGRAM(S): 2.5 TABLET ORAL at 06:06

## 2017-05-02 RX ADMIN — Medication 81 MILLIGRAM(S): at 12:58

## 2017-05-02 RX ADMIN — CLOPIDOGREL BISULFATE 75 MILLIGRAM(S): 75 TABLET, FILM COATED ORAL at 12:58

## 2017-05-02 RX ADMIN — MIRTAZAPINE 15 MILLIGRAM(S): 45 TABLET, ORALLY DISINTEGRATING ORAL at 22:38

## 2017-05-02 RX ADMIN — HEPARIN SODIUM 5000 UNIT(S): 5000 INJECTION INTRAVENOUS; SUBCUTANEOUS at 06:06

## 2017-05-02 RX ADMIN — PANTOPRAZOLE SODIUM 40 MILLIGRAM(S): 20 TABLET, DELAYED RELEASE ORAL at 15:06

## 2017-05-02 RX ADMIN — GABAPENTIN 300 MILLIGRAM(S): 400 CAPSULE ORAL at 23:24

## 2017-05-02 RX ADMIN — QUETIAPINE FUMARATE 25 MILLIGRAM(S): 200 TABLET, FILM COATED ORAL at 22:38

## 2017-05-02 RX ADMIN — OXYCODONE HYDROCHLORIDE 10 MILLIGRAM(S): 5 TABLET ORAL at 11:00

## 2017-05-02 RX ADMIN — OXYCODONE HYDROCHLORIDE 10 MILLIGRAM(S): 5 TABLET ORAL at 10:09

## 2017-05-02 RX ADMIN — Medication 100 MILLIGRAM(S): at 18:11

## 2017-05-02 RX ADMIN — DAPTOMYCIN 109.2 MILLIGRAM(S): 500 INJECTION, POWDER, LYOPHILIZED, FOR SOLUTION INTRAVENOUS at 17:35

## 2017-05-02 RX ADMIN — ATORVASTATIN CALCIUM 80 MILLIGRAM(S): 80 TABLET, FILM COATED ORAL at 22:39

## 2017-05-02 RX ADMIN — Medication 100 MILLIGRAM(S): at 06:06

## 2017-05-02 RX ADMIN — LISINOPRIL 2.5 MILLIGRAM(S): 2.5 TABLET ORAL at 06:06

## 2017-05-03 LAB
ANION GAP SERPL CALC-SCNC: 19 MMOL/L — HIGH (ref 5–17)
BUN SERPL-MCNC: 10 MG/DL — SIGNIFICANT CHANGE UP (ref 7–23)
CALCIUM SERPL-MCNC: 9.6 MG/DL — SIGNIFICANT CHANGE UP (ref 8.4–10.5)
CHLORIDE SERPL-SCNC: 100 MMOL/L — SIGNIFICANT CHANGE UP (ref 96–108)
CO2 SERPL-SCNC: 21 MMOL/L — LOW (ref 22–31)
CREAT SERPL-MCNC: 1.06 MG/DL — SIGNIFICANT CHANGE UP (ref 0.5–1.3)
GLUCOSE SERPL-MCNC: 109 MG/DL — HIGH (ref 70–99)
HCT VFR BLD CALC: 34.1 % — LOW (ref 39–50)
HGB BLD-MCNC: 11.4 G/DL — LOW (ref 13–17)
MCHC RBC-ENTMCNC: 32.4 PG — SIGNIFICANT CHANGE UP (ref 27–34)
MCHC RBC-ENTMCNC: 33.4 GM/DL — SIGNIFICANT CHANGE UP (ref 32–36)
MCV RBC AUTO: 96.9 FL — SIGNIFICANT CHANGE UP (ref 80–100)
PLATELET # BLD AUTO: 165 K/UL — SIGNIFICANT CHANGE UP (ref 150–400)
POTASSIUM SERPL-MCNC: 4.2 MMOL/L — SIGNIFICANT CHANGE UP (ref 3.5–5.3)
POTASSIUM SERPL-SCNC: 4.2 MMOL/L — SIGNIFICANT CHANGE UP (ref 3.5–5.3)
RBC # BLD: 3.52 M/UL — LOW (ref 4.2–5.8)
RBC # FLD: 14.8 % — HIGH (ref 10.3–14.5)
SODIUM SERPL-SCNC: 140 MMOL/L — SIGNIFICANT CHANGE UP (ref 135–145)
WBC # BLD: 4.03 K/UL — SIGNIFICANT CHANGE UP (ref 3.8–10.5)
WBC # FLD AUTO: 4.03 K/UL — SIGNIFICANT CHANGE UP (ref 3.8–10.5)

## 2017-05-03 PROCEDURE — 78320: CPT | Mod: 26

## 2017-05-03 PROCEDURE — 78306 BONE IMAGING WHOLE BODY: CPT | Mod: 26

## 2017-05-03 RX ORDER — FAMOTIDINE 10 MG/ML
20 INJECTION INTRAVENOUS DAILY
Qty: 0 | Refills: 0 | Status: DISCONTINUED | OUTPATIENT
Start: 2017-05-04 | End: 2017-05-05

## 2017-05-03 RX ADMIN — CEFTRIAXONE 100 GRAM(S): 500 INJECTION, POWDER, FOR SOLUTION INTRAMUSCULAR; INTRAVENOUS at 18:11

## 2017-05-03 RX ADMIN — MORPHINE SULFATE 4 MILLIGRAM(S): 50 CAPSULE, EXTENDED RELEASE ORAL at 04:19

## 2017-05-03 RX ADMIN — GABAPENTIN 300 MILLIGRAM(S): 400 CAPSULE ORAL at 14:37

## 2017-05-03 RX ADMIN — MORPHINE SULFATE 4 MILLIGRAM(S): 50 CAPSULE, EXTENDED RELEASE ORAL at 11:30

## 2017-05-03 RX ADMIN — MORPHINE SULFATE 4 MILLIGRAM(S): 50 CAPSULE, EXTENDED RELEASE ORAL at 04:34

## 2017-05-03 RX ADMIN — Medication 100 MILLIGRAM(S): at 18:11

## 2017-05-03 RX ADMIN — MORPHINE SULFATE 4 MILLIGRAM(S): 50 CAPSULE, EXTENDED RELEASE ORAL at 18:09

## 2017-05-03 RX ADMIN — MORPHINE SULFATE 4 MILLIGRAM(S): 50 CAPSULE, EXTENDED RELEASE ORAL at 18:30

## 2017-05-03 RX ADMIN — QUETIAPINE FUMARATE 25 MILLIGRAM(S): 200 TABLET, FILM COATED ORAL at 22:27

## 2017-05-03 RX ADMIN — MORPHINE SULFATE 4 MILLIGRAM(S): 50 CAPSULE, EXTENDED RELEASE ORAL at 11:08

## 2017-05-03 RX ADMIN — SODIUM CHLORIDE 75 MILLILITER(S): 9 INJECTION, SOLUTION INTRAVENOUS at 07:00

## 2017-05-03 RX ADMIN — PANTOPRAZOLE SODIUM 40 MILLIGRAM(S): 20 TABLET, DELAYED RELEASE ORAL at 11:07

## 2017-05-03 RX ADMIN — HEPARIN SODIUM 5000 UNIT(S): 5000 INJECTION INTRAVENOUS; SUBCUTANEOUS at 18:11

## 2017-05-03 RX ADMIN — GABAPENTIN 300 MILLIGRAM(S): 400 CAPSULE ORAL at 07:01

## 2017-05-03 RX ADMIN — MIRTAZAPINE 15 MILLIGRAM(S): 45 TABLET, ORALLY DISINTEGRATING ORAL at 22:27

## 2017-05-03 RX ADMIN — Medication 81 MILLIGRAM(S): at 11:07

## 2017-05-03 RX ADMIN — SODIUM CHLORIDE 75 MILLILITER(S): 9 INJECTION, SOLUTION INTRAVENOUS at 22:27

## 2017-05-03 RX ADMIN — AMLODIPINE BESYLATE 5 MILLIGRAM(S): 2.5 TABLET ORAL at 07:01

## 2017-05-03 RX ADMIN — HEPARIN SODIUM 5000 UNIT(S): 5000 INJECTION INTRAVENOUS; SUBCUTANEOUS at 07:02

## 2017-05-03 RX ADMIN — ATORVASTATIN CALCIUM 80 MILLIGRAM(S): 80 TABLET, FILM COATED ORAL at 22:27

## 2017-05-03 RX ADMIN — LISINOPRIL 2.5 MILLIGRAM(S): 2.5 TABLET ORAL at 07:01

## 2017-05-03 RX ADMIN — DAPTOMYCIN 109.2 MILLIGRAM(S): 500 INJECTION, POWDER, LYOPHILIZED, FOR SOLUTION INTRAVENOUS at 14:38

## 2017-05-03 RX ADMIN — GABAPENTIN 300 MILLIGRAM(S): 400 CAPSULE ORAL at 22:27

## 2017-05-03 RX ADMIN — CLOPIDOGREL BISULFATE 75 MILLIGRAM(S): 75 TABLET, FILM COATED ORAL at 11:07

## 2017-05-03 RX ADMIN — Medication 100 MILLIGRAM(S): at 07:02

## 2017-05-04 LAB
ANION GAP SERPL CALC-SCNC: 14 MMOL/L — SIGNIFICANT CHANGE UP (ref 5–17)
BUN SERPL-MCNC: 10 MG/DL — SIGNIFICANT CHANGE UP (ref 7–23)
CALCIUM SERPL-MCNC: 8.8 MG/DL — SIGNIFICANT CHANGE UP (ref 8.4–10.5)
CHLORIDE SERPL-SCNC: 103 MMOL/L — SIGNIFICANT CHANGE UP (ref 96–108)
CO2 SERPL-SCNC: 22 MMOL/L — SIGNIFICANT CHANGE UP (ref 22–31)
CREAT SERPL-MCNC: 0.9 MG/DL — SIGNIFICANT CHANGE UP (ref 0.5–1.3)
GLUCOSE SERPL-MCNC: 110 MG/DL — HIGH (ref 70–99)
HCT VFR BLD CALC: 28.8 % — LOW (ref 39–50)
HGB BLD-MCNC: 9.7 G/DL — LOW (ref 13–17)
MCHC RBC-ENTMCNC: 33.2 PG — SIGNIFICANT CHANGE UP (ref 27–34)
MCHC RBC-ENTMCNC: 33.7 GM/DL — SIGNIFICANT CHANGE UP (ref 32–36)
MCV RBC AUTO: 98.6 FL — SIGNIFICANT CHANGE UP (ref 80–100)
PLATELET # BLD AUTO: 155 K/UL — SIGNIFICANT CHANGE UP (ref 150–400)
POTASSIUM SERPL-MCNC: 3.8 MMOL/L — SIGNIFICANT CHANGE UP (ref 3.5–5.3)
POTASSIUM SERPL-SCNC: 3.8 MMOL/L — SIGNIFICANT CHANGE UP (ref 3.5–5.3)
RBC # BLD: 2.92 M/UL — LOW (ref 4.2–5.8)
RBC # FLD: 15 % — HIGH (ref 10.3–14.5)
SODIUM SERPL-SCNC: 139 MMOL/L — SIGNIFICANT CHANGE UP (ref 135–145)
WBC # BLD: 2.8 K/UL — LOW (ref 3.8–10.5)
WBC # FLD AUTO: 2.8 K/UL — LOW (ref 3.8–10.5)

## 2017-05-04 RX ORDER — ACETAMINOPHEN 500 MG
650 TABLET ORAL ONCE
Qty: 0 | Refills: 0 | Status: COMPLETED | OUTPATIENT
Start: 2017-05-04 | End: 2017-05-04

## 2017-05-04 RX ORDER — IMATINIB MESYLATE 400 MG/1
400 TABLET, FILM COATED ORAL DAILY
Qty: 0 | Refills: 0 | Status: DISCONTINUED | OUTPATIENT
Start: 2017-05-04 | End: 2017-05-05

## 2017-05-04 RX ADMIN — MIRTAZAPINE 15 MILLIGRAM(S): 45 TABLET, ORALLY DISINTEGRATING ORAL at 21:22

## 2017-05-04 RX ADMIN — Medication 650 MILLIGRAM(S): at 18:41

## 2017-05-04 RX ADMIN — DAPTOMYCIN 109.2 MILLIGRAM(S): 500 INJECTION, POWDER, LYOPHILIZED, FOR SOLUTION INTRAVENOUS at 14:56

## 2017-05-04 RX ADMIN — GABAPENTIN 300 MILLIGRAM(S): 400 CAPSULE ORAL at 21:20

## 2017-05-04 RX ADMIN — QUETIAPINE FUMARATE 25 MILLIGRAM(S): 200 TABLET, FILM COATED ORAL at 21:22

## 2017-05-04 RX ADMIN — SODIUM CHLORIDE 75 MILLILITER(S): 9 INJECTION, SOLUTION INTRAVENOUS at 21:22

## 2017-05-04 RX ADMIN — GABAPENTIN 300 MILLIGRAM(S): 400 CAPSULE ORAL at 14:00

## 2017-05-04 RX ADMIN — FAMOTIDINE 20 MILLIGRAM(S): 10 INJECTION INTRAVENOUS at 15:55

## 2017-05-04 RX ADMIN — Medication 81 MILLIGRAM(S): at 12:36

## 2017-05-04 RX ADMIN — MORPHINE SULFATE 4 MILLIGRAM(S): 50 CAPSULE, EXTENDED RELEASE ORAL at 13:00

## 2017-05-04 RX ADMIN — MORPHINE SULFATE 4 MILLIGRAM(S): 50 CAPSULE, EXTENDED RELEASE ORAL at 12:38

## 2017-05-04 RX ADMIN — AMLODIPINE BESYLATE 5 MILLIGRAM(S): 2.5 TABLET ORAL at 06:38

## 2017-05-04 RX ADMIN — HEPARIN SODIUM 5000 UNIT(S): 5000 INJECTION INTRAVENOUS; SUBCUTANEOUS at 18:40

## 2017-05-04 RX ADMIN — Medication 100 MILLIGRAM(S): at 18:40

## 2017-05-04 RX ADMIN — GABAPENTIN 300 MILLIGRAM(S): 400 CAPSULE ORAL at 06:38

## 2017-05-04 RX ADMIN — Medication 100 MILLIGRAM(S): at 06:38

## 2017-05-04 RX ADMIN — ATORVASTATIN CALCIUM 80 MILLIGRAM(S): 80 TABLET, FILM COATED ORAL at 21:22

## 2017-05-04 RX ADMIN — SODIUM CHLORIDE 75 MILLILITER(S): 9 INJECTION, SOLUTION INTRAVENOUS at 06:38

## 2017-05-04 RX ADMIN — CEFTRIAXONE 100 GRAM(S): 500 INJECTION, POWDER, FOR SOLUTION INTRAMUSCULAR; INTRAVENOUS at 18:39

## 2017-05-04 RX ADMIN — LISINOPRIL 2.5 MILLIGRAM(S): 2.5 TABLET ORAL at 06:38

## 2017-05-04 RX ADMIN — HEPARIN SODIUM 5000 UNIT(S): 5000 INJECTION INTRAVENOUS; SUBCUTANEOUS at 06:38

## 2017-05-04 RX ADMIN — Medication 650 MILLIGRAM(S): at 19:45

## 2017-05-04 RX ADMIN — IMATINIB MESYLATE 400 MILLIGRAM(S): 400 TABLET, FILM COATED ORAL at 18:39

## 2017-05-04 RX ADMIN — CLOPIDOGREL BISULFATE 75 MILLIGRAM(S): 75 TABLET, FILM COATED ORAL at 12:36

## 2017-05-04 NOTE — DIETITIAN INITIAL EVALUATION ADULT. - NS AS NUTRI INTERV ENTERAL NUTRITION
Recommend continue Jevity 1.2 at goal rate of 55mL/hour for total volume of 1320mL which provides 1584cals and 73.3gm protein daily (27cals/kg and 1.3gm protein/kg based on dosing weight of 58kg).

## 2017-05-04 NOTE — DIETITIAN INITIAL EVALUATION ADULT. - OTHER INFO
Pt seen for length of stay of 4DSU. Pt seen for length of stay of 4DSU. Pt admitted with after episode of emesis likely due to administration of chemotherapy. 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 Limited weight and nutrition history available at this time as pt is unable to provide and no family is present at bedside. Per previous RD note (4/3/2017) weight of 127 pounds; per previous RD note (1/24/2017) weight of 131.6 pounds; per previous RD note (1/2/2017) weight of 128 pounds. Note weight trending stable over previous 4 months. During previous admission pt receiving PO feeds of dysphagia 1 honey consistency for pleasure with supplemental bolus feeds of Jevity 1.2 via PEG. Pt currently receiving Jevity 1.2 @ 55mL/hour x 24 hours. Per RN pt tolerating feeds. Per chart no episode of emesis since admission. Last BM x1 (4/30). No food allergies noted in medical record.

## 2017-05-04 NOTE — DIETITIAN INITIAL EVALUATION ADULT. - ENERGY NEEDS
Note pt with height of 63 inches however previous RD notes show pt as 66 inches; discussed with RN.  Height: 66 inches, Dosing Weight: 128 pounds, BMI: 20.7, IBW: 142 pounds (+/-10%), 90%IBW  Pertinent Information: Pt admitted after episode of emesis likely secondary to chemotherapy. 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 Found with UTI currently receiving antibiotics.  No edema noted. Stage II sacral pressure injury.

## 2017-05-04 NOTE — DIETITIAN INITIAL EVALUATION ADULT. - PT NOT SOURCE
Per medical record primary language is noted as Palauan however pt is noted as nonverbal and aphasic. Pt is not responding at this time.

## 2017-05-04 NOTE — DIETITIAN INITIAL EVALUATION ADULT. - NUTRITIONGOAL OUTCOME1
Pt will continue to consume/tolerate > 75% estimated nutrient needs Pt will continue to consume/tolerate > 75% estimated nutrient needs via most tolerated route

## 2017-05-05 VITALS
DIASTOLIC BLOOD PRESSURE: 89 MMHG | OXYGEN SATURATION: 99 % | HEART RATE: 65 BPM | RESPIRATION RATE: 18 BRPM | TEMPERATURE: 99 F | SYSTOLIC BLOOD PRESSURE: 148 MMHG

## 2017-05-05 LAB
ANION GAP SERPL CALC-SCNC: 13 MMOL/L — SIGNIFICANT CHANGE UP (ref 5–17)
BUN SERPL-MCNC: 12 MG/DL — SIGNIFICANT CHANGE UP (ref 7–23)
CALCIUM SERPL-MCNC: 9.1 MG/DL — SIGNIFICANT CHANGE UP (ref 8.4–10.5)
CHLORIDE SERPL-SCNC: 105 MMOL/L — SIGNIFICANT CHANGE UP (ref 96–108)
CO2 SERPL-SCNC: 24 MMOL/L — SIGNIFICANT CHANGE UP (ref 22–31)
CREAT SERPL-MCNC: 1.01 MG/DL — SIGNIFICANT CHANGE UP (ref 0.5–1.3)
GLUCOSE SERPL-MCNC: 115 MG/DL — HIGH (ref 70–99)
HCT VFR BLD CALC: 31.5 % — LOW (ref 39–50)
HGB BLD-MCNC: 10.7 G/DL — LOW (ref 13–17)
MAGNESIUM SERPL-MCNC: 1.8 MG/DL — SIGNIFICANT CHANGE UP (ref 1.6–2.6)
MCHC RBC-ENTMCNC: 34.1 GM/DL — SIGNIFICANT CHANGE UP (ref 32–36)
MCHC RBC-ENTMCNC: 34.1 PG — HIGH (ref 27–34)
MCV RBC AUTO: 100 FL — SIGNIFICANT CHANGE UP (ref 80–100)
PHOSPHATE SERPL-MCNC: 2.6 MG/DL — SIGNIFICANT CHANGE UP (ref 2.5–4.5)
PLATELET # BLD AUTO: 140 K/UL — LOW (ref 150–400)
POTASSIUM SERPL-MCNC: 4.2 MMOL/L — SIGNIFICANT CHANGE UP (ref 3.5–5.3)
POTASSIUM SERPL-SCNC: 4.2 MMOL/L — SIGNIFICANT CHANGE UP (ref 3.5–5.3)
RBC # BLD: 3.15 M/UL — LOW (ref 4.2–5.8)
RBC # FLD: 13.7 % — SIGNIFICANT CHANGE UP (ref 10.3–14.5)
SODIUM SERPL-SCNC: 142 MMOL/L — SIGNIFICANT CHANGE UP (ref 135–145)
WBC # BLD: 3.2 K/UL — LOW (ref 3.8–10.5)
WBC # FLD AUTO: 3.2 K/UL — LOW (ref 3.8–10.5)

## 2017-05-05 PROCEDURE — 74176 CT ABD & PELVIS W/O CONTRAST: CPT

## 2017-05-05 PROCEDURE — 99285 EMERGENCY DEPT VISIT HI MDM: CPT | Mod: 25

## 2017-05-05 PROCEDURE — 87086 URINE CULTURE/COLONY COUNT: CPT

## 2017-05-05 PROCEDURE — 93005 ELECTROCARDIOGRAM TRACING: CPT

## 2017-05-05 PROCEDURE — 83735 ASSAY OF MAGNESIUM: CPT

## 2017-05-05 PROCEDURE — 78306 BONE IMAGING WHOLE BODY: CPT

## 2017-05-05 PROCEDURE — 96374 THER/PROPH/DIAG INJ IV PUSH: CPT

## 2017-05-05 PROCEDURE — 83690 ASSAY OF LIPASE: CPT

## 2017-05-05 PROCEDURE — 84295 ASSAY OF SERUM SODIUM: CPT

## 2017-05-05 PROCEDURE — 81001 URINALYSIS AUTO W/SCOPE: CPT

## 2017-05-05 PROCEDURE — 82565 ASSAY OF CREATININE: CPT

## 2017-05-05 PROCEDURE — 85027 COMPLETE CBC AUTOMATED: CPT

## 2017-05-05 PROCEDURE — A9561: CPT

## 2017-05-05 PROCEDURE — 82947 ASSAY GLUCOSE BLOOD QUANT: CPT

## 2017-05-05 PROCEDURE — 80048 BASIC METABOLIC PNL TOTAL CA: CPT

## 2017-05-05 PROCEDURE — 97161 PT EVAL LOW COMPLEX 20 MIN: CPT

## 2017-05-05 PROCEDURE — 83605 ASSAY OF LACTIC ACID: CPT

## 2017-05-05 PROCEDURE — 78999 UNLISTED MISC PX DX NUC MED: CPT

## 2017-05-05 PROCEDURE — 82435 ASSAY OF BLOOD CHLORIDE: CPT

## 2017-05-05 PROCEDURE — 80053 COMPREHEN METABOLIC PANEL: CPT

## 2017-05-05 PROCEDURE — 84132 ASSAY OF SERUM POTASSIUM: CPT

## 2017-05-05 PROCEDURE — 82330 ASSAY OF CALCIUM: CPT

## 2017-05-05 PROCEDURE — 87186 SC STD MICRODIL/AGAR DIL: CPT

## 2017-05-05 PROCEDURE — 84100 ASSAY OF PHOSPHORUS: CPT

## 2017-05-05 PROCEDURE — 85014 HEMATOCRIT: CPT

## 2017-05-05 PROCEDURE — 82803 BLOOD GASES ANY COMBINATION: CPT

## 2017-05-05 PROCEDURE — 71045 X-RAY EXAM CHEST 1 VIEW: CPT

## 2017-05-05 RX ORDER — BICALUTAMIDE 50 MG/1
50 TABLET, FILM COATED ORAL DAILY
Qty: 0 | Refills: 0 | Status: DISCONTINUED | OUTPATIENT
Start: 2017-05-05 | End: 2017-05-05

## 2017-05-05 RX ADMIN — FAMOTIDINE 20 MILLIGRAM(S): 10 INJECTION INTRAVENOUS at 11:45

## 2017-05-05 RX ADMIN — GABAPENTIN 300 MILLIGRAM(S): 400 CAPSULE ORAL at 05:46

## 2017-05-05 RX ADMIN — HEPARIN SODIUM 5000 UNIT(S): 5000 INJECTION INTRAVENOUS; SUBCUTANEOUS at 05:53

## 2017-05-05 RX ADMIN — MORPHINE SULFATE 4 MILLIGRAM(S): 50 CAPSULE, EXTENDED RELEASE ORAL at 13:00

## 2017-05-05 RX ADMIN — MORPHINE SULFATE 4 MILLIGRAM(S): 50 CAPSULE, EXTENDED RELEASE ORAL at 12:26

## 2017-05-05 RX ADMIN — GABAPENTIN 300 MILLIGRAM(S): 400 CAPSULE ORAL at 14:54

## 2017-05-05 RX ADMIN — AMLODIPINE BESYLATE 5 MILLIGRAM(S): 2.5 TABLET ORAL at 05:45

## 2017-05-05 RX ADMIN — Medication 100 MILLIGRAM(S): at 06:34

## 2017-05-05 RX ADMIN — IMATINIB MESYLATE 400 MILLIGRAM(S): 400 TABLET, FILM COATED ORAL at 11:46

## 2017-05-05 RX ADMIN — CLOPIDOGREL BISULFATE 75 MILLIGRAM(S): 75 TABLET, FILM COATED ORAL at 11:45

## 2017-05-05 RX ADMIN — Medication 81 MILLIGRAM(S): at 11:45

## 2017-05-05 RX ADMIN — LISINOPRIL 2.5 MILLIGRAM(S): 2.5 TABLET ORAL at 05:45

## 2017-05-17 ENCOUNTER — APPOINTMENT (OUTPATIENT)
Dept: GASTROENTEROLOGY | Facility: CLINIC | Age: 72
End: 2017-05-17

## 2017-06-07 ENCOUNTER — INPATIENT (INPATIENT)
Facility: HOSPITAL | Age: 72
LOS: 6 days | Discharge: HOME | DRG: 689 | End: 2017-06-14
Attending: INTERNAL MEDICINE | Admitting: INTERNAL MEDICINE
Payer: MEDICARE

## 2017-06-07 VITALS
DIASTOLIC BLOOD PRESSURE: 74 MMHG | SYSTOLIC BLOOD PRESSURE: 122 MMHG | RESPIRATION RATE: 16 BRPM | TEMPERATURE: 99 F | HEART RATE: 80 BPM

## 2017-06-07 DIAGNOSIS — I63.9 CEREBRAL INFARCTION, UNSPECIFIED: ICD-10-CM

## 2017-06-07 DIAGNOSIS — N39.0 URINARY TRACT INFECTION, SITE NOT SPECIFIED: ICD-10-CM

## 2017-06-07 DIAGNOSIS — D49.0 NEOPLASM OF UNSPECIFIED BEHAVIOR OF DIGESTIVE SYSTEM: Chronic | ICD-10-CM

## 2017-06-07 DIAGNOSIS — R53.1 WEAKNESS: ICD-10-CM

## 2017-06-07 DIAGNOSIS — R64 CACHEXIA: ICD-10-CM

## 2017-06-07 DIAGNOSIS — I25.10 ATHEROSCLEROTIC HEART DISEASE OF NATIVE CORONARY ARTERY WITHOUT ANGINA PECTORIS: ICD-10-CM

## 2017-06-07 DIAGNOSIS — K25.2 ACUTE GASTRIC ULCER WITH BOTH HEMORRHAGE AND PERFORATION: Chronic | ICD-10-CM

## 2017-06-07 DIAGNOSIS — C49.A0 GASTROINTESTINAL STROMAL TUMOR, UNSPECIFIED SITE: ICD-10-CM

## 2017-06-07 DIAGNOSIS — C61 MALIGNANT NEOPLASM OF PROSTATE: ICD-10-CM

## 2017-06-07 LAB
ALBUMIN SERPL ELPH-MCNC: 4 G/DL — SIGNIFICANT CHANGE UP (ref 3.3–5)
ALP SERPL-CCNC: 89 U/L — SIGNIFICANT CHANGE UP (ref 40–120)
ALT FLD-CCNC: 9 U/L RC — LOW (ref 10–45)
ANION GAP SERPL CALC-SCNC: 15 MMOL/L — SIGNIFICANT CHANGE UP (ref 5–17)
APPEARANCE UR: ABNORMAL
APTT BLD: 29.8 SEC — SIGNIFICANT CHANGE UP (ref 27.5–37.4)
AST SERPL-CCNC: 18 U/L — SIGNIFICANT CHANGE UP (ref 10–40)
BACTERIA # UR AUTO: ABNORMAL /HPF
BASOPHILS # BLD AUTO: 0 K/UL — SIGNIFICANT CHANGE UP (ref 0–0.2)
BASOPHILS NFR BLD AUTO: 0.3 % — SIGNIFICANT CHANGE UP (ref 0–2)
BILIRUB SERPL-MCNC: 1.2 MG/DL — SIGNIFICANT CHANGE UP (ref 0.2–1.2)
BILIRUB UR-MCNC: NEGATIVE — SIGNIFICANT CHANGE UP
BUN SERPL-MCNC: 20 MG/DL — SIGNIFICANT CHANGE UP (ref 7–23)
CALCIUM SERPL-MCNC: 9.7 MG/DL — SIGNIFICANT CHANGE UP (ref 8.4–10.5)
CHLORIDE SERPL-SCNC: 101 MMOL/L — SIGNIFICANT CHANGE UP (ref 96–108)
CK MB CFR SERPL CALC: 1 NG/ML — SIGNIFICANT CHANGE UP (ref 0–6.7)
CK SERPL-CCNC: 54 U/L — SIGNIFICANT CHANGE UP (ref 30–200)
CO2 SERPL-SCNC: 24 MMOL/L — SIGNIFICANT CHANGE UP (ref 22–31)
COLOR SPEC: SIGNIFICANT CHANGE UP
CREAT SERPL-MCNC: 1.28 MG/DL — SIGNIFICANT CHANGE UP (ref 0.5–1.3)
DIFF PNL FLD: ABNORMAL
EOSINOPHIL # BLD AUTO: 0.3 K/UL — SIGNIFICANT CHANGE UP (ref 0–0.5)
EOSINOPHIL NFR BLD AUTO: 3.8 % — SIGNIFICANT CHANGE UP (ref 0–6)
EPI CELLS # UR: SIGNIFICANT CHANGE UP /HPF
GAS PNL BLDV: SIGNIFICANT CHANGE UP
GLUCOSE SERPL-MCNC: 109 MG/DL — HIGH (ref 70–99)
GLUCOSE UR QL: NEGATIVE — SIGNIFICANT CHANGE UP
HCT VFR BLD CALC: 38 % — LOW (ref 39–50)
HGB BLD-MCNC: 12.3 G/DL — LOW (ref 13–17)
INR BLD: 1.14 RATIO — SIGNIFICANT CHANGE UP (ref 0.88–1.16)
KETONES UR-MCNC: NEGATIVE — SIGNIFICANT CHANGE UP
LEUKOCYTE ESTERASE UR-ACNC: ABNORMAL
LYMPHOCYTES # BLD AUTO: 1.9 K/UL — SIGNIFICANT CHANGE UP (ref 1–3.3)
LYMPHOCYTES # BLD AUTO: 21.6 % — SIGNIFICANT CHANGE UP (ref 13–44)
MCHC RBC-ENTMCNC: 32.3 GM/DL — SIGNIFICANT CHANGE UP (ref 32–36)
MCHC RBC-ENTMCNC: 33.4 PG — SIGNIFICANT CHANGE UP (ref 27–34)
MCV RBC AUTO: 104 FL — HIGH (ref 80–100)
MONOCYTES # BLD AUTO: 0.7 K/UL — SIGNIFICANT CHANGE UP (ref 0–0.9)
MONOCYTES NFR BLD AUTO: 8.1 % — SIGNIFICANT CHANGE UP (ref 2–14)
NEUTROPHILS # BLD AUTO: 5.8 K/UL — SIGNIFICANT CHANGE UP (ref 1.8–7.4)
NEUTROPHILS NFR BLD AUTO: 66.2 % — SIGNIFICANT CHANGE UP (ref 43–77)
NITRITE UR-MCNC: NEGATIVE — SIGNIFICANT CHANGE UP
PH UR: 6.5 — SIGNIFICANT CHANGE UP (ref 5–8)
PLATELET # BLD AUTO: 178 K/UL — SIGNIFICANT CHANGE UP (ref 150–400)
POTASSIUM SERPL-MCNC: 4.3 MMOL/L — SIGNIFICANT CHANGE UP (ref 3.5–5.3)
POTASSIUM SERPL-SCNC: 4.3 MMOL/L — SIGNIFICANT CHANGE UP (ref 3.5–5.3)
PROT SERPL-MCNC: 8.2 G/DL — SIGNIFICANT CHANGE UP (ref 6–8.3)
PROT UR-MCNC: 30 MG/DL
PROTHROM AB SERPL-ACNC: 12.3 SEC — SIGNIFICANT CHANGE UP (ref 9.8–12.7)
RBC # BLD: 3.67 M/UL — LOW (ref 4.2–5.8)
RBC # FLD: 11.7 % — SIGNIFICANT CHANGE UP (ref 10.3–14.5)
RBC CASTS # UR COMP ASSIST: ABNORMAL /HPF (ref 0–2)
SODIUM SERPL-SCNC: 140 MMOL/L — SIGNIFICANT CHANGE UP (ref 135–145)
SP GR SPEC: 1.01 — LOW (ref 1.01–1.02)
TROPONIN T SERPL-MCNC: <0.01 NG/ML — SIGNIFICANT CHANGE UP (ref 0–0.06)
UROBILINOGEN FLD QL: NEGATIVE — SIGNIFICANT CHANGE UP
WBC # BLD: 8.7 K/UL — SIGNIFICANT CHANGE UP (ref 3.8–10.5)
WBC # FLD AUTO: 8.7 K/UL — SIGNIFICANT CHANGE UP (ref 3.8–10.5)
WBC UR QL: >50 /HPF (ref 0–5)

## 2017-06-07 PROCEDURE — 99285 EMERGENCY DEPT VISIT HI MDM: CPT | Mod: 25

## 2017-06-07 PROCEDURE — 93010 ELECTROCARDIOGRAM REPORT: CPT

## 2017-06-07 PROCEDURE — 71010: CPT | Mod: 26

## 2017-06-07 PROCEDURE — 74177 CT ABD & PELVIS W/CONTRAST: CPT | Mod: 26

## 2017-06-07 RX ORDER — ASPIRIN/CALCIUM CARB/MAGNESIUM 324 MG
81 TABLET ORAL DAILY
Qty: 0 | Refills: 0 | Status: DISCONTINUED | OUTPATIENT
Start: 2017-06-07 | End: 2017-06-10

## 2017-06-07 RX ORDER — SODIUM CHLORIDE 9 MG/ML
1000 INJECTION, SOLUTION INTRAVENOUS
Qty: 0 | Refills: 0 | Status: DISCONTINUED | OUTPATIENT
Start: 2017-06-07 | End: 2017-06-14

## 2017-06-07 RX ORDER — HEPARIN SODIUM 5000 [USP'U]/ML
5000 INJECTION INTRAVENOUS; SUBCUTANEOUS EVERY 12 HOURS
Qty: 0 | Refills: 0 | Status: DISCONTINUED | OUTPATIENT
Start: 2017-06-07 | End: 2017-06-14

## 2017-06-07 RX ORDER — AMLODIPINE BESYLATE 2.5 MG/1
5 TABLET ORAL DAILY
Qty: 0 | Refills: 0 | Status: DISCONTINUED | OUTPATIENT
Start: 2017-06-07 | End: 2017-06-14

## 2017-06-07 RX ORDER — CEFTRIAXONE 500 MG/1
INJECTION, POWDER, FOR SOLUTION INTRAMUSCULAR; INTRAVENOUS
Qty: 0 | Refills: 0 | Status: DISCONTINUED | OUTPATIENT
Start: 2017-06-07 | End: 2017-06-10

## 2017-06-07 RX ORDER — GABAPENTIN 400 MG/1
300 CAPSULE ORAL THREE TIMES A DAY
Qty: 0 | Refills: 0 | Status: DISCONTINUED | OUTPATIENT
Start: 2017-06-07 | End: 2017-06-14

## 2017-06-07 RX ORDER — MIRTAZAPINE 45 MG/1
15 TABLET, ORALLY DISINTEGRATING ORAL AT BEDTIME
Qty: 0 | Refills: 0 | Status: DISCONTINUED | OUTPATIENT
Start: 2017-06-07 | End: 2017-06-14

## 2017-06-07 RX ORDER — POLYETHYLENE GLYCOL 3350 17 G/17G
17 POWDER, FOR SOLUTION ORAL DAILY
Qty: 0 | Refills: 0 | Status: DISCONTINUED | OUTPATIENT
Start: 2017-06-07 | End: 2017-06-14

## 2017-06-07 RX ORDER — CLOPIDOGREL BISULFATE 75 MG/1
75 TABLET, FILM COATED ORAL DAILY
Qty: 0 | Refills: 0 | Status: DISCONTINUED | OUTPATIENT
Start: 2017-06-07 | End: 2017-06-14

## 2017-06-07 RX ORDER — MORPHINE SULFATE 50 MG/1
4 CAPSULE, EXTENDED RELEASE ORAL ONCE
Qty: 0 | Refills: 0 | Status: DISCONTINUED | OUTPATIENT
Start: 2017-06-07 | End: 2017-06-07

## 2017-06-07 RX ORDER — ACETAMINOPHEN 500 MG
650 TABLET ORAL EVERY 6 HOURS
Qty: 0 | Refills: 0 | Status: DISCONTINUED | OUTPATIENT
Start: 2017-06-07 | End: 2017-06-14

## 2017-06-07 RX ORDER — QUETIAPINE FUMARATE 200 MG/1
25 TABLET, FILM COATED ORAL DAILY
Qty: 0 | Refills: 0 | Status: DISCONTINUED | OUTPATIENT
Start: 2017-06-07 | End: 2017-06-14

## 2017-06-07 RX ORDER — CEFTRIAXONE 500 MG/1
1 INJECTION, POWDER, FOR SOLUTION INTRAMUSCULAR; INTRAVENOUS ONCE
Qty: 0 | Refills: 0 | Status: COMPLETED | OUTPATIENT
Start: 2017-06-07 | End: 2017-06-07

## 2017-06-07 RX ORDER — CEFTRIAXONE 500 MG/1
1 INJECTION, POWDER, FOR SOLUTION INTRAMUSCULAR; INTRAVENOUS EVERY 24 HOURS
Qty: 0 | Refills: 0 | Status: DISCONTINUED | OUTPATIENT
Start: 2017-06-08 | End: 2017-06-10

## 2017-06-07 RX ORDER — ATORVASTATIN CALCIUM 80 MG/1
80 TABLET, FILM COATED ORAL AT BEDTIME
Qty: 0 | Refills: 0 | Status: DISCONTINUED | OUTPATIENT
Start: 2017-06-07 | End: 2017-06-14

## 2017-06-07 RX ORDER — LISINOPRIL 2.5 MG/1
2.5 TABLET ORAL DAILY
Qty: 0 | Refills: 0 | Status: DISCONTINUED | OUTPATIENT
Start: 2017-06-07 | End: 2017-06-13

## 2017-06-07 RX ORDER — PANTOPRAZOLE SODIUM 20 MG/1
40 TABLET, DELAYED RELEASE ORAL DAILY
Qty: 0 | Refills: 0 | Status: DISCONTINUED | OUTPATIENT
Start: 2017-06-07 | End: 2017-06-14

## 2017-06-07 RX ORDER — SODIUM CHLORIDE 9 MG/ML
1000 INJECTION INTRAMUSCULAR; INTRAVENOUS; SUBCUTANEOUS ONCE
Qty: 0 | Refills: 0 | Status: COMPLETED | OUTPATIENT
Start: 2017-06-07 | End: 2017-06-07

## 2017-06-07 RX ORDER — HYDROMORPHONE HYDROCHLORIDE 2 MG/ML
1 INJECTION INTRAMUSCULAR; INTRAVENOUS; SUBCUTANEOUS EVERY 6 HOURS
Qty: 0 | Refills: 0 | Status: DISCONTINUED | OUTPATIENT
Start: 2017-06-07 | End: 2017-06-11

## 2017-06-07 RX ORDER — METOPROLOL TARTRATE 50 MG
100 TABLET ORAL
Qty: 0 | Refills: 0 | Status: DISCONTINUED | OUTPATIENT
Start: 2017-06-07 | End: 2017-06-14

## 2017-06-07 RX ORDER — ACETAMINOPHEN 500 MG
1000 TABLET ORAL ONCE
Qty: 0 | Refills: 0 | Status: COMPLETED | OUTPATIENT
Start: 2017-06-07 | End: 2017-06-07

## 2017-06-07 RX ADMIN — HEPARIN SODIUM 5000 UNIT(S): 5000 INJECTION INTRAVENOUS; SUBCUTANEOUS at 19:39

## 2017-06-07 RX ADMIN — ATORVASTATIN CALCIUM 80 MILLIGRAM(S): 80 TABLET, FILM COATED ORAL at 21:19

## 2017-06-07 RX ADMIN — LISINOPRIL 2.5 MILLIGRAM(S): 2.5 TABLET ORAL at 21:19

## 2017-06-07 RX ADMIN — Medication 1000 MILLIGRAM(S): at 13:04

## 2017-06-07 RX ADMIN — Medication 81 MILLIGRAM(S): at 21:19

## 2017-06-07 RX ADMIN — AMLODIPINE BESYLATE 5 MILLIGRAM(S): 2.5 TABLET ORAL at 21:19

## 2017-06-07 RX ADMIN — CEFTRIAXONE 100 GRAM(S): 500 INJECTION, POWDER, FOR SOLUTION INTRAMUSCULAR; INTRAVENOUS at 14:02

## 2017-06-07 RX ADMIN — MORPHINE SULFATE 4 MILLIGRAM(S): 50 CAPSULE, EXTENDED RELEASE ORAL at 14:33

## 2017-06-07 RX ADMIN — GABAPENTIN 300 MILLIGRAM(S): 400 CAPSULE ORAL at 21:19

## 2017-06-07 RX ADMIN — Medication 400 MILLIGRAM(S): at 12:25

## 2017-06-07 RX ADMIN — SODIUM CHLORIDE 1000 MILLILITER(S): 9 INJECTION INTRAMUSCULAR; INTRAVENOUS; SUBCUTANEOUS at 11:14

## 2017-06-07 RX ADMIN — Medication 100 MILLIGRAM(S): at 19:39

## 2017-06-07 NOTE — ED PROVIDER NOTE - MEDICAL DECISION MAKING DETAILS
ARMY:  PT presents with generalize weakness, malaise, decreased PO c/w finding of UTI. PT has no specific pain complaints.  Stable for admission for UTI, generalized malaise/weakness, decreased PO.

## 2017-06-07 NOTE — ED PROVIDER NOTE - ATTENDING CONTRIBUTION TO CARE
Attending MD Raymond.  Agree with above.  PT is a 72 yr old male with past medical hx of BPH, HLD, stroke, syncope, HNT, depression, gastric tumor, previous gastric ulcer with hemorrhage and perforation presenting to ED with complaint of fever, weakness and increased urination.  PT found to have evidence of gastric CA recurrence at the partial gastrectomy margin with unchanged liver mets on scan 2 mos ago.  Pt is not yet on chemotherapy.  Per wife and home health aide he’s been having 3 days progressive increased urinary frequency of dark urine, increased generalized weakness, markedly decreased PO.  PT usually takes PO and also gets supplementation by gastric tube for nutritional supplement/medications.  Pt has continued to receive GI tube supplementation/meds however has had progressive decrease to little/no PO by mouth over last 3 days accompanying increased generalized malaise/frequent dark urination.  Attempt at straight cath/cude.  Urology called.

## 2017-06-07 NOTE — ED ADULT NURSE NOTE - CHPI ED SYMPTOMS NEG
no nausea/no chills/no tingling/no vomiting/no pain/no decreased eating/drinking/no dizziness/no numbness

## 2017-06-07 NOTE — H&P ADULT - HISTORY OF PRESENT ILLNESS
72yom PMHx of CVA with R sided residual weakness, PEG tube, Hypertension, prostate and stomach cancer on oral chemo through PEG tube, hyperlipidemia presents to ED with family for past 2 days with decrease PO intake, refusing to get out of bed and with freq urination along with dark yellow urine. No vomiting. no fever or chills. Pt baseline usually verbal and sitting in a chair. Per spouse, stopped giving oral chemo pills for past 10 days because made the pt nauseous and vomit.

## 2017-06-07 NOTE — CONSULT NOTE ADULT - SUBJECTIVE AND OBJECTIVE BOX
HPI:   Patient is a 72y male with hx Gastric CA on chemo, CVA, depression, now presents with lethargy, failure to thrive, poor PO intake. Pt is poor historian and unable to provide any history. he is uncomfortable due to chronic abd pain. He had abnormal UA with pyuria and was started on IV ceftriaxone. ID eval requested for further abx management    REVIEW OF SYSTEMS:  All other review of systems negative (Comprehensive ROS)    PAST MEDICAL & SURGICAL HISTORY:  Prostate hypertrophy  HLD (hyperlipidemia)  Agitation  Depression  Stroke  Syncope: 2013, 2010  HTN (hypertension)  Gastric tumor  Gastric ulcer with hemorrhage, perforation, and obstruction, acute  No Past Surgical History      Allergies    No Known Allergies    Intolerances        Antimicrobials Day #    cefTRIAXone   IVPB  IV Intermittent   cefTRIAXone   IVPB 1Gram(s) IV Intermittent once    Other Medications:  heparin  Injectable 5000Unit(s) SubCutaneous every 12 hours  dextrose 5% + sodium chloride 0.9%. 1000milliLiter(s) IV Continuous <Continuous>  HYDROmorphone  Injectable 1milliGRAM(s) IV Push every 6 hours PRN  pantoprazole  Injectable 40milliGRAM(s) IV Push daily  acetaminophen   Tablet 650milliGRAM(s) Oral every 6 hours PRN  aspirin enteric coated 81milliGRAM(s) Oral daily  oxyCODONE  5 mG/acetaminophen 325 mG 2Tablet(s) Oral every 6 hours PRN  lisinopril 2.5milliGRAM(s) Oral daily  gabapentin 300milliGRAM(s) Oral three times a day  mirtazapine 15milliGRAM(s) Oral at bedtime PRN  atorvastatin 80milliGRAM(s) Oral at bedtime  clopidogrel Tablet 75milliGRAM(s) Oral daily  QUEtiapine 25milliGRAM(s) Oral daily  metoprolol 100milliGRAM(s) Oral two times a day  amLODIPine   Tablet 5milliGRAM(s) Oral daily  polyethylene glycol 3350 17Gram(s) Oral daily      FAMILY HISTORY:  No pertinent family history in first degree relatives      SOCIAL HISTORY:  Smoking:  unable to obtain   ETOH: ?      T(F): 97.8, Max: 99.4 (-07 @ 10:46)  HR: 93  BP: 160/91  RR: 20  SpO2: 99%  Wt(kg): --    PHYSICAL EXAM:  General:pale, chronically ill appearing  Eyes:  anicteric, no conjunctival injection, no discharge  Oropharynx: no lesions or injection 	  Neck: supple, without adenopathy  Lungs: clear to auscultation  Heart: regular rate and rhythm; no murmur, rubs or gallops  Abdomen: soft,distended, diffusely tender, without mass or organomegaly  +PEG  Skin: no lesions  Extremities: no clubbing, cyanosis, or edema  Neurologic: weak, non-verbal    LAB RESULTS:                        12.3   8.7   )-----------( 178      ( 2017 11:19 )             38.0     06-07    140  |  101  |  20  ----------------------------<  109<H>  4.3   |  24  |  1.28    Ca    9.7      2017 11:19    TPro  8.2  /  Alb  4.0  /  TBili  1.2  /  DBili  x   /  AST  18  /  ALT  9<L>  /  AlkPhos  89  06-07    LIVER FUNCTIONS - ( 2017 11:19 )  Alb: 4.0 g/dL / Pro: 8.2 g/dL / ALK PHOS: 89 U/L / ALT: 9 U/L RC / AST: 18 U/L / GGT: x           Urinalysis Basic - ( 2017 13:06 )    Color: PL Yellow / Appearance: SL Turbid / S.008 / pH: x  Gluc: x / Ketone: Negative  / Bili: Negative / Urobili: Negative   Blood: x / Protein: 30 mg/dL / Nitrite: Negative   Leuk Esterase: Large / RBC: 5-10 /HPF / WBC >50 /HPF   Sq Epi: x / Non Sq Epi: OCC /HPF / Bacteria: Few /HPF        MICROBIOLOGY REVIEWED:    RADIOLOGY REVIEWED:

## 2017-06-07 NOTE — ED PROVIDER NOTE - OBJECTIVE STATEMENT
72 year old male with PMHx of PEG tube, Hypertension, prostate and stomach cancer, hyperlipidemia, and stroke presents to ED c/o two episodes of vomiting this morning after receiving oral chemo 72yom PMHx of CVA with R sided residual weakness, PEG tube, Hypertension, prostate and stomach cancer on oral chemo through PEG tube, hyperlipidemia presents to ED with family for past 2 days with decrease PO intake, refusing to get out of bed and with freq urination along with dark yellow urine. No vomiting. no fever or chills. Pt baseline usually verbal and sitting in a chair. Per spouse, stopped giving oral chemo pills for past 10 days because made the pt nauseous and vomit.

## 2017-06-07 NOTE — ED ADULT NURSE REASSESSMENT NOTE - NS ED NURSE REASSESS COMMENT FT1
Urinary catherization attempted 2 times with straight cath kit and cudae with 2 RNs present using sterile technique. Urology contacted. MD notified.

## 2017-06-07 NOTE — ED PROVIDER NOTE - CARE PLAN
Principal Discharge DX:	Generalized weakness Principal Discharge DX:	Generalized weakness  Secondary Diagnosis:	UTI (urinary tract infection) due to Enterococcus

## 2017-06-07 NOTE — ED ADULT NURSE NOTE - OBJECTIVE STATEMENT
72 year old male presented to ED via EMS with c/o of weakness and fever. Pt temperature currently 99.4 orally. Pt denies CP, SOB, nausea/vomiting, cough, chills. Pt is not diaphoretic. Pt denies pain. Pt a&ox3, lungs clear, heart sounds regular. Abdomen soft nontender nondistended. Pt has feeding tube in epigastric area. Skin intact. IV in right AC 20G and patent. Pt currently resting in bed with family at bedside. 72 year old male presented to ED via EMS with c/o of weakness and fever. Pt temperature currently 99.4 orally. Pt denies CP, SOB, nausea/vomiting, cough, chills. Pt is not diaphoretic. Pt denies pain. Pt a&ox3, lungs clear, heart sounds regular. Abdomen soft nontender nondistended. Pt has feeding tube in epigastric area. Pt has not had BM in past 3 days. Pt denies burning/pain on urination. Pt has difficulty urinating. Skin intact. IV in right AC 20G and patent. Pt currently resting in bed with family at bedside.

## 2017-06-08 DIAGNOSIS — C49.A2 GASTROINTESTINAL STROMAL TUMOR OF STOMACH: ICD-10-CM

## 2017-06-08 LAB
ANION GAP SERPL CALC-SCNC: 13 MMOL/L — SIGNIFICANT CHANGE UP (ref 5–17)
BUN SERPL-MCNC: 13 MG/DL — SIGNIFICANT CHANGE UP (ref 7–23)
CALCIUM SERPL-MCNC: 9 MG/DL — SIGNIFICANT CHANGE UP (ref 8.4–10.5)
CHLORIDE SERPL-SCNC: 102 MMOL/L — SIGNIFICANT CHANGE UP (ref 96–108)
CO2 SERPL-SCNC: 24 MMOL/L — SIGNIFICANT CHANGE UP (ref 22–31)
CREAT SERPL-MCNC: 0.99 MG/DL — SIGNIFICANT CHANGE UP (ref 0.5–1.3)
GLUCOSE SERPL-MCNC: 93 MG/DL — SIGNIFICANT CHANGE UP (ref 70–99)
HCT VFR BLD CALC: 34.8 % — LOW (ref 39–50)
HGB BLD-MCNC: 11.8 G/DL — LOW (ref 13–17)
MCHC RBC-ENTMCNC: 34 GM/DL — SIGNIFICANT CHANGE UP (ref 32–36)
MCHC RBC-ENTMCNC: 34.8 PG — HIGH (ref 27–34)
MCV RBC AUTO: 102 FL — HIGH (ref 80–100)
PLATELET # BLD AUTO: 185 K/UL — SIGNIFICANT CHANGE UP (ref 150–400)
POTASSIUM SERPL-MCNC: 3.8 MMOL/L — SIGNIFICANT CHANGE UP (ref 3.5–5.3)
POTASSIUM SERPL-SCNC: 3.8 MMOL/L — SIGNIFICANT CHANGE UP (ref 3.5–5.3)
RBC # BLD: 3.4 M/UL — LOW (ref 4.2–5.8)
RBC # FLD: 11.6 % — SIGNIFICANT CHANGE UP (ref 10.3–14.5)
SODIUM SERPL-SCNC: 139 MMOL/L — SIGNIFICANT CHANGE UP (ref 135–145)
TROPONIN T SERPL-MCNC: <0.01 NG/ML — SIGNIFICANT CHANGE UP (ref 0–0.06)
WBC # BLD: 5 K/UL — SIGNIFICANT CHANGE UP (ref 3.8–10.5)
WBC # FLD AUTO: 5 K/UL — SIGNIFICANT CHANGE UP (ref 3.8–10.5)

## 2017-06-08 RX ORDER — HYDROMORPHONE HYDROCHLORIDE 2 MG/ML
0.5 INJECTION INTRAMUSCULAR; INTRAVENOUS; SUBCUTANEOUS ONCE
Qty: 0 | Refills: 0 | Status: DISCONTINUED | OUTPATIENT
Start: 2017-06-08 | End: 2017-06-08

## 2017-06-08 RX ADMIN — HYDROMORPHONE HYDROCHLORIDE 0.5 MILLIGRAM(S): 2 INJECTION INTRAMUSCULAR; INTRAVENOUS; SUBCUTANEOUS at 20:22

## 2017-06-08 RX ADMIN — CLOPIDOGREL BISULFATE 75 MILLIGRAM(S): 75 TABLET, FILM COATED ORAL at 12:44

## 2017-06-08 RX ADMIN — HEPARIN SODIUM 5000 UNIT(S): 5000 INJECTION INTRAVENOUS; SUBCUTANEOUS at 05:46

## 2017-06-08 RX ADMIN — HYDROMORPHONE HYDROCHLORIDE 1 MILLIGRAM(S): 2 INJECTION INTRAMUSCULAR; INTRAVENOUS; SUBCUTANEOUS at 18:32

## 2017-06-08 RX ADMIN — GABAPENTIN 300 MILLIGRAM(S): 400 CAPSULE ORAL at 13:03

## 2017-06-08 RX ADMIN — AMLODIPINE BESYLATE 5 MILLIGRAM(S): 2.5 TABLET ORAL at 05:46

## 2017-06-08 RX ADMIN — HYDROMORPHONE HYDROCHLORIDE 0.5 MILLIGRAM(S): 2 INJECTION INTRAMUSCULAR; INTRAVENOUS; SUBCUTANEOUS at 19:52

## 2017-06-08 RX ADMIN — Medication 100 MILLIGRAM(S): at 05:46

## 2017-06-08 RX ADMIN — HYDROMORPHONE HYDROCHLORIDE 1 MILLIGRAM(S): 2 INJECTION INTRAMUSCULAR; INTRAVENOUS; SUBCUTANEOUS at 13:03

## 2017-06-08 RX ADMIN — LISINOPRIL 2.5 MILLIGRAM(S): 2.5 TABLET ORAL at 05:46

## 2017-06-08 RX ADMIN — HYDROMORPHONE HYDROCHLORIDE 1 MILLIGRAM(S): 2 INJECTION INTRAMUSCULAR; INTRAVENOUS; SUBCUTANEOUS at 13:18

## 2017-06-08 RX ADMIN — Medication 81 MILLIGRAM(S): at 12:45

## 2017-06-08 RX ADMIN — QUETIAPINE FUMARATE 25 MILLIGRAM(S): 200 TABLET, FILM COATED ORAL at 12:44

## 2017-06-08 RX ADMIN — CEFTRIAXONE 100 GRAM(S): 500 INJECTION, POWDER, FOR SOLUTION INTRAMUSCULAR; INTRAVENOUS at 15:26

## 2017-06-08 RX ADMIN — GABAPENTIN 300 MILLIGRAM(S): 400 CAPSULE ORAL at 22:46

## 2017-06-08 RX ADMIN — HYDROMORPHONE HYDROCHLORIDE 1 MILLIGRAM(S): 2 INJECTION INTRAMUSCULAR; INTRAVENOUS; SUBCUTANEOUS at 18:16

## 2017-06-08 RX ADMIN — PANTOPRAZOLE SODIUM 40 MILLIGRAM(S): 20 TABLET, DELAYED RELEASE ORAL at 12:45

## 2017-06-08 RX ADMIN — HEPARIN SODIUM 5000 UNIT(S): 5000 INJECTION INTRAVENOUS; SUBCUTANEOUS at 17:04

## 2017-06-08 RX ADMIN — GABAPENTIN 300 MILLIGRAM(S): 400 CAPSULE ORAL at 05:46

## 2017-06-08 RX ADMIN — SODIUM CHLORIDE 75 MILLILITER(S): 9 INJECTION, SOLUTION INTRAVENOUS at 12:45

## 2017-06-08 RX ADMIN — ATORVASTATIN CALCIUM 80 MILLIGRAM(S): 80 TABLET, FILM COATED ORAL at 22:45

## 2017-06-08 RX ADMIN — POLYETHYLENE GLYCOL 3350 17 GRAM(S): 17 POWDER, FOR SOLUTION ORAL at 12:44

## 2017-06-08 RX ADMIN — Medication 100 MILLIGRAM(S): at 17:04

## 2017-06-08 NOTE — CONSULT NOTE ADULT - SUBJECTIVE AND OBJECTIVE BOX
Patient is a 72y old  Male who presents with a chief complaint of poor po intake, chills (07 Jun 2017 15:28)      HPI:  72yom PMHx of CVA with R sided residual weakness, PEG tube, Hypertension, prostate and stomach cancer on oral chemo through PEG tube, hyperlipidemia presents to ED with family for past 2 days with decrease PO intake, refusing to get out of bed and with freq urination along with dark yellow urine. No vomiting. no fever or chills. Pt baseline usually verbal and sitting in a chair. Per spouse, stopped giving oral chemo pills for past 10 days because made the pt nauseous and vomit. (07 Jun 2017 15:28)       ROS:  Negative except for:    PAST MEDICAL & SURGICAL HISTORY:  Prostate hypertrophy  HLD (hyperlipidemia)  Agitation  Depression  Stroke  Syncope: 2/2013, 9/2010  HTN (hypertension)  Gastric tumor  Gastric ulcer with hemorrhage, perforation, and obstruction, acute  No Past Surgical History      SOCIAL HISTORY:    FAMILY HISTORY:  No pertinent family history in first degree relatives      MEDICATIONS  (STANDING):  heparin  Injectable 5000Unit(s) SubCutaneous every 12 hours  dextrose 5% + sodium chloride 0.9%. 1000milliLiter(s) IV Continuous <Continuous>  pantoprazole  Injectable 40milliGRAM(s) IV Push daily  cefTRIAXone   IVPB  IV Intermittent   aspirin enteric coated 81milliGRAM(s) Oral daily  lisinopril 2.5milliGRAM(s) Oral daily  gabapentin 300milliGRAM(s) Oral three times a day  cefTRIAXone   IVPB 1Gram(s) IV Intermittent every 24 hours  atorvastatin 80milliGRAM(s) Oral at bedtime  clopidogrel Tablet 75milliGRAM(s) Oral daily  QUEtiapine 25milliGRAM(s) Oral daily  metoprolol 100milliGRAM(s) Oral two times a day  amLODIPine   Tablet 5milliGRAM(s) Oral daily  polyethylene glycol 3350 17Gram(s) Oral daily    MEDICATIONS  (PRN):  HYDROmorphone  Injectable 1milliGRAM(s) IV Push every 6 hours PRN Severe Pain (7 - 10)  acetaminophen   Tablet 650milliGRAM(s) Oral every 6 hours PRN For Temp greater than 38.5 C (101.3 F)  oxyCODONE  5 mG/acetaminophen 325 mG 2Tablet(s) Oral every 6 hours PRN Moderate Pain (4 - 6)  mirtazapine 15milliGRAM(s) Oral at bedtime PRN sleep      Allergies    No Known Allergies    Intolerances        Vital Signs Last 24 Hrs  T(C): 37, Max: 37.4 (06-07 @ 10:46)  T(F): 98.6, Max: 99.4 (06-07 @ 10:46)  HR: 62 (61 - 93)  BP: 126/79 (122/74 - 191/80)  BP(mean): --  RR: 18 (16 - 20)  SpO2: 94% (94% - 99%)    PHYSICAL EXAM  General: adult in NAD  HEENT: clear oropharynx, anicteric sclera, pink conjunctiva  Neck: supple  CV: normal S1/S2 with no murmur rubs or gallops  Lungs: positive air movement b/l ant lungs,clear to auscultation, no wheezes, no rales  Abdomen: soft non-tender non-distended, no hepatosplenomegaly  Ext: no clubbing cyanosis or edema  Skin: no rashes and no petechiae  Neuro: alert and oriented X 4, no focal deficits      LABS:                          12.3   8.7   )-----------( 178      ( 07 Jun 2017 11:19 )             38.0   Patient is a 72y old  Male who presents with a chief complaint of poor po intake, chills (07 Jun 2017 15:28)  Hx of GIST tumor , on Gleevec, held for past 10 days  Vital Signs Last 24 Hrs  T(C): 37, Max: 37.4 (06-07 @ 10:46)  T(F): 98.6, Max: 99.4 (06-07 @ 10:46)  HR: 62 (61 - 93)  BP: 126/79 (122/74 - 191/80)  BP(mean): --  RR: 18 (16 - 20)  SpO2: 94% (94% - 99%)    PHYSICAL EXAM:    Constitutional:Alert in no distress    Eyes:neg.    ENMT:neg    Neck:supple    Respiratory:chest clear    Cardiovascular: neg.    Gastrointestinal:soft, benign, no organomegaly or tenderness, feeding gastrostomy in place    Extremities:No edema    Neurological:    Skin:neg    Lymph Nodes:no adenopathy    l  Mean Cell Volume : 104.0 fl  Mean Cell Hemoglobin : 33.4 pg  Mean Cell Hemoglobin Concentration : 32.3 gm/dL  Auto Neutrophil # : 5.8 K/uL  Auto Lymphocyte # : 1.9 K/uL  Auto Monocyte # : 0.7 K/uL  Auto Eosinophil # : 0.3 K/uL  Auto Basophil # : 0.0 K/uL  Auto Neutrophil % : 66.2 %  Auto Lymphocyte % : 21.6 %  Auto Monocyte % : 8.1 %  Auto Eosinophil % : 3.8 %  Auto Basophil % : 0.3 %      06-07    140  |  101  |  20  ----------------------------<  109<H>  4.3   |  24  |  1.28    Ca    9.7      07 Jun 2017 11:19    TPro  8.2  /  Alb  4.0  /  TBili  1.2  /  DBili  x   /  AST  18  /  ALT  9<L>  /  AlkPhos  89  06-07      PT/INR - ( 07 Jun 2017 11:20 )   PT: 12.3 sec;   INR: 1.14 ratio         PTT - ( 07 Jun 2017 11:20 )  PTT:29.8 sec      BLOOD SMEAR INTERPRETATION:       RADIOLOGY & ADDITIONAL STUDIES:  Chest xray  MPRESSION: Clear lungs.    Abd Ct - pending

## 2017-06-08 NOTE — CONSULT NOTE ADULT - SUBJECTIVE AND OBJECTIVE BOX
HPI:  72 year old male with Pmed/surg history as mentioned below admitted with decrease PO intake, refusing to get out of bed and with freq urination along with dark yellow urine. No vomiting. no fever or chills. Pt baseline usually verbal and sitting in a chair. Per spouse, stopped giving oral chemo pills for past 10 days because made the pt nauseous and vomit.       PAST MEDICAL & SURGICAL HISTORY:  Prostate hypertrophy  HLD (hyperlipidemia)  Agitation  Depression  Stroke  Syncope: 2/2013, 9/2010  HTN (hypertension)  Gastric tumor  Gastric ulcer with hemorrhage, perforation, and obstruction, acute  No Past Surgical History          PREVIOUS DIAGNOSTIC TESTING:    [x ] Echocardiogram:  Study Date: 4/22/2016  EF (Teicholtz): 62 %  ------------------------------------------------------------------------  Conclusions:  1.  Moderate aortic regurgitation.  2. Normal aortic root (Ao: 3.4 cm at the sinuses of  Valsalva). Technically difficult, however in certain views,  the descending aorta appears dilated. Suggest CT to further  evaluate.  3. Leftatrium not well visualized, probably normal. The  left atrium is extrinsically compressed by a tortuous aorta  vs a large hiatal hernia.  4. Increased relative wall thickness with normal left  ventricular mass index, consistent with concentric left  ventricular remodeling.  5. Normal left ventricular systolic function. No segmental  wall motion abnormalities. There is a false tendon in the  LV cavity (normal variant).  6. Bubble study was technically difficult. No definitive  bubbles seen crossing the interatrial septum.  *** Compared with echocardiogram of 5/3/2013, no  significant changes noted.  ------------------------------------------------------------------------    [ x ]  Catheterization:  Study date: 08/31/2010  Summary: Nonobstructive CAD. Normal LV function. At least moderate AI.    ------------------------------------------------------------------------    [x ] Stress Test:  	  STUDY DATE: 05/05/2013  IMPRESSIONS:Normal Study    MEDICATIONS:  MEDICATIONS  (STANDING):  heparin  Injectable 5000Unit(s) SubCutaneous every 12 hours  dextrose 5% + sodium chloride 0.9%. 1000milliLiter(s) IV Continuous <Continuous>  pantoprazole  Injectable 40milliGRAM(s) IV Push daily  cefTRIAXone   IVPB  IV Intermittent   aspirin enteric coated 81milliGRAM(s) Oral daily  lisinopril 2.5milliGRAM(s) Oral daily  gabapentin 300milliGRAM(s) Oral three times a day  cefTRIAXone   IVPB 1Gram(s) IV Intermittent every 24 hours  atorvastatin 80milliGRAM(s) Oral at bedtime  clopidogrel Tablet 75milliGRAM(s) Oral daily  QUEtiapine 25milliGRAM(s) Oral daily  metoprolol 100milliGRAM(s) Oral two times a day  amLODIPine   Tablet 5milliGRAM(s) Oral daily  polyethylene glycol 3350 17Gram(s) Oral daily      FAMILY HISTORY:  No pertinent family history in first degree relatives      SOCIAL HISTORY:    [ ] Non-smoker  [ ] Smoker  [ ] Alcohol    Allergies    No Known Allergies    Intolerances    	    REVIEW OF SYSTEMS:  CONSTITUTIONAL: No fever, weight loss, or fatigue  EYES: No eye pain, visual disturbances, or discharge  ENMT:  No difficulty hearing, tinnitus, vertigo; No sinus or throat pain  NECK: No pain or stiffness  RESPIRATORY: No cough, wheezing, chills or hemoptysis; No Shortness of Breath  CARDIOVASCULAR: No chest pain, palpitations, passing out, dizziness, or leg swelling  GASTROINTESTINAL: No abdominal or epigastric pain. No nausea, vomiting, or hematemesis; No diarrhea or constipation. No melena or hematochezia.  GENITOURINARY: No dysuria, frequency, hematuria, or incontinence  NEUROLOGICAL: No headaches, memory loss, loss of strength, numbness, or tremors  SKIN: No itching, burning, rashes, or lesions   	    [ ] All others negative	  [ ] Unable to obtain    PHYSICAL EXAM:  T(C): 36.9, Max: 37.4 (06-07 @ 10:46)  HR: 61 (61 - 93)  BP: 148/85 (122/74 - 191/80)  RR: 18 (16 - 20)  SpO2: 95% (95% - 99%)  Wt(kg): --  I&O's Summary    I & Os for current day (as of 08 Jun 2017 08:33)  =============================================  IN: 1125 ml / OUT: 300 ml / NET: 825 ml      Appearance: Normal	  Psychiatry: A & O x 3, Mood & affect appropriate  HEENT:   Normal oral mucosa, PERRL, EOMI	  Lymphatic: No lymphadenopathy  Cardiovascular: Normal S1 S2,RRR, No JVD, No murmurs  Respiratory: Lungs clear to auscultation	  Gastrointestinal:  Soft, Non-tender, + BS	  Skin: No rashes, No ecchymoses, No cyanosis	  Neurologic: Non-focal  Extremities: Normal range of motion, No clubbing, cyanosis or edema  Vascular: Peripheral pulses palpable 2+ bilaterally    	    ECG:  NSR at 73 bpm, LAD, LVH, TWI   	  RADIOLOGY:  EXAM:  CHEST SINGLE AP OR PA                        PROCEDURE DATE:  06/07/2017    Impression: Stable cardiomegaly and aneurysmal dilatation of the thoracic   aorta. Grossly clear lungs.    OTHER: 	  	  LABS:	 	    CARDIAC MARKERS:        trop x 2 negative                           12.3   8.7   )-----------( 178      ( 07 Jun 2017 11:19 )             38.0     06-07    140  |  101  |  20  ----------------------------<  109<H>  4.3   |  24  |  1.28    Ca    9.7      07 Jun 2017 11:19    TPro  8.2  /  Alb  4.0  /  TBili  1.2  /  DBili  x   /  AST  18  /  ALT  9<L>  /  AlkPhos  89  06-07    PT/INR - ( 07 Jun 2017 11:20 )   PT: 12.3 sec;   INR: 1.14 ratio         PTT - ( 07 Jun 2017 11:20 )  PTT:29.8 sec  proBNP:   Lipid Profile:   HgA1c:   TSH: HPI:  Information obtained from h&P, no family present , pt a.ox1 .   72 year old male with Pmed/surg history as mentioned below admitted with decrease PO intake, refusing to get out of bed and with freq urination along with dark yellow urine. No vomiting. no fever or chills. Pt baseline usually verbal and sitting in a chair. Per spouse, stopped giving oral chemo pills for past 10 days because made the pt nauseous and vomit. unable to attain proper ROS. Patient currently resting comfortable in bed, with s/s of distress .       PAST MEDICAL & SURGICAL HISTORY:  Prostate hypertrophy  HLD (hyperlipidemia)  Agitation  Depression  Stroke  Syncope: 2/2013, 9/2010  HTN (hypertension)  Gastric tumor  Gastric ulcer with hemorrhage, perforation, and obstruction, acute  No Past Surgical History          PREVIOUS DIAGNOSTIC TESTING:    [x ] Echocardiogram:  Study Date: 4/22/2016  EF (Kingichjoanntz): 62 %  ------------------------------------------------------------------------  Conclusions:  1.  Moderate aortic regurgitation.  2. Normal aortic root (Ao: 3.4 cm at the sinuses of  Valsalva). Technically difficult, however in certain views,  the descending aorta appears dilated. Suggest CT to further  evaluate.  3. Leftatrium not well visualized, probably normal. The  left atrium is extrinsically compressed by a tortuous aorta  vs a large hiatal hernia.  4. Increased relative wall thickness with normal left  ventricular mass index, consistent with concentric left  ventricular remodeling.  5. Normal left ventricular systolic function. No segmental  wall motion abnormalities. There is a false tendon in the  LV cavity (normal variant).  6. Bubble study was technically difficult. No definitive  bubbles seen crossing the interatrial septum.  *** Compared with echocardiogram of 5/3/2013, no  significant changes noted.  ------------------------------------------------------------------------    [ x ]  Catheterization:  Study date: 08/31/2010  Summary: Nonobstructive CAD. Normal LV function. At least moderate AI.    ------------------------------------------------------------------------    [x ] Stress Test:  	  STUDY DATE: 05/05/2013  IMPRESSIONS:Normal Study    MEDICATIONS:  MEDICATIONS  (STANDING):  heparin  Injectable 5000Unit(s) SubCutaneous every 12 hours  dextrose 5% + sodium chloride 0.9%. 1000milliLiter(s) IV Continuous <Continuous>  pantoprazole  Injectable 40milliGRAM(s) IV Push daily  cefTRIAXone   IVPB  IV Intermittent   aspirin enteric coated 81milliGRAM(s) Oral daily  lisinopril 2.5milliGRAM(s) Oral daily  gabapentin 300milliGRAM(s) Oral three times a day  cefTRIAXone   IVPB 1Gram(s) IV Intermittent every 24 hours  atorvastatin 80milliGRAM(s) Oral at bedtime  clopidogrel Tablet 75milliGRAM(s) Oral daily  QUEtiapine 25milliGRAM(s) Oral daily  metoprolol 100milliGRAM(s) Oral two times a day  amLODIPine   Tablet 5milliGRAM(s) Oral daily  polyethylene glycol 3350 17Gram(s) Oral daily      FAMILY HISTORY:  No pertinent family history in first degree relatives      SOCIAL HISTORY:    [x ] Non-smoker  [ ] Smoker  [ ] Alcohol    Allergies    No Known Allergies    Intolerances    	    REVIEW OF SYSTEMS:  CONSTITUTIONAL: No fever, weight loss, or fatigue  EYES: No eye pain, visual disturbances, or discharge  ENMT:  No difficulty hearing, tinnitus, vertigo; No sinus or throat pain  NECK: No pain or stiffness  RESPIRATORY: No cough, wheezing, chills or hemoptysis; No Shortness of Breath  CARDIOVASCULAR: No chest pain, palpitations, passing out, dizziness, or leg swelling  GASTROINTESTINAL: No abdominal or epigastric pain. No nausea, vomiting, or hematemesis; No diarrhea or constipation. No melena or hematochezia.  GENITOURINARY: No dysuria, frequency, hematuria, or incontinence  NEUROLOGICAL: No headaches, memory loss, loss of strength, numbness, or tremors  SKIN: No itching, burning, rashes, or lesions   	    [ ] All others negative	  [x ] Unable to obtain    PHYSICAL EXAM:  T(C): 36.9, Max: 37.4 (06-07 @ 10:46)  HR: 61 (61 - 93)  BP: 148/85 (122/74 - 191/80)  RR: 18 (16 - 20)  SpO2: 95% (95% - 99%)  Wt(kg): --  I&O's Summary    I & Os for current day (as of 08 Jun 2017 08:33)  =============================================  IN: 1125 ml / OUT: 300 ml / NET: 825 ml      Appearance: NAD	  Psychiatry: A & O x 1   HEENT:   Normal oral mucosa, PERRL, EOMI	  Lymphatic: No lymphadenopathy  Cardiovascular: Normal S1 S2,RRR, No JVD, 2/6 murmur  Respiratory: Lungs clear to auscultation	  Gastrointestinal:  Soft, Non-tender, + BS	+ Peg  Skin: No rashes, No ecchymoses, No cyanosis	  Neurologic: Non-focal  Extremities: Normal range of motion, No clubbing, cyanosis or edema  Vascular: Peripheral pulses palpable 2+ bilaterally    	    ECG:  NSR at 73 bpm, LAD, LVH, TWI   	  RADIOLOGY:  EXAM:  CHEST SINGLE AP OR PA                        PROCEDURE DATE:  06/07/2017    Impression: Stable cardiomegaly and aneurysmal dilatation of the thoracic   aorta. Grossly clear lungs.    OTHER: 	  	  LABS:	 	    CARDIAC MARKERS:        trop x 2 negative                           12.3   8.7   )-----------( 178      ( 07 Jun 2017 11:19 )             38.0     06-07    140  |  101  |  20  ----------------------------<  109<H>  4.3   |  24  |  1.28    Ca    9.7      07 Jun 2017 11:19    TPro  8.2  /  Alb  4.0  /  TBili  1.2  /  DBili  x   /  AST  18  /  ALT  9<L>  /  AlkPhos  89  06-07    PT/INR - ( 07 Jun 2017 11:20 )   PT: 12.3 sec;   INR: 1.14 ratio         PTT - ( 07 Jun 2017 11:20 )  PTT:29.8 sec  proBNP:   Lipid Profile:   HgA1c:   TSH:

## 2017-06-08 NOTE — PROGRESS NOTE ADULT - SUBJECTIVE AND OBJECTIVE BOX
CC: f/u for abnormal UA, UTI    Patient reports no new c/o, weak, poorly interactive    REVIEW OF SYSTEMS: no fevers, no chills, no nausea, no vomiting, no abd pain, no resp symptoms  All other review of systems negative (Comprehensive ROS)    Antimicrobials Day #2    cefTRIAXone   IVPB 1Gram(s) IV Intermittent every 24 hours    Other Medications Reviewed    T(F): 98.6, Max: 99.4 ( @ 10:46)  HR: 62  BP: 126/79  RR: 18  SpO2: 94%  Wt(kg): --    PHYSICAL EXAM:  General: alert, no acute distress  Eyes:  anicteric, no conjunctival injection, no discharge  Oropharynx: no lesions or injection 	  Neck: supple, without adenopathy  Lungs: clear to auscultation  Heart: regular rate and rhythm; no murmur, rubs or gallops  Abdomen: soft, nondistended, nontender, without mass or organomegaly  Skin: no lesions  Extremities: no clubbing, cyanosis, or edema  Neurologic: alert, oriented, moves all extremities    LAB RESULTS:                        12.3   8.7   )-----------( 178      ( 2017 11:19 )             38.0     -07    140  |  101  |  20  ----------------------------<  109<H>  4.3   |  24  |  1.28    Ca    9.7      2017 11:19    TPro  8.2  /  Alb  4.0  /  TBili  1.2  /  DBili  x   /  AST  18  /  ALT  9<L>  /  AlkPhos  89  06-07    LIVER FUNCTIONS - ( 2017 11:19 )  Alb: 4.0 g/dL / Pro: 8.2 g/dL / ALK PHOS: 89 U/L / ALT: 9 U/L RC / AST: 18 U/L / GGT: x           Urinalysis Basic - ( 2017 13:06 )    Color: PL Yellow / Appearance: SL Turbid / S.008 / pH: x  Gluc: x / Ketone: Negative  / Bili: Negative / Urobili: Negative   Blood: x / Protein: 30 mg/dL / Nitrite: Negative   Leuk Esterase: Large / RBC: 5-10 /HPF / WBC >50 /HPF   Sq Epi: x / Non Sq Epi: OCC /HPF / Bacteria: Few /HPF        MICROBIOLOGY REVIEWED:    RADIOLOGY REVIEWED: CC: f/u for abnormal UA, UTI    Patient reports no new c/o, weak, poorly interactive    REVIEW OF SYSTEMS: no fevers, no chills, no nausea, no vomiting, no abd pain, no resp symptoms  All other review of systems negative (Comprehensive ROS)    Antimicrobials Day #2    cefTRIAXone   IVPB 1Gram(s) IV Intermittent every 24 hours    Other Medications Reviewed    T(F): 98.6, Max: 99.4 ( @ 10:46)  HR: 62  BP: 126/79  RR: 18  SpO2: 94%  Wt(kg): --    PHYSICAL EXAM:  General: alert, no acute distress  Eyes:  anicteric, no conjunctival injection, no discharge  Oropharynx: no lesions or injection 	  Neck: supple, without adenopathy  Lungs: clear to auscultation  Heart: regular rate and rhythm; no murmur, rubs or gallops  Abdomen: soft, nondistended, nontender, without mass or organomegaly, PEG tube in place  Skin: no lesions  Extremities: no clubbing, cyanosis, or edema  Neurologic: weak, poorly interactive    LAB RESULTS:                        12.3   8.7   )-----------( 178      ( 2017 11:19 )             38.0     -    140  |  101  |  20  ----------------------------<  109<H>  4.3   |  24  |  1.28    Ca    9.7      2017 11:19    TPro  8.2  /  Alb  4.0  /  TBili  1.2  /  DBili  x   /  AST  18  /  ALT  9<L>  /  AlkPhos  89  -07    LIVER FUNCTIONS - ( 2017 11:19 )  Alb: 4.0 g/dL / Pro: 8.2 g/dL / ALK PHOS: 89 U/L / ALT: 9 U/L RC / AST: 18 U/L / GGT: x           Urinalysis Basic - ( 2017 13:06 )    Color: PL Yellow / Appearance: SL Turbid / S.008 / pH: x  Gluc: x / Ketone: Negative  / Bili: Negative / Urobili: Negative   Blood: x / Protein: 30 mg/dL / Nitrite: Negative   Leuk Esterase: Large / RBC: 5-10 /HPF / WBC >50 /HPF   Sq Epi: x / Non Sq Epi: OCC /HPF / Bacteria: Few /HPF          MICROBIOLOGY REVIEWED:    RADIOLOGY REVIEWED:

## 2017-06-08 NOTE — PROGRESS NOTE ADULT - SUBJECTIVE AND OBJECTIVE BOX
Patient is a 72y old  Male who presents with a chief complaint of poor po intake, chills (2017 15:28)      SUBJECTIVE / OVERNIGHT EVENTS: Comfortable, feels better  Review of Systems  chest pain no  palpitations no  sob no  nausea no  headache no    MEDICATIONS  (STANDING):  heparin  Injectable 5000Unit(s) SubCutaneous every 12 hours  dextrose 5% + sodium chloride 0.9%. 1000milliLiter(s) IV Continuous <Continuous>  pantoprazole  Injectable 40milliGRAM(s) IV Push daily  cefTRIAXone   IVPB  IV Intermittent   aspirin enteric coated 81milliGRAM(s) Oral daily  lisinopril 2.5milliGRAM(s) Oral daily  gabapentin 300milliGRAM(s) Oral three times a day  cefTRIAXone   IVPB 1Gram(s) IV Intermittent every 24 hours  atorvastatin 80milliGRAM(s) Oral at bedtime  clopidogrel Tablet 75milliGRAM(s) Oral daily  QUEtiapine 25milliGRAM(s) Oral daily  metoprolol 100milliGRAM(s) Oral two times a day  amLODIPine   Tablet 5milliGRAM(s) Oral daily  polyethylene glycol 3350 17Gram(s) Oral daily    MEDICATIONS  (PRN):  HYDROmorphone  Injectable 1milliGRAM(s) IV Push every 6 hours PRN Severe Pain (7 - 10)  acetaminophen   Tablet 650milliGRAM(s) Oral every 6 hours PRN For Temp greater than 38.5 C (101.3 F)  oxyCODONE  5 mG/acetaminophen 325 mG 2Tablet(s) Oral every 6 hours PRN Moderate Pain (4 - 6)  mirtazapine 15milliGRAM(s) Oral at bedtime PRN sleep      Vital Signs Last 24 Hrs  T(C): 37, Max: 37 (06-08 @ 08:58)  HR: 62 (61 - 93)  BP: 126/79 (126/79 - 191/80)  RR: 18 (18 - 20)  SpO2: 94% (94% - 99%)  Wt(kg): --  CAPILLARY BLOOD GLUCOSE    I&O's Summary    I & Os for current day (as of 2017 12:34)  =============================================  IN: 1125 ml / OUT: 300 ml / NET: 825 ml      PHYSICAL EXAM:  GENERAL: NAD, well-developed  HEAD:  Atraumatic, Normocephalic  EYES: EOMI, PERRLA, conjunctiva and sclera clear  NECK: Supple, No JVD  CHEST/LUNG: Clear to auscultation bilaterally; No wheeze  HEART: Regular rate and rhythm; No murmurs, rubs, or gallops  ABDOMEN: Soft, Nontender, Nondistended; Bowel sounds present  EXTREMITIES:  2+ Peripheral Pulses, No clubbing, cyanosis, or edema  PSYCH: AAOx3  NEUROLOGY: non-focal  SKIN: No rashes or lesions    LABS:                        11.8   5.0   )-----------( 185      ( 2017 10:10 )             34.8     -08    139  |  102  |  13  ----------------------------<  93  3.8   |  24  |  0.99    Ca    9.0      2017 10:10    TPro  8.2  /  Alb  4.0  /  TBili  1.2  /  DBili  x   /  AST  18  /  ALT  9<L>  /  AlkPhos  89  06-07    PT/INR - ( 2017 11:20 )   PT: 12.3 sec;   INR: 1.14 ratio         PTT - ( 2017 11:20 )  PTT:29.8 sec  CARDIAC MARKERS ( 2017 00:39 )  x     / <0.01 ng/mL / x     / x     / x      CARDIAC MARKERS ( 2017 11:19 )  x     / <0.01 ng/mL / 54 U/L / x     / 1.0 ng/mL      Urinalysis Basic - ( 2017 13:06 )    Color: PL Yellow / Appearance: SL Turbid / S.008 / pH: x  Gluc: x / Ketone: Negative  / Bili: Negative / Urobili: Negative   Blood: x / Protein: 30 mg/dL / Nitrite: Negative   Leuk Esterase: Large / RBC: 5-10 /HPF / WBC >50 /HPF   Sq Epi: x / Non Sq Epi: OCC /HPF / Bacteria: Few /HPF        RADIOLOGY & ADDITIONAL TESTS:    Imaging Personally Reviewed:    Consultant(s) Notes Reviewed:      Care Discussed with Consultants/Other Providers:

## 2017-06-08 NOTE — CONSULT NOTE ADULT - PROBLEM SELECTOR RECOMMENDATION 9
Await CT result  GI consult  Evaluate PEG tube   Hold Gleevec, if determined to be secondary to Gleevec and he is still responding will consider continuation at a lower dose.

## 2017-06-09 RX ORDER — HYDROMORPHONE HYDROCHLORIDE 2 MG/ML
0.5 INJECTION INTRAMUSCULAR; INTRAVENOUS; SUBCUTANEOUS ONCE
Qty: 0 | Refills: 0 | Status: DISCONTINUED | OUTPATIENT
Start: 2017-06-09 | End: 2017-06-09

## 2017-06-09 RX ADMIN — CLOPIDOGREL BISULFATE 75 MILLIGRAM(S): 75 TABLET, FILM COATED ORAL at 13:48

## 2017-06-09 RX ADMIN — HYDROMORPHONE HYDROCHLORIDE 1 MILLIGRAM(S): 2 INJECTION INTRAMUSCULAR; INTRAVENOUS; SUBCUTANEOUS at 04:43

## 2017-06-09 RX ADMIN — CEFTRIAXONE 100 GRAM(S): 500 INJECTION, POWDER, FOR SOLUTION INTRAMUSCULAR; INTRAVENOUS at 15:40

## 2017-06-09 RX ADMIN — Medication 100 MILLIGRAM(S): at 18:47

## 2017-06-09 RX ADMIN — ATORVASTATIN CALCIUM 80 MILLIGRAM(S): 80 TABLET, FILM COATED ORAL at 21:24

## 2017-06-09 RX ADMIN — GABAPENTIN 300 MILLIGRAM(S): 400 CAPSULE ORAL at 21:24

## 2017-06-09 RX ADMIN — AMLODIPINE BESYLATE 5 MILLIGRAM(S): 2.5 TABLET ORAL at 05:41

## 2017-06-09 RX ADMIN — MIRTAZAPINE 15 MILLIGRAM(S): 45 TABLET, ORALLY DISINTEGRATING ORAL at 21:24

## 2017-06-09 RX ADMIN — HEPARIN SODIUM 5000 UNIT(S): 5000 INJECTION INTRAVENOUS; SUBCUTANEOUS at 18:47

## 2017-06-09 RX ADMIN — HYDROMORPHONE HYDROCHLORIDE 1 MILLIGRAM(S): 2 INJECTION INTRAMUSCULAR; INTRAVENOUS; SUBCUTANEOUS at 20:38

## 2017-06-09 RX ADMIN — HYDROMORPHONE HYDROCHLORIDE 1 MILLIGRAM(S): 2 INJECTION INTRAMUSCULAR; INTRAVENOUS; SUBCUTANEOUS at 11:22

## 2017-06-09 RX ADMIN — HYDROMORPHONE HYDROCHLORIDE 1 MILLIGRAM(S): 2 INJECTION INTRAMUSCULAR; INTRAVENOUS; SUBCUTANEOUS at 04:13

## 2017-06-09 RX ADMIN — GABAPENTIN 300 MILLIGRAM(S): 400 CAPSULE ORAL at 05:37

## 2017-06-09 RX ADMIN — Medication 81 MILLIGRAM(S): at 13:48

## 2017-06-09 RX ADMIN — PANTOPRAZOLE SODIUM 40 MILLIGRAM(S): 20 TABLET, DELAYED RELEASE ORAL at 13:48

## 2017-06-09 RX ADMIN — GABAPENTIN 300 MILLIGRAM(S): 400 CAPSULE ORAL at 13:48

## 2017-06-09 RX ADMIN — LISINOPRIL 2.5 MILLIGRAM(S): 2.5 TABLET ORAL at 05:41

## 2017-06-09 RX ADMIN — POLYETHYLENE GLYCOL 3350 17 GRAM(S): 17 POWDER, FOR SOLUTION ORAL at 13:48

## 2017-06-09 RX ADMIN — Medication 100 MILLIGRAM(S): at 05:41

## 2017-06-09 RX ADMIN — SODIUM CHLORIDE 75 MILLILITER(S): 9 INJECTION, SOLUTION INTRAVENOUS at 11:22

## 2017-06-09 RX ADMIN — HYDROMORPHONE HYDROCHLORIDE 1 MILLIGRAM(S): 2 INJECTION INTRAMUSCULAR; INTRAVENOUS; SUBCUTANEOUS at 21:08

## 2017-06-09 RX ADMIN — QUETIAPINE FUMARATE 25 MILLIGRAM(S): 200 TABLET, FILM COATED ORAL at 13:48

## 2017-06-09 RX ADMIN — HYDROMORPHONE HYDROCHLORIDE 0.5 MILLIGRAM(S): 2 INJECTION INTRAMUSCULAR; INTRAVENOUS; SUBCUTANEOUS at 08:02

## 2017-06-09 RX ADMIN — HEPARIN SODIUM 5000 UNIT(S): 5000 INJECTION INTRAVENOUS; SUBCUTANEOUS at 05:36

## 2017-06-09 NOTE — PROGRESS NOTE ADULT - SUBJECTIVE AND OBJECTIVE BOX
Patient is a 72y old  Male who presents with a chief complaint of poor po intake, chills (07 Jun 2017 15:28)      SUBJECTIVE / OVERNIGHT EVENTS: More comfortable, tolerating peg feeds.  Review of Systems  chest pain no  palpitations no  sob no  nausea no  headache no    MEDICATIONS  (STANDING):  heparin  Injectable 5000Unit(s) SubCutaneous every 12 hours  dextrose 5% + sodium chloride 0.9%. 1000milliLiter(s) IV Continuous <Continuous>  pantoprazole  Injectable 40milliGRAM(s) IV Push daily  cefTRIAXone   IVPB  IV Intermittent   aspirin enteric coated 81milliGRAM(s) Oral daily  lisinopril 2.5milliGRAM(s) Oral daily  gabapentin 300milliGRAM(s) Oral three times a day  cefTRIAXone   IVPB 1Gram(s) IV Intermittent every 24 hours  atorvastatin 80milliGRAM(s) Oral at bedtime  clopidogrel Tablet 75milliGRAM(s) Oral daily  QUEtiapine 25milliGRAM(s) Oral daily  metoprolol 100milliGRAM(s) Oral two times a day  amLODIPine   Tablet 5milliGRAM(s) Oral daily  polyethylene glycol 3350 17Gram(s) Oral daily    MEDICATIONS  (PRN):  HYDROmorphone  Injectable 1milliGRAM(s) IV Push every 6 hours PRN Severe Pain (7 - 10)  acetaminophen   Tablet 650milliGRAM(s) Oral every 6 hours PRN For Temp greater than 38.5 C (101.3 F)  oxyCODONE  5 mG/acetaminophen 325 mG 2Tablet(s) Oral every 6 hours PRN Moderate Pain (4 - 6)  mirtazapine 15milliGRAM(s) Oral at bedtime PRN sleep      Vital Signs Last 24 Hrs  T(C): 36.7, Max: 36.7 (06-09 @ 09:30)  HR: 65 (56 - 68)  BP: 142/74 (142/74 - 159/89)  RR: 18 (18 - 18)  SpO2: 97% (97% - 98%)  Wt(kg): --  CAPILLARY BLOOD GLUCOSE    I&O's Summary  I & Os for 24h ending 09 Jun 2017 07:00  =============================================  IN: 2495 ml / OUT: 250 ml / NET: 2245 ml    I & Os for current day (as of 09 Jun 2017 15:42)  =============================================  IN: 0 ml / OUT: 500 ml / NET: -500 ml      PHYSICAL EXAM:  GENERAL: NAD, well-developed  HEAD:  Atraumatic, Normocephalic  EYES: EOMI, PERRLA, conjunctiva and sclera clear  NECK: Supple, No JVD  CHEST/LUNG: Clear to auscultation bilaterally; No wheeze  HEART: Regular rate and rhythm; No murmurs, rubs, or gallops  ABDOMEN: Soft, Nontender, Nondistended; Bowel sounds present PEG in place.  EXTREMITIES:  2+ Peripheral Pulses, No clubbing, cyanosis, or edema  PSYCH: AAOx3  NEUROLOGY: non-focal  SKIN: No rashes or lesions    LABS:                        11.8   5.0   )-----------( 185      ( 08 Jun 2017 10:10 )             34.8     06-08    139  |  102  |  13  ----------------------------<  93  3.8   |  24  |  0.99    Ca    9.0      08 Jun 2017 10:10        CARDIAC MARKERS ( 08 Jun 2017 00:39 )  x     / <0.01 ng/mL / x     / x     / x              RADIOLOGY & ADDITIONAL TESTS:    Imaging Personally Reviewed:    Consultant(s) Notes Reviewed:      Care Discussed with Consultants/Other Providers:

## 2017-06-09 NOTE — PROGRESS NOTE ADULT - SUBJECTIVE AND OBJECTIVE BOX
CC: no acute events    TELEMETRY:     PHYSICAL EXAM:    T(C): 36.7, Max: 36.7 (06-09 @ 09:30)  HR: 65 (56 - 68)  BP: 142/74 (125/77 - 159/89)  RR: 18 (18 - 18)  SpO2: 97% (95% - 98%)  Wt(kg): --  I&O's Summary  I & Os for 24h ending 09 Jun 2017 07:00  =============================================  IN: 2495 ml / OUT: 250 ml / NET: 2245 ml    I & Os for current day (as of 09 Jun 2017 11:14)  =============================================  IN: 0 ml / OUT: 100 ml / NET: -100 ml      Appearance: Normal	  Cardiovascular: Normal S1 S2,RRR, No JVD, No murmurs  Respiratory: Lungs clear to auscultation	  Gastrointestinal:  Soft, Non-tender, + BS	  Extremities: Normal range of motion, No clubbing, cyanosis or edema  Vascular: Peripheral pulses palpable 2+ bilaterally     LABS:	 	                          11.8   5.0   )-----------( 185      ( 08 Jun 2017 10:10 )             34.8     06-08    139  |  102  |  13  ----------------------------<  93  3.8   |  24  |  0.99    Ca    9.0      08 Jun 2017 10:10    TPro  8.2  /  Alb  4.0  /  TBili  1.2  /  DBili  x   /  AST  18  /  ALT  9<L>  /  AlkPhos  89  06-07      PT/INR - ( 07 Jun 2017 11:20 )   PT: 12.3 sec;   INR: 1.14 ratio         PTT - ( 07 Jun 2017 11:20 )  PTT:29.8 sec    CARDIAC MARKERS:

## 2017-06-09 NOTE — PROGRESS NOTE ADULT - SUBJECTIVE AND OBJECTIVE BOX
Pt is seen and examined  pt is awake and lying in bed  pt seems comfortable and appears in no distress  PEG feeding resumed        MEDICATIONS  (STANDING):  heparin  Injectable 5000Unit(s) SubCutaneous every 12 hours  dextrose 5% + sodium chloride 0.9%. 1000milliLiter(s) IV Continuous <Continuous>  pantoprazole  Injectable 40milliGRAM(s) IV Push daily  cefTRIAXone   IVPB  IV Intermittent   aspirin enteric coated 81milliGRAM(s) Oral daily  lisinopril 2.5milliGRAM(s) Oral daily  gabapentin 300milliGRAM(s) Oral three times a day  cefTRIAXone   IVPB 1Gram(s) IV Intermittent every 24 hours  atorvastatin 80milliGRAM(s) Oral at bedtime  clopidogrel Tablet 75milliGRAM(s) Oral daily  QUEtiapine 25milliGRAM(s) Oral daily  metoprolol 100milliGRAM(s) Oral two times a day  amLODIPine   Tablet 5milliGRAM(s) Oral daily  polyethylene glycol 3350 17Gram(s) Oral daily    MEDICATIONS  (PRN):  HYDROmorphone  Injectable 1milliGRAM(s) IV Push every 6 hours PRN Severe Pain (7 - 10)  acetaminophen   Tablet 650milliGRAM(s) Oral every 6 hours PRN For Temp greater than 38.5 C (101.3 F)  oxyCODONE  5 mG/acetaminophen 325 mG 2Tablet(s) Oral every 6 hours PRN Moderate Pain (4 - 6)  mirtazapine 15milliGRAM(s) Oral at bedtime PRN sleep      Allergies    No Known Allergies    Intolerances        Vital Signs Last 24 Hrs  T(C): 36.3, Max: 36.4 (06-08 @ 15:20)  T(F): 97.4, Max: 97.5 (06-08 @ 15:20)  HR: 68 (56 - 68)  BP: 159/89 (125/77 - 159/89)  BP(mean): --  RR: 18 (18 - 18)  SpO2: 98% (95% - 98%)    PHYSICAL EXAM  General: adult in NAD  HEENT: clear oropharynx, anicteric sclera, pink conjunctiva  Neck: supple  CV: normal S1/S2 with no murmur rubs or gallops  Lungs: positive air movement b/l ant lungs,clear to auscultation, no wheezes, no rales  Abdomen: soft non-tender non-distended, no hepatosplenomegaly  Ext: no clubbing cyanosis or edema  Skin: no rashes and no petechiae  Neuro: alert and oriented X 4, no focal deficits  LABS:                          11.8   5.0   )-----------( 185      ( 08 Jun 2017 10:10 )             34.8         Mean Cell Volume : 102.0 fl  Mean Cell Hemoglobin : 34.8 pg  Mean Cell Hemoglobin Concentration : 34.0 gm/dL  Auto Neutrophil # : x  Auto Lymphocyte # : x  Auto Monocyte # : x  Auto Eosinophil # : x  Auto Basophil # : x  Auto Neutrophil % : x  Auto Lymphocyte % : x  Auto Monocyte % : x  Auto Eosinophil % : x  Auto Basophil % : x      06-08    139  |  102  |  13  ----------------------------<  93  3.8   |  24  |  0.99    Ca    9.0      08 Jun 2017 10:10    TPro  8.2  /  Alb  4.0  /  TBili  1.2  /  DBili  x   /  AST  18  /  ALT  9<L>  /  AlkPhos  89  06-07      PT/INR - ( 07 Jun 2017 11:20 )   PT: 12.3 sec;   INR: 1.14 ratio         PTT - ( 07 Jun 2017 11:20 )  PTT:29.8 sec      RADIOLOGY & ADDITIONAL STUDIES:    EXAM:  CT ABDOMEN AND PELVIS OC IC                            PROCEDURE DATE:  06/07/2017  FINDINGS:    LOWER CHEST: Right basilar subsegmental atelectasis. Cardiomegaly.    Limited evaluation without intravenous contrast.  LIVER: There is a 1.3 cm hypodense lesion within the right lobe of the   liver which measures slightly higher than fluid attenuation and is new   since 8/23/2016. Additional subcentimeter hypodense lesions are again   noted in the right lobe of the liver.  BILE DUCTS: The common duct measures 8 mm.  GALLBLADDER: Within normal limits.  SPLEEN: Within normal limits.  PANCREAS: Within normal limits.  ADRENALS: Within normal limits.  KIDNEYS/URETERS: Bilateral renal cysts and subcentimeter hypodense   lesions which are too small to characterize. Hypodense lesions at the   upper poles bilaterally are again noted and measure higher than fluid   attenuation.    BLADDER: Within normal limits.  REPRODUCTIVE ORGANS: The prostate gland is enlarged.    BOWEL: Status post partial gastrectomy with a gastrostomy tube in place,   unchanged. A mass is again noted at the resection site which is difficult   to elucidate on this noncontrast study but which measures 2.1 x 2.0 cm on   series 2 image 16. There is stool throughout the colon. The appendix is   normal.   PERITONEUM: No ascites.  VESSELS:  The lower thoracic descending and upper abdominal aorta are   aneurysmal measuring up to 4.7 cm.  RETROPERITONEUM: No lymphadenopathy.    ABDOMINAL WALL: Within normal limits.  BONES: Unremarkable.    IMPRESSION:   1.3 cm hypodense lesion within the right lobe of the liver which measures   slightly higher than fluid attenuation and is new since 8/23/2016 but is   also seen on more recent previous studies.    Status post partial gastrectomy with a mass at the resection site again   noted, measuring 2.1 x 2.0 cm.    Aneurysmal dilatation of the lower thoracic and upper abdominal aorta   unchanged.

## 2017-06-10 DIAGNOSIS — R10.9 UNSPECIFIED ABDOMINAL PAIN: ICD-10-CM

## 2017-06-10 LAB — PSA FLD-MCNC: 31.94 NG/ML — HIGH (ref 0–4)

## 2017-06-10 RX ORDER — CEFUROXIME AXETIL 250 MG
500 TABLET ORAL EVERY 12 HOURS
Qty: 0 | Refills: 0 | Status: DISCONTINUED | OUTPATIENT
Start: 2017-06-10 | End: 2017-06-14

## 2017-06-10 RX ORDER — ASPIRIN/CALCIUM CARB/MAGNESIUM 324 MG
81 TABLET ORAL DAILY
Qty: 0 | Refills: 0 | Status: DISCONTINUED | OUTPATIENT
Start: 2017-06-10 | End: 2017-06-10

## 2017-06-10 RX ORDER — ASPIRIN/CALCIUM CARB/MAGNESIUM 324 MG
81 TABLET ORAL DAILY
Qty: 0 | Refills: 0 | Status: DISCONTINUED | OUTPATIENT
Start: 2017-06-10 | End: 2017-06-14

## 2017-06-10 RX ADMIN — HYDROMORPHONE HYDROCHLORIDE 1 MILLIGRAM(S): 2 INJECTION INTRAMUSCULAR; INTRAVENOUS; SUBCUTANEOUS at 06:30

## 2017-06-10 RX ADMIN — GABAPENTIN 300 MILLIGRAM(S): 400 CAPSULE ORAL at 05:39

## 2017-06-10 RX ADMIN — HEPARIN SODIUM 5000 UNIT(S): 5000 INJECTION INTRAVENOUS; SUBCUTANEOUS at 05:39

## 2017-06-10 RX ADMIN — PANTOPRAZOLE SODIUM 40 MILLIGRAM(S): 20 TABLET, DELAYED RELEASE ORAL at 12:59

## 2017-06-10 RX ADMIN — QUETIAPINE FUMARATE 25 MILLIGRAM(S): 200 TABLET, FILM COATED ORAL at 12:57

## 2017-06-10 RX ADMIN — ATORVASTATIN CALCIUM 80 MILLIGRAM(S): 80 TABLET, FILM COATED ORAL at 21:41

## 2017-06-10 RX ADMIN — HEPARIN SODIUM 5000 UNIT(S): 5000 INJECTION INTRAVENOUS; SUBCUTANEOUS at 19:35

## 2017-06-10 RX ADMIN — HYDROMORPHONE HYDROCHLORIDE 1 MILLIGRAM(S): 2 INJECTION INTRAMUSCULAR; INTRAVENOUS; SUBCUTANEOUS at 06:01

## 2017-06-10 RX ADMIN — CLOPIDOGREL BISULFATE 75 MILLIGRAM(S): 75 TABLET, FILM COATED ORAL at 12:57

## 2017-06-10 RX ADMIN — HYDROMORPHONE HYDROCHLORIDE 1 MILLIGRAM(S): 2 INJECTION INTRAMUSCULAR; INTRAVENOUS; SUBCUTANEOUS at 23:30

## 2017-06-10 RX ADMIN — HYDROMORPHONE HYDROCHLORIDE 1 MILLIGRAM(S): 2 INJECTION INTRAMUSCULAR; INTRAVENOUS; SUBCUTANEOUS at 23:15

## 2017-06-10 RX ADMIN — AMLODIPINE BESYLATE 5 MILLIGRAM(S): 2.5 TABLET ORAL at 05:39

## 2017-06-10 RX ADMIN — Medication 100 MILLIGRAM(S): at 19:35

## 2017-06-10 RX ADMIN — Medication 500 MILLIGRAM(S): at 19:34

## 2017-06-10 RX ADMIN — POLYETHYLENE GLYCOL 3350 17 GRAM(S): 17 POWDER, FOR SOLUTION ORAL at 12:57

## 2017-06-10 RX ADMIN — Medication 81 MILLIGRAM(S): at 12:57

## 2017-06-10 RX ADMIN — Medication 100 MILLIGRAM(S): at 05:39

## 2017-06-10 RX ADMIN — GABAPENTIN 300 MILLIGRAM(S): 400 CAPSULE ORAL at 12:59

## 2017-06-10 RX ADMIN — HYDROMORPHONE HYDROCHLORIDE 1 MILLIGRAM(S): 2 INJECTION INTRAMUSCULAR; INTRAVENOUS; SUBCUTANEOUS at 14:46

## 2017-06-10 RX ADMIN — HYDROMORPHONE HYDROCHLORIDE 1 MILLIGRAM(S): 2 INJECTION INTRAMUSCULAR; INTRAVENOUS; SUBCUTANEOUS at 15:01

## 2017-06-10 RX ADMIN — LISINOPRIL 2.5 MILLIGRAM(S): 2.5 TABLET ORAL at 05:39

## 2017-06-10 RX ADMIN — GABAPENTIN 300 MILLIGRAM(S): 400 CAPSULE ORAL at 21:41

## 2017-06-10 NOTE — PROGRESS NOTE ADULT - SUBJECTIVE AND OBJECTIVE BOX
CC: f/u for abnormal UA, UTI    Patient is very weak, poorly interactive  No fevers, currently on tube feeds    REVIEW OF SYSTEMS: no fevers, no chills, no nausea, no vomiting, no abd pain, no resp symptoms  All other review of systems negative (Comprehensive ROS)    Antimicrobials Day #4    cefTRIAXone   IVPB  IV Intermittent   cefTRIAXone   IVPB 1Gram(s) IV Intermittent every 24 hours        Other Medications Reviewed    Vital Signs Last 24 Hrs  T(C): 36.8, Max: 36.8 (06-10 @ 07:45)  T(F): 98.2, Max: 98.2 (06-10 @ 07:45)  HR: 56 (53 - 67)  BP: 130/76 (118/72 - 155/75)  BP(mean): --  RR: 18 (18 - 18)  SpO2: 99% (96% - 99%)    PHYSICAL EXAM:  General: alert, no acute distress  Eyes:  anicteric, no conjunctival injection, no discharge  Oropharynx: no lesions or injection 	  Neck: supple, without adenopathy  Lungs: clear to auscultation  Heart: regular rate and rhythm; no murmur, rubs or gallops  Abdomen: soft, nondistended, nontender, without mass or organomegaly, PEG tube in place  Skin: no lesions  Extremities: no clubbing, cyanosis, or edema  Neurologic: weak, poorly interactive    LAB RESULTS:                                              11.8   5.0   )-----------( 185      ( 08 Jun 2017 10:10 )             34.8   06-08    139  |  102  |  13  ----------------------------<  93  3.8   |  24  |  0.99    Ca    9.0      08 Jun 2017 10:10              MICROBIOLOGY REVIEWED:    Culture - Urine (06.07.17 @ 16:23)    -  Ampicillin: I 16    -  Aztreonam: S <=4    -  Cefepime: S <=2    -  Cefoxitin: S <=4    -  Meropenem: S <=1    -  Tobramycin: S <=2    -  Cefazolin: S <=2    -  Gentamicin: S <=1    -  Levofloxacin: S <=1    -  Ceftazidime: S <=1    -  Ceftriaxone: S <=1    -  Ciprofloxacin: S <=0.5    -  Ertapenem: S <=0.5    -  Nitrofurantoin: S <=32    -  Piperacillin/Tazobactam: S <=8    -  Trimethoprim/Sulfamethoxazole: R >2/38    -  Amikacin: S <=8    -  Ampicillin/Sulbactam: S <=4/2    -  Imipenem: S <=1    Specimen Source: .Urine Catheterized    Culture Results:   50,000 - 99,000 CFU/mL Escherichia coli          RADIOLOGY REVIEWED:      CT:   KIDNEYS/URETERS: Bilateral renal cysts and subcentimeter hypodense   lesions which are too small to characterize. Hypodense lesions at the   upper poles bilaterally are again noted and measure higher than fluid   attenuation.  1.3 cm hypodense lesion within the right lobe of the liver which measures   slightly higher than fluid attenuation and is new since 8/23/2016 but is   also seen on more recent previous studies.    Status post partial gastrectomy with a mass at the resection site again   noted, measuring 2.1 x 2.0 cm.    Aneurysmal dilatation of the lower thoracic and upper abdominal aorta   unchanged.

## 2017-06-10 NOTE — PROGRESS NOTE ADULT - SUBJECTIVE AND OBJECTIVE BOX
CARDIOLOGY FOLLOW UP NOTE - DR. LI    Subjective:  no chest pain, sob    PHYSICAL EXAM:  T(C): 36.8, Max: 36.8 (06-10 @ 07:45)  HR: 56 (53 - 67)  BP: 130/76 (118/72 - 155/75)  RR: 18 (18 - 18)  SpO2: 99% (96% - 99%)  Wt(kg): --  I&O's Summary  I & Os for 24h ending 10 Sammy 2017 07:00  =============================================  IN: 1560 ml / OUT: 500 ml / NET: 1060 ml    I & Os for current day (as of 10 Samym 2017 15:31)  =============================================  IN: 0 ml / OUT: 0 ml / NET: 0 ml      Appearance: Normal	  Cardiovascular: Normal S1 S2,RRR, No JVD, No murmurs  Respiratory: Lungs clear to auscultation	  Gastrointestinal:  Soft, Non-tender, + BS	  Extremities: Normal range of motion, No clubbing, cyanosis or edema      MEDICATIONS  (STANDING):  heparin  Injectable 5000Unit(s) SubCutaneous every 12 hours  dextrose 5% + sodium chloride 0.9%. 1000milliLiter(s) IV Continuous <Continuous>  pantoprazole  Injectable 40milliGRAM(s) IV Push daily  lisinopril 2.5milliGRAM(s) Oral daily  gabapentin 300milliGRAM(s) Oral three times a day  atorvastatin 80milliGRAM(s) Oral at bedtime  clopidogrel Tablet 75milliGRAM(s) Oral daily  QUEtiapine 25milliGRAM(s) Oral daily  metoprolol 100milliGRAM(s) Oral two times a day  amLODIPine   Tablet 5milliGRAM(s) Oral daily  polyethylene glycol 3350 17Gram(s) Oral daily  cefuroxime   Tablet 500milliGRAM(s) Oral every 12 hours  aspirin  chewable 81milliGRAM(s) Oral daily      TELEMETRY: 	    ECG:  	  RADIOLOGY:   DIAGNOSTIC TESTING:  [ ] Echocardiogram:  [ ] Catheterization:  [ ] Stress Test:    OTHER: 	    LABS:	 	    CARDIAC MARKERS:                  proBNP:     Lipid Profile:   HgA1c:

## 2017-06-10 NOTE — PROGRESS NOTE ADULT - SUBJECTIVE AND OBJECTIVE BOX
Pt is seen and examined  pt is awake and lying in bed/  pt agitated in distress,points to abdomen  Cannot express what the problem is even with a         MEDICATIONS  (STANDING):  heparin  Injectable 5000Unit(s) SubCutaneous every 12 hours  dextrose 5% + sodium chloride 0.9%. 1000milliLiter(s) IV Continuous <Continuous>  pantoprazole  Injectable 40milliGRAM(s) IV Push daily  aspirin enteric coated 81milliGRAM(s) Oral daily  lisinopril 2.5milliGRAM(s) Oral daily  gabapentin 300milliGRAM(s) Oral three times a day  atorvastatin 80milliGRAM(s) Oral at bedtime  clopidogrel Tablet 75milliGRAM(s) Oral daily  QUEtiapine 25milliGRAM(s) Oral daily  metoprolol 100milliGRAM(s) Oral two times a day  amLODIPine   Tablet 5milliGRAM(s) Oral daily  polyethylene glycol 3350 17Gram(s) Oral daily  cefuroxime   Tablet 500milliGRAM(s) Oral every 12 hours    MEDICATIONS  (PRN):  HYDROmorphone  Injectable 1milliGRAM(s) IV Push every 6 hours PRN Severe Pain (7 - 10)  acetaminophen   Tablet 650milliGRAM(s) Oral every 6 hours PRN For Temp greater than 38.5 C (101.3 F)  oxyCODONE  5 mG/acetaminophen 325 mG 2Tablet(s) Oral every 6 hours PRN Moderate Pain (4 - 6)  mirtazapine 15milliGRAM(s) Oral at bedtime PRN sleep      Allergies    No Known Allergies    Intolerances        Vital Signs Last 24 Hrs  T(C): 36.8, Max: 36.8 (06-10 @ 07:45)  T(F): 98.2, Max: 98.2 (06-10 @ 07:45)  HR: 56 (53 - 67)  BP: 130/76 (118/72 - 155/75)  BP(mean): --  RR: 18 (18 - 18)  SpO2: 99% (96% - 99%)    PHYSICAL EXAM  General: adult in NAD  HEENT: clear oropharynx, anicteric sclera, pink conjunctiva  Neck: supple  CV: normal S1/S2 with no murmur rubs or gallops  Lungs: positive air movement b/l ant lungs,clear to auscultation, no wheezes, no rales  Abdomen: soft non-tender non-distended, no hepatosplenomegaly  Ext: no clubbing cyanosis or edema  Skin: no rashes and no petechiae  Neuro: alert and oriented X 4, no focal deficits  LABS:    Basic Metabolic Panel (06.08.17 @ 10:10)    Sodium, Serum: 139 mmol/L    Potassium, Serum: 3.8 mmol/L    Chloride, Serum: 102 mmol/L    Carbon Dioxide, Serum: 24 mmol/L    Anion Gap, Serum: 13 mmol/L    Blood Urea Nitrogen, Serum: 13 mg/dL    Creatinine, Serum: 0.99 mg/dL    Glucose, Serum: 93 mg/dL    Calcium, Total Serum: 9.0 mg/dL    eGFR if Non : 75: Interpretative comment       Complete Blood Count (06.08.17 @ 10:10)    WBC Count: 5.0 K/uL    RBC Count: 3.40 M/uL    Hemoglobin: 11.8 g/dL    Hematocrit: 34.8 %    Mean Cell Volume: 102.0 fl    Mean Cell Hemoglobin: 34.8 pg    Mean Cell Hemoglobin Conc: 34.0 gm/dL    Red Cell Distrib Width: 11.6 %    Platelet Count - Automated: 185 K/uL      RADIOLOGY & ADDITIONAL STUDIES:

## 2017-06-10 NOTE — PROGRESS NOTE ADULT - SUBJECTIVE AND OBJECTIVE BOX
Patient is a 72y old  Male who presents with a chief complaint of poor po intake, chills (07 Jun 2017 15:28)      SUBJECTIVE / OVERNIGHT EVENTS: Still with abdominal pain.   Review of Systems  chest pain no  palpitations no  sob no  nausea no  headache no    MEDICATIONS  (STANDING):  heparin  Injectable 5000Unit(s) SubCutaneous every 12 hours  dextrose 5% + sodium chloride 0.9%. 1000milliLiter(s) IV Continuous <Continuous>  pantoprazole  Injectable 40milliGRAM(s) IV Push daily  lisinopril 2.5milliGRAM(s) Oral daily  gabapentin 300milliGRAM(s) Oral three times a day  atorvastatin 80milliGRAM(s) Oral at bedtime  clopidogrel Tablet 75milliGRAM(s) Oral daily  QUEtiapine 25milliGRAM(s) Oral daily  metoprolol 100milliGRAM(s) Oral two times a day  amLODIPine   Tablet 5milliGRAM(s) Oral daily  polyethylene glycol 3350 17Gram(s) Oral daily  cefuroxime   Tablet 500milliGRAM(s) Oral every 12 hours  aspirin  chewable 81milliGRAM(s) Oral daily    MEDICATIONS  (PRN):  HYDROmorphone  Injectable 1milliGRAM(s) IV Push every 6 hours PRN Severe Pain (7 - 10)  acetaminophen   Tablet 650milliGRAM(s) Oral every 6 hours PRN For Temp greater than 38.5 C (101.3 F)  oxyCODONE  5 mG/acetaminophen 325 mG 2Tablet(s) Oral every 6 hours PRN Moderate Pain (4 - 6)  mirtazapine 15milliGRAM(s) Oral at bedtime PRN sleep      Vital Signs Last 24 Hrs  T(C): 36.8, Max: 36.8 (06-10 @ 07:45)  HR: 56 (53 - 67)  BP: 130/76 (118/72 - 155/75)  RR: 18 (18 - 18)  SpO2: 99% (96% - 99%)  Wt(kg): --  CAPILLARY BLOOD GLUCOSE    I&O's Summary  I & Os for 24h ending 10 Sammy 2017 07:00  =============================================  IN: 1560 ml / OUT: 500 ml / NET: 1060 ml    I & Os for current day (as of 10 Sammy 2017 12:59)  =============================================  IN: 0 ml / OUT: 0 ml / NET: 0 ml      PHYSICAL EXAM:  GENERAL: NAD, well-developed  HEAD:  Atraumatic, Normocephalic  EYES: EOMI, PERRLA, conjunctiva and sclera clear  NECK: Supple, No JVD  CHEST/LUNG: Clear to auscultation bilaterally; No wheeze  HEART: Regular rate and rhythm; No murmurs, rubs, or gallops  ABDOMEN: Soft, Nontender, Nondistended; Bowel sounds present  EXTREMITIES:  2+ Peripheral Pulses, No clubbing, cyanosis, or edema  PSYCH: AAOx3  NEUROLOGY: non-focal  SKIN: No rashes or lesions    LABS:                    RADIOLOGY & ADDITIONAL TESTS:    Imaging Personally Reviewed:    Consultant(s) Notes Reviewed:      Care Discussed with Consultants/Other Providers:

## 2017-06-11 LAB
ANION GAP SERPL CALC-SCNC: 19 MMOL/L — HIGH (ref 5–17)
BASOPHILS # BLD AUTO: 0.02 K/UL — SIGNIFICANT CHANGE UP (ref 0–0.2)
BASOPHILS NFR BLD AUTO: 0.4 % — SIGNIFICANT CHANGE UP (ref 0–2)
BUN SERPL-MCNC: 11 MG/DL — SIGNIFICANT CHANGE UP (ref 7–23)
CALCIUM SERPL-MCNC: 9.4 MG/DL — SIGNIFICANT CHANGE UP (ref 8.4–10.5)
CHLORIDE SERPL-SCNC: 103 MMOL/L — SIGNIFICANT CHANGE UP (ref 96–108)
CO2 SERPL-SCNC: 19 MMOL/L — LOW (ref 22–31)
CREAT SERPL-MCNC: 0.97 MG/DL — SIGNIFICANT CHANGE UP (ref 0.5–1.3)
EOSINOPHIL # BLD AUTO: 0.28 K/UL — SIGNIFICANT CHANGE UP (ref 0–0.5)
EOSINOPHIL NFR BLD AUTO: 6 % — SIGNIFICANT CHANGE UP (ref 0–6)
GLUCOSE SERPL-MCNC: 90 MG/DL — SIGNIFICANT CHANGE UP (ref 70–99)
HCT VFR BLD CALC: 35.2 % — LOW (ref 39–50)
HGB BLD-MCNC: 11.9 G/DL — LOW (ref 13–17)
IMM GRANULOCYTES NFR BLD AUTO: 0 % — SIGNIFICANT CHANGE UP (ref 0–1.5)
LYMPHOCYTES # BLD AUTO: 1.24 K/UL — SIGNIFICANT CHANGE UP (ref 1–3.3)
LYMPHOCYTES # BLD AUTO: 26.4 % — SIGNIFICANT CHANGE UP (ref 13–44)
MCHC RBC-ENTMCNC: 32.7 PG — SIGNIFICANT CHANGE UP (ref 27–34)
MCHC RBC-ENTMCNC: 33.8 GM/DL — SIGNIFICANT CHANGE UP (ref 32–36)
MCV RBC AUTO: 96.7 FL — SIGNIFICANT CHANGE UP (ref 80–100)
MONOCYTES # BLD AUTO: 0.49 K/UL — SIGNIFICANT CHANGE UP (ref 0–0.9)
MONOCYTES NFR BLD AUTO: 10.4 % — SIGNIFICANT CHANGE UP (ref 2–14)
NEUTROPHILS # BLD AUTO: 2.66 K/UL — SIGNIFICANT CHANGE UP (ref 1.8–7.4)
NEUTROPHILS NFR BLD AUTO: 56.8 % — SIGNIFICANT CHANGE UP (ref 43–77)
PLATELET # BLD AUTO: 220 K/UL — SIGNIFICANT CHANGE UP (ref 150–400)
POTASSIUM SERPL-MCNC: 4.2 MMOL/L — SIGNIFICANT CHANGE UP (ref 3.5–5.3)
POTASSIUM SERPL-SCNC: 4.2 MMOL/L — SIGNIFICANT CHANGE UP (ref 3.5–5.3)
PSA FLD-MCNC: 39.3 NG/ML — HIGH (ref 0–4)
RBC # BLD: 3.64 M/UL — LOW (ref 4.2–5.8)
RBC # FLD: 12.7 % — SIGNIFICANT CHANGE UP (ref 10.3–14.5)
SODIUM SERPL-SCNC: 141 MMOL/L — SIGNIFICANT CHANGE UP (ref 135–145)
WBC # BLD: 4.69 K/UL — SIGNIFICANT CHANGE UP (ref 3.8–10.5)
WBC # FLD AUTO: 4.69 K/UL — SIGNIFICANT CHANGE UP (ref 3.8–10.5)

## 2017-06-11 RX ORDER — HYDROMORPHONE HYDROCHLORIDE 2 MG/ML
1 INJECTION INTRAMUSCULAR; INTRAVENOUS; SUBCUTANEOUS EVERY 6 HOURS
Qty: 0 | Refills: 0 | Status: DISCONTINUED | OUTPATIENT
Start: 2017-06-11 | End: 2017-06-14

## 2017-06-11 RX ADMIN — Medication 500 MILLIGRAM(S): at 05:01

## 2017-06-11 RX ADMIN — Medication 81 MILLIGRAM(S): at 13:12

## 2017-06-11 RX ADMIN — Medication 500 MILLIGRAM(S): at 19:06

## 2017-06-11 RX ADMIN — PANTOPRAZOLE SODIUM 40 MILLIGRAM(S): 20 TABLET, DELAYED RELEASE ORAL at 12:44

## 2017-06-11 RX ADMIN — Medication 100 MILLIGRAM(S): at 05:01

## 2017-06-11 RX ADMIN — QUETIAPINE FUMARATE 25 MILLIGRAM(S): 200 TABLET, FILM COATED ORAL at 12:44

## 2017-06-11 RX ADMIN — HYDROMORPHONE HYDROCHLORIDE 1 MILLIGRAM(S): 2 INJECTION INTRAMUSCULAR; INTRAVENOUS; SUBCUTANEOUS at 18:39

## 2017-06-11 RX ADMIN — CLOPIDOGREL BISULFATE 75 MILLIGRAM(S): 75 TABLET, FILM COATED ORAL at 13:12

## 2017-06-11 RX ADMIN — AMLODIPINE BESYLATE 5 MILLIGRAM(S): 2.5 TABLET ORAL at 05:01

## 2017-06-11 RX ADMIN — ATORVASTATIN CALCIUM 80 MILLIGRAM(S): 80 TABLET, FILM COATED ORAL at 21:29

## 2017-06-11 RX ADMIN — HYDROMORPHONE HYDROCHLORIDE 1 MILLIGRAM(S): 2 INJECTION INTRAMUSCULAR; INTRAVENOUS; SUBCUTANEOUS at 12:51

## 2017-06-11 RX ADMIN — HEPARIN SODIUM 5000 UNIT(S): 5000 INJECTION INTRAVENOUS; SUBCUTANEOUS at 18:43

## 2017-06-11 RX ADMIN — LISINOPRIL 2.5 MILLIGRAM(S): 2.5 TABLET ORAL at 05:01

## 2017-06-11 RX ADMIN — HYDROMORPHONE HYDROCHLORIDE 1 MILLIGRAM(S): 2 INJECTION INTRAMUSCULAR; INTRAVENOUS; SUBCUTANEOUS at 12:21

## 2017-06-11 RX ADMIN — GABAPENTIN 300 MILLIGRAM(S): 400 CAPSULE ORAL at 05:01

## 2017-06-11 RX ADMIN — HYDROMORPHONE HYDROCHLORIDE 1 MILLIGRAM(S): 2 INJECTION INTRAMUSCULAR; INTRAVENOUS; SUBCUTANEOUS at 04:45

## 2017-06-11 RX ADMIN — GABAPENTIN 300 MILLIGRAM(S): 400 CAPSULE ORAL at 14:27

## 2017-06-11 RX ADMIN — SODIUM CHLORIDE 75 MILLILITER(S): 9 INJECTION, SOLUTION INTRAVENOUS at 10:09

## 2017-06-11 RX ADMIN — HEPARIN SODIUM 5000 UNIT(S): 5000 INJECTION INTRAVENOUS; SUBCUTANEOUS at 05:01

## 2017-06-11 RX ADMIN — Medication 100 MILLIGRAM(S): at 18:43

## 2017-06-11 RX ADMIN — GABAPENTIN 300 MILLIGRAM(S): 400 CAPSULE ORAL at 21:29

## 2017-06-11 RX ADMIN — HYDROMORPHONE HYDROCHLORIDE 1 MILLIGRAM(S): 2 INJECTION INTRAMUSCULAR; INTRAVENOUS; SUBCUTANEOUS at 23:05

## 2017-06-11 RX ADMIN — HYDROMORPHONE HYDROCHLORIDE 1 MILLIGRAM(S): 2 INJECTION INTRAMUSCULAR; INTRAVENOUS; SUBCUTANEOUS at 22:50

## 2017-06-11 NOTE — PHYSICAL THERAPY INITIAL EVALUATION ADULT - RANGE OF MOTION EXAMINATION, REHAB EVAL
except RUE 2/2 CVA. Passive ROM WFL's, flexor pattern noted/bilateral lower extremity ROM was WFL (within functional limits)/bilateral upper extremity ROM was WFL (within functional limits)

## 2017-06-11 NOTE — PROGRESS NOTE ADULT - SUBJECTIVE AND OBJECTIVE BOX
Pt is seen and examined  pt is awake and out of bed to chair  pt seems comfortable and denies any complaints at this time  Agitation  observed yesterday has resolved        MEDICATIONS  (STANDING):  heparin  Injectable 5000Unit(s) SubCutaneous every 12 hours  dextrose 5% + sodium chloride 0.9%. 1000milliLiter(s) IV Continuous <Continuous>  pantoprazole  Injectable 40milliGRAM(s) IV Push daily  lisinopril 2.5milliGRAM(s) Oral daily  gabapentin 300milliGRAM(s) Oral three times a day  atorvastatin 80milliGRAM(s) Oral at bedtime  clopidogrel Tablet 75milliGRAM(s) Oral daily  QUEtiapine 25milliGRAM(s) Oral daily  metoprolol 100milliGRAM(s) Oral two times a day  amLODIPine   Tablet 5milliGRAM(s) Oral daily  polyethylene glycol 3350 17Gram(s) Oral daily  cefuroxime   Tablet 500milliGRAM(s) Oral every 12 hours  aspirin  chewable 81milliGRAM(s) Oral daily    MEDICATIONS  (PRN):  HYDROmorphone  Injectable 1milliGRAM(s) IV Push every 6 hours PRN Severe Pain (7 - 10)  acetaminophen   Tablet 650milliGRAM(s) Oral every 6 hours PRN For Temp greater than 38.5 C (101.3 F)  oxyCODONE  5 mG/acetaminophen 325 mG 2Tablet(s) Oral every 6 hours PRN Moderate Pain (4 - 6)  mirtazapine 15milliGRAM(s) Oral at bedtime PRN sleep      Allergies    No Known Allergies    Intolerances        Vital Signs Last 24 Hrs  T(C): 36.8, Max: 36.8 (06-11 @ 08:24)  T(F): 98.2, Max: 98.2 (06-11 @ 08:24)  HR: 70 (56 - 98)  BP: 178/87 (133/74 - 178/87)  BP(mean): --  RR: 18 (18 - 18)  SpO2: 99% (96% - 99%)    PHYSICAL EXAM  General: adult in NAD  HEENT: clear oropharynx, anicteric sclera, pink conjunctiva  Neck: supple  CV: normal S1/S2 with no murmur rubs or gallops  Lungs: positive air movement b/l ant lungs,clear to auscultation, no wheezes, no rales  Abdomen: soft non-tender non-distended, no hepatosplenomegaly,PEG functioning normally  Ext: no clubbing cyanosis or edema  Skin: no rashes and no petechiae  Neuro: alert and oriented X 4, no focal deficits  LABS:    Complete Blood Count (06.08.17 @ 10:10)    WBC Count: 5.0 K/uL    RBC Count: 3.40 M/uL    Hemoglobin: 11.8 g/dL    Hematocrit: 34.8 %    Mean Cell Volume: 102.0 fl    Mean Cell Hemoglobin: 34.8 pg    Mean Cell Hemoglobin Conc: 34.0 gm/dL    Red Cell Distrib Width: 11.6 %    Platelet Count - Automated: 185 K/uL      Prostate Ca Screen, PSA Total (06.10.17 @ 17:19)    Prostate Ca Screen, PSA Total: 31.94: Serum PSA is not an absolute test for malignancy. The PSA value      Basic Metabolic Panel (06.08.17 @ 10:10)    Sodium, Serum: 139 mmol/L    Potassium, Serum: 3.8 mmol/L    Chloride, Serum: 102 mmol/L    Carbon Dioxide, Serum: 24 mmol/L    Anion Gap, Serum: 13 mmol/L    Blood Urea Nitrogen, Serum: 13 mg/dL    Creatinine, Serum: 0.99 mg/dL    Glucose, Serum: 93 mg/dL    Calcium, Total Serum: 9.0 mg/dL    eGFR if Non : 75: Interpretative comment

## 2017-06-11 NOTE — PHYSICAL THERAPY INITIAL EVALUATION ADULT - ADDITIONAL COMMENTS
Pt is not verbal, history obtained via phone conversation with pt's niece Melisa Bhakta ([phone].) Pt lives with wife and son in 2nd floor apartment, 22 steps to enter. Has HHA 9hrs/day, family assists at other times. Pt was ambulating with cane pta, and supervision/assist. Able to negotiate stairs with assist. As per niece, pt is mostly not verbal, but able to answer yes/no accurately. Pt was R handed but has adapted since stroke, now able to feed himself, brush teeth independently using LUE.

## 2017-06-11 NOTE — PROGRESS NOTE ADULT - PROBLEM SELECTOR PLAN 5
GI evaluation called house  Oncology evaluation Dr. Orosco noted  feeding via PEG  may need PEG change

## 2017-06-11 NOTE — PROGRESS NOTE ADULT - SUBJECTIVE AND OBJECTIVE BOX
CC: f/u for abnormal UA, UTI    Patient is very weak, poorly interactive, minimally verbal  No fevers, tolerates tube feeds    REVIEW OF SYSTEMS: no fevers, no chills, no nausea, no vomiting, no abd pain, no resp symptoms  All other review of systems negative (Comprehensive ROS)    Antimicrobials Day #5  cefuroxime   Tablet 500milliGRAM(s) Oral every 12 hours          Other Medications Reviewed    Vital Signs Last 24 Hrs  T(C): 36.6, Max: 36.8 (06-10 @ 07:45)  T(F): 97.8, Max: 98.2 (06-10 @ 07:45)  HR: 98 (56 - 98)  BP: 159/89 (130/76 - 159/89)  BP(mean): --  RR: 18 (18 - 18)  SpO2: 98% (96% - 99%)    PHYSICAL EXAM:  General: alert, no acute distress  Eyes:  anicteric, no conjunctival injection, no discharge  Oropharynx: no lesions or injection 	  Neck: supple, without adenopathy  Lungs: poor insp effort  Heart: regular rate and rhythm; no murmur, rubs or gallops  Abdomen: soft, nondistended, nontender, without mass or organomegaly, PEG tube in place  Skin: no lesions  Extremities: no clubbing, cyanosis, or edema  Neurologic: weak, poorly interactive    LAB RESULTS:                        no new labs            MICROBIOLOGY REVIEWED:      Culture - Urine (06.07.17 @ 16:23)    -  Ampicillin: I 16    -  Aztreonam: S <=4    -  Cefepime: S <=2    -  Cefoxitin: S <=4    -  Meropenem: S <=1    -  Tobramycin: S <=2    -  Cefazolin: S <=2    -  Gentamicin: S <=1    -  Levofloxacin: S <=1    -  Ceftazidime: S <=1    -  Ceftriaxone: S <=1    -  Ciprofloxacin: S <=0.5    -  Ertapenem: S <=0.5    -  Nitrofurantoin: S <=32    -  Piperacillin/Tazobactam: S <=8    -  Trimethoprim/Sulfamethoxazole: R >2/38    -  Amikacin: S <=8    -  Ampicillin/Sulbactam: S <=4/2    -  Imipenem: S <=1    Specimen Source: .Urine Catheterized    Culture Results:   50,000 - 99,000 CFU/mL Escherichia coli          RADIOLOGY REVIEWED:        CT:   KIDNEYS/URETERS: Bilateral renal cysts and subcentimeter hypodense   lesions which are too small to characterize. Hypodense lesions at the   upper poles bilaterally are again noted and measure higher than fluid   attenuation.  1.3 cm hypodense lesion within the right lobe of the liver which measures   slightly higher than fluid attenuation and is new since 8/23/2016 but is   also seen on more recent previous studies.    Status post partial gastrectomy with a mass at the resection site again   noted, measuring 2.1 x 2.0 cm.    Aneurysmal dilatation of the lower thoracic and upper abdominal aorta   unchanged.

## 2017-06-11 NOTE — PHYSICAL THERAPY INITIAL EVALUATION ADULT - PERTINENT HX OF CURRENT PROBLEM, REHAB EVAL
72 y/oM admitted 6/7 with decreased PO intake x2d, refusing to get out of bed, frequent urination with dark urine. +UTI. At baseline pt is verbal, sitting in a chair. As per spouse, stopped giving oral chemo pills for past 10 days because made the pt nauseous and vomit. AS per heme/onc note 6/9 pt with malignant GI stromal tumor, CT shows overall stable disease. PMH CVA with R sided residual weakness, PEG tube, Hypertension, prostate and stomach CA on oral chemo through PEG tube, HLD

## 2017-06-11 NOTE — PROGRESS NOTE ADULT - SUBJECTIVE AND OBJECTIVE BOX
CARDIOLOGY FOLLOW UP NOTE - DR. LI    Subjective:  no changes      PHYSICAL EXAM:  T(C): 36.8, Max: 36.8 (06-11 @ 08:24)  HR: 70 (56 - 98)  BP: 178/87 (133/74 - 178/87)  RR: 18 (18 - 18)  SpO2: 99% (96% - 99%)  Wt(kg): --  I&O's Summary  I & Os for 24h ending 11 Jun 2017 07:00  =============================================  IN: 3130 ml / OUT: 900 ml / NET: 2230 ml    I & Os for current day (as of 11 Jun 2017 13:09)  =============================================  IN: 0 ml / OUT: 500 ml / NET: -500 ml      Appearance: Normal	  Cardiovascular: Normal S1 S2,RRR, No JVD, No murmurs  Respiratory: Lungs clear to auscultation	  Gastrointestinal:  Soft, Non-tender, + BS	  Extremities: Normal range of motion, No clubbing, cyanosis or edema      MEDICATIONS  (STANDING):  heparin  Injectable 5000Unit(s) SubCutaneous every 12 hours  dextrose 5% + sodium chloride 0.9%. 1000milliLiter(s) IV Continuous <Continuous>  pantoprazole  Injectable 40milliGRAM(s) IV Push daily  lisinopril 2.5milliGRAM(s) Oral daily  gabapentin 300milliGRAM(s) Oral three times a day  atorvastatin 80milliGRAM(s) Oral at bedtime  clopidogrel Tablet 75milliGRAM(s) Oral daily  QUEtiapine 25milliGRAM(s) Oral daily  metoprolol 100milliGRAM(s) Oral two times a day  amLODIPine   Tablet 5milliGRAM(s) Oral daily  polyethylene glycol 3350 17Gram(s) Oral daily  cefuroxime   Tablet 500milliGRAM(s) Oral every 12 hours  aspirin  chewable 81milliGRAM(s) Oral daily      TELEMETRY: 	    ECG:  	  RADIOLOGY:   DIAGNOSTIC TESTING:  [ ] Echocardiogram:  [ ] Catheterization:  [ ] Stress Test:    OTHER: 	    LABS:	 	    CARDIAC MARKERS:                  proBNP:     Lipid Profile:   HgA1c:

## 2017-06-11 NOTE — PROGRESS NOTE ADULT - SUBJECTIVE AND OBJECTIVE BOX
Patient is a 72y old  Male who presents with a chief complaint of poor po intake, chills (07 Jun 2017 15:28)      SUBJECTIVE / OVERNIGHT EVENTS: Comfortable. Daughter at bedside  Review of Systems  chest pain no  palpitations no  sob no  nausea no  headache no    MEDICATIONS  (STANDING):  heparin  Injectable 5000Unit(s) SubCutaneous every 12 hours  dextrose 5% + sodium chloride 0.9%. 1000milliLiter(s) IV Continuous <Continuous>  pantoprazole  Injectable 40milliGRAM(s) IV Push daily  lisinopril 2.5milliGRAM(s) Oral daily  gabapentin 300milliGRAM(s) Oral three times a day  atorvastatin 80milliGRAM(s) Oral at bedtime  clopidogrel Tablet 75milliGRAM(s) Oral daily  QUEtiapine 25milliGRAM(s) Oral daily  metoprolol 100milliGRAM(s) Oral two times a day  amLODIPine   Tablet 5milliGRAM(s) Oral daily  polyethylene glycol 3350 17Gram(s) Oral daily  cefuroxime   Tablet 500milliGRAM(s) Oral every 12 hours  aspirin  chewable 81milliGRAM(s) Oral daily    MEDICATIONS  (PRN):  acetaminophen   Tablet 650milliGRAM(s) Oral every 6 hours PRN For Temp greater than 38.5 C (101.3 F)  oxyCODONE  5 mG/acetaminophen 325 mG 2Tablet(s) Oral every 6 hours PRN Moderate Pain (4 - 6)  mirtazapine 15milliGRAM(s) Oral at bedtime PRN sleep  HYDROmorphone  Injectable 1milliGRAM(s) IV Push every 6 hours PRN Severe Pain (7 - 10)      Vital Signs Last 24 Hrs  T(C): 36.8, Max: 36.8 (06-11 @ 08:24)  HR: 70 (56 - 98)  BP: 178/87 (133/74 - 178/87)  RR: 18 (18 - 18)  SpO2: 99% (96% - 99%)  Wt(kg): --  CAPILLARY BLOOD GLUCOSE    I&O's Summary  I & Os for 24h ending 11 Jun 2017 07:00  =============================================  IN: 3130 ml / OUT: 900 ml / NET: 2230 ml    I & Os for current day (as of 11 Jun 2017 15:15)  =============================================  IN: 0 ml / OUT: 800 ml / NET: -800 ml      PHYSICAL EXAM:  GENERAL: NAD, well-developed  HEAD:  Atraumatic, Normocephalic  EYES: EOMI, PERRLA, conjunctiva and sclera clear  NECK: Supple, No JVD  CHEST/LUNG: Clear to auscultation bilaterally; No wheeze  HEART: Regular rate and rhythm; No murmurs, rubs, or gallops  ABDOMEN: Soft, Nontender, Nondistended; Bowel sounds present  EXTREMITIES:  2+ Peripheral Pulses, No clubbing, cyanosis, or edema  PSYCH: AAOx3  NEUROLOGY: non-focal  SKIN: No rashes or lesions    LABS:                        11.9   4.69  )-----------( 220      ( 11 Jun 2017 13:37 )             35.2     06-11    141  |  103  |  11  ----------------------------<  90  4.2   |  19<L>  |  0.97    Ca    9.4      11 Jun 2017 13:37                RADIOLOGY & ADDITIONAL TESTS:    Imaging Personally Reviewed:    Consultant(s) Notes Reviewed:      Care Discussed with Consultants/Other Providers:

## 2017-06-12 DIAGNOSIS — Z93.1 GASTROSTOMY STATUS: ICD-10-CM

## 2017-06-12 RX ADMIN — GABAPENTIN 300 MILLIGRAM(S): 400 CAPSULE ORAL at 14:09

## 2017-06-12 RX ADMIN — HYDROMORPHONE HYDROCHLORIDE 1 MILLIGRAM(S): 2 INJECTION INTRAMUSCULAR; INTRAVENOUS; SUBCUTANEOUS at 14:08

## 2017-06-12 RX ADMIN — POLYETHYLENE GLYCOL 3350 17 GRAM(S): 17 POWDER, FOR SOLUTION ORAL at 11:48

## 2017-06-12 RX ADMIN — HYDROMORPHONE HYDROCHLORIDE 1 MILLIGRAM(S): 2 INJECTION INTRAMUSCULAR; INTRAVENOUS; SUBCUTANEOUS at 04:40

## 2017-06-12 RX ADMIN — QUETIAPINE FUMARATE 25 MILLIGRAM(S): 200 TABLET, FILM COATED ORAL at 11:48

## 2017-06-12 RX ADMIN — GABAPENTIN 300 MILLIGRAM(S): 400 CAPSULE ORAL at 21:13

## 2017-06-12 RX ADMIN — Medication 100 MILLIGRAM(S): at 05:01

## 2017-06-12 RX ADMIN — Medication 500 MILLIGRAM(S): at 18:09

## 2017-06-12 RX ADMIN — LISINOPRIL 2.5 MILLIGRAM(S): 2.5 TABLET ORAL at 05:01

## 2017-06-12 RX ADMIN — HEPARIN SODIUM 5000 UNIT(S): 5000 INJECTION INTRAVENOUS; SUBCUTANEOUS at 05:01

## 2017-06-12 RX ADMIN — GABAPENTIN 300 MILLIGRAM(S): 400 CAPSULE ORAL at 05:01

## 2017-06-12 RX ADMIN — AMLODIPINE BESYLATE 5 MILLIGRAM(S): 2.5 TABLET ORAL at 05:01

## 2017-06-12 RX ADMIN — HEPARIN SODIUM 5000 UNIT(S): 5000 INJECTION INTRAVENOUS; SUBCUTANEOUS at 18:08

## 2017-06-12 RX ADMIN — HYDROMORPHONE HYDROCHLORIDE 1 MILLIGRAM(S): 2 INJECTION INTRAMUSCULAR; INTRAVENOUS; SUBCUTANEOUS at 04:55

## 2017-06-12 RX ADMIN — CLOPIDOGREL BISULFATE 75 MILLIGRAM(S): 75 TABLET, FILM COATED ORAL at 11:48

## 2017-06-12 RX ADMIN — HYDROMORPHONE HYDROCHLORIDE 1 MILLIGRAM(S): 2 INJECTION INTRAMUSCULAR; INTRAVENOUS; SUBCUTANEOUS at 14:40

## 2017-06-12 RX ADMIN — Medication 500 MILLIGRAM(S): at 05:01

## 2017-06-12 RX ADMIN — HYDROMORPHONE HYDROCHLORIDE 1 MILLIGRAM(S): 2 INJECTION INTRAMUSCULAR; INTRAVENOUS; SUBCUTANEOUS at 21:22

## 2017-06-12 RX ADMIN — Medication 100 MILLIGRAM(S): at 18:09

## 2017-06-12 RX ADMIN — SODIUM CHLORIDE 75 MILLILITER(S): 9 INJECTION, SOLUTION INTRAVENOUS at 05:01

## 2017-06-12 RX ADMIN — HYDROMORPHONE HYDROCHLORIDE 1 MILLIGRAM(S): 2 INJECTION INTRAMUSCULAR; INTRAVENOUS; SUBCUTANEOUS at 21:13

## 2017-06-12 RX ADMIN — Medication 81 MILLIGRAM(S): at 11:48

## 2017-06-12 RX ADMIN — PANTOPRAZOLE SODIUM 40 MILLIGRAM(S): 20 TABLET, DELAYED RELEASE ORAL at 11:47

## 2017-06-12 RX ADMIN — ATORVASTATIN CALCIUM 80 MILLIGRAM(S): 80 TABLET, FILM COATED ORAL at 21:13

## 2017-06-12 NOTE — PROGRESS NOTE ADULT - SUBJECTIVE AND OBJECTIVE BOX
CC: f/u for abnormal UA, UTI    Patient is very weak, poorly interactive  No fevers, tolerates tube feeds    REVIEW OF SYSTEMS: no fevers, no chills, no nausea, no vomiting, no abd pain, no resp symptoms  All other review of systems negative (Comprehensive ROS)    Antimicrobials Day #6  cefuroxime   Tablet 500milliGRAM(s) Oral every 12 hours        Other Medications Reviewed    Vital Signs Last 24 Hrs  T(C): 37, Max: 37 (06-12 @ 15:36)  T(F): 98.6, Max: 98.6 (06-12 @ 15:36)  HR: 70 (59 - 88)  BP: 143/92 (143/92 - 171/87)  BP(mean): --  RR: 18 (18 - 20)  SpO2: 97% (96% - 99%)    PHYSICAL EXAM:  General: alert, no acute distress  Eyes:  anicteric, no conjunctival injection, no discharge  Oropharynx: no lesions or injection 	  Neck: supple, without adenopathy  Lungs: poor insp effort  Heart: regular rate and rhythm; no murmur, rubs or gallops  Abdomen: soft, nondistended, nontender, without mass or organomegaly, PEG tube in place  Skin: no lesions  Extremities: no clubbing, cyanosis, or edema  Neurologic: weak, poorly interactive    LAB RESULTS:                                   11.9   4.69  )-----------( 220      ( 11 Jun 2017 13:37 )             35.2   06-11    141  |  103  |  11  ----------------------------<  90  4.2   |  19<L>  |  0.97    Ca    9.4      11 Jun 2017 13:37        MICROBIOLOGY REVIEWED:      Culture - Urine (06.07.17 @ 16:23)    -  Ampicillin: I 16    -  Aztreonam: S <=4    -  Cefepime: S <=2    -  Cefoxitin: S <=4    -  Meropenem: S <=1    -  Tobramycin: S <=2    -  Cefazolin: S <=2    -  Gentamicin: S <=1    -  Levofloxacin: S <=1    -  Ceftazidime: S <=1    -  Ceftriaxone: S <=1    -  Ciprofloxacin: S <=0.5    -  Ertapenem: S <=0.5    -  Nitrofurantoin: S <=32    -  Piperacillin/Tazobactam: S <=8    -  Trimethoprim/Sulfamethoxazole: R >2/38    -  Amikacin: S <=8    -  Ampicillin/Sulbactam: S <=4/2    -  Imipenem: S <=1    Specimen Source: .Urine Catheterized    Culture Results:   50,000 - 99,000 CFU/mL Escherichia coli          RADIOLOGY REVIEWED:        CT:   KIDNEYS/URETERS: Bilateral renal cysts and subcentimeter hypodense   lesions which are too small to characterize. Hypodense lesions at the   upper poles bilaterally are again noted and measure higher than fluid   attenuation.  1.3 cm hypodense lesion within the right lobe of the liver which measures   slightly higher than fluid attenuation and is new since 8/23/2016 but is   also seen on more recent previous studies.    Status post partial gastrectomy with a mass at the resection site again   noted, measuring 2.1 x 2.0 cm.    Aneurysmal dilatation of the lower thoracic and upper abdominal aorta   unchanged.

## 2017-06-12 NOTE — PROGRESS NOTE ADULT - SUBJECTIVE AND OBJECTIVE BOX
Patient is a 72y old  Male who presents with a chief complaint of poor po intake, chills (07 Jun 2017 15:28)      SUBJECTIVE / OVERNIGHT EVENTS:No new complaints. Still with abdominal pain. Family at bedside.  Review of Systems  chest pain no  palpitations no  sob no  nausea no  headache no    MEDICATIONS  (STANDING):  heparin  Injectable 5000Unit(s) SubCutaneous every 12 hours  dextrose 5% + sodium chloride 0.9%. 1000milliLiter(s) IV Continuous <Continuous>  pantoprazole  Injectable 40milliGRAM(s) IV Push daily  lisinopril 2.5milliGRAM(s) Oral daily  gabapentin 300milliGRAM(s) Oral three times a day  atorvastatin 80milliGRAM(s) Oral at bedtime  clopidogrel Tablet 75milliGRAM(s) Oral daily  QUEtiapine 25milliGRAM(s) Oral daily  metoprolol 100milliGRAM(s) Oral two times a day  amLODIPine   Tablet 5milliGRAM(s) Oral daily  polyethylene glycol 3350 17Gram(s) Oral daily  cefuroxime   Tablet 500milliGRAM(s) Oral every 12 hours  aspirin  chewable 81milliGRAM(s) Oral daily    MEDICATIONS  (PRN):  acetaminophen   Tablet 650milliGRAM(s) Oral every 6 hours PRN For Temp greater than 38.5 C (101.3 F)  oxyCODONE  5 mG/acetaminophen 325 mG 2Tablet(s) Oral every 6 hours PRN Moderate Pain (4 - 6)  mirtazapine 15milliGRAM(s) Oral at bedtime PRN sleep  HYDROmorphone  Injectable 1milliGRAM(s) IV Push every 6 hours PRN Severe Pain (7 - 10)      Vital Signs Last 24 Hrs  T(C): 36.6, Max: 36.6 (06-11 @ 15:44)  HR: 59 (59 - 88)  BP: 146/82 (146/82 - 175/90)  RR: 18 (18 - 20)  SpO2: 96% (96% - 99%)  Wt(kg): --  CAPILLARY BLOOD GLUCOSE    I&O's Summary  I & Os for 24h ending 12 Jun 2017 07:00  =============================================  IN: 3120 ml / OUT: 2800 ml / NET: 320 ml    I & Os for current day (as of 12 Jun 2017 13:44)  =============================================  IN: 0 ml / OUT: 300 ml / NET: -300 ml      PHYSICAL EXAM:  GENERAL: NAD, well-developed  HEAD:  Atraumatic, Normocephalic  EYES: EOMI, PERRLA, conjunctiva and sclera clear  NECK: Supple, No JVD  CHEST/LUNG: Clear to auscultation bilaterally; No wheeze  HEART: Regular rate and rhythm; No murmurs, rubs, or gallops  ABDOMEN: Soft, Nontender, Nondistended; Bowel sounds present  EXTREMITIES:  2+ Peripheral Pulses, No clubbing, cyanosis, or edema  PSYCH: AAOx3  NEUROLOGY: non-focal  SKIN: No rashes or lesions    LABS:                        11.9   4.69  )-----------( 220      ( 11 Jun 2017 13:37 )             35.2     06-11    141  |  103  |  11  ----------------------------<  90  4.2   |  19<L>  |  0.97    Ca    9.4      11 Jun 2017 13:37                RADIOLOGY & ADDITIONAL TESTS:    Imaging Personally Reviewed:    Consultant(s) Notes Reviewed:      Care Discussed with Consultants/Other Providers:

## 2017-06-12 NOTE — PROGRESS NOTE ADULT - SUBJECTIVE AND OBJECTIVE BOX
CARDIOLOGY FOLLOW UP NOTE - DR. LI    Subjective:    no chest pain/sob      PHYSICAL EXAM:  T(C): 37, Max: 37 (06-12 @ 15:36)  HR: 70 (59 - 88)  BP: 143/92 (143/92 - 171/87)  RR: 18 (18 - 20)  SpO2: 97% (96% - 99%)  Wt(kg): --  I&O's Summary  I & Os for 24h ending 12 Jun 2017 07:00  =============================================  IN: 3120 ml / OUT: 2800 ml / NET: 320 ml    I & Os for current day (as of 12 Jun 2017 15:48)  =============================================  IN: 0 ml / OUT: 300 ml / NET: -300 ml      Appearance: Normal	  Cardiovascular: Normal S1 S2,RRR, No JVD, No murmurs  Respiratory: Lungs clear to auscultation	  Gastrointestinal:  Soft, Non-tender, + BS	  Extremities: Normal range of motion, No clubbing, cyanosis or edema      MEDICATIONS  (STANDING):  heparin  Injectable 5000Unit(s) SubCutaneous every 12 hours  dextrose 5% + sodium chloride 0.9%. 1000milliLiter(s) IV Continuous <Continuous>  pantoprazole  Injectable 40milliGRAM(s) IV Push daily  lisinopril 2.5milliGRAM(s) Oral daily  gabapentin 300milliGRAM(s) Oral three times a day  atorvastatin 80milliGRAM(s) Oral at bedtime  clopidogrel Tablet 75milliGRAM(s) Oral daily  QUEtiapine 25milliGRAM(s) Oral daily  metoprolol 100milliGRAM(s) Oral two times a day  amLODIPine   Tablet 5milliGRAM(s) Oral daily  polyethylene glycol 3350 17Gram(s) Oral daily  cefuroxime   Tablet 500milliGRAM(s) Oral every 12 hours  aspirin  chewable 81milliGRAM(s) Oral daily      TELEMETRY: 	    ECG:  	  RADIOLOGY:   DIAGNOSTIC TESTING:  [ ] Echocardiogram:  [ ] Catheterization:  [ ] Stress Test:    OTHER: 	    LABS:	 	    CARDIAC MARKERS:                                11.9   4.69  )-----------( 220      ( 11 Jun 2017 13:37 )             35.2     06-11    141  |  103  |  11  ----------------------------<  90  4.2   |  19<L>  |  0.97    Ca    9.4      11 Jun 2017 13:37      proBNP:     Lipid Profile:   HgA1c:

## 2017-06-13 ENCOUNTER — TRANSCRIPTION ENCOUNTER (OUTPATIENT)
Age: 72
End: 2017-06-13

## 2017-06-13 LAB
ANION GAP SERPL CALC-SCNC: 13 MMOL/L — SIGNIFICANT CHANGE UP (ref 5–17)
BUN SERPL-MCNC: 13 MG/DL — SIGNIFICANT CHANGE UP (ref 7–23)
CALCIUM SERPL-MCNC: 9 MG/DL — SIGNIFICANT CHANGE UP (ref 8.4–10.5)
CHLORIDE SERPL-SCNC: 103 MMOL/L — SIGNIFICANT CHANGE UP (ref 96–108)
CO2 SERPL-SCNC: 25 MMOL/L — SIGNIFICANT CHANGE UP (ref 22–31)
CREAT SERPL-MCNC: 0.84 MG/DL — SIGNIFICANT CHANGE UP (ref 0.5–1.3)
GLUCOSE SERPL-MCNC: 108 MG/DL — HIGH (ref 70–99)
HCT VFR BLD CALC: 34.1 % — LOW (ref 39–50)
HGB BLD-MCNC: 11.2 G/DL — LOW (ref 13–17)
MCHC RBC-ENTMCNC: 33 GM/DL — SIGNIFICANT CHANGE UP (ref 32–36)
MCHC RBC-ENTMCNC: 33.8 PG — SIGNIFICANT CHANGE UP (ref 27–34)
MCV RBC AUTO: 102 FL — HIGH (ref 80–100)
PLATELET # BLD AUTO: 225 K/UL — SIGNIFICANT CHANGE UP (ref 150–400)
POTASSIUM SERPL-MCNC: 4.2 MMOL/L — SIGNIFICANT CHANGE UP (ref 3.5–5.3)
POTASSIUM SERPL-SCNC: 4.2 MMOL/L — SIGNIFICANT CHANGE UP (ref 3.5–5.3)
RBC # BLD: 3.32 M/UL — LOW (ref 4.2–5.8)
RBC # FLD: 11.7 % — SIGNIFICANT CHANGE UP (ref 10.3–14.5)
SODIUM SERPL-SCNC: 141 MMOL/L — SIGNIFICANT CHANGE UP (ref 135–145)
WBC # BLD: 4.1 K/UL — SIGNIFICANT CHANGE UP (ref 3.8–10.5)
WBC # FLD AUTO: 4.1 K/UL — SIGNIFICANT CHANGE UP (ref 3.8–10.5)

## 2017-06-13 PROCEDURE — 99232 SBSQ HOSP IP/OBS MODERATE 35: CPT | Mod: GC

## 2017-06-13 RX ORDER — GABAPENTIN 400 MG/1
1 CAPSULE ORAL
Qty: 0 | Refills: 0 | COMMUNITY
Start: 2017-06-13

## 2017-06-13 RX ORDER — LACTULOSE 10 G/15ML
30 SOLUTION ORAL DAILY
Qty: 0 | Refills: 0 | Status: DISCONTINUED | OUTPATIENT
Start: 2017-06-13 | End: 2017-06-14

## 2017-06-13 RX ORDER — POLYETHYLENE GLYCOL 3350 17 G/17G
17 POWDER, FOR SOLUTION ORAL
Qty: 0 | Refills: 0 | COMMUNITY
Start: 2017-06-13

## 2017-06-13 RX ORDER — MIRTAZAPINE 45 MG/1
1 TABLET, ORALLY DISINTEGRATING ORAL
Qty: 0 | Refills: 0 | COMMUNITY
Start: 2017-06-13

## 2017-06-13 RX ORDER — LISINOPRIL 2.5 MG/1
5 TABLET ORAL DAILY
Qty: 0 | Refills: 0 | Status: DISCONTINUED | OUTPATIENT
Start: 2017-06-13 | End: 2017-06-14

## 2017-06-13 RX ORDER — AMLODIPINE BESYLATE 2.5 MG/1
1 TABLET ORAL
Qty: 0 | Refills: 0 | COMMUNITY
Start: 2017-06-13

## 2017-06-13 RX ORDER — ATORVASTATIN CALCIUM 80 MG/1
1 TABLET, FILM COATED ORAL
Qty: 0 | Refills: 0 | COMMUNITY
Start: 2017-06-13

## 2017-06-13 RX ORDER — CEFUROXIME AXETIL 250 MG
1 TABLET ORAL
Qty: 5 | Refills: 0 | OUTPATIENT
Start: 2017-06-13 | End: 2017-06-15

## 2017-06-13 RX ORDER — OXYCODONE HYDROCHLORIDE 5 MG/1
5 TABLET ORAL ONCE
Qty: 0 | Refills: 0 | Status: DISCONTINUED | OUTPATIENT
Start: 2017-06-13 | End: 2017-06-13

## 2017-06-13 RX ORDER — ASPIRIN/CALCIUM CARB/MAGNESIUM 324 MG
1 TABLET ORAL
Qty: 0 | Refills: 0 | COMMUNITY
Start: 2017-06-13

## 2017-06-13 RX ORDER — SENNA PLUS 8.6 MG/1
2 TABLET ORAL
Qty: 60 | Refills: 0 | OUTPATIENT
Start: 2017-06-13 | End: 2017-07-13

## 2017-06-13 RX ORDER — PANTOPRAZOLE SODIUM 20 MG/1
1 TABLET, DELAYED RELEASE ORAL
Qty: 0 | Refills: 0 | COMMUNITY

## 2017-06-13 RX ORDER — DOCUSATE SODIUM 100 MG
10 CAPSULE ORAL
Qty: 900 | Refills: 0 | OUTPATIENT
Start: 2017-06-13 | End: 2017-07-13

## 2017-06-13 RX ORDER — SENNA PLUS 8.6 MG/1
2 TABLET ORAL AT BEDTIME
Qty: 0 | Refills: 0 | Status: DISCONTINUED | OUTPATIENT
Start: 2017-06-13 | End: 2017-06-14

## 2017-06-13 RX ORDER — CLOPIDOGREL BISULFATE 75 MG/1
1 TABLET, FILM COATED ORAL
Qty: 0 | Refills: 0 | COMMUNITY
Start: 2017-06-13

## 2017-06-13 RX ORDER — ACETAMINOPHEN 500 MG
2 TABLET ORAL
Qty: 0 | Refills: 0 | COMMUNITY
Start: 2017-06-13

## 2017-06-13 RX ORDER — OXYCODONE HYDROCHLORIDE 5 MG/1
2 TABLET ORAL
Qty: 0 | Refills: 0 | COMMUNITY
Start: 2017-06-13

## 2017-06-13 RX ORDER — METOPROLOL TARTRATE 50 MG
1 TABLET ORAL
Qty: 0 | Refills: 0 | COMMUNITY

## 2017-06-13 RX ORDER — DOCUSATE SODIUM 100 MG
100 CAPSULE ORAL THREE TIMES A DAY
Qty: 0 | Refills: 0 | Status: DISCONTINUED | OUTPATIENT
Start: 2017-06-13 | End: 2017-06-14

## 2017-06-13 RX ORDER — METOPROLOL TARTRATE 50 MG
1 TABLET ORAL
Qty: 0 | Refills: 0 | COMMUNITY
Start: 2017-06-13

## 2017-06-13 RX ORDER — LISINOPRIL 2.5 MG/1
1 TABLET ORAL
Qty: 0 | Refills: 0 | COMMUNITY
Start: 2017-06-13

## 2017-06-13 RX ORDER — QUETIAPINE FUMARATE 200 MG/1
1 TABLET, FILM COATED ORAL
Qty: 0 | Refills: 0 | COMMUNITY
Start: 2017-06-13

## 2017-06-13 RX ORDER — AMLODIPINE BESYLATE 2.5 MG/1
1 TABLET ORAL
Qty: 0 | Refills: 0 | COMMUNITY

## 2017-06-13 RX ORDER — PANTOPRAZOLE SODIUM 20 MG/1
40 TABLET, DELAYED RELEASE ORAL
Qty: 0 | Refills: 0 | COMMUNITY
Start: 2017-06-13

## 2017-06-13 RX ORDER — ATORVASTATIN CALCIUM 80 MG/1
1 TABLET, FILM COATED ORAL
Qty: 0 | Refills: 0 | COMMUNITY

## 2017-06-13 RX ADMIN — POLYETHYLENE GLYCOL 3350 17 GRAM(S): 17 POWDER, FOR SOLUTION ORAL at 12:24

## 2017-06-13 RX ADMIN — HYDROMORPHONE HYDROCHLORIDE 1 MILLIGRAM(S): 2 INJECTION INTRAMUSCULAR; INTRAVENOUS; SUBCUTANEOUS at 05:43

## 2017-06-13 RX ADMIN — SENNA PLUS 2 TABLET(S): 8.6 TABLET ORAL at 21:39

## 2017-06-13 RX ADMIN — Medication 100 MILLIGRAM(S): at 17:49

## 2017-06-13 RX ADMIN — CLOPIDOGREL BISULFATE 75 MILLIGRAM(S): 75 TABLET, FILM COATED ORAL at 12:23

## 2017-06-13 RX ADMIN — HYDROMORPHONE HYDROCHLORIDE 1 MILLIGRAM(S): 2 INJECTION INTRAMUSCULAR; INTRAVENOUS; SUBCUTANEOUS at 11:49

## 2017-06-13 RX ADMIN — HEPARIN SODIUM 5000 UNIT(S): 5000 INJECTION INTRAVENOUS; SUBCUTANEOUS at 17:45

## 2017-06-13 RX ADMIN — AMLODIPINE BESYLATE 5 MILLIGRAM(S): 2.5 TABLET ORAL at 05:41

## 2017-06-13 RX ADMIN — GABAPENTIN 300 MILLIGRAM(S): 400 CAPSULE ORAL at 21:36

## 2017-06-13 RX ADMIN — HEPARIN SODIUM 5000 UNIT(S): 5000 INJECTION INTRAVENOUS; SUBCUTANEOUS at 05:41

## 2017-06-13 RX ADMIN — ATORVASTATIN CALCIUM 80 MILLIGRAM(S): 80 TABLET, FILM COATED ORAL at 21:36

## 2017-06-13 RX ADMIN — Medication 100 MILLIGRAM(S): at 05:42

## 2017-06-13 RX ADMIN — GABAPENTIN 300 MILLIGRAM(S): 400 CAPSULE ORAL at 15:32

## 2017-06-13 RX ADMIN — HYDROMORPHONE HYDROCHLORIDE 1 MILLIGRAM(S): 2 INJECTION INTRAMUSCULAR; INTRAVENOUS; SUBCUTANEOUS at 17:39

## 2017-06-13 RX ADMIN — HYDROMORPHONE HYDROCHLORIDE 1 MILLIGRAM(S): 2 INJECTION INTRAMUSCULAR; INTRAVENOUS; SUBCUTANEOUS at 17:43

## 2017-06-13 RX ADMIN — LISINOPRIL 2.5 MILLIGRAM(S): 2.5 TABLET ORAL at 05:42

## 2017-06-13 RX ADMIN — Medication 500 MILLIGRAM(S): at 05:42

## 2017-06-13 RX ADMIN — Medication 100 MILLIGRAM(S): at 21:39

## 2017-06-13 RX ADMIN — LACTULOSE 30 GRAM(S): 10 SOLUTION ORAL at 10:09

## 2017-06-13 RX ADMIN — Medication 500 MILLIGRAM(S): at 17:49

## 2017-06-13 RX ADMIN — HYDROMORPHONE HYDROCHLORIDE 1 MILLIGRAM(S): 2 INJECTION INTRAMUSCULAR; INTRAVENOUS; SUBCUTANEOUS at 05:41

## 2017-06-13 RX ADMIN — PANTOPRAZOLE SODIUM 40 MILLIGRAM(S): 20 TABLET, DELAYED RELEASE ORAL at 12:24

## 2017-06-13 RX ADMIN — GABAPENTIN 300 MILLIGRAM(S): 400 CAPSULE ORAL at 05:41

## 2017-06-13 RX ADMIN — QUETIAPINE FUMARATE 25 MILLIGRAM(S): 200 TABLET, FILM COATED ORAL at 12:24

## 2017-06-13 RX ADMIN — Medication 81 MILLIGRAM(S): at 12:23

## 2017-06-13 RX ADMIN — HYDROMORPHONE HYDROCHLORIDE 1 MILLIGRAM(S): 2 INJECTION INTRAMUSCULAR; INTRAVENOUS; SUBCUTANEOUS at 11:34

## 2017-06-13 NOTE — DISCHARGE NOTE ADULT - HOSPITAL COURSE
to be completed by attending 72yom PMHx of CVA with R sided residual weakness, PEG tube, Hypertension, prostate and stomach cancer on oral chemo through PEG tube, hyperlipidemia presents to ED with family for past 2 days with decrease PO intake, refusing to get out of bed and with freq urination along with dark yellow urine. No vomiting. no fever or chills. Pt baseline usually verbal and sitting in a chair. Per spouse, stopped giving oral chemo pills for past 10 days because made the pt nauseous and vomit.	  DX weakness, UTI, GIST magligant6/8   Resuming tube feed as per DR Medina. Patient doesn't know what was he on           Unable to contact family. Recommended  by dietitian  6/9  Cont ceftriaxone, may switch to po on DC  6/12 awaiting urology/ gi consult  6/13	stable for discharge awaiting pickup @ 7AM-8am; discharge completed; please call NP to verify D/C  Patient has severe protein caloric malnutrition.  Mental status change secondary to infection and pain medication, multifactorial as metabolic encephalopathy..

## 2017-06-13 NOTE — DISCHARGE NOTE ADULT - PATIENT PORTAL LINK FT
“You can access the [facility] Patient Portal, offered by [facility], by registering with the following website: [email or web address]”

## 2017-06-13 NOTE — DISCHARGE NOTE ADULT - MEDICATION SUMMARY - MEDICATIONS TO TAKE
I will START or STAY ON the medications listed below when I get home from the hospital:    aspirin 81 mg oral tablet, chewable  -- 1 tab(s) by mouth once a day  -- Indication: For CAD (coronary artery disease)    acetaminophen 325 mg oral tablet  -- 2 tab(s) by mouth every 6 hours, As needed, For Temp greater than 38.5 C (101.3 F)  -- Indication: For fever    acetaminophen-oxycodone 325 mg-5 mg oral tablet  -- 2 tab(s) by mouth every 6 hours, As needed, Moderate Pain (4 - 6)  -- Indication: For Pain    lisinopril 2.5 mg oral tablet  -- 1 tab(s) by mouth once a day  -- Indication: For HTN    gabapentin 300 mg oral capsule  -- 1 cap(s) by mouth 3 times a day  -- Indication: For Pain    mirtazapine 15 mg oral tablet  -- 1 tab(s) by mouth once a day (at bedtime), As needed, sleep  -- Indication: For Agitation    atorvastatin 80 mg oral tablet  -- 1 tab(s) by mouth once a day (at bedtime)  -- Indication: For HLD    clopidogrel 75 mg oral tablet  -- 1 tab(s) by mouth once a day  -- Indication: For CAD (coronary artery disease)    QUEtiapine 25 mg oral tablet  -- 1 tab(s) by mouth once a day  -- Indication: For Agitation    imatinib 400 mg oral tablet  -- 1 tab(s) by gastrostomy tube once a day  -- Indication: For Stomach cancer    metoprolol tartrate 100 mg oral tablet  -- 1 tab(s) by mouth 2 times a day  -- Indication: For HTN    amLODIPine 5 mg oral tablet  -- 1 tab(s) by mouth once a day  -- Indication: For HTN    cefuroxime 500 mg oral tablet  -- 1 tab(s) by mouth every 12 hours  -- Indication: For UTI (urinary tract infection) due to Enterococcus    docusate sodium 10 mg/mL oral liquid  -- 10 milliliter(s) by mouth 3 times a day  -- Medication should be taken with plenty of water.    -- Indication: For Constipation    polyethylene glycol 3350 oral powder for reconstitution  -- 17 gram(s) by mouth once a day  -- Indication: For Constipation    senna oral tablet  -- 2 tab(s) by mouth once a day (at bedtime)  -- Indication: For Constipation    pantoprazole 40 mg intravenous injection  -- 40 milligram(s) intravenous once a day  -- Indication: For GERd

## 2017-06-13 NOTE — PROGRESS NOTE ADULT - SUBJECTIVE AND OBJECTIVE BOX
Patient is a 72y old  Male who presents with a chief complaint of poor po intake, chills (07 Jun 2017 15:28) - H/o GIST, newly diagnosed prostate cancer - was on gleevec - on hold since admission       Pt is seen and examined  pt is awake and lying in bed  pt poor historian - nods yes when asked if in pain and c/o nausea    PHYSICAL EXAM  General: adult resting in bed  CV: normal S1/S2 with no murmur rubs or gallops  Lungs: decreased b/l at bases  Abdomen: soft vaguely tender non-distended, PEG site +  Ext: no clubbing cyanosis or edema  Skin: no rashes and no petechiae  Neuro: awake    MEDICATIONS  (STANDING):  heparin  Injectable 5000Unit(s) SubCutaneous every 12 hours  dextrose 5% + sodium chloride 0.9%. 1000milliLiter(s) IV Continuous <Continuous>  pantoprazole  Injectable 40milliGRAM(s) IV Push daily  lisinopril 2.5milliGRAM(s) Oral daily  gabapentin 300milliGRAM(s) Oral three times a day  atorvastatin 80milliGRAM(s) Oral at bedtime  clopidogrel Tablet 75milliGRAM(s) Oral daily  QUEtiapine 25milliGRAM(s) Oral daily  metoprolol 100milliGRAM(s) Oral two times a day  amLODIPine   Tablet 5milliGRAM(s) Oral daily  polyethylene glycol 3350 17Gram(s) Oral daily  cefuroxime   Tablet 500milliGRAM(s) Oral every 12 hours  aspirin  chewable 81milliGRAM(s) Oral daily  senna 2Tablet(s) Oral at bedtime  docusate sodium 100milliGRAM(s) Oral three times a day    MEDICATIONS  (PRN):  acetaminophen   Tablet 650milliGRAM(s) Oral every 6 hours PRN For Temp greater than 38.5 C (101.3 F)  oxyCODONE  5 mG/acetaminophen 325 mG 2Tablet(s) Oral every 6 hours PRN Moderate Pain (4 - 6)  mirtazapine 15milliGRAM(s) Oral at bedtime PRN sleep  HYDROmorphone  Injectable 1milliGRAM(s) IV Push every 6 hours PRN Severe Pain (7 - 10)  lactulose Syrup 30Gram(s) Oral daily PRN constipation      Allergies    No Known Allergies    Intolerances        Vital Signs Last 24 Hrs  T(C): 36.9, Max: 37 (06-12 @ 15:36)  T(F): 98.4, Max: 98.6 (06-12 @ 15:36)  HR: 60 (58 - 80)  BP: 167/82 (135/77 - 176/87)  BP(mean): --  RR: 18 (18 - 20)  SpO2: 96% (96% - 97%)    LABS:                        11.9   4.69  )-----------( 220      ( 11 Jun 2017 13:37 )             35.2         Mean Cell Volume : 96.7 fl  Mean Cell Hemoglobin : 32.7 pg  Mean Cell Hemoglobin Concentration : 33.8 gm/dL  Auto Neutrophil # : 2.66 K/uL  Auto Lymphocyte # : 1.24 K/uL  Auto Monocyte # : 0.49 K/uL  Auto Eosinophil # : 0.28 K/uL  Auto Basophil # : 0.02 K/uL  Auto Neutrophil % : 56.8 %  Auto Lymphocyte % : 26.4 %  Auto Monocyte % : 10.4 %  Auto Eosinophil % : 6.0 %  Auto Basophil % : 0.4 %    Serial CBC's  06-11 @ 13:37  Hct-35.2 / Hgb-11.9 / Plat-220 / RBC-3.64 / WBC-4.69            06-11    141  |  103  |  11  ----------------------------<  90  4.2   |  19<L>  |  0.97    Ca    9.4      11 Jun 2017 13:37      Prostate Ca Screen, PSA Total (06.11.17 @ 13:37)    Prostate Ca Screen, PSA Total: 39.30:

## 2017-06-13 NOTE — DISCHARGE NOTE ADULT - PLAN OF CARE
improved Follow up with your primary care doctor complete antibiotics follow up with oncology follow up with oncology/urology complete antibiotics as instructed

## 2017-06-13 NOTE — DISCHARGE NOTE ADULT - CARE PLAN
Principal Discharge DX:	Generalized weakness  Goal:	improved  Instructions for follow-up, activity and diet:	Follow up with your primary care doctor  Secondary Diagnosis:	UTI (urinary tract infection) due to Enterococcus  Goal:	complete antibiotics  Secondary Diagnosis:	GIST, malignant  Goal:	follow up with oncology  Secondary Diagnosis:	Prostate cancer  Goal:	follow up with oncology/urology Principal Discharge DX:	Generalized weakness  Goal:	improved  Instructions for follow-up, activity and diet:	Follow up with your primary care doctor  Secondary Diagnosis:	UTI (urinary tract infection) due to Enterococcus  Goal:	complete antibiotics as instructed  Secondary Diagnosis:	GIST, malignant  Goal:	follow up with oncology  Secondary Diagnosis:	Prostate cancer  Goal:	follow up with oncology/urology

## 2017-06-13 NOTE — CONSULT NOTE ADULT - ASSESSMENT
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 Nausea vomiting, poor intake.  ? secondary to  medication intolerance, PEG malfunction, GIST tumor progression, other benign upper GI  pathology.  Comfortable benign exam at present.
1 : ? PEG Change : Previously placed PEG functioning normally. No indication for PEG change at the moment.  2 :  Abdominal Discomfort and Constipation : Increase dose of Miralax to 17 Gram Twice Daily and add Senna and Colace.
72 year old male with hx Gastric CA on chemo, CVA, depression, now presents with lethargy, failure to thrive, poor PO intake  + UTI     1. CV stable No chest pain No SOB   EKG : reviewed no change from previous EKG, no evidence of acute Ischemia   trop negative   last echo (2016)   EF 62%, normal LV fx, moderate Aortic Regurg   no evidence of CHF on exam     2. HTN, chronic   elevated BP readings noted, AM blood pressure acceptable   trend for now  continue with Norvasc, lisinopril, lopressor as ordered   titrate Norvasc if bp remains elevated.     3. lethargy, failure to thrive, poor PO intake likely  secondary to patient overall medical condition  + UTI : ID f/u : continue on IV abx   pending BC results  Gastric CA : GI eval / Onc eval   pending CT abd/pelvis results.     4. CAD : continue with asa/ plavix/ statin     5. DVT ppx
Given his co-morbidities it appears he has less than a 5- 10 life expectancy and he is not capable of providing full understanding of treatment options for management of his prostate cancer,.  treatment options include RT, hormonal therapy including orchiectomy surveillance and no treatment.  He is not a candidate for radical prostatectomy and at this time will monitor unless symptomatic
lethargy, FTT  hx Gastric CA on chemo  generalized weakness and abnormal UA  Prior Urine cult with VRE and E coli  no fevers or leukocytosis  UA with active sediment, some blood and protein as well with elevated creatinine from baseline  PLAN:  empiric Ceftriaxone for now  will try to limit abx to short course  f/u cultures  ?goals of care  consider bladder/renal sono  appropriate use of abx and side effects reviewed  Thank you for the courtesy of this consult

## 2017-06-13 NOTE — DISCHARGE NOTE ADULT - HOME CARE AGENCY
[facility]. Nurse and Physical therapist to arrange visit within 48 hrs after discharge. [phone]

## 2017-06-13 NOTE — DISCHARGE NOTE ADULT - CARE PROVIDER_API CALL
Jose Medina (MD), Internal Medicine  [address] [place] [address]  Phone: [phone]  Fax: [phone]

## 2017-06-13 NOTE — PROGRESS NOTE ADULT - SUBJECTIVE AND OBJECTIVE BOX
CC no acute events noted       PHYSICAL EXAM:  T(C): 36.9, Max: 37 (06-12 @ 15:36)  HR: 60 (58 - 80)  BP: 167/82 (135/77 - 176/87)  RR: 18 (18 - 20)  SpO2: 96% (96% - 97%)  Wt(kg): --  I&O's Summary  I & Os for 24h ending 13 Jun 2017 07:00  =============================================  IN: 1560 ml / OUT: 600 ml / NET: 960 ml    I & Os for current day (as of 13 Jun 2017 13:06)  =============================================  IN: 0 ml / OUT: 625 ml / NET: -625 ml      Appearance: Normal	  Cardiovascular: Normal S1 S2,RRR, No JVD, No murmurs  Respiratory: Lungs clear to auscultation	  Gastrointestinal:  Soft, Non-tender, + BS	  Extremities: Normal range of motion, No clubbing, cyanosis or edema        MEDICATIONS  (STANDING):  heparin  Injectable 5000Unit(s) SubCutaneous every 12 hours  dextrose 5% + sodium chloride 0.9%. 1000milliLiter(s) IV Continuous <Continuous>  pantoprazole  Injectable 40milliGRAM(s) IV Push daily  lisinopril 2.5milliGRAM(s) Oral daily  gabapentin 300milliGRAM(s) Oral three times a day  atorvastatin 80milliGRAM(s) Oral at bedtime  clopidogrel Tablet 75milliGRAM(s) Oral daily  QUEtiapine 25milliGRAM(s) Oral daily  metoprolol 100milliGRAM(s) Oral two times a day  amLODIPine   Tablet 5milliGRAM(s) Oral daily  polyethylene glycol 3350 17Gram(s) Oral daily  cefuroxime   Tablet 500milliGRAM(s) Oral every 12 hours  aspirin  chewable 81milliGRAM(s) Oral daily  senna 2Tablet(s) Oral at bedtime  docusate sodium 100milliGRAM(s) Oral three times a day  oxyCODONE IR 5milliGRAM(s) Oral once      OTHER: 	    LABS:	 	                            11.2   4.1   )-----------( 225      ( 13 Jun 2017 10:12 )             34.1     06-13    141  |  103  |  13  ----------------------------<  108<H>  4.2   |  25  |  0.84    Ca    9.0      13 Jun 2017 10:12

## 2017-06-13 NOTE — CONSULT NOTE ADULT - SUBJECTIVE AND OBJECTIVE BOX
patient seen and examined and full note dictated.  Patient with prostate biopsy revealing intermediate risk adenocarcinoma of the prostate.  Patient without indication of metastatic disease or symptoms given general condition.

## 2017-06-13 NOTE — PROGRESS NOTE ADULT - SUBJECTIVE AND OBJECTIVE BOX
Patient is a 72y old  Male who presents with a chief complaint of poor po intake, chills (13 Jun 2017 10:43)      SUBJECTIVE / OVERNIGHT EVENTS: No new complaints.  Review of Systems  chest pain no  palpitations no  sob no  nausea no  headache no    MEDICATIONS  (STANDING):  heparin  Injectable 5000Unit(s) SubCutaneous every 12 hours  dextrose 5% + sodium chloride 0.9%. 1000milliLiter(s) IV Continuous <Continuous>  pantoprazole  Injectable 40milliGRAM(s) IV Push daily  lisinopril 2.5milliGRAM(s) Oral daily  gabapentin 300milliGRAM(s) Oral three times a day  atorvastatin 80milliGRAM(s) Oral at bedtime  clopidogrel Tablet 75milliGRAM(s) Oral daily  QUEtiapine 25milliGRAM(s) Oral daily  metoprolol 100milliGRAM(s) Oral two times a day  amLODIPine   Tablet 5milliGRAM(s) Oral daily  polyethylene glycol 3350 17Gram(s) Oral daily  cefuroxime   Tablet 500milliGRAM(s) Oral every 12 hours  aspirin  chewable 81milliGRAM(s) Oral daily  senna 2Tablet(s) Oral at bedtime  docusate sodium 100milliGRAM(s) Oral three times a day  oxyCODONE IR 5milliGRAM(s) Oral once    MEDICATIONS  (PRN):  acetaminophen   Tablet 650milliGRAM(s) Oral every 6 hours PRN For Temp greater than 38.5 C (101.3 F)  oxyCODONE  5 mG/acetaminophen 325 mG 2Tablet(s) Oral every 6 hours PRN Moderate Pain (4 - 6)  mirtazapine 15milliGRAM(s) Oral at bedtime PRN sleep  HYDROmorphone  Injectable 1milliGRAM(s) IV Push every 6 hours PRN Severe Pain (7 - 10)  lactulose Syrup 30Gram(s) Oral daily PRN constipation      Vital Signs Last 24 Hrs  T(C): 36.9, Max: 37 (06-12 @ 15:36)  HR: 60 (58 - 80)  BP: 167/82 (135/77 - 176/87)  RR: 18 (18 - 20)  SpO2: 96% (96% - 97%)  Wt(kg): --  CAPILLARY BLOOD GLUCOSE    I&O's Summary  I & Os for 24h ending 13 Jun 2017 07:00  =============================================  IN: 1560 ml / OUT: 600 ml / NET: 960 ml    I & Os for current day (as of 13 Jun 2017 12:18)  =============================================  IN: 0 ml / OUT: 625 ml / NET: -625 ml      PHYSICAL EXAM:  GENERAL: NAD, well-developed  HEAD:  Atraumatic, Normocephalic  EYES: EOMI, PERRLA, conjunctiva and sclera clear  NECK: Supple, No JVD  CHEST/LUNG: Clear to auscultation bilaterally; No wheeze  HEART: Regular rate and rhythm; No murmurs, rubs, or gallops  ABDOMEN: Soft, Nontender, Nondistended; Bowel sounds present  EXTREMITIES:  2+ Peripheral Pulses, No clubbing, cyanosis, or edema  PSYCH: AAOx3  NEUROLOGY: non-focal  SKIN: No rashes or lesions    LABS:                        11.2   4.1   )-----------( 225      ( 13 Jun 2017 10:12 )             34.1     06-13    141  |  103  |  13  ----------------------------<  108<H>  4.2   |  25  |  0.84    Ca    9.0      13 Jun 2017 10:12                RADIOLOGY & ADDITIONAL TESTS:    Imaging Personally Reviewed:    Consultant(s) Notes Reviewed:      Care Discussed with Consultants/Other Providers:

## 2017-06-13 NOTE — CONSULT NOTE ADULT - ATTENDING COMMENTS
Patient seen and examined.  Agree with above NP note.    cont current tx  monitor bp  dvt ppx
71 yo male with abdominal pain,possibly due to constipation, recommend bowel regimen follow clinically. Peg in place and being used by primary team.

## 2017-06-13 NOTE — CONSULT NOTE ADULT - CONSULT REASON
Hx of gastric Gist tumor with intraabdominal metastasis. had been on Gleevec, held for past 10 days for nausea and vomiting
prostate cancer
PEG Change ?
abnl EKG, HTN management   hx syncope/stroke
abnormal UA, weakness, UTI

## 2017-06-13 NOTE — PROGRESS NOTE ADULT - SUBJECTIVE AND OBJECTIVE BOX
CC: f/u for uti    Patient reports his abdomen hurts    REVIEW OF SYSTEMS:  All other review of systems negative (Comprehensive ROS)    Antimicrobials Day #  :7/10 ceftin  cefuroxime   Tablet 500milliGRAM(s) Oral every 12 hours    Other Medications Reviewed    T(F): 98.4, Max: 98.6 (06-12 @ 15:36)  HR: 60  BP: 167/82  RR: 18  SpO2: 96%  Wt(kg): --    PHYSICAL EXAM:  General: alert, no acute distress  Eyes:  anicteric, no conjunctival injection, no discharge  Oropharynx: no lesions or injection 	  Neck: supple, without adenopathy  Lungs: clear to auscultation  Heart: regular rate and rhythm; no murmur, rubs or gallops  Abdomen: soft, nondistended, tender,  mass , peg  Skin: no lesions  Extremities: no clubbing, cyanosis, or edema  Neurologic: alert, , moves all extremities    LAB RESULTS:                        11.9   4.69  )-----------( 220      ( 11 Jun 2017 13:37 )             35.2     06-11    141  |  103  |  11  ----------------------------<  90  4.2   |  19<L>  |  0.97    Ca    9.4      11 Jun 2017 13:37          MICROBIOLOGY:  RECENT CULTURES:  Culture - Urine (06.07.17 @ 16:23)    -  Amikacin: S <=8    -  Ampicillin/Sulbactam: S <=4/2    -  Imipenem: S <=1    -  Ceftazidime: S <=1    -  Ceftriaxone: S <=1    -  Ciprofloxacin: S <=0.5    -  Ertapenem: S <=0.5    -  Nitrofurantoin: S <=32    -  Piperacillin/Tazobactam: S <=8    -  Trimethoprim/Sulfamethoxazole: R >2/38    -  Cefazolin: S <=2    -  Gentamicin: S <=1    -  Levofloxacin: S <=1    -  Ampicillin: I 16    -  Aztreonam: S <=4    -  Cefepime: S <=2    -  Cefoxitin: S <=4    -  Meropenem: S <=1    -  Tobramycin: S <=2    Specimen Source: .Urine Catheterized    Culture Results:   50,000 - 99,000 CFU/mL Escherichia coli    Organism Identification: Escherichia coli    Organism: Escherichia coli    Method Type: ALICE        RADIOLOGY REVIEWED:      XAM:  CT ABDOMEN AND PELVIS OC IC                            PROCEDURE DATE:  06/07/2017            INTERPRETATION:  CLINICAL INFORMATION: Failure thrive with abdominal pain.    COMPARISON: CT scan of the abdomen and pelvis from 4/28/2017 and dating   back to 8/23/2016.    PROCEDURE:   CT of the Abdomen and Pelvis was performed with intravenous contrast.   Intravenous contrast: 90 ml Omnipaque 350. 10 ml discarded.  Oral contrast: positive contrast was administered.  Sagittal and coronal reformats were performed.    FINDINGS:    LOWER CHEST: Right basilar subsegmental atelectasis. Cardiomegaly.    Limited evaluation without intravenous contrast.  LIVER: There is a 1.3 cm hypodense lesion within the right lobe of the   liver which measures slightly higher than fluid attenuation and is new   since 8/23/2016. Additional subcentimeter hypodense lesions are again   noted in the right lobe of the liver.  BILE DUCTS: The common duct measures 8 mm.  GALLBLADDER: Within normal limits.  SPLEEN: Within normal limits.  PANCREAS: Within normal limits.  ADRENALS: Within normal limits.  KIDNEYS/URETERS: Bilateral renal cysts and subcentimeter hypodense   lesions which are too small to characterize. Hypodense lesions at the   upper poles bilaterally are again noted and measure higher than fluid   attenuation.    BLADDER: Within normal limits.  REPRODUCTIVE ORGANS: The prostate gland is enlarged.    BOWEL: Status post partial gastrectomy with a gastrostomy tube in place,   unchanged. A mass is again noted at the resection site which is difficult   to elucidate on this noncontrast study but which measures 2.1 x 2.0 cm on   series 2 image 16. There is stool throughout the colon. The appendix is   normal.   PERITONEUM: No ascites.  VESSELS:  The lower thoracic descending and upper abdominal aorta are   aneurysmal measuring up to 4.7 cm.  RETROPERITONEUM: No lymphadenopathy.    ABDOMINAL WALL: Within normal limits.  BONES: Unremarkable.    IMPRESSION:   1.3 cm hypodense lesion within the right lobe of the liver which measures   slightly higher than fluid attenuation and is new since 8/23/2016 but is   also seen on more recent previous studies.    Status post partial gastrectomy with a mass at the resection site again   noted, measuring 2.1 x 2.0 cm.    Aneurysmal dilatation of the lower thoracic and upper abdominal aorta   unchanged.      RENA ROWE M.D., ATTENDING RADIOLOGIST  This document has been electronically signed. Jun 8 2017 11:11AM            Assessment: Patient with gastric cancer, gastrectomy, peg, mass, failure to thrive, completing 10 days of antibiotics for uti    Plan:  3 more days of ceftin

## 2017-06-13 NOTE — CONSULT NOTE ADULT - SUBJECTIVE AND OBJECTIVE BOX
Chief Complaint:  Patient is a 72y old  Male who presents with a chief complaint of poor po intake, chills (07 Jun 2017 15:28)      HPI: GI consulted for PEG change as family was concerned about partially dislodged PEG that they had to push back in place. as per RN , PEG not leaking and able to feed the patient through the PEG tube. PEG was initially placed in 04/2-16.  Pt also has constipation and points out to abdomen for pain, cannot verbalize or point out the exact location of his abdominal pain, pt on Percocet and Dilaudid and currently being treated for UTI as well. CT abdomen with IV contrast done for abdominal pain at admission did not reveal any acute abnormality. No documented BM since admission.    Allergies:  No Known Allergies      Home Medications:    Hospital Medications:  heparin  Injectable 5000Unit(s) SubCutaneous every 12 hours  dextrose 5% + sodium chloride 0.9%. 1000milliLiter(s) IV Continuous <Continuous>  pantoprazole  Injectable 40milliGRAM(s) IV Push daily  acetaminophen   Tablet 650milliGRAM(s) Oral every 6 hours PRN  oxyCODONE  5 mG/acetaminophen 325 mG 2Tablet(s) Oral every 6 hours PRN  lisinopril 2.5milliGRAM(s) Oral daily  gabapentin 300milliGRAM(s) Oral three times a day  mirtazapine 15milliGRAM(s) Oral at bedtime PRN  atorvastatin 80milliGRAM(s) Oral at bedtime  clopidogrel Tablet 75milliGRAM(s) Oral daily  QUEtiapine 25milliGRAM(s) Oral daily  metoprolol 100milliGRAM(s) Oral two times a day  amLODIPine   Tablet 5milliGRAM(s) Oral daily  polyethylene glycol 3350 17Gram(s) Oral daily  cefuroxime   Tablet 500milliGRAM(s) Oral every 12 hours  aspirin  chewable 81milliGRAM(s) Oral daily  HYDROmorphone  Injectable 1milliGRAM(s) IV Push every 6 hours PRN      PMHX/PSHX:  Prostate hypertrophy  HLD (hyperlipidemia)  Agitation  Depression  Stroke  Syncope  HTN (hypertension)  Gastric tumor  Gastric ulcer with hemorrhage, perforation, and obstruction, acute  No Past Surgical History      Family history:  No pertinent family history in first degree relatives    ROS: Unable to assess as patient is nonverbal.        PHYSICAL EXAM:     GENERAL:  Appears stated age, in no acute distress.  CHEST:  Full & symmetric excursion, no increased effort, breath sounds clear  HEART:  Regular rhythm, no added sounds  ABDOMEN:  Soft, non-tender, non-distended, normoactive bowel sounds,  PEG site clean, no discharge, leak noted.  NEURO:  Alert, oriented, aware of surroundings, pleasant but nonverbal.    Vital Signs:  Vital Signs Last 24 Hrs  T(C): 36.9, Max: 37 (06-12 @ 15:36)  T(F): 98.4, Max: 98.6 (06-12 @ 15:36)  HR: 60 (58 - 80)  BP: 167/82 (135/77 - 176/87)  BP(mean): --  RR: 18 (18 - 20)  SpO2: 96% (96% - 97%)  Daily     Daily     LABS:                        11.9   4.69  )-----------( 220      ( 11 Jun 2017 13:37 )             35.2     06-11    141  |  103  |  11  ----------------------------<  90  4.2   |  19<L>  |  0.97    Ca    9.4      11 Jun 2017 13:37

## 2017-06-14 VITALS — WEIGHT: 114.86 LBS

## 2017-06-14 PROCEDURE — 85730 THROMBOPLASTIN TIME PARTIAL: CPT

## 2017-06-14 PROCEDURE — 82803 BLOOD GASES ANY COMBINATION: CPT

## 2017-06-14 PROCEDURE — 84484 ASSAY OF TROPONIN QUANT: CPT

## 2017-06-14 PROCEDURE — 85027 COMPLETE CBC AUTOMATED: CPT

## 2017-06-14 PROCEDURE — G0103: CPT

## 2017-06-14 PROCEDURE — 87086 URINE CULTURE/COLONY COUNT: CPT

## 2017-06-14 PROCEDURE — 99285 EMERGENCY DEPT VISIT HI MDM: CPT | Mod: 25

## 2017-06-14 PROCEDURE — 93005 ELECTROCARDIOGRAM TRACING: CPT | Mod: XU

## 2017-06-14 PROCEDURE — 82330 ASSAY OF CALCIUM: CPT

## 2017-06-14 PROCEDURE — 82435 ASSAY OF BLOOD CHLORIDE: CPT

## 2017-06-14 PROCEDURE — 85610 PROTHROMBIN TIME: CPT

## 2017-06-14 PROCEDURE — 87186 SC STD MICRODIL/AGAR DIL: CPT

## 2017-06-14 PROCEDURE — 85014 HEMATOCRIT: CPT

## 2017-06-14 PROCEDURE — 84295 ASSAY OF SERUM SODIUM: CPT

## 2017-06-14 PROCEDURE — 80048 BASIC METABOLIC PNL TOTAL CA: CPT

## 2017-06-14 PROCEDURE — 84132 ASSAY OF SERUM POTASSIUM: CPT

## 2017-06-14 PROCEDURE — 80053 COMPREHEN METABOLIC PANEL: CPT

## 2017-06-14 PROCEDURE — 96374 THER/PROPH/DIAG INJ IV PUSH: CPT | Mod: XU

## 2017-06-14 PROCEDURE — 74177 CT ABD & PELVIS W/CONTRAST: CPT

## 2017-06-14 PROCEDURE — 83605 ASSAY OF LACTIC ACID: CPT

## 2017-06-14 PROCEDURE — 87040 BLOOD CULTURE FOR BACTERIA: CPT

## 2017-06-14 PROCEDURE — 81001 URINALYSIS AUTO W/SCOPE: CPT

## 2017-06-14 PROCEDURE — 82553 CREATINE MB FRACTION: CPT

## 2017-06-14 PROCEDURE — 97161 PT EVAL LOW COMPLEX 20 MIN: CPT

## 2017-06-14 PROCEDURE — 82947 ASSAY GLUCOSE BLOOD QUANT: CPT

## 2017-06-14 PROCEDURE — 71045 X-RAY EXAM CHEST 1 VIEW: CPT

## 2017-06-14 PROCEDURE — 82550 ASSAY OF CK (CPK): CPT

## 2017-06-14 PROCEDURE — 51701 INSERT BLADDER CATHETER: CPT

## 2017-06-14 RX ORDER — PANTOPRAZOLE SODIUM 20 MG/1
1 TABLET, DELAYED RELEASE ORAL
Qty: 0 | Refills: 0 | COMMUNITY
Start: 2017-06-14

## 2017-06-14 RX ADMIN — GABAPENTIN 300 MILLIGRAM(S): 400 CAPSULE ORAL at 13:05

## 2017-06-14 RX ADMIN — Medication 500 MILLIGRAM(S): at 05:57

## 2017-06-14 RX ADMIN — CLOPIDOGREL BISULFATE 75 MILLIGRAM(S): 75 TABLET, FILM COATED ORAL at 13:04

## 2017-06-14 RX ADMIN — AMLODIPINE BESYLATE 5 MILLIGRAM(S): 2.5 TABLET ORAL at 05:57

## 2017-06-14 RX ADMIN — Medication 81 MILLIGRAM(S): at 13:04

## 2017-06-14 RX ADMIN — LISINOPRIL 5 MILLIGRAM(S): 2.5 TABLET ORAL at 06:01

## 2017-06-14 RX ADMIN — MIRTAZAPINE 15 MILLIGRAM(S): 45 TABLET, ORALLY DISINTEGRATING ORAL at 00:35

## 2017-06-14 RX ADMIN — POLYETHYLENE GLYCOL 3350 17 GRAM(S): 17 POWDER, FOR SOLUTION ORAL at 13:03

## 2017-06-14 RX ADMIN — Medication 100 MILLIGRAM(S): at 05:57

## 2017-06-14 RX ADMIN — Medication 100 MILLIGRAM(S): at 05:58

## 2017-06-14 RX ADMIN — Medication 100 MILLIGRAM(S): at 13:05

## 2017-06-14 RX ADMIN — PANTOPRAZOLE SODIUM 40 MILLIGRAM(S): 20 TABLET, DELAYED RELEASE ORAL at 13:03

## 2017-06-14 RX ADMIN — HEPARIN SODIUM 5000 UNIT(S): 5000 INJECTION INTRAVENOUS; SUBCUTANEOUS at 05:59

## 2017-06-14 RX ADMIN — HYDROMORPHONE HYDROCHLORIDE 1 MILLIGRAM(S): 2 INJECTION INTRAMUSCULAR; INTRAVENOUS; SUBCUTANEOUS at 06:33

## 2017-06-14 RX ADMIN — HYDROMORPHONE HYDROCHLORIDE 1 MILLIGRAM(S): 2 INJECTION INTRAMUSCULAR; INTRAVENOUS; SUBCUTANEOUS at 06:36

## 2017-06-14 RX ADMIN — QUETIAPINE FUMARATE 25 MILLIGRAM(S): 200 TABLET, FILM COATED ORAL at 13:04

## 2017-06-14 RX ADMIN — GABAPENTIN 300 MILLIGRAM(S): 400 CAPSULE ORAL at 05:59

## 2017-06-14 NOTE — PROGRESS NOTE ADULT - SUBJECTIVE AND OBJECTIVE BOX
CC: f/u for uti    Patient reports nothing , won't answer question    REVIEW OF SYSTEMS:  All other review of systems negative (Comprehensive ROS)    Antimicrobials Day #  :8/10  cefuroxime   Tablet 500milliGRAM(s) Oral every 12 hours    Other Medications Reviewed    T(F): 97.4, Max: 98.1 (06-13 @ 21:24)  HR: 65  BP: 146/81  RR: 18  SpO2: 97%  Wt(kg): --    PHYSICAL EXAM:  General: alert, no acute distress  Eyes:  anicteric, no conjunctival injection, no discharge  Oropharynx: no lesions or injection 	  Neck: supple, without adenopathy  Lungs: clear to auscultation  Heart: regular rate and rhythm; no murmur, rubs or gallops  Abdomen: soft, nondistended, nontender,  organomegaly, peg in place  Skin: no lesions  Extremities: no clubbing, cyanosis, or edema  Neurologic: alert,, moves all extremities    LAB RESULTS:                        11.2   4.1   )-----------( 225      ( 13 Jun 2017 10:12 )             34.1     06-13    141  |  103  |  13  ----------------------------<  108<H>  4.2   |  25  |  0.84    Ca    9.0      13 Jun 2017 10:12          MICROBIOLOGY:  RECENT CULTURES:    Culture - Urine (06.07.17 @ 16:23)    -  Amikacin: S <=8    -  Ampicillin/Sulbactam: S <=4/2    -  Imipenem: S <=1    -  Ceftazidime: S <=1    -  Ceftriaxone: S <=1    -  Ciprofloxacin: S <=0.5    -  Ertapenem: S <=0.5    -  Nitrofurantoin: S <=32    -  Piperacillin/Tazobactam: S <=8    -  Trimethoprim/Sulfamethoxazole: R >2/38    -  Cefazolin: S <=2    -  Gentamicin: S <=1    -  Levofloxacin: S <=1    -  Ampicillin: I 16    -  Aztreonam: S <=4    -  Cefepime: S <=2    -  Cefoxitin: S <=4    -  Meropenem: S <=1    -  Tobramycin: S <=2    Specimen Source: .Urine Catheterized    Culture Results:   50,000 - 99,000 CFU/mL Escherichia coli    Organism Identification: Escherichia coli    Organism: Escherichia coli    Method Type: ALICE      RADIOLOGY REVIEWED:  EXAM:  CT ABDOMEN AND PELVIS OC IC                            PROCEDURE DATE:  06/07/2017            INTERPRETATION:  CLINICAL INFORMATION: Failure thrive with abdominal pain.    COMPARISON: CT scan of the abdomen and pelvis from 4/28/2017 and dating   back to 8/23/2016.    PROCEDURE:   CT of the Abdomen and Pelvis was performed with intravenous contrast.   Intravenous contrast: 90 ml Omnipaque 350. 10 ml discarded.  Oral contrast: positive contrast was administered.  Sagittal and coronal reformats were performed.    FINDINGS:    LOWER CHEST: Right basilar subsegmental atelectasis. Cardiomegaly.    Limited evaluation without intravenous contrast.  LIVER: There is a 1.3 cm hypodense lesion within the right lobe of the   liver which measures slightly higher than fluid attenuation and is new   since 8/23/2016. Additional subcentimeter hypodense lesions are again   noted in the right lobe of the liver.  BILE DUCTS: The common duct measures 8 mm.  GALLBLADDER: Within normal limits.  SPLEEN: Within normal limits.  PANCREAS: Within normal limits.  ADRENALS: Within normal limits.  KIDNEYS/URETERS: Bilateral renal cysts and subcentimeter hypodense   lesions which are too small to characterize. Hypodense lesions at the   upper poles bilaterally are again noted and measure higher than fluid   attenuation.  BLADDER: Within normal limits.  REPRODUCTIVE ORGANS: The prostate gland is enlarged.    BOWEL: Status post partial gastrectomy with a gastrostomy tube in place,   unchanged. A mass is again noted at the resection site which is difficult   to elucidate on this noncontrast study but which measures 2.1 x 2.0 cm on   series 2 image 16. There is stool throughout the colon. The appendix is   normal.   PERITONEUM: No ascites.  VESSELS:  The lower thoracic descending and upper abdominal aorta are   aneurysmal measuring up to 4.7 cm.  RETROPERITONEUM: No lymphadenopathy.    ABDOMINAL WALL: Within normal limits.  BONES: Unremarkable.    IMPRESSION:   1.3 cm hypodense lesion within the right lobe of the liver which measures   slightly higher than fluid attenuation and is new since 8/23/2016 but is   also seen on more recent previous studies.    Status post partial gastrectomy with a mass at the resection site again   noted, measuring 2.1 x 2.0 cm.    Aneurysmal dilatation of the lower thoracic and upper abdominal aorta   unchanged.    RENA ROWE M.D., ATTENDING RADIOLOGIST  This document has been electronically signed. Jun 8 2017 11:11AM        Assessment: Patient with stomach cancer, metastatic on treatment for uti, no uncontrolled infection is apparent at present    Plan:2 more days of po ceftin  call us if further input is needed

## 2017-06-14 NOTE — PROGRESS NOTE ADULT - PROBLEM SELECTOR PLAN 2
will need to d/w family re. hormonal therapy  such as Lupron - decision as outpatient   followup noted - no plan for surgery
Clarify if any treatment was initiated, hormonal therapy  such as Lupron indicated   followup recommended  Repeat PSA
Clarify if any treatment was initiated, hormonal therapy  such as Lupron indicated   followup recommended
Clarify if any treatmet was initiated, hormonal therapy  such as Lupron indicated   followup recommended  Repeat PSA
antibiotics  ID f/u noted
antibiotics  panculture  ID evaluation noted

## 2017-06-14 NOTE — PROGRESS NOTE ADULT - PROBLEM SELECTOR PLAN 8
Gastroenterology evaluation called re PEG change /No PEG change now.
Gastroenterology evaluation called re PEG change /No PEG change now.
Gastroenterology evaluation called re PEG change

## 2017-06-14 NOTE — PROGRESS NOTE ADULT - PROBLEM SELECTOR PLAN 3
on tube feeds  GI input noted
PT  supportive care
comfortable today - feeding in progress  GI consult  Consider abdominal CT with contrast
Hold feeding  GI consult  Consider abdominal CT with contrast

## 2017-06-14 NOTE — PROGRESS NOTE ADULT - PROBLEM SELECTOR PLAN 1
Hold Gleevec  consider restarting if resumption of PEG feeding uneventful and no discomfort.
Hold Gleevec, consider restarting at a lower dose if tolerates resumption of PEG feeding.
nutrition support  possible infection related

## 2017-06-14 NOTE — PROGRESS NOTE ADULT - ASSESSMENT
lethargy, FTT  hx Gastric CA on chemo  generalized weakness and abnormal UA  Prior Urine cult with VRE and E coli  no fevers or leukocytosis  UA with active sediment, some blood and protein as well with elevated creatinine from baseline  PLAN:  empiric Ceftriaxone for now, hopefully short course  f/u cultures  ?goals of care  consider bladder/renal sono  appropriate use of abx and side effects reviewed
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 ?whether symptoms were related to Gleevec or other problem.  Prostate CA - tissue diagnosis established last admision in 4/2017 - not clear if any treatment was initiated  Patient did not return for outpatient f/u or call our office since last diacharge
lethargy, FTT  hx Gastric CA on chemo  generalized weakness and abnormal UA  Prior Urine cult with VRE and E coli  no fevers or leukocytosis  UA with active sediment, some blood and protein  UCx with E coli R bactrim  PLAN:  cont Ceftin 500 bid via PEG for 4 more days   ?goals of care, disposition as per PMD  appropriate use of abx and side effects reviewed
72 m with UTI, gastric GIST tumor , hx CVA
72 m with UTI, gastric GIST tumor , hx CVA  Medically stable. DC home. QA
72 year old male with hx Gastric CA on chemo, CVA, depression, now presents with lethargy, failure to thrive, poor PO intake  + UTI     -CV stable    -last echo (2016)   EF 62%, normal LV fx, moderate Aortic Regurg   -no evidence of CHF on exam   -BP acceptable, continue with Norvasc, lisinopril, lopressor as ordered       4. CAD : continue with asa/ plavix/ statin     5. DVT ppx
72 year old male with hx Gastric CA on chemo, CVA, depression, now presents with lethargy, failure to thrive, poor PO intake  + UTI     -CV stable    -last echo (2016) normal LV fx, moderate Aortic Regurg  -no evidence of CHF on exam     -HTN : am blood pressure acceptable   - continue with Norvasc, lopressor, lisinopril  as ordered   - can titrate lisinopril to 10 mg po daily     -CAD : continue with asa/ plavix/ statin     -DVT ppx     -med/ onc f/u
72 year old male with hx Gastric CA on chemo, CVA, depression, now presents with lethargy, failure to thrive, poor PO intake  + UTI     -CV stable    -last echo (2016) normal LV fx, moderate Aortic Regurg  -no evidence of CHF on exam     -HTN : blood pressure elevated   - continue with Norvasc, lopressor as ordered   -increase lisinopril to 5mg daily     -CAD : continue with asa/ plavix/ statin     -DVT ppx     -med/ onc f/u
72 year old male with hx Gastric CA on chemo, CVA, depression, now presents with lethargy, failure to thrive, poor PO intake  + UTI     -CV stable    -normal lv fxn   -moderate Aortic Regurg   -no evidence of CHF on exam   -BP acceptable, continue with Norvasc, lisinopril, lopressor as ordered     -CAD : continue with asa/ plavix/ statin   -DVT ppx   -onc f/u  ,ed f/u
72 year old male with hx Gastric CA on chemo, CVA, depression, now presents with lethargy, failure to thrive, poor PO intake  + UTI     -CV stable    -normal lv fxn   -moderate Aortic Regurg   -no evidence of CHF on exam   -BP elevated   -continue with Norvasc, lopressor as ordered   -inc lisinopril to 5mg daily     -CAD : continue with asa/ plavix/ statin   -DVT ppx   -onc f/u
72 year old male with hx Gastric CA on chemo, CVA, depression, now presents with lethargy, failure to thrive, poor PO intake  + UTI     -CV stable    -normal lv fxn   -moderate Aortic Regurg   -no evidence of CHF on exam   -monitor bp  -Norvasc, lopressor as ordered   -inc lisinopril to 5mg daily     -CAD : continue with asa/ plavix/ statin   -DVT ppx   -onc f/u
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 ?whether symptoms were related to Gleevec or other problem.  Prostate CA - tissue diagnosis established last admision in 4/2017 - not clear if any treatment was initiated- PSA stable  Patient did not return for outpatient f/u or call our office since last diacharge
lethargy, FTT  hx Gastric CA on chemo  generalized weakness and abnormal UA  Prior Urine cult with VRE and E coli  no fevers or leukocytosis  UA with active sediment, some blood and protein  UCx with E coli  PLAN:  empiric Ceftriaxone for now, hopefully short course  f/u sensitivity of urine isolate   ?goals of care  consider bladder/renal sono, ? eval of renal lesions  Oral ceftin may be an option if ready for discharge  appropriate use of abx and side effects reviewed
lethargy, FTT  hx Gastric CA on chemo  generalized weakness and abnormal UA  Prior Urine cult with VRE and E coli  no fevers or leukocytosis  UA with active sediment, some blood and protein  UCx with E coli R bactrim  PLAN:  change Ceftriaxone to Ceftin 500 bid via PEG for 5 more days   ?goals of care, disposition as per PMD  consider bladder/renal sono, ? eval of renal lesions  appropriate use of abx and side effects reviewed
lethargy, FTT  hx Gastric CA on chemo  generalized weakness and abnormal UA  Prior Urine cult with VRE and E coli  no fevers or leukocytosis  UA with active sediment, some blood and protein  UCx with E coli R bactrim  PLAN:  cont Ceftin 500 bid via PEG for 3 more days   d/c planning  appropriate use of abx and side effects reviewed
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 ?whether symptoms were related to Gleevec or other problem.  Prostate CA - tissue diagnosis established last admision in 4/2017 - not clear if any treatment was initiated  Patient did not return for outpatient f/u or call our office since last diacharge

## 2017-06-14 NOTE — PROGRESS NOTE ADULT - PROVIDER SPECIALTY LIST ADULT
Cardiology
Heme/Onc
Heme/Onc
Infectious Disease
Internal Medicine
Infectious Disease
Heme/Onc
Heme/Onc

## 2017-06-14 NOTE — PROGRESS NOTE ADULT - PROBLEM SELECTOR PLAN 6
oncology foloow up  urology eval called
oncology foloow up
oncology foloow up
oncology foloow up  urology devyn called re possible Rx Dr. Gary Goldberg
oncology foloow up  urology eval called
oncology foloow up  urology eval noted. Conservative management.
oncology foloow up  urology eval noted. Conservative management.

## 2017-06-14 NOTE — PROGRESS NOTE ADULT - PROBLEM SELECTOR PROBLEM 2
Prostate cancer
UTI (urinary tract infection) due to Enterococcus
Prostate cancer

## 2017-06-14 NOTE — PROGRESS NOTE ADULT - PROBLEM SELECTOR PROBLEM 3
Abdominal pain, unspecified location
Stroke

## 2017-06-14 NOTE — PROGRESS NOTE ADULT - ATTENDING COMMENTS
Patient seen and examined, agree with the above assessment and plan by CRISTIAN Ayon.  CV status remains stable  asa/plavix  med/onc F/U
Patient seen and examined, agree with the above assessment and plan by CRISTIAN Ayon.  CV status remains stable  asa/plavix  med/onc F/U
Jose Medina MD pager [phone]
Jose Medina MD pager [phone]
Jose Medina MD pager [phone]
Jose Medina MD pager [phone]
Jose Medina MD pager [phone]   Discharge patient home.

## 2017-06-14 NOTE — PROGRESS NOTE ADULT - PROBLEM SELECTOR PROBLEM 8
PEG (percutaneous endoscopic gastrostomy) status

## 2017-06-14 NOTE — PROGRESS NOTE ADULT - SUBJECTIVE AND OBJECTIVE BOX
CC no acute events       PHYSICAL EXAM:  T(C): 36.3, Max: 36.7 (06-13 @ 15:37)  HR: 65 (58 - 68)  BP: 146/81 (145/85 - 172/82)  RR: 18 (18 - 18)  SpO2: 97% (95% - 98%)  Wt(kg): --  I&O's Summary    I & Os for current day (as of 14 Jun 2017 09:25)  =============================================  IN: 3240 ml / OUT: 2075 ml / NET: 1165 ml      Appearance: Normal	  Cardiovascular: Normal S1 S2,RRR, No JVD, No murmurs  Respiratory: Lungs clear to auscultation	  Gastrointestinal:  Soft, Non-tender, + BS	  Extremities: Normal range of motion, No clubbing, cyanosis or edema        MEDICATIONS  (STANDING):  heparin  Injectable 5000Unit(s) SubCutaneous every 12 hours  dextrose 5% + sodium chloride 0.9%. 1000milliLiter(s) IV Continuous <Continuous>  pantoprazole  Injectable 40milliGRAM(s) IV Push daily  gabapentin 300milliGRAM(s) Oral three times a day  atorvastatin 80milliGRAM(s) Oral at bedtime  clopidogrel Tablet 75milliGRAM(s) Oral daily  QUEtiapine 25milliGRAM(s) Oral daily  metoprolol 100milliGRAM(s) Oral two times a day  amLODIPine   Tablet 5milliGRAM(s) Oral daily  polyethylene glycol 3350 17Gram(s) Oral daily  cefuroxime   Tablet 500milliGRAM(s) Oral every 12 hours  aspirin  chewable 81milliGRAM(s) Oral daily  senna 2Tablet(s) Oral at bedtime  docusate sodium 100milliGRAM(s) Oral three times a day  lisinopril 5milliGRAM(s) Oral daily          LABS:	 	                            11.2   4.1   )-----------( 225      ( 13 Jun 2017 10:12 )             34.1     06-13    141  |  103  |  13  ----------------------------<  108<H>  4.2   |  25  |  0.84    Ca    9.0      13 Jun 2017 10:12

## 2017-06-14 NOTE — PROGRESS NOTE ADULT - SUBJECTIVE AND OBJECTIVE BOX
Patient is a 72y old  Male who presents with a chief complaint of poor po intake, chills (13 Jun 2017 10:43)      SUBJECTIVE / OVERNIGHT EVENTS: comfortable Family at bedside.  Review of Systems  chest pain no  palpitations no  sob no  nausea no  headache no    MEDICATIONS  (STANDING):  heparin  Injectable 5000Unit(s) SubCutaneous every 12 hours  dextrose 5% + sodium chloride 0.9%. 1000milliLiter(s) IV Continuous <Continuous>  pantoprazole  Injectable 40milliGRAM(s) IV Push daily  gabapentin 300milliGRAM(s) Oral three times a day  atorvastatin 80milliGRAM(s) Oral at bedtime  clopidogrel Tablet 75milliGRAM(s) Oral daily  QUEtiapine 25milliGRAM(s) Oral daily  metoprolol 100milliGRAM(s) Oral two times a day  amLODIPine   Tablet 5milliGRAM(s) Oral daily  polyethylene glycol 3350 17Gram(s) Oral daily  cefuroxime   Tablet 500milliGRAM(s) Oral every 12 hours  aspirin  chewable 81milliGRAM(s) Oral daily  senna 2Tablet(s) Oral at bedtime  docusate sodium 100milliGRAM(s) Oral three times a day  lisinopril 5milliGRAM(s) Oral daily    MEDICATIONS  (PRN):  acetaminophen   Tablet 650milliGRAM(s) Oral every 6 hours PRN For Temp greater than 38.5 C (101.3 F)  oxyCODONE  5 mG/acetaminophen 325 mG 2Tablet(s) Oral every 6 hours PRN Moderate Pain (4 - 6)  mirtazapine 15milliGRAM(s) Oral at bedtime PRN sleep  HYDROmorphone  Injectable 1milliGRAM(s) IV Push every 6 hours PRN Severe Pain (7 - 10)  lactulose Syrup 30Gram(s) Oral daily PRN constipation      Vital Signs Last 24 Hrs  T(C): 36.3, Max: 36.7 (06-13 @ 15:37)  HR: 65 (58 - 68)  BP: 146/81 (145/85 - 172/82)  RR: 18 (18 - 18)  SpO2: 97% (95% - 98%)  Wt(kg): --  CAPILLARY BLOOD GLUCOSE    I&O's Summary  I & Os for 24h ending 14 Jun 2017 07:00  =============================================  IN: 3240 ml / OUT: 2075 ml / NET: 1165 ml    I & Os for current day (as of 14 Jun 2017 13:47)  =============================================  IN: 0 ml / OUT: 300 ml / NET: -300 ml      PHYSICAL EXAM:  GENERAL: NAD, well-developed  HEAD:  Atraumatic, Normocephalic  EYES: EOMI, PERRLA, conjunctiva and sclera clear  NECK: Supple, No JVD  CHEST/LUNG: Clear to auscultation bilaterally; No wheeze  HEART: Regular rate and rhythm; No murmurs, rubs, or gallops  ABDOMEN: Soft, Nontender, Nondistended; Bowel sounds present  EXTREMITIES:  2+ Peripheral Pulses, No clubbing, cyanosis, or edema  PSYCH: AAOx3  NEUROLOGY: non-focal  SKIN: No rashes or lesions    LABS:                        11.2   4.1   )-----------( 225      ( 13 Jun 2017 10:12 )             34.1     06-13    141  |  103  |  13  ----------------------------<  108<H>  4.2   |  25  |  0.84    Ca    9.0      13 Jun 2017 10:12                RADIOLOGY & ADDITIONAL TESTS:    Imaging Personally Reviewed:    Consultant(s) Notes Reviewed:      Care Discussed with Consultants/Other Providers:

## 2017-06-14 NOTE — PROGRESS NOTE ADULT - PROBLEM SELECTOR PROBLEM 1
Malignant gastrointestinal stromal tumor (GIST) of stomach
Malignant gastrointestinal stromal tumor (GIST) of stomach
Generalized weakness
Malignant gastrointestinal stromal tumor (GIST) of stomach
Malignant gastrointestinal stromal tumor (GIST) of stomach

## 2017-06-29 NOTE — DISCHARGE NOTE ADULT - CONDITION (STATED IN TERMS THAT PERMIT A SPECIFIC MEASURABLE COMPARISON WITH CONDITION ON ADMISSION):
This patient has been cleared for discharge by Dr. Medina
3-part fracture of surgical neck of left humerus, initial encounter

## 2017-07-16 ENCOUNTER — EMERGENCY (EMERGENCY)
Facility: HOSPITAL | Age: 72
LOS: 1 days | Discharge: HOME | End: 2017-07-16
Attending: EMERGENCY MEDICINE | Admitting: EMERGENCY MEDICINE
Payer: MEDICARE

## 2017-07-16 VITALS
TEMPERATURE: 100 F | HEART RATE: 67 BPM | OXYGEN SATURATION: 99 % | RESPIRATION RATE: 16 BRPM | DIASTOLIC BLOOD PRESSURE: 96 MMHG | SYSTOLIC BLOOD PRESSURE: 155 MMHG

## 2017-07-16 VITALS
HEART RATE: 68 BPM | DIASTOLIC BLOOD PRESSURE: 91 MMHG | SYSTOLIC BLOOD PRESSURE: 160 MMHG | OXYGEN SATURATION: 99 % | RESPIRATION RATE: 16 BRPM

## 2017-07-16 DIAGNOSIS — K25.2 ACUTE GASTRIC ULCER WITH BOTH HEMORRHAGE AND PERFORATION: Chronic | ICD-10-CM

## 2017-07-16 DIAGNOSIS — D49.0 NEOPLASM OF UNSPECIFIED BEHAVIOR OF DIGESTIVE SYSTEM: Chronic | ICD-10-CM

## 2017-07-16 DIAGNOSIS — F03.90 UNSPECIFIED DEMENTIA, UNSPECIFIED SEVERITY, WITHOUT BEHAVIORAL DISTURBANCE, PSYCHOTIC DISTURBANCE, MOOD DISTURBANCE, AND ANXIETY: ICD-10-CM

## 2017-07-16 DIAGNOSIS — F32.9 MAJOR DEPRESSIVE DISORDER, SINGLE EPISODE, UNSPECIFIED: ICD-10-CM

## 2017-07-16 LAB
ALBUMIN SERPL ELPH-MCNC: 3.9 G/DL — SIGNIFICANT CHANGE UP (ref 3.3–5)
ALP SERPL-CCNC: 69 U/L — SIGNIFICANT CHANGE UP (ref 40–120)
ALT FLD-CCNC: 8 U/L RC — LOW (ref 10–45)
ANION GAP SERPL CALC-SCNC: 16 MMOL/L — SIGNIFICANT CHANGE UP (ref 5–17)
APAP SERPL-MCNC: <15 UG/ML — SIGNIFICANT CHANGE UP (ref 10–30)
APPEARANCE UR: CLEAR — SIGNIFICANT CHANGE UP
AST SERPL-CCNC: 17 U/L — SIGNIFICANT CHANGE UP (ref 10–40)
BASOPHILS # BLD AUTO: 0 K/UL — SIGNIFICANT CHANGE UP (ref 0–0.2)
BASOPHILS NFR BLD AUTO: 0.6 % — SIGNIFICANT CHANGE UP (ref 0–2)
BILIRUB SERPL-MCNC: 1.4 MG/DL — HIGH (ref 0.2–1.2)
BILIRUB UR-MCNC: NEGATIVE — SIGNIFICANT CHANGE UP
BUN SERPL-MCNC: 22 MG/DL — SIGNIFICANT CHANGE UP (ref 7–23)
CALCIUM SERPL-MCNC: 9.5 MG/DL — SIGNIFICANT CHANGE UP (ref 8.4–10.5)
CHLORIDE SERPL-SCNC: 103 MMOL/L — SIGNIFICANT CHANGE UP (ref 96–108)
CK MB BLD-MCNC: 2.3 % — SIGNIFICANT CHANGE UP (ref 0–3.5)
CK MB CFR SERPL CALC: 1.5 NG/ML — SIGNIFICANT CHANGE UP (ref 0–6.7)
CK SERPL-CCNC: 64 U/L — SIGNIFICANT CHANGE UP (ref 30–200)
CO2 SERPL-SCNC: 23 MMOL/L — SIGNIFICANT CHANGE UP (ref 22–31)
COLOR SPEC: SIGNIFICANT CHANGE UP
CREAT SERPL-MCNC: 1.22 MG/DL — SIGNIFICANT CHANGE UP (ref 0.5–1.3)
DIFF PNL FLD: NEGATIVE — SIGNIFICANT CHANGE UP
EOSINOPHIL # BLD AUTO: 0.1 K/UL — SIGNIFICANT CHANGE UP (ref 0–0.5)
EOSINOPHIL NFR BLD AUTO: 1.5 % — SIGNIFICANT CHANGE UP (ref 0–6)
EPI CELLS # UR: SIGNIFICANT CHANGE UP /HPF
GAS PNL BLDV: SIGNIFICANT CHANGE UP
GLUCOSE SERPL-MCNC: 96 MG/DL — SIGNIFICANT CHANGE UP (ref 70–99)
GLUCOSE UR QL: NEGATIVE — SIGNIFICANT CHANGE UP
HCT VFR BLD CALC: 36.5 % — LOW (ref 39–50)
HGB BLD-MCNC: 12.4 G/DL — LOW (ref 13–17)
KETONES UR-MCNC: NEGATIVE — SIGNIFICANT CHANGE UP
LEUKOCYTE ESTERASE UR-ACNC: NEGATIVE — SIGNIFICANT CHANGE UP
LIDOCAIN IGE QN: 48 U/L — SIGNIFICANT CHANGE UP (ref 7–60)
LYMPHOCYTES # BLD AUTO: 1.3 K/UL — SIGNIFICANT CHANGE UP (ref 1–3.3)
LYMPHOCYTES # BLD AUTO: 25.9 % — SIGNIFICANT CHANGE UP (ref 13–44)
MCHC RBC-ENTMCNC: 33.8 GM/DL — SIGNIFICANT CHANGE UP (ref 32–36)
MCHC RBC-ENTMCNC: 34.3 PG — HIGH (ref 27–34)
MCV RBC AUTO: 101 FL — HIGH (ref 80–100)
MONOCYTES # BLD AUTO: 0.4 K/UL — SIGNIFICANT CHANGE UP (ref 0–0.9)
MONOCYTES NFR BLD AUTO: 7.3 % — SIGNIFICANT CHANGE UP (ref 2–14)
NEUTROPHILS # BLD AUTO: 3.2 K/UL — SIGNIFICANT CHANGE UP (ref 1.8–7.4)
NEUTROPHILS NFR BLD AUTO: 64.7 % — SIGNIFICANT CHANGE UP (ref 43–77)
NITRITE UR-MCNC: NEGATIVE — SIGNIFICANT CHANGE UP
PCP SPEC-MCNC: SIGNIFICANT CHANGE UP
PH UR: 6.5 — SIGNIFICANT CHANGE UP (ref 5–8)
PLATELET # BLD AUTO: 161 K/UL — SIGNIFICANT CHANGE UP (ref 150–400)
POTASSIUM SERPL-MCNC: 4.1 MMOL/L — SIGNIFICANT CHANGE UP (ref 3.5–5.3)
POTASSIUM SERPL-SCNC: 4.1 MMOL/L — SIGNIFICANT CHANGE UP (ref 3.5–5.3)
PROT SERPL-MCNC: 7.7 G/DL — SIGNIFICANT CHANGE UP (ref 6–8.3)
PROT UR-MCNC: 100 MG/DL
RBC # BLD: 3.6 M/UL — LOW (ref 4.2–5.8)
RBC # FLD: 11.9 % — SIGNIFICANT CHANGE UP (ref 10.3–14.5)
RBC CASTS # UR COMP ASSIST: SIGNIFICANT CHANGE UP /HPF (ref 0–2)
SALICYLATES SERPL-MCNC: <2 MG/DL — LOW (ref 15–30)
SODIUM SERPL-SCNC: 142 MMOL/L — SIGNIFICANT CHANGE UP (ref 135–145)
SP GR SPEC: 1.01 — LOW (ref 1.01–1.02)
TROPONIN T SERPL-MCNC: <0.01 NG/ML — SIGNIFICANT CHANGE UP (ref 0–0.06)
UROBILINOGEN FLD QL: NEGATIVE — SIGNIFICANT CHANGE UP
WBC # BLD: 4.9 K/UL — SIGNIFICANT CHANGE UP (ref 3.8–10.5)
WBC # FLD AUTO: 4.9 K/UL — SIGNIFICANT CHANGE UP (ref 3.8–10.5)

## 2017-07-16 PROCEDURE — 80307 DRUG TEST PRSMV CHEM ANLYZR: CPT

## 2017-07-16 PROCEDURE — 84484 ASSAY OF TROPONIN QUANT: CPT

## 2017-07-16 PROCEDURE — 93010 ELECTROCARDIOGRAM REPORT: CPT

## 2017-07-16 PROCEDURE — 71045 X-RAY EXAM CHEST 1 VIEW: CPT

## 2017-07-16 PROCEDURE — 80053 COMPREHEN METABOLIC PANEL: CPT

## 2017-07-16 PROCEDURE — 82330 ASSAY OF CALCIUM: CPT

## 2017-07-16 PROCEDURE — 81001 URINALYSIS AUTO W/SCOPE: CPT

## 2017-07-16 PROCEDURE — 82962 GLUCOSE BLOOD TEST: CPT

## 2017-07-16 PROCEDURE — 71010: CPT | Mod: 26

## 2017-07-16 PROCEDURE — 82803 BLOOD GASES ANY COMBINATION: CPT

## 2017-07-16 PROCEDURE — 96374 THER/PROPH/DIAG INJ IV PUSH: CPT

## 2017-07-16 PROCEDURE — 83690 ASSAY OF LIPASE: CPT

## 2017-07-16 PROCEDURE — 93005 ELECTROCARDIOGRAM TRACING: CPT

## 2017-07-16 PROCEDURE — 90792 PSYCH DIAG EVAL W/MED SRVCS: CPT

## 2017-07-16 PROCEDURE — 84295 ASSAY OF SERUM SODIUM: CPT

## 2017-07-16 PROCEDURE — 99285 EMERGENCY DEPT VISIT HI MDM: CPT | Mod: 25,GC

## 2017-07-16 PROCEDURE — 82947 ASSAY GLUCOSE BLOOD QUANT: CPT

## 2017-07-16 PROCEDURE — 99285 EMERGENCY DEPT VISIT HI MDM: CPT | Mod: 25

## 2017-07-16 PROCEDURE — 85014 HEMATOCRIT: CPT

## 2017-07-16 PROCEDURE — 82550 ASSAY OF CK (CPK): CPT

## 2017-07-16 PROCEDURE — 84132 ASSAY OF SERUM POTASSIUM: CPT

## 2017-07-16 PROCEDURE — 82435 ASSAY OF BLOOD CHLORIDE: CPT

## 2017-07-16 PROCEDURE — 82553 CREATINE MB FRACTION: CPT

## 2017-07-16 PROCEDURE — 85027 COMPLETE CBC AUTOMATED: CPT

## 2017-07-16 PROCEDURE — 87086 URINE CULTURE/COLONY COUNT: CPT

## 2017-07-16 PROCEDURE — 83605 ASSAY OF LACTIC ACID: CPT

## 2017-07-16 RX ORDER — SODIUM CHLORIDE 9 MG/ML
1000 INJECTION INTRAMUSCULAR; INTRAVENOUS; SUBCUTANEOUS ONCE
Qty: 0 | Refills: 0 | Status: COMPLETED | OUTPATIENT
Start: 2017-07-16 | End: 2017-07-16

## 2017-07-16 RX ORDER — MORPHINE SULFATE 50 MG/1
2 CAPSULE, EXTENDED RELEASE ORAL ONCE
Qty: 0 | Refills: 0 | Status: COMPLETED | OUTPATIENT
Start: 2017-07-16 | End: 2017-07-16

## 2017-07-16 RX ORDER — MORPHINE SULFATE 50 MG/1
2 CAPSULE, EXTENDED RELEASE ORAL ONCE
Qty: 0 | Refills: 0 | Status: DISCONTINUED | OUTPATIENT
Start: 2017-07-16 | End: 2017-07-16

## 2017-07-16 RX ADMIN — SODIUM CHLORIDE 1000 MILLILITER(S): 9 INJECTION INTRAMUSCULAR; INTRAVENOUS; SUBCUTANEOUS at 13:14

## 2017-07-16 RX ADMIN — MORPHINE SULFATE 2 MILLIGRAM(S): 50 CAPSULE, EXTENDED RELEASE ORAL at 13:52

## 2017-07-16 RX ADMIN — MORPHINE SULFATE 2 MILLIGRAM(S): 50 CAPSULE, EXTENDED RELEASE ORAL at 13:14

## 2017-07-16 NOTE — ED PROVIDER NOTE - OBJECTIVE STATEMENT
Stephanie Wallace M.D: 72yoM hx BPH, GIST s/p resection, PEG tube, chronic pain, CVA w/ left sided deficits depression, BIBEMS for 2 weeks of decreased PO intake and suicidal ideation. hx per family at bedside, pt with worsening pain over the last month, not eating, losing weight. has threatened to stab himself, and tried that 2 weeks ago. wife has since taken all the knives out of the home. has also threatened to jump out the window. family ntoes chills and increased urinary frequency but no fevers n/v.    PMD: Simbacu

## 2017-07-16 NOTE — ED BEHAVIORAL HEALTH ASSESSMENT NOTE - SUMMARY
pt is a 73y/o male, mult med issues, hx depression? anxiety? no prior psych admissions, s/p PEG, stroke yrs ago, expressive aphasia, seen for SI, suicidal gestures, pt limited historian, confused, poor insight, unable to explain details of recent events, pt denies si/hi, no hx suicide attempts, family concerned pt experiencing more pain, pt more irritable, tried to hurt himself mutiple times in the last few weeks. pt is a 73y/o male, mult med issues, hx depression? anxiety? no prior psych admissions, s/p PEG, stroke yrs ago, expressive aphasia, seen for SI, suicidal gestures, pt limited historian, confused, poor insight, unable to explain details of recent events, pt denies si/hi, no hx suicide attempts, calm in er, no prns needed, family concerned pt having more pain, underlying med issues, family feels comfortable bringing pt back home, offered psych admission, family declined.

## 2017-07-16 NOTE — ED BEHAVIORAL HEALTH ASSESSMENT NOTE - HPI (INCLUDE ILLNESS QUALITY, SEVERITY, DURATION, TIMING, CONTEXT, MODIFYING FACTORS, ASSOCIATED SIGNS AND SYMPTOMS)
Pt is a 72yoM hx BPH, GIST s/p resection, PEG tube, chronic pain, CVA w/ left sided deficits depression, neurocognitive disorder major, no prior psych admissions, not in current psych tx, on multiple psych meds, BIBEMS for 2 weeks of decreased PO intake and suicidal ideation. Pt mostly nonverbal, mumbling at times, has expressive aphasia since stroke, pt c/o abd pain, pointed to stomach area, lower abdomen. Pt denied current si, or past si/hi. As per lasha, states pt has been more withdrawn, sad, c/o pain in abd area the last few weeks, pt has repeatedly tried to hurt himself by throwing himself down the stairs, trying to jump out window, or stab self with knife, he would mostly gesture these attempts. no access to guns. Today pt was more aggressive with wife, tried to throw a chair at her, pt has limited mobility and strength due to stroke. No other violent behavior in past. Pt has been eating and drinking less, lost some weight.pt doesn't report paranoia, hallucinations. pt was disoriented to time, knew he was in a hospital, couldn't tell me his birth date.

## 2017-07-16 NOTE — ED PROVIDER NOTE - MEDICAL DECISION MAKING DETAILS
Stephanie Wallace M.D: pt w/ active SI and decreased PO intake. chronically ill appearing. will medically clear and then have psych come evaluate pt.

## 2017-07-16 NOTE — ED ADULT NURSE NOTE - OBJECTIVE STATEMENT
pt 71 y/o male via ems to er family states patient refusing to eat x 2 days wants to throw himself down stairs 2 weeks ago tried to cut himself with knife per wife pt hx CVA with right hemiparesis non verbal placed on 1:1 obs on arrival NSR on moniter skin warm dry b/l clear breath sounds Kyrgyz speaking only on pain meds for chronic pain percocet with non relief per family

## 2017-07-16 NOTE — ED BEHAVIORAL HEALTH ASSESSMENT NOTE - OTHER PAST PSYCHIATRIC HISTORY (INCLUDE DETAILS REGARDING ONSET, COURSE OF ILLNESS, INPATIENT/OUTPATIENT TREATMENT)
no prior psych admissions, not in current psych tx, pt started on remeron, seroquel, unclear by whom,how long he was on it for

## 2017-07-16 NOTE — ED ADULT NURSE REASSESSMENT NOTE - NS ED NURSE REASSESS COMMENT FT1
pt with constant obs discontinued pt for discharge neice and wife at bedside states they can transport pt via private vehicle

## 2017-07-16 NOTE — ED ADULT NURSE REASSESSMENT NOTE - NS ED NURSE REASSESS COMMENT FT1
pt seen by psyciatry at bedside obs continued pt non verbal med for pain as ordered family at bedside pending disposition

## 2017-07-16 NOTE — ED ADULT NURSE REASSESSMENT NOTE - NS ED NURSE REASSESS COMMENT FT1
pt nodded ok for relief of pain pt pending disposition family at bedside with constant obs continued

## 2017-07-16 NOTE — ED PROVIDER NOTE - PHYSICAL EXAMINATION
Stephanie Wallace M.D.:   patient awake alert seen lying on stretcher cacechtic NAD .   LUNGS CTAB no wheeze no crackle.   CARD RRR no m/r/g.    Abdomen soft diffusely ttp (unchanged from prior baseline pain) ND no rebound no guarding no CVA tenderness.   EXT WWP no edema no calf tenderness CV 2+DP/PT bilaterally.   neuro A&Ox3 residual left sided weakness.    skin warm and dry no rash  HEENT: dry mucous membranes, PERRL, EOMI

## 2017-07-16 NOTE — ED ADULT NURSE REASSESSMENT NOTE - NS ED NURSE REASSESS COMMENT FT1
pt d/c home with family pt able to stand independently and ambulate to wheelchair family instructed on f/u with md for better pain management pt also to f/u with psychiatry family instructed to return for any worsening symptoms pt alert and cooperative on discharge via wheelchair to families vehicle

## 2017-07-16 NOTE — ED PROVIDER NOTE - ATTENDING CONTRIBUTION TO CARE
Attending MD Lima: I personally have seen and examined this patient.  Resident note reviewed and agree on plan of care and except where noted.  See below for details.     72M with extensive PMH including GIST s/p resection, PEG tube, CVA with L sided residual, depression, BPH, HTN, HLD presents to the ED BIBEMS for two weeks of suicidal ideations and decreased po intake.  Family bedside, Savanah wife and Deepika niece.  Report that over the last two weeks has had change in baseline.  Wife reports that they have been together for 60 years and this is different for him.  Reports that he has been threatening to jump out the window or down the stairs or cut himself with knives.  Reports that she has taken the knives out of the home.  Reports no previous episodes of this.  Spoke with family and patient in Faroese, patient seems to respond better in Faroese.  Patient denies SI/HI.  Deny vomiting, diarrhea, blood in stools, blood in urine, fevers, chills, cough, difficulty breathing.  On exam, NAD, mild agitation which  resolved with speaking Faroese to patient, cachectic, no tenderness to palpation or stepoffs along length of spine, lungs CTAB with fair inspiratory effort, +S1S2, no m/r/g, abdomen soft with +BS, +mild diffuse tenderness (patient reports baseline, family corroborates), +PEG in place, no CVAT, moving extremities, L sided weakness, no rash, no bruising, A/P: 72M brought in by family for decreased po intake and suicidal ideations, will obtain labs, UA, UTox, CXR, EKG, psych reassess

## 2017-07-16 NOTE — ED BEHAVIORAL HEALTH ASSESSMENT NOTE - RISK ASSESSMENT
pt overall low risk for suicide attempt, pt has limited cognitive abilities due to stroke, poor insight, no current si/hi, no hx attempts, no access to guns, risk factors include med issues, recent suicidal gestures, aggerssion,

## 2017-07-16 NOTE — ED PROVIDER NOTE - PROGRESS NOTE DETAILS
Stephanie Wallace M.D: psych evaluated pt. state discussion limited 2/2 pt's aphasia from stroke. to them pt denying SI. do not feel that family will be open to a psych admission at this time, saying that psych may not have much to offer if he cant communicate. for now recommending 1:1. will redisucss case when all labs are abck. Stephanie Wallace M.D: pt was medically cleared labs wnl. psych reexamined pt deemed safe for discharge home. family given information for follow up at [facility]

## 2017-07-17 LAB
CULTURE RESULTS: SIGNIFICANT CHANGE UP
SPECIMEN SOURCE: SIGNIFICANT CHANGE UP

## 2017-08-17 ENCOUNTER — CLINICAL ADVICE (OUTPATIENT)
Age: 72
End: 2017-08-17

## 2017-09-13 ENCOUNTER — EMERGENCY (EMERGENCY)
Facility: HOSPITAL | Age: 72
LOS: 1 days | Discharge: HOME | End: 2017-09-13
Attending: EMERGENCY MEDICINE | Admitting: EMERGENCY MEDICINE
Payer: MEDICARE

## 2017-09-13 VITALS
SYSTOLIC BLOOD PRESSURE: 148 MMHG | OXYGEN SATURATION: 100 % | HEART RATE: 76 BPM | RESPIRATION RATE: 16 BRPM | DIASTOLIC BLOOD PRESSURE: 87 MMHG

## 2017-09-13 VITALS
DIASTOLIC BLOOD PRESSURE: 102 MMHG | OXYGEN SATURATION: 100 % | HEART RATE: 68 BPM | SYSTOLIC BLOOD PRESSURE: 168 MMHG | RESPIRATION RATE: 18 BRPM

## 2017-09-13 DIAGNOSIS — D49.0 NEOPLASM OF UNSPECIFIED BEHAVIOR OF DIGESTIVE SYSTEM: Chronic | ICD-10-CM

## 2017-09-13 DIAGNOSIS — K25.2 ACUTE GASTRIC ULCER WITH BOTH HEMORRHAGE AND PERFORATION: Chronic | ICD-10-CM

## 2017-09-13 LAB
ALBUMIN SERPL ELPH-MCNC: 3.8 G/DL — SIGNIFICANT CHANGE UP (ref 3.3–5)
ALP SERPL-CCNC: 82 U/L — SIGNIFICANT CHANGE UP (ref 40–120)
ALT FLD-CCNC: 13 U/L RC — SIGNIFICANT CHANGE UP (ref 10–45)
ANION GAP SERPL CALC-SCNC: 15 MMOL/L — SIGNIFICANT CHANGE UP (ref 5–17)
APPEARANCE UR: CLEAR — SIGNIFICANT CHANGE UP
APTT BLD: 32.6 SEC — SIGNIFICANT CHANGE UP (ref 27.5–37.4)
AST SERPL-CCNC: 24 U/L — SIGNIFICANT CHANGE UP (ref 10–40)
BASOPHILS # BLD AUTO: 0 K/UL — SIGNIFICANT CHANGE UP (ref 0–0.2)
BASOPHILS NFR BLD AUTO: 0.5 % — SIGNIFICANT CHANGE UP (ref 0–2)
BILIRUB SERPL-MCNC: 0.8 MG/DL — SIGNIFICANT CHANGE UP (ref 0.2–1.2)
BILIRUB UR-MCNC: NEGATIVE — SIGNIFICANT CHANGE UP
BUN SERPL-MCNC: 18 MG/DL — SIGNIFICANT CHANGE UP (ref 7–23)
CALCIUM SERPL-MCNC: 9.3 MG/DL — SIGNIFICANT CHANGE UP (ref 8.4–10.5)
CHLORIDE SERPL-SCNC: 102 MMOL/L — SIGNIFICANT CHANGE UP (ref 96–108)
CK MB BLD-MCNC: 1.4 % — SIGNIFICANT CHANGE UP (ref 0–3.5)
CK MB CFR SERPL CALC: 1.7 NG/ML — SIGNIFICANT CHANGE UP (ref 0–6.7)
CK SERPL-CCNC: 123 U/L — SIGNIFICANT CHANGE UP (ref 30–200)
CO2 SERPL-SCNC: 24 MMOL/L — SIGNIFICANT CHANGE UP (ref 22–31)
COLOR SPEC: SIGNIFICANT CHANGE UP
CREAT SERPL-MCNC: 1.15 MG/DL — SIGNIFICANT CHANGE UP (ref 0.5–1.3)
DIFF PNL FLD: NEGATIVE — SIGNIFICANT CHANGE UP
EOSINOPHIL # BLD AUTO: 0.3 K/UL — SIGNIFICANT CHANGE UP (ref 0–0.5)
EOSINOPHIL NFR BLD AUTO: 6.4 % — HIGH (ref 0–6)
GLUCOSE SERPL-MCNC: 142 MG/DL — HIGH (ref 70–99)
GLUCOSE UR QL: NEGATIVE — SIGNIFICANT CHANGE UP
HCT VFR BLD CALC: 38.6 % — LOW (ref 39–50)
HGB BLD-MCNC: 13.2 G/DL — SIGNIFICANT CHANGE UP (ref 13–17)
INR BLD: 1.08 RATIO — SIGNIFICANT CHANGE UP (ref 0.88–1.16)
KETONES UR-MCNC: NEGATIVE — SIGNIFICANT CHANGE UP
LEUKOCYTE ESTERASE UR-ACNC: NEGATIVE — SIGNIFICANT CHANGE UP
LYMPHOCYTES # BLD AUTO: 1.8 K/UL — SIGNIFICANT CHANGE UP (ref 1–3.3)
LYMPHOCYTES # BLD AUTO: 39 % — SIGNIFICANT CHANGE UP (ref 13–44)
MCHC RBC-ENTMCNC: 34 PG — SIGNIFICANT CHANGE UP (ref 27–34)
MCHC RBC-ENTMCNC: 34.2 GM/DL — SIGNIFICANT CHANGE UP (ref 32–36)
MCV RBC AUTO: 99.2 FL — SIGNIFICANT CHANGE UP (ref 80–100)
MONOCYTES # BLD AUTO: 0.4 K/UL — SIGNIFICANT CHANGE UP (ref 0–0.9)
MONOCYTES NFR BLD AUTO: 8.4 % — SIGNIFICANT CHANGE UP (ref 2–14)
NEUTROPHILS # BLD AUTO: 2.1 K/UL — SIGNIFICANT CHANGE UP (ref 1.8–7.4)
NEUTROPHILS NFR BLD AUTO: 45.6 % — SIGNIFICANT CHANGE UP (ref 43–77)
NITRITE UR-MCNC: NEGATIVE — SIGNIFICANT CHANGE UP
PH UR: 7.5 — SIGNIFICANT CHANGE UP (ref 5–8)
PLATELET # BLD AUTO: 136 K/UL — LOW (ref 150–400)
POTASSIUM SERPL-MCNC: 4.9 MMOL/L — SIGNIFICANT CHANGE UP (ref 3.5–5.3)
POTASSIUM SERPL-SCNC: 4.9 MMOL/L — SIGNIFICANT CHANGE UP (ref 3.5–5.3)
PROT SERPL-MCNC: 7.7 G/DL — SIGNIFICANT CHANGE UP (ref 6–8.3)
PROT UR-MCNC: 100 MG/DL
PROTHROM AB SERPL-ACNC: 11.7 SEC — SIGNIFICANT CHANGE UP (ref 9.8–12.7)
RBC # BLD: 3.89 M/UL — LOW (ref 4.2–5.8)
RBC # FLD: 12.5 % — SIGNIFICANT CHANGE UP (ref 10.3–14.5)
RBC CASTS # UR COMP ASSIST: SIGNIFICANT CHANGE UP /HPF (ref 0–2)
SODIUM SERPL-SCNC: 141 MMOL/L — SIGNIFICANT CHANGE UP (ref 135–145)
SP GR SPEC: 1.01 — SIGNIFICANT CHANGE UP (ref 1.01–1.02)
TROPONIN T SERPL-MCNC: <0.01 NG/ML — SIGNIFICANT CHANGE UP (ref 0–0.06)
UROBILINOGEN FLD QL: NEGATIVE — SIGNIFICANT CHANGE UP
WBC # BLD: 4.5 K/UL — SIGNIFICANT CHANGE UP (ref 3.8–10.5)
WBC # FLD AUTO: 4.5 K/UL — SIGNIFICANT CHANGE UP (ref 3.8–10.5)
WBC UR QL: SIGNIFICANT CHANGE UP /HPF (ref 0–5)

## 2017-09-13 PROCEDURE — 85027 COMPLETE CBC AUTOMATED: CPT

## 2017-09-13 PROCEDURE — 99284 EMERGENCY DEPT VISIT MOD MDM: CPT | Mod: 25

## 2017-09-13 PROCEDURE — 81001 URINALYSIS AUTO W/SCOPE: CPT

## 2017-09-13 PROCEDURE — 87040 BLOOD CULTURE FOR BACTERIA: CPT

## 2017-09-13 PROCEDURE — 93005 ELECTROCARDIOGRAM TRACING: CPT

## 2017-09-13 PROCEDURE — 93010 ELECTROCARDIOGRAM REPORT: CPT

## 2017-09-13 PROCEDURE — 71045 X-RAY EXAM CHEST 1 VIEW: CPT

## 2017-09-13 PROCEDURE — 85730 THROMBOPLASTIN TIME PARTIAL: CPT

## 2017-09-13 PROCEDURE — 85610 PROTHROMBIN TIME: CPT

## 2017-09-13 PROCEDURE — 99285 EMERGENCY DEPT VISIT HI MDM: CPT | Mod: 25

## 2017-09-13 PROCEDURE — 80053 COMPREHEN METABOLIC PANEL: CPT

## 2017-09-13 PROCEDURE — 70450 CT HEAD/BRAIN W/O DYE: CPT | Mod: 26

## 2017-09-13 PROCEDURE — 87086 URINE CULTURE/COLONY COUNT: CPT

## 2017-09-13 PROCEDURE — 82553 CREATINE MB FRACTION: CPT

## 2017-09-13 PROCEDURE — 84484 ASSAY OF TROPONIN QUANT: CPT

## 2017-09-13 PROCEDURE — 70450 CT HEAD/BRAIN W/O DYE: CPT

## 2017-09-13 PROCEDURE — 71010: CPT | Mod: 26

## 2017-09-13 PROCEDURE — 82550 ASSAY OF CK (CPK): CPT

## 2017-09-13 RX ORDER — SODIUM CHLORIDE 9 MG/ML
3 INJECTION INTRAMUSCULAR; INTRAVENOUS; SUBCUTANEOUS ONCE
Qty: 0 | Refills: 0 | Status: COMPLETED | OUTPATIENT
Start: 2017-09-13 | End: 2017-09-13

## 2017-09-13 RX ORDER — OXYCODONE HYDROCHLORIDE 5 MG/1
5 TABLET ORAL ONCE
Qty: 0 | Refills: 0 | Status: DISCONTINUED | OUTPATIENT
Start: 2017-09-13 | End: 2017-09-13

## 2017-09-13 RX ADMIN — OXYCODONE HYDROCHLORIDE 5 MILLIGRAM(S): 5 TABLET ORAL at 17:01

## 2017-09-13 RX ADMIN — SODIUM CHLORIDE 3 MILLILITER(S): 9 INJECTION INTRAMUSCULAR; INTRAVENOUS; SUBCUTANEOUS at 16:00

## 2017-09-13 NOTE — ED PROVIDER NOTE - CONSTITUTIONAL, MLM
normal... Awake and alert, responds to simple commands in [place] Leonean. Awake and alert, responds to simple commands in Djib[place]ian.  Occasional grimacing

## 2017-09-13 NOTE — ED PROVIDER NOTE - OBJECTIVE STATEMENT
73 yo M Croatian-speaking, with PMHx of agitation, depression, HLD, HTN, prostate hypertrophy, stroke, syncope, PSHx of gastric tumor, gastric ulcer with hemorrhage, presents to the ED with complaints of generalized pain. As per aid and family, pt began to feel ill around 11:30am this morning with waves of pain, pt took pain medication with no relief. At 12:30pm the pain worsened and pt began to pace about the house, pointing at his R arm, crying, breathing hard, and beating his body with a pillow. Pt was not aware of where he was and felt like he was going to pass out, causing family to call an ambulance. Family states that this is a first time episode. Last stroke was May 2016. As per family, denies dyspnea, nausea, vomiting, headache, fever, recent illnesses or any other complaint at this time. LILIAN.   Dr. Luis CARBAJAL 71 yo M Swiss-speaking, with PMHx of agitation, depression, HLD, HTN, prostate hypertrophy, stroke, syncope, PSHx of gastric tumor, gastric ulcer with hemorrhage, presents to the ED with generalized pain.  Patient non verbal since prior stroke, history provided by family and aide.  As per aide and family, pt began to feel ill around 11:30am this morning with waves of pain, pt took pain medication with some relief and return to baseline. At 12:30pm the pain worsened and pt began to pace about the house, pointing at his R arm, crying, breathing hard, and beating his body with a pillow.  Last stroke was May 2016.  LILIAN.   Dr. Smith PMD

## 2017-09-13 NOTE — ED PROVIDER NOTE - MEDICAL DECISION MAKING DETAILS
72 y.o. M, nonverbal, presents with presumptive R arm pain and change in behavior, cause unclear on physical exam. Will perform extensive screening evaluation including labs, EKG, cardiac enzymes, CT, and discuss with PMD. 72 y.o. M, nonverbal, presents with presumptive R arm pain and generalized dyscomfort, cause unclear on physical exam without noted abnormalities. Will perform extensive screening evaluation including labs, EKG, cardiac enzymes, CT, pain medications, re-evaluate.

## 2017-09-13 NOTE — ED ADULT NURSE NOTE - OBJECTIVE STATEMENT
72y m pt arrived via ems from home; wife and care giver with him; emt reports pt came from home; wife states pt normally nonverbal due to stroke x 1 1/2yrs; this morning pt was walking around and urinated on himself which is new; than pt started pounding on chest as if in pain; both new behaviors; pt awake; anxious; nonverbal; no resp distress; unable to assess for chest pain/abd pain; abd soft nontender nondistended; skin intact; afebrile in er; straight cath for urine per md order; sterile technique used; pt tolerated well; 230ml yellow urine return; iv placed; labs drawn per md order

## 2017-09-13 NOTE — ED PROVIDER NOTE - PROGRESS NOTE DETAILS
Discussed pt with Dr. Medina, knows pt well,  states this is typical behavior when pain medications are under dosed. No significant findings on work up, would recommend outpatient follow up. After receiving oxycodone in ED patient completely at baseline per family, workup unremarkable, discussed with family they want to take him home, will discharge. Discussed pt with Dr. Medina, knows pt well, who states this is typical behavior when pain medications are under dosed. No significant findings on work up, would recommend outpatient follow up.

## 2017-09-14 LAB
CULTURE RESULTS: NO GROWTH — SIGNIFICANT CHANGE UP
SPECIMEN SOURCE: SIGNIFICANT CHANGE UP

## 2017-11-23 ENCOUNTER — INPATIENT (INPATIENT)
Facility: HOSPITAL | Age: 72
LOS: 25 days | DRG: 100 | End: 2017-12-19
Attending: INTERNAL MEDICINE | Admitting: INTERNAL MEDICINE
Payer: MEDICARE

## 2017-11-23 VITALS
DIASTOLIC BLOOD PRESSURE: 112 MMHG | OXYGEN SATURATION: 96 % | RESPIRATION RATE: 16 BRPM | HEART RATE: 60 BPM | SYSTOLIC BLOOD PRESSURE: 213 MMHG

## 2017-11-23 DIAGNOSIS — Z29.9 ENCOUNTER FOR PROPHYLACTIC MEASURES, UNSPECIFIED: ICD-10-CM

## 2017-11-23 DIAGNOSIS — R41.82 ALTERED MENTAL STATUS, UNSPECIFIED: ICD-10-CM

## 2017-11-23 DIAGNOSIS — C49.A0 GASTROINTESTINAL STROMAL TUMOR, UNSPECIFIED SITE: ICD-10-CM

## 2017-11-23 DIAGNOSIS — I10 ESSENTIAL (PRIMARY) HYPERTENSION: ICD-10-CM

## 2017-11-23 DIAGNOSIS — R56.9 UNSPECIFIED CONVULSIONS: ICD-10-CM

## 2017-11-23 DIAGNOSIS — I63.9 CEREBRAL INFARCTION, UNSPECIFIED: ICD-10-CM

## 2017-11-23 DIAGNOSIS — I25.10 ATHEROSCLEROTIC HEART DISEASE OF NATIVE CORONARY ARTERY WITHOUT ANGINA PECTORIS: ICD-10-CM

## 2017-11-23 DIAGNOSIS — F32.9 MAJOR DEPRESSIVE DISORDER, SINGLE EPISODE, UNSPECIFIED: ICD-10-CM

## 2017-11-23 DIAGNOSIS — D49.0 NEOPLASM OF UNSPECIFIED BEHAVIOR OF DIGESTIVE SYSTEM: Chronic | ICD-10-CM

## 2017-11-23 DIAGNOSIS — K25.2 ACUTE GASTRIC ULCER WITH BOTH HEMORRHAGE AND PERFORATION: Chronic | ICD-10-CM

## 2017-11-23 LAB
ALBUMIN SERPL ELPH-MCNC: 4 G/DL — SIGNIFICANT CHANGE UP (ref 3.3–5)
ALP SERPL-CCNC: 116 U/L — SIGNIFICANT CHANGE UP (ref 40–120)
ALT FLD-CCNC: 92 U/L RC — HIGH (ref 10–45)
ANION GAP SERPL CALC-SCNC: 13 MMOL/L — SIGNIFICANT CHANGE UP (ref 5–17)
APPEARANCE UR: CLEAR — SIGNIFICANT CHANGE UP
APTT BLD: 34.4 SEC — SIGNIFICANT CHANGE UP (ref 27.5–37.4)
AST SERPL-CCNC: 67 U/L — HIGH (ref 10–40)
BASOPHILS # BLD AUTO: 0.1 K/UL — SIGNIFICANT CHANGE UP (ref 0–0.2)
BASOPHILS NFR BLD AUTO: 1.8 % — SIGNIFICANT CHANGE UP (ref 0–2)
BILIRUB SERPL-MCNC: 1.1 MG/DL — SIGNIFICANT CHANGE UP (ref 0.2–1.2)
BILIRUB UR-MCNC: NEGATIVE — SIGNIFICANT CHANGE UP
BUN SERPL-MCNC: 18 MG/DL — SIGNIFICANT CHANGE UP (ref 7–23)
CALCIUM SERPL-MCNC: 10 MG/DL — SIGNIFICANT CHANGE UP (ref 8.4–10.5)
CHLORIDE SERPL-SCNC: 102 MMOL/L — SIGNIFICANT CHANGE UP (ref 96–108)
CK MB BLD-MCNC: 2.7 % — SIGNIFICANT CHANGE UP (ref 0–3.5)
CK MB CFR SERPL CALC: 1.4 NG/ML — SIGNIFICANT CHANGE UP (ref 0–6.7)
CK SERPL-CCNC: 52 U/L — SIGNIFICANT CHANGE UP (ref 30–200)
CO2 SERPL-SCNC: 26 MMOL/L — SIGNIFICANT CHANGE UP (ref 22–31)
COLOR SPEC: COLORLESS — SIGNIFICANT CHANGE UP
CREAT SERPL-MCNC: 1.1 MG/DL — SIGNIFICANT CHANGE UP (ref 0.5–1.3)
DIFF PNL FLD: NEGATIVE — SIGNIFICANT CHANGE UP
EOSINOPHIL # BLD AUTO: 0.2 K/UL — SIGNIFICANT CHANGE UP (ref 0–0.5)
EOSINOPHIL NFR BLD AUTO: 3.2 % — SIGNIFICANT CHANGE UP (ref 0–6)
GLUCOSE SERPL-MCNC: 128 MG/DL — HIGH (ref 70–99)
GLUCOSE UR QL: NEGATIVE — SIGNIFICANT CHANGE UP
HCT VFR BLD CALC: 40.4 % — SIGNIFICANT CHANGE UP (ref 39–50)
HGB BLD-MCNC: 13.5 G/DL — SIGNIFICANT CHANGE UP (ref 13–17)
INR BLD: 1.05 RATIO — SIGNIFICANT CHANGE UP (ref 0.88–1.16)
KETONES UR-MCNC: NEGATIVE — SIGNIFICANT CHANGE UP
LEUKOCYTE ESTERASE UR-ACNC: NEGATIVE — SIGNIFICANT CHANGE UP
LYMPHOCYTES # BLD AUTO: 2.4 K/UL — SIGNIFICANT CHANGE UP (ref 1–3.3)
LYMPHOCYTES # BLD AUTO: 45.6 % — HIGH (ref 13–44)
MCHC RBC-ENTMCNC: 33.4 GM/DL — SIGNIFICANT CHANGE UP (ref 32–36)
MCHC RBC-ENTMCNC: 35.3 PG — HIGH (ref 27–34)
MCV RBC AUTO: 106 FL — HIGH (ref 80–100)
MONOCYTES # BLD AUTO: 0.4 K/UL — SIGNIFICANT CHANGE UP (ref 0–0.9)
MONOCYTES NFR BLD AUTO: 8.2 % — SIGNIFICANT CHANGE UP (ref 2–14)
NEUTROPHILS # BLD AUTO: 2.1 K/UL — SIGNIFICANT CHANGE UP (ref 1.8–7.4)
NEUTROPHILS NFR BLD AUTO: 41.2 % — LOW (ref 43–77)
NITRITE UR-MCNC: NEGATIVE — SIGNIFICANT CHANGE UP
PH UR: 6.5 — SIGNIFICANT CHANGE UP (ref 5–8)
PLATELET # BLD AUTO: 156 K/UL — SIGNIFICANT CHANGE UP (ref 150–400)
POTASSIUM SERPL-MCNC: 5.2 MMOL/L — SIGNIFICANT CHANGE UP (ref 3.5–5.3)
POTASSIUM SERPL-SCNC: 5.2 MMOL/L — SIGNIFICANT CHANGE UP (ref 3.5–5.3)
PROT SERPL-MCNC: 7.7 G/DL — SIGNIFICANT CHANGE UP (ref 6–8.3)
PROT UR-MCNC: 100 MG/DL
PROTHROM AB SERPL-ACNC: 11.4 SEC — SIGNIFICANT CHANGE UP (ref 9.8–12.7)
RBC # BLD: 3.82 M/UL — LOW (ref 4.2–5.8)
RBC # FLD: 13.4 % — SIGNIFICANT CHANGE UP (ref 10.3–14.5)
RBC CASTS # UR COMP ASSIST: SIGNIFICANT CHANGE UP /HPF (ref 0–2)
SODIUM SERPL-SCNC: 141 MMOL/L — SIGNIFICANT CHANGE UP (ref 135–145)
SP GR SPEC: 1.01 — LOW (ref 1.01–1.02)
TROPONIN T SERPL-MCNC: <0.01 NG/ML — SIGNIFICANT CHANGE UP (ref 0–0.06)
UROBILINOGEN FLD QL: NEGATIVE — SIGNIFICANT CHANGE UP
WBC # BLD: 5.2 K/UL — SIGNIFICANT CHANGE UP (ref 3.8–10.5)
WBC # FLD AUTO: 5.2 K/UL — SIGNIFICANT CHANGE UP (ref 3.8–10.5)
WBC UR QL: SIGNIFICANT CHANGE UP /HPF (ref 0–5)

## 2017-11-23 PROCEDURE — 99285 EMERGENCY DEPT VISIT HI MDM: CPT | Mod: 25,GC

## 2017-11-23 PROCEDURE — 93010 ELECTROCARDIOGRAM REPORT: CPT | Mod: 76,GC

## 2017-11-23 PROCEDURE — 70450 CT HEAD/BRAIN W/O DYE: CPT | Mod: 26

## 2017-11-23 PROCEDURE — 71010: CPT | Mod: 26

## 2017-11-23 PROCEDURE — 72125 CT NECK SPINE W/O DYE: CPT | Mod: 26

## 2017-11-23 PROCEDURE — 99223 1ST HOSP IP/OBS HIGH 75: CPT

## 2017-11-23 RX ORDER — AMLODIPINE BESYLATE 2.5 MG/1
5 TABLET ORAL DAILY
Qty: 0 | Refills: 0 | Status: DISCONTINUED | OUTPATIENT
Start: 2017-11-23 | End: 2017-12-19

## 2017-11-23 RX ORDER — ATORVASTATIN CALCIUM 80 MG/1
80 TABLET, FILM COATED ORAL AT BEDTIME
Qty: 0 | Refills: 0 | Status: DISCONTINUED | OUTPATIENT
Start: 2017-11-23 | End: 2017-12-19

## 2017-11-23 RX ORDER — ASPIRIN/CALCIUM CARB/MAGNESIUM 324 MG
81 TABLET ORAL DAILY
Qty: 0 | Refills: 0 | Status: DISCONTINUED | OUTPATIENT
Start: 2017-11-23 | End: 2017-11-30

## 2017-11-23 RX ORDER — GABAPENTIN 400 MG/1
300 CAPSULE ORAL THREE TIMES A DAY
Qty: 0 | Refills: 0 | Status: DISCONTINUED | OUTPATIENT
Start: 2017-11-23 | End: 2017-12-01

## 2017-11-23 RX ORDER — OXYCODONE HYDROCHLORIDE 5 MG/1
5 TABLET ORAL ONCE
Qty: 0 | Refills: 0 | Status: DISCONTINUED | OUTPATIENT
Start: 2017-11-23 | End: 2017-11-23

## 2017-11-23 RX ORDER — LEVETIRACETAM 250 MG/1
1000 TABLET, FILM COATED ORAL ONCE
Qty: 0 | Refills: 0 | Status: COMPLETED | OUTPATIENT
Start: 2017-11-23 | End: 2017-11-23

## 2017-11-23 RX ORDER — LISINOPRIL 2.5 MG/1
2.5 TABLET ORAL DAILY
Qty: 0 | Refills: 0 | Status: DISCONTINUED | OUTPATIENT
Start: 2017-11-23 | End: 2017-11-23

## 2017-11-23 RX ORDER — IMATINIB MESYLATE 400 MG/1
400 TABLET, FILM COATED ORAL DAILY
Qty: 0 | Refills: 0 | Status: DISCONTINUED | OUTPATIENT
Start: 2017-11-23 | End: 2017-11-23

## 2017-11-23 RX ORDER — METOPROLOL TARTRATE 50 MG
100 TABLET ORAL
Qty: 0 | Refills: 0 | Status: DISCONTINUED | OUTPATIENT
Start: 2017-11-23 | End: 2017-11-23

## 2017-11-23 RX ORDER — OXYCODONE AND ACETAMINOPHEN 5; 325 MG/1; MG/1
2 TABLET ORAL EVERY 6 HOURS
Qty: 0 | Refills: 0 | Status: DISCONTINUED | OUTPATIENT
Start: 2017-11-23 | End: 2017-11-27

## 2017-11-23 RX ORDER — SENNA PLUS 8.6 MG/1
2 TABLET ORAL AT BEDTIME
Qty: 0 | Refills: 0 | Status: DISCONTINUED | OUTPATIENT
Start: 2017-11-23 | End: 2017-12-17

## 2017-11-23 RX ORDER — PANTOPRAZOLE SODIUM 20 MG/1
40 TABLET, DELAYED RELEASE ORAL
Qty: 0 | Refills: 0 | Status: DISCONTINUED | OUTPATIENT
Start: 2017-11-23 | End: 2017-11-28

## 2017-11-23 RX ORDER — HEPARIN SODIUM 5000 [USP'U]/ML
5000 INJECTION INTRAVENOUS; SUBCUTANEOUS EVERY 8 HOURS
Qty: 0 | Refills: 0 | Status: DISCONTINUED | OUTPATIENT
Start: 2017-11-23 | End: 2017-12-19

## 2017-11-23 RX ORDER — NICOTINE POLACRILEX 2 MG
1 GUM BUCCAL DAILY
Qty: 0 | Refills: 0 | Status: DISCONTINUED | OUTPATIENT
Start: 2017-11-23 | End: 2017-11-23

## 2017-11-23 RX ORDER — MORPHINE SULFATE 50 MG/1
4 CAPSULE, EXTENDED RELEASE ORAL ONCE
Qty: 0 | Refills: 0 | Status: DISCONTINUED | OUTPATIENT
Start: 2017-11-23 | End: 2017-11-23

## 2017-11-23 RX ORDER — POLYETHYLENE GLYCOL 3350 17 G/17G
17 POWDER, FOR SOLUTION ORAL DAILY
Qty: 0 | Refills: 0 | Status: DISCONTINUED | OUTPATIENT
Start: 2017-11-23 | End: 2017-12-15

## 2017-11-23 RX ORDER — IMATINIB MESYLATE 400 MG/1
1 TABLET, FILM COATED ORAL
Qty: 0 | Refills: 0 | COMMUNITY

## 2017-11-23 RX ORDER — CLOPIDOGREL BISULFATE 75 MG/1
75 TABLET, FILM COATED ORAL DAILY
Qty: 0 | Refills: 0 | Status: DISCONTINUED | OUTPATIENT
Start: 2017-11-23 | End: 2017-11-30

## 2017-11-23 RX ORDER — ACETAMINOPHEN 500 MG
650 TABLET ORAL ONCE
Qty: 0 | Refills: 0 | Status: COMPLETED | OUTPATIENT
Start: 2017-11-23 | End: 2017-11-23

## 2017-11-23 RX ORDER — QUETIAPINE FUMARATE 200 MG/1
25 TABLET, FILM COATED ORAL DAILY
Qty: 0 | Refills: 0 | Status: DISCONTINUED | OUTPATIENT
Start: 2017-11-23 | End: 2017-12-19

## 2017-11-23 RX ORDER — LEVETIRACETAM 250 MG/1
500 TABLET, FILM COATED ORAL
Qty: 0 | Refills: 0 | Status: DISCONTINUED | OUTPATIENT
Start: 2017-11-23 | End: 2017-11-24

## 2017-11-23 RX ORDER — HYDRALAZINE HCL 50 MG
10 TABLET ORAL ONCE
Qty: 0 | Refills: 0 | Status: COMPLETED | OUTPATIENT
Start: 2017-11-23 | End: 2017-11-23

## 2017-11-23 RX ADMIN — GABAPENTIN 300 MILLIGRAM(S): 400 CAPSULE ORAL at 22:21

## 2017-11-23 RX ADMIN — MORPHINE SULFATE 4 MILLIGRAM(S): 50 CAPSULE, EXTENDED RELEASE ORAL at 18:28

## 2017-11-23 RX ADMIN — SENNA PLUS 2 TABLET(S): 8.6 TABLET ORAL at 22:21

## 2017-11-23 RX ADMIN — Medication 10 MILLIGRAM(S): at 14:55

## 2017-11-23 RX ADMIN — Medication 260 MILLIGRAM(S): at 21:52

## 2017-11-23 RX ADMIN — Medication 10 MILLIGRAM(S): at 20:28

## 2017-11-23 RX ADMIN — LEVETIRACETAM 400 MILLIGRAM(S): 250 TABLET, FILM COATED ORAL at 15:04

## 2017-11-23 RX ADMIN — ATORVASTATIN CALCIUM 80 MILLIGRAM(S): 80 TABLET, FILM COATED ORAL at 22:21

## 2017-11-23 RX ADMIN — HEPARIN SODIUM 5000 UNIT(S): 5000 INJECTION INTRAVENOUS; SUBCUTANEOUS at 22:21

## 2017-11-23 RX ADMIN — MORPHINE SULFATE 4 MILLIGRAM(S): 50 CAPSULE, EXTENDED RELEASE ORAL at 16:00

## 2017-11-23 RX ADMIN — OXYCODONE HYDROCHLORIDE 5 MILLIGRAM(S): 5 TABLET ORAL at 22:21

## 2017-11-23 NOTE — H&P ADULT - ASSESSMENT
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

## 2017-11-23 NOTE — ED PROVIDER NOTE - NEUROLOGICAL, MLM
Alert and oriented x2, R facial droop, extinction on R leg, unable to name simple objects, can follow commands on left hand

## 2017-11-23 NOTE — CONSULT NOTE ADULT - ASSESSMENT
72 year old male w/ PMHx of stroke with R sided residual weakness, PEG tube, Hypertension, prostate and stomach cancer on oral chemo through PEG tube, hyperlipidemia presents to ED for loss of balance and upper extremity shaking, s/p code stroke, NIHSS 10, MRS 4, not tPA candidate, neurological exam significant for R-sided facial droop and weakness.  Symptoms likely secondary to seizure from presentation.    Plan:  - start Keppra 500mg BID  - seizure/fall precautions  - If patient back to baseline, he can follow-up at epilepsy clinic [phone]

## 2017-11-23 NOTE — H&P ADULT - HISTORY OF PRESENT ILLNESS
This is a 72 year old male w/ h/o stroke with residual right sided weakness, aphasia, s/p PEG tube, Hypertension, HLD, prostate and stomach cancer on oral chemo who presented to ED for syncopal episode. As per wife, the patient was last seen normal at about 8AM. At that time he did not want to get out of bed this morning, so he was left there in bed. Several hours later his wife attempted to help get the patient out of bed so he could go top the bathroom, and when she did so, his legs buckled and he began to shake his right upper extremity. During episode, eyes rolled back. Was less responsive then his normal self for about 15-20 minutes, taking longer to respond to questions. Denied tongue biting. Unknown if urinary incontinence as patient wears diapers.  Code stroke called on arrival, NIHSS 10, MRS 4, tPA not administered as patient out of window.

## 2017-11-23 NOTE — ED PROVIDER NOTE - OBJECTIVE STATEMENT
72M PMH stroke with residual aphasia and R sided weakness, prostate cancer p/w LOC. 72M PMH stroke with residual aphasia and R sided weakness, prostate cancer p/w LOC.  Started with headache about 1.5 hours prior to arrival.  Was sleeping for the morning.  When his wife came to get him out of bed 30 minutes prior to arrival his legs buckled and he fell to the ground.  Seemed to loose consciousness, unclear if 72M PMH stroke with residual aphasia and R sided weakness, prostate cancer p/w LOC.  Started with headache about 1.5 hours prior to arrival.  Was sleeping for the morning.  When his wife came to get him out of bed 30 minutes prior to arrival his legs buckled and he fell to the ground.  Seemed to lose consciousness, unclear if 72M PMH stroke with residual aphasia and R sided weakness, prostate cancer, PEG tube p/w LOC.  Started with headache about 1.5 hours prior to arrival.  Was sleeping for the morning.  When his wife came to get him out of bed 30 minutes prior to arrival his legs buckled and he fell to the ground.  Seemed to lose consciousness, unclear if was before or after hitting left side of head.  Eyes rolled back and he seemed to have twitching of right arm.  Was less responsive for about 15 minutes and then was alert when EMS arrived.  On arrival was difficult to assess as he had previous stroke.  Wife was concerned about weakness as he had collapsed and said his speech and behavior was not back to baseline so code stroke was called.  Alert and oriented x2

## 2017-11-23 NOTE — H&P ADULT - PROBLEM SELECTOR PLAN 5
BP elevated on presentation, to 200's, s/p hydralazine with improvement to 170's. Will c/w current home medication regimen of Norvasc 5mg. Was previously on Lisinopril and Metoprolol on hospitalization in July however not currently taking.   - Up-titrate BP med's as needed

## 2017-11-23 NOTE — ED PROVIDER NOTE - ATTENDING CONTRIBUTION TO CARE
***Attending Tomas Grant MD***   71 yo M Beninese-speaking, with PMHx of agitation, depression, HLD, HTN, prostate hypertrophy, stroke, syncope, PSHx of gastric tumor, gastric ulcer with hemorrhage, presents to the ED with generalized pain.  Patient non verbal since prior stroke, history provided by family and aide. ***Attending Tomas Grant MD***   CODE STROKE CALLED UPON ARRIVAL  Patient non verbal since prior stroke, history provided by family with Dr. Chavarria translating.   73 yo M Filipino-speaking, with PMHx of agitation, depression, HLD, HTN, prostate hypertrophy, stroke, syncope, PSHx of gastric tumor, gastric ulcer with hemorrhage, presents to the ED with generalized pain presents after episode of decreased responsiveness.  As per family, pt feel back struck head, with "shaking movement" approx. 30 mins prior to arrival.  since patient has been "slowed".  +head trauma.  Pt with righ-sided weakness and aphasia true baseline unclear and given family poor hx, code stroke called, however, ultimately hx sounds more suggestive of seizure v syncope. ***Attending Tomas Grant MD***   CODE STROKE CALLED UPON ARRIVAL  Patient non verbal since prior stroke, history provided by family with Dr. Chavarria translating.   73 yo M Japanese-speaking, with PMHx of agitation, depression, HLD, HTN, prostate hypertrophy, stroke, syncope, PSHx of gastric tumor, gastric ulcer with hemorrhage, presents to the ED with generalized pain presents after episode of decreased responsiveness.  As per family, pt feel back struck head, with "shaking movement" approx. 30 mins prior to arrival.  since patient has been "slowed".  +head trauma.  Pt with righ-sided weakness and aphasia true baseline unclear and given family poor hx, code stroke called, however, ultimately hx sounds more suggestive of seizure v syncope.  CT Head negative for acute pathology.  Pt hypertensive.   (Attending - Juanita) NAD, aphasic non-verbal, NCAT, MMM, Trachea midline, PERRL, CTAB, Non-tachy, Normal perfusion, soft, NTND, No deformity of extremities, RUE and LUE weakness 4/5.     EKG with precordial TWI, present on old ekg from September.  Will obtain serial for dynamic changes.   History suggestive of seizure.  will load patient with keppra.  FS WNL.  check lytes, cardiac enzymes.  continue to monitor including telemonitor.  to be admitted.  neuro recs

## 2017-11-23 NOTE — ED PROVIDER NOTE - PROGRESS NOTE DETAILS
Reassessed patient, more agitated, trying to climb out of bed.  Neuro declined admission, believes seizure work up can be done outpatient.  Patient appears to be delirious with waxing/waning mental status.  Urine sent, rectal temp performed, admitted to medicine.  Iliana Chavarria, PGY3 On further evaluation patient seems to have possible seizure rather than stroke.  Extensive conversation with wife, sister in law, and niece, is usually able to follow commands however has been intermittently agitated and with decreased responsiveness.  Has been off his pain meds since he ran out 3 days ago, family is concerned for withdrawal.    Iliana Chavarria, PGY3

## 2017-11-23 NOTE — H&P ADULT - NSHPPHYSICALEXAM_GEN_ALL_CORE
.  VITAL SIGNS:  T(C): 36.6 (11-23-17 @ 17:17), Max: 37.1 (11-23-17 @ 14:15)  T(F): 97.8 (11-23-17 @ 17:17), Max: 98.8 (11-23-17 @ 14:15)  HR: 70 (11-23-17 @ 17:17) (60 - 70)  BP: 174/85 (11-23-17 @ 17:17) (174/85 - 213/112)  BP(mean): --  RR: 19 (11-23-17 @ 17:17) (16 - 19)  SpO2: 98% (11-23-17 @ 17:17) (96% - 98%)  Wt(kg): --    PHYSICAL EXAM:    Constitutional: WDWN resting comfortably in bed; NAD, frail  Head: NC/AT  Eyes: PERRL, EOMI, anicteric sclera  ENT: no nasal discharge; uvula midline, no oropharyngeal erythema or exudates; MMM  Neck: supple; no JVD or thyromegaly  Respiratory: CTA B/L; no W/R/R, no retractions  Cardiac: +S1/S2; RRR; no M/R/G  Gastrointestinal: soft, NT/ND; no rebound or guarding; +BSx4, + peg  Back: spine midline, no bony tenderness or step-offs; no CVAT B/L  Extremities: WWP, no clubbing or cyanosis; no peripheral edema  Musculoskeletal: NROM x4; no joint swelling, tenderness or erythema  Dermatologic: skin warm, dry and intact; no rashes, wounds, or scars  Lymphatic: no submandibular or cervical LAD  Neurologic: able to follow some simple commands only, 4/5 strength in RUE, LUE and LLE 5/5. + dysarthria, finger to nose intact.

## 2017-11-23 NOTE — ED ADULT NURSE NOTE - OBJECTIVE STATEMENT
Pt is an ambulatory 72 yr old male a/oX s at baseline BIBA after wife witness an episode of total body shaking and pts eyes rolling to back of his head while he fell.  Pt did hit his head, no LOC.  As per wife, pt is not back to baseline yet.  PERRl wnl, mcconnell with weakness of right upper extremity from previous CVA.  Pt responds to commands see NIHSS for 1st assessment.  LUNGS CTA no chest pain or sob.  NSR on cm at 77 bpm.  No recent fevers, chills or N/V.  Abdomen NT Nd with BS+4Q.  SKIN WDI.  Peripheral pulses +2bl no edema

## 2017-11-23 NOTE — H&P ADULT - PROBLEM SELECTOR PROBLEM 3
Patient states the fluid in his right elbow has decreased but the pain is no better, still severe, tender to touch and he states he just wants it out. Have made an appointment for patient to come back in for reevaluation.   Stroke

## 2017-11-23 NOTE — H&P ADULT - NSHPLABSRESULTS_GEN_ALL_CORE
.  LABS:                         13.5   5.2   )-----------( 156      ( 2017 14:27 )             40.4         141  |  102  |  18  ----------------------------<  128<H>  5.2   |  26  |  1.10    Ca    10.0      2017 14:27    TPro  7.7  /  Alb  4.0  /  TBili  1.1  /  DBili  x   /  AST  67<H>  /  ALT  92<H>  /  AlkPhos  116      PT/INR - ( 2017 14:27 )   PT: 11.4 sec;   INR: 1.05 ratio         PTT - ( 2017 14:27 )  PTT:34.4 sec  Urinalysis Basic - ( 2017 16:59 )    Color: Colorless / Appearance: Clear / S.006 / pH: x  Gluc: x / Ketone: Negative  / Bili: Negative / Urobili: Negative   Blood: x / Protein: 100 mg/dL / Nitrite: Negative   Leuk Esterase: Negative / RBC: 0-2 /HPF / WBC 0-2 /HPF   Sq Epi: x / Non Sq Epi: x / Bacteria: x      CARDIAC MARKERS ( 2017 14:27 )  x     / <0.01 ng/mL / 52 U/L / x     / 1.4 ng/mL            RADIOLOGY, EKG & ADDITIONAL TESTS: Reviewed. .  LABS:                         13.5   5.2   )-----------( 156      ( 2017 14:27 )             40.4         141  |  102  |  18  ----------------------------<  128<H>  5.2   |  26  |  1.10    Ca    10.0      2017 14:27    TPro  7.7  /  Alb  4.0  /  TBili  1.1  /  DBili  x   /  AST  67<H>  /  ALT  92<H>  /  AlkPhos  116      PT/INR - ( 2017 14:27 )   PT: 11.4 sec;   INR: 1.05 ratio         PTT - ( 2017 14:27 )  PTT:34.4 sec  Urinalysis Basic - ( 2017 16:59 )    Color: Colorless / Appearance: Clear / S.006 / pH: x  Gluc: x / Ketone: Negative  / Bili: Negative / Urobili: Negative   Blood: x / Protein: 100 mg/dL / Nitrite: Negative   Leuk Esterase: Negative / RBC: 0-2 /HPF / WBC 0-2 /HPF   Sq Epi: x / Non Sq Epi: x / Bacteria: x      CARDIAC MARKERS ( 2017 14:27 )  x     / <0.01 ng/mL / 52 U/L / x     / 1.4 ng/mL            RADIOLOGY, EKG & ADDITIONAL TESTS:   CT head: There is no acute intracranial hemorrhage, mass effect, or evidence of   acute territorial infarct. There is a chronic infarct involving the left   frontoparietal periventricular white matter and corona radiata.    There are severe chronic white matter microvascular ischemic changes.   There is age-appropriate global cerebral volume loss with associated   sulcal and ventricular dilatation.    There is no displaced calvarial fracture. The visualized portions of the   paranasal sinuses and mastoid air cells are clear.

## 2017-11-23 NOTE — CONSULT NOTE ADULT - SUBJECTIVE AND OBJECTIVE BOX
Neurology Consult    Name  OTILIA PADGETT    Patient is a 72 year old male w/ PMHx of stroke with R sided residual weakness, PEG tube, Hypertension, prostate and stomach cancer on oral chemo through PEG tube, hyperlipidemia presents to ED for loss of balance and upper extremity shaking.  Per history from wife, patient was last seen normal at about 8AM, he did not want to get out of bed, so the wife left him in bed for several hours, when he got up to go to the bathroom, his legs buckled and he began to shake his upper extremities.  Episode lasted for a few minutes, no tongue biting, but patient wears diapers so urinary incontinence is unknown.  According to the wife, he was taking longer than usual to answer questions, so she called EMS.  Code stroke called on arrival, NIHSS 10, MRS 4, tPA not administered as patient out of window.                                                          MEDICATIONS  (STANDING):  hydrALAZINE Injectable 10 milliGRAM(s) IV Push Once  morphine  - Injectable 4 milliGRAM(s) IV Push Once    MEDICATIONS  (PRN):      Allergies    No Known Allergies    Intolerances        Objective  Vital Signs Last 24 Hrs  T(C): --  T(F): --  HR: 61 (23 Nov 2017 14:15) (60 - 61)  BP: 193/98 (23 Nov 2017 14:15) (193/98 - 213/112)  BP(mean): --  RR: 18 (23 Nov 2017 14:15) (16 - 18)  SpO2: 98% (23 Nov 2017 14:15) (96% - 98%)    General Exam   General appearance: No acute distress, emaciated  Respiratory:    non-labored respirations               Neurological Exam  Mental Status:  alert and oriented x2, able to follow some simple commands only    Cranial Nerves: PERRL, EOMI without nystagmus, no blink to threat on R, R facial droop, mildly dysarthric    Motor:   Tone:   normal               Strength:  Upper extremity                          Delt       Bicep    Tricep                                                  R         4/5        4/5        4/5       4/5                                               L          5/5        5/5        5/5      5/5    Lower extremity                           HF          KE          KF        DF         PF                                               R        4-/5        4/5        4/5       4/5       4/5                                               L         5/5        5/5        5/5      5/5        5/5    Pronator drift:   none           Dysmetria: none with finger-to-nose testing  Tremor:  none appreciated at rest or in action    Sensation: intact grossly to light touch    Deep Tendon Reflexes:  +3 RUE/RLE, 2+ otherwise  Toes upgoing on R, down L      Other Studies    11-23    141  |  102  |  18  ----------------------------<  128<H>  5.2   |  26  |  1.10    Ca    10.0      23 Nov 2017 14:27    TPro  7.7  /  Alb  4.0  /  TBili  1.1  /  DBili  x   /  AST  67<H>  /  ALT  92<H>  /  AlkPhos  116  11-23 11-23    141  |  102  |  18  ----------------------------<  128<H>  5.2   |  26  |  1.10    Ca    10.0      23 Nov 2017 14:27    TPro  7.7  /  Alb  4.0  /  TBili  1.1  /  DBili  x   /  AST  67<H>  /  ALT  92<H>  /  AlkPhos  116  11-23    LIVER FUNCTIONS - ( 23 Nov 2017 14:27 )  Alb: 4.0 g/dL / Pro: 7.7 g/dL / ALK PHOS: 116 U/L / ALT: 92 U/L RC / AST: 67 U/L / GGT: x             Radiology    CTH:  No acute abnormalities

## 2017-11-23 NOTE — CHART NOTE - NSCHARTNOTEFT_GEN_A_CORE
CHIEF COMPLAINT: tremors and making incomprehensible noises    SUBJECTIVE / OVERNIGHT EVENTS: called to room as patient was screaming out lo    REVIEW OF SYSTEMS:  Constitutional: No fever, fatigue or weight loss.  Skin: No rash.  Eyes: No recent vision problems or eye pain.  ENT: No congestion, ear pain, or sore throat.  Endocrine: No thyroid problems.  Cardiovascular: No chest pain or palpation.  Respiratory: No cough, shortness of breath, congestion, or wheezing.  Gastrointestinal: No abdominal pain, nausea, vomiting, or diarrhea.  Genitourinary: No dysuria.  Musculoskeletal: No joint swelling.  Neurologic: No headache.        CAPILLARY BLOOD GLUCOSE  87 (2017 14:25)      POCT Blood Glucose.: 87 mg/dL (2017 14:19)    I&O's Summary    2017 07:01  -  2017 21:59  --------------------------------------------------------  IN: 0 mL / OUT: 400 mL / NET: -400 mL        LABS:                        13.5   5.2   )-----------( 156      ( 2017 14:27 )             40.4     11-23    141  |  102  |  18  ----------------------------<  128<H>  5.2   |  26  |  1.10    Ca    10.0      2017 14:27    TPro  7.7  /  Alb  4.0  /  TBili  1.1  /  DBili  x   /  AST  67<H>  /  ALT  92<H>  /  AlkPhos  116  11-23    PT/INR - ( 2017 14:27 )   PT: 11.4 sec;   INR: 1.05 ratio         PTT - ( 2017 14:27 )  PTT:34.4 sec  CARDIAC MARKERS ( 2017 14:27 )  x     / <0.01 ng/mL / 52 U/L / x     / 1.4 ng/mL      Urinalysis Basic - ( 2017 16:59 )    Color: Colorless / Appearance: Clear / S.006 / pH: x  Gluc: x / Ketone: Negative  / Bili: Negative / Urobili: Negative   Blood: x / Protein: 100 mg/dL / Nitrite: Negative   Leuk Esterase: Negative / RBC: 0-2 /HPF / WBC 0-2 /HPF   Sq Epi: x / Non Sq Epi: x / Bacteria: x        RADIOLOGY & ADDITIONAL TESTS:    MEDICATIONS  (STANDING):  amLODIPine   Tablet 5 milliGRAM(s) Oral daily  aspirin  chewable 81 milliGRAM(s) Oral daily  atorvastatin 80 milliGRAM(s) Oral at bedtime  clopidogrel Tablet 75 milliGRAM(s) Oral daily  gabapentin 300 milliGRAM(s) Oral three times a day  heparin  Injectable 5000 Unit(s) SubCutaneous every 8 hours  pantoprazole    Tablet 40 milliGRAM(s) Oral before breakfast  polyethylene glycol 3350 17 Gram(s) Oral daily  QUEtiapine 25 milliGRAM(s) Oral daily  senna 2 Tablet(s) Oral at bedtime    MEDICATIONS  (PRN):  oxyCODONE    5 mG/acetaminophen 325 mG 2 Tablet(s) Oral every 6 hours PRN Moderate Pain (4 - 6)      PHYSICAL EXAM:  GENERAL: NAD, well-developed  NEUROLOGY/PSYCHIATRIC: non-focal  CARDIOVASCULAR: Regular rate and rhythm; No murmurs, rubs, or gallops  RESPIRATORY: Clear to auscultation bilaterally; No wheeze  GASTROINTESTINAL: Soft, Nontender, Nondistended; Bowel sounds present  EXTREMITIES:  2+ Peripheral Pulses, No clubbing, cyanosis, or edema  SKIN: No rashes or lesions  GENITOURINARY: Non distended bladder    Prostate hypertrophy  HLD (hyperlipidemia)  Agitation  Depression  Stroke  Syncope  HTN (hypertension)  Altered mental status  Gastric tumor  Gastric ulcer with hemorrhage, perforation, and obstruction, acute  No Past Surgical History        ASSESSMENT/PLAN:   HPI:  This is a 72 year old male w/ h/o stroke with residual right sided weakness, aphasia, s/p PEG tube, Hypertension, HLD, prostate and stomach cancer on oral chemo who presented to ED for syncopal episode. As per wife, the patient was last seen normal at about 8AM. At that time he did not want to get out of bed this morning, so he was left there in bed. Several hours later his wife attempted to help get the patient out of bed so he could go top the bathroom, and when she did so, his legs buckled and he began to shake his right upper extremity. During episode, eyes rolled back. Was less responsive then his normal self for about 15-20 minutes, taking longer to respond to questions. Denied tongue biting. Unknown if urinary incontinence as patient wears diapers.  Code stroke called on arrival, NIHSS 10, MRS 4, tPA not administered as patient out of window. (2017 18:06)          Care Discussed with Consultants/Other Providers: CHIEF COMPLAINT: trembling and making incomprehensible noises    SUBJECTIVE / OVERNIGHT EVENTS: called to room as patient was screaming out loud making repetitious  noises following with eyes movement at staff at bedside and having voluntary tremors to bilateral extremities alternating while making noises. Also informed that prior to this episode the patient was trying to get out of bed. Noise ceased when bed changed and repositions and additional blankets applied. Stopped trembling. Pt with expressive aphasia after CVA along with right sided deficits and was pointing to right hand complaining of pain. Patient was grimacing and moaning however stopped. Patient [place] speaking. Per ER documentations patient had similar episode of agitation and trying to get out of bed.    Vital Signs Last 24 Hrs  T(C): 37 (2017 19:01), Max: 37.1 (2017 14:15)  T(F): 98.6 (2017 19:01), Max: 98.8 (2017 14:15) Rectal temp at 2115 97.7  HR: 69 (2017 19:01) (60 - 76)  BP: 162/84 (2017 21:00) (162/84 - 213/112)  BP(mean): --  RR: 18 (2017 19:01) (16 - 19)  SpO2: 98% (2017 19:01) (96% - 100%)    REVIEW OF SYSTEMS:  Constitutional: No fever, fatigue or weight loss.  Skin: No rash.  Eyes: No recent vision problems or eye pain.  ENT: No congestion, ear pain, or sore throat.  Endocrine: No thyroid problems.  Cardiovascular: No chest pain or palpation.  Respiratory: No cough, shortness of breath, congestion, or wheezing.  Gastrointestinal: No abdominal pain, nausea, vomiting, or diarrhea.  Genitourinary: No dysuria.  Musculoskeletal: pain to right upper ext No joint swelling.  Neurologic: No headache.        CAPILLARY BLOOD GLUCOSE  87 (2017 14:25)      POCT Blood Glucose.: 87 mg/dL (2017 14:19)    I&O's Summary    2017 07:01  -  2017 21:59  --------------------------------------------------------  IN: 0 mL / OUT: 400 mL / NET: -400 mL        LABS:                        13.5   5.2   )-----------( 156      ( 2017 14:27 )             40.4         141  |  102  |  18  ----------------------------<  128<H>  5.2   |  26  |  1.10    Ca    10.0      2017 14:27    TPro  7.7  /  Alb  4.0  /  TBili  1.1  /  DBili  x   /  AST  67<H>  /  ALT  92<H>  /  AlkPhos  116      PT/INR - ( 2017 14:27 )   PT: 11.4 sec;   INR: 1.05 ratio         PTT - ( 2017 14:27 )  PTT:34.4 sec  CARDIAC MARKERS ( 2017 14:27 )  x     / <0.01 ng/mL / 52 U/L / x     / 1.4 ng/mL      Urinalysis Basic - ( 2017 16:59 )    Color: Colorless / Appearance: Clear / S.006 / pH: x  Gluc: x / Ketone: Negative  / Bili: Negative / Urobili: Negative   Blood: x / Protein: 100 mg/dL / Nitrite: Negative   Leuk Esterase: Negative / RBC: 0-2 /HPF / WBC 0-2 /HPF   Sq Epi: x / Non Sq Epi: x / Bacteria: x        RADIOLOGY & ADDITIONAL TESTS:  < from: Xray Chest 1 View AP/PA (17 @ 15:01) >    IMPRESSION:   Cardiomegaly and dilated aorta, similar to the prior studies.    < end of copied text >    < from: CT Brain Stroke Protocol (17 @ 15:12) >    IMPRESSION:    Head CT: No intracranial hemorrhage or displaced calvarial fracture.   Chronic infarcts and microvascular ischemic changes as described. If   symptoms persist, an MRI of the brain can be obtained for further   evaluation.    Cervical spine CT: No acute displaced fracture or traumatic malalignment.   Degenerative changes as described.    < end of copied text >      MEDICATIONS  (STANDING):  amLODIPine   Tablet 5 milliGRAM(s) Oral daily  aspirin  chewable 81 milliGRAM(s) Oral daily  atorvastatin 80 milliGRAM(s) Oral at bedtime  clopidogrel Tablet 75 milliGRAM(s) Oral daily  gabapentin 300 milliGRAM(s) Oral three times a day  heparin  Injectable 5000 Unit(s) SubCutaneous every 8 hours  pantoprazole    Tablet 40 milliGRAM(s) Oral before breakfast  polyethylene glycol 3350 17 Gram(s) Oral daily  QUEtiapine 25 milliGRAM(s) Oral daily  senna 2 Tablet(s) Oral at bedtime    MEDICATIONS  (PRN):  oxyCODONE    5 mG/acetaminophen 325 mG 2 Tablet(s) Oral every 6 hours PRN Moderate Pain (4 - 6)      PHYSICAL EXAM:  GENERAL: NAD, well-developed  NEUROLOGY/PSYCHIATRIC: awake alert non-focal deficits pupils brisk bilaterally 2mm  CARDIOVASCULAR: Regular rate and rhythm; No murmurs, rubs, or gallops  RESPIRATORY: Clear to auscultation bilaterally; No wheeze no crackles no accessory muscle use  GASTROINTESTINAL: PEG tube blackened able to flush Soft, Nontender, Nondistended; Bowel sounds present  EXTREMITIES:  weakness to rue and rle 2+ Peripheral Pulses, No clubbing, cyanosis, or edema  SKIN: No rashes or lesions  GENITOURINARY: Non distended bladder    PMH/PSH  Prostate hypertrophy  HLD (hyperlipidemia)  Agitation  Depression  Stroke  Syncope  HTN (hypertension)  Altered mental status  Gastric tumor  Gastric ulcer with hemorrhage, perforation, and obstruction, acute  PEG tube placement        ASSESSMENT/PLAN:    72 year old male w/ h/o stroke with residual right sided weakness, aphasia, s/p PEG tube, Hypertension, HLD, prostate and stomach cancer on oral chemo admitted for syncopal episode. Now with agitations and trembling which resolved.    Plan  IV Tylenol  c/w enhanced supervision  seizure precautions  bed alarm  fall precautions  safety precautions    Will continue to monitor and endorse to primary team in the a.m. Attending to follow

## 2017-11-23 NOTE — H&P ADULT - PROBLEM SELECTOR PLAN 3
s/p CVA with residual right sided upper and lower extremity weakness, aphasia.  - CT head negative for acute findings  - c/w Asa, plavix, lipitor

## 2017-11-23 NOTE — H&P ADULT - PROBLEM SELECTOR PLAN 1
Pt presented with AMS and syncopal episode a/w right upper extremity twitching. Now back to baseline as per family. s/p code stroke. Seen by neuro. Symptoms likely 2/2 seizure. CT head no acute abnormalities. Afebrile, no signs/symptoms of infection or metabolic derangements.   - Neuro rec's appreciated. Started on keppra 500mg BID  - check vEEG

## 2017-11-24 LAB
ANION GAP SERPL CALC-SCNC: 13 MMOL/L — SIGNIFICANT CHANGE UP (ref 5–17)
BUN SERPL-MCNC: 17 MG/DL — SIGNIFICANT CHANGE UP (ref 7–23)
CALCIUM SERPL-MCNC: 9.9 MG/DL — SIGNIFICANT CHANGE UP (ref 8.4–10.5)
CHLORIDE SERPL-SCNC: 102 MMOL/L — SIGNIFICANT CHANGE UP (ref 96–108)
CHOLEST SERPL-MCNC: 112 MG/DL — SIGNIFICANT CHANGE UP (ref 10–199)
CO2 SERPL-SCNC: 25 MMOL/L — SIGNIFICANT CHANGE UP (ref 22–31)
CREAT SERPL-MCNC: 1.03 MG/DL — SIGNIFICANT CHANGE UP (ref 0.5–1.3)
GLUCOSE SERPL-MCNC: 73 MG/DL — SIGNIFICANT CHANGE UP (ref 70–99)
HCT VFR BLD CALC: 35 % — LOW (ref 39–50)
HDLC SERPL-MCNC: 46 MG/DL — SIGNIFICANT CHANGE UP (ref 40–125)
HGB BLD-MCNC: 12 G/DL — LOW (ref 13–17)
LIPID PNL WITH DIRECT LDL SERPL: 56 MG/DL — SIGNIFICANT CHANGE UP
MAGNESIUM SERPL-MCNC: 2 MG/DL — SIGNIFICANT CHANGE UP (ref 1.6–2.6)
MCHC RBC-ENTMCNC: 33.2 PG — SIGNIFICANT CHANGE UP (ref 27–34)
MCHC RBC-ENTMCNC: 34.3 GM/DL — SIGNIFICANT CHANGE UP (ref 32–36)
MCV RBC AUTO: 97 FL — SIGNIFICANT CHANGE UP (ref 80–100)
PLATELET # BLD AUTO: 175 K/UL — SIGNIFICANT CHANGE UP (ref 150–400)
POTASSIUM SERPL-MCNC: 3.9 MMOL/L — SIGNIFICANT CHANGE UP (ref 3.5–5.3)
POTASSIUM SERPL-SCNC: 3.9 MMOL/L — SIGNIFICANT CHANGE UP (ref 3.5–5.3)
RBC # BLD: 3.61 M/UL — LOW (ref 4.2–5.8)
RBC # FLD: 14.7 % — HIGH (ref 10.3–14.5)
SODIUM SERPL-SCNC: 140 MMOL/L — SIGNIFICANT CHANGE UP (ref 135–145)
TOTAL CHOLESTEROL/HDL RATIO MEASUREMENT: 2.4 RATIO — LOW (ref 3.4–9.6)
TRIGL SERPL-MCNC: 50 MG/DL — SIGNIFICANT CHANGE UP (ref 10–149)
TSH SERPL-MCNC: 5.78 UIU/ML — HIGH (ref 0.27–4.2)
WBC # BLD: 4.21 K/UL — SIGNIFICANT CHANGE UP (ref 3.8–10.5)
WBC # FLD AUTO: 4.21 K/UL — SIGNIFICANT CHANGE UP (ref 3.8–10.5)

## 2017-11-24 PROCEDURE — 99222 1ST HOSP IP/OBS MODERATE 55: CPT

## 2017-11-24 PROCEDURE — 95819 EEG AWAKE AND ASLEEP: CPT | Mod: 26

## 2017-11-24 RX ORDER — LEVETIRACETAM 250 MG/1
500 TABLET, FILM COATED ORAL
Qty: 0 | Refills: 0 | Status: DISCONTINUED | OUTPATIENT
Start: 2017-11-24 | End: 2017-12-19

## 2017-11-24 RX ADMIN — HEPARIN SODIUM 5000 UNIT(S): 5000 INJECTION INTRAVENOUS; SUBCUTANEOUS at 22:15

## 2017-11-24 RX ADMIN — OXYCODONE AND ACETAMINOPHEN 2 TABLET(S): 5; 325 TABLET ORAL at 19:56

## 2017-11-24 RX ADMIN — ATORVASTATIN CALCIUM 80 MILLIGRAM(S): 80 TABLET, FILM COATED ORAL at 22:16

## 2017-11-24 RX ADMIN — QUETIAPINE FUMARATE 25 MILLIGRAM(S): 200 TABLET, FILM COATED ORAL at 12:48

## 2017-11-24 RX ADMIN — OXYCODONE AND ACETAMINOPHEN 2 TABLET(S): 5; 325 TABLET ORAL at 19:19

## 2017-11-24 RX ADMIN — LEVETIRACETAM 500 MILLIGRAM(S): 250 TABLET, FILM COATED ORAL at 13:52

## 2017-11-24 RX ADMIN — SENNA PLUS 2 TABLET(S): 8.6 TABLET ORAL at 22:16

## 2017-11-24 RX ADMIN — GABAPENTIN 300 MILLIGRAM(S): 400 CAPSULE ORAL at 06:53

## 2017-11-24 RX ADMIN — OXYCODONE AND ACETAMINOPHEN 2 TABLET(S): 5; 325 TABLET ORAL at 02:06

## 2017-11-24 RX ADMIN — AMLODIPINE BESYLATE 5 MILLIGRAM(S): 2.5 TABLET ORAL at 06:53

## 2017-11-24 RX ADMIN — OXYCODONE AND ACETAMINOPHEN 2 TABLET(S): 5; 325 TABLET ORAL at 13:45

## 2017-11-24 RX ADMIN — HEPARIN SODIUM 5000 UNIT(S): 5000 INJECTION INTRAVENOUS; SUBCUTANEOUS at 13:52

## 2017-11-24 RX ADMIN — Medication 81 MILLIGRAM(S): at 12:48

## 2017-11-24 RX ADMIN — OXYCODONE AND ACETAMINOPHEN 2 TABLET(S): 5; 325 TABLET ORAL at 12:48

## 2017-11-24 RX ADMIN — Medication 650 MILLIGRAM(S): at 00:08

## 2017-11-24 RX ADMIN — GABAPENTIN 300 MILLIGRAM(S): 400 CAPSULE ORAL at 13:52

## 2017-11-24 RX ADMIN — HEPARIN SODIUM 5000 UNIT(S): 5000 INJECTION INTRAVENOUS; SUBCUTANEOUS at 06:53

## 2017-11-24 RX ADMIN — CLOPIDOGREL BISULFATE 75 MILLIGRAM(S): 75 TABLET, FILM COATED ORAL at 12:48

## 2017-11-24 RX ADMIN — OXYCODONE AND ACETAMINOPHEN 2 TABLET(S): 5; 325 TABLET ORAL at 00:31

## 2017-11-24 RX ADMIN — OXYCODONE HYDROCHLORIDE 5 MILLIGRAM(S): 5 TABLET ORAL at 00:08

## 2017-11-24 RX ADMIN — GABAPENTIN 300 MILLIGRAM(S): 400 CAPSULE ORAL at 22:16

## 2017-11-24 NOTE — DIETITIAN INITIAL EVALUATION ADULT. - ENERGY NEEDS
Ht: 5'7",  Wt: 115.7lbs, BMI: 18.1kg/m2, IBW: 148lbs(+/-10%), 77%IBW  Pertinent information: Pt admitted for syncopal episode. Pt with history of Stroke with right sided weakness, aphasia, PEG a/w prostate and stomach cancer on oral chemo. Per chart, Pt code stroke, likely seizure like activity.    No Edema, Skin intact

## 2017-11-24 NOTE — CHART NOTE - NSCHARTNOTEFT_GEN_A_CORE
Upon Nutritional Assessment by the Registered Dietitian your patient was determined to meet criteria / has evidence of the following diagnosis/diagnoses:          [ ]  Mild Protein Calorie Malnutrition        [ ]  Moderate Protein Calorie Malnutrition        [ X] Severe Protein Calorie Malnutrition        [ ] Unspecified Protein Calorie Malnutrition        [ ] Underweight / BMI <19        [ ] Morbid Obesity / BMI > 40      Findings as based on:  [ X] Comprehensive nutrition assessment 10% Wt loss x6 months   [ X] Nutrition Focused Physical Exam: severe fat and muscle wasting   [ X] Other:       Nutrition Plan/Recommendations:      Recommend to increase Goal rate of Jevity 1.2 via PEG to 60ml/bzw44ows (1728kcals and 79gm protein; 32.kclas/kg and 1.5gm pro based on current Wt: 52.5kg)      PROVIDER Section:     By signing this assessment you are acknowledging and agree with the diagnosis/diagnoses assigned by the Registered Dietitian    Comments:

## 2017-11-24 NOTE — DIETITIAN INITIAL EVALUATION ADULT. - NS FNS REASON FOR WEIGHT CHANG
unknown, ?  catabolic illness. Per previus RD note from may 2017, Pt was weight stable at 128lbs, Pt admitted now at 115.7lbs./other (specify)

## 2017-11-24 NOTE — CONSULT NOTE ADULT - SUBJECTIVE AND OBJECTIVE BOX
Chief Complaint:  Patient is a 72y old  Male who presents with a chief complaint of LOC (2017 18:06)      HPI:  72 year old male w/ h/o stroke with residual right sided weakness, aphasia, s/p PEG tube, Hypertension, HLD, prostate and stomach cancer (GIST) on oral chemo who presented to ED for syncopal episode. Now consulted for PEG tube replacement.    Currently getting worked up for syncopy. Neuro consulted. Started on keppra and plan for EEG. During admission, PEG tube was noted to be old and black but still properly functioning. Patient and family requesting PEG change.    20 Fr Endo[employer] Safety gastrostomy tube priginally placed on 16 by GI house staff.    Denies any fevers, chills, abdominal pain, N/V. Tolerating tube feeds okay.    Allergies:  No Known Allergies      Home Medications:    Hospital Medications:  amLODIPine   Tablet 5 milliGRAM(s) Oral daily  aspirin  chewable 81 milliGRAM(s) Oral daily  atorvastatin 80 milliGRAM(s) Oral at bedtime  clopidogrel Tablet 75 milliGRAM(s) Oral daily  gabapentin 300 milliGRAM(s) Oral three times a day  heparin  Injectable 5000 Unit(s) SubCutaneous every 8 hours  levETIRAcetam  Solution 500 milliGRAM(s) Oral two times a day  oxyCODONE    5 mG/acetaminophen 325 mG 2 Tablet(s) Oral every 6 hours PRN  pantoprazole    Tablet 40 milliGRAM(s) Oral before breakfast  polyethylene glycol 3350 17 Gram(s) Oral daily  QUEtiapine 25 milliGRAM(s) Oral daily  senna 2 Tablet(s) Oral at bedtime      PMHX/PSHX:  Prostate hypertrophy  HLD (hyperlipidemia)  Agitation  Depression  Stroke  Syncope  HTN (hypertension)  Gastric tumor  Gastric ulcer with hemorrhage, perforation, and obstruction, acute  No Past Surgical History      Family history:  No pertinent family history in first degree relatives      Social History:     ROS:     General:  No wt loss, fevers, chills, night sweats, fatigue,   Eyes:  Good vision, no reported pain  ENT:  No sore throat, pain, runny nose, dysphagia  CV:  No pain, palpitations, hypo/hypertension  Resp:  No dyspnea, cough, tachypnea, wheezing  GI:  See HPI  :  No pain, bleeding, incontinence, nocturia  Muscle:  No pain, weakness  Neuro:  No weakness, tingling, memory problems  Psych:  No fatigue, insomnia, mood problems, depression  Endocrine:  No polyuria, polydipsia, cold/heat intolerance  Heme:  No petechiae, ecchymosis, easy bruisability  Skin:  No rash, edema      PHYSICAL EXAM:     GENERAL:  NAD  HEENT:  NC/AT,  conjunctivae clear and pink,  no JVD  CHEST: no increased WOB  ABDOMEN:  Soft, non-tender, non-distended, normoactive bowel sounds,  no masses +PEG site CDI no erythema swelling or warmth  EXTREMITIES:  no cyanosis,clubbing or edema  SKIN:  No rash/erythema/ecchymoses/petechiae/wounds/abscess/warm/dry  NEURO:  Alert, oriented    Vital Signs:  Vital Signs Last 24 Hrs  T(C): 36.4 (2017 11:52), Max: 37 (2017 19:01)  T(F): 97.5 (2017 11:52), Max: 98.6 (2017 19:01)  HR: 73 (2017 11:52) (68 - 76)  BP: 164/85 (2017 11:52) (139/90 - 202/96)  BP(mean): --  RR: 18 (2017 11:52) (18 - 19)  SpO2: 98% (2017 11:52) (96% - 100%)  Daily Height in cm: 170.18 (2017 21:00)    Daily Weight in k.4 (2017 10:21)    LABS:                        12.0   4.21  )-----------( 175      ( 2017 08:32 )             35.0         140  |  102  |  17  ----------------------------<  73  3.9   |  25  |  1.03    Ca    9.9      2017 08:41  Mg     2.0         TPro  7.7  /  Alb  4.0  /  TBili  1.1  /  DBili  x   /  AST  67<H>  /  ALT  92<H>  /  AlkPhos  116      LIVER FUNCTIONS - ( 2017 14:27 )  Alb: 4.0 g/dL / Pro: 7.7 g/dL / ALK PHOS: 116 U/L / ALT: 92 U/L RC / AST: 67 U/L / GGT: x           PT/INR - ( 2017 14:27 )   PT: 11.4 sec;   INR: 1.05 ratio         PTT - ( 2017 14:27 )  PTT:34.4 sec  Urinalysis Basic - ( 2017 16:59 )    Color: Colorless / Appearance: Clear / S.006 / pH: x  Gluc: x / Ketone: Negative  / Bili: Negative / Urobili: Negative   Blood: x / Protein: 100 mg/dL / Nitrite: Negative   Leuk Esterase: Negative / RBC: 0-2 /HPF / WBC 0-2 /HPF   Sq Epi: x / Non Sq Epi: x / Bacteria: x          Imaging:

## 2017-11-24 NOTE — CONSULT NOTE ADULT - ATTENDING COMMENTS
Patient seen and examined at bedside in the presence of the neurology residents. In short this is a 72M with prior history of left MCA CVA, residual spastic right hemiparesis and aphasia, s/p PEG tube, presenting with episode of leg shaking on standing, imbalance. Unclear if LOC or other stigmata of seizure. While this patient does have a reason for seizure (prior CVA), history is not compelling and AED exposure may unnecessarily lead to delirium.     Would therefore obtain MRI brain to rule out new CVA and metastatic disease related to bladder cancer as well as EEG.     If above testing is not revealing, would taper Keppra.    Check orthostatics    BP optimization    Fall precautions    PT    Will follow. Discussed with residents.
73 yo male pmh GIST, HTN, CVA s/p PEG 7 months ago presenting with syncope. Incidentally on admit, patient's family requesting PEG exchange given appearance of PEG (change in color, crustiness of PEG).  There is no pain, tenderness at site and PEG is working well, but team requesting exchange which is reasonable given appearance of PEG.  Patient apprehensive today re: removal of PEG without family.  Will await their return and hold feeds in order to make exchange at bedside    Impression:  1) CVA s/p PEG  2) GIST    Plan:  1) Plan for PEG exchange when family present

## 2017-11-24 NOTE — PROGRESS NOTE ADULT - ASSESSMENT
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  Mental status change, Possible seizure- EEG, continue Keppra, Neurology follow  Gastric cancer- Oncology evaluation called  Prostate cancer- Rx as per Oncol;ogy  PEG replacement- Gastroenterology evaluation called. Continue Peg feeds.  Pain control  HTN control  CAD stable.  Depression- continue Rx  Jose Medina MD pager [phone]

## 2017-11-24 NOTE — DIETITIAN INITIAL EVALUATION ADULT. - NS AS NUTRI INTERV PARENTERAL
Rate/Recommend to continue increasing Jevity 1.2 via PEG as tolerated by Pt by 10ml/hr q4-6 hrs until goal rate of 60ml/[id number]. Incresed goal rate will provide 1728kcals and 79gm protein ( 32.9kcals/kg and 1.5gm pro/kg based on current Wt: 52.3kg)

## 2017-11-24 NOTE — DIETITIAN INITIAL EVALUATION ADULT. - OTHER INFO
Nutrition consult received for tube feedings. Pt seen, in bed. Pt able to nod yes and no, however limited information collected at this time as Pt confused. Pt currently receiving Jevity 1.2 @ 20ml/hr tolerating well weith goal rate of Jevity 1.2 @ 55ml/[id number] via PEG. No documented GI distress. Known chewing/swallowing difficulty. No documented micronutrient supplements PTA.

## 2017-11-24 NOTE — DIETITIAN INITIAL EVALUATION ADULT. - NS AS NUTRI INTERV COLLABORAT
Malnutrition sticker placed in chart, team aware. RD remains available to monitor EN tolerance, wt and labs./Collaboration with other providers

## 2017-11-24 NOTE — DIETITIAN INITIAL EVALUATION ADULT. - PHYSICAL APPEARANCE
underweight/emaciated/Nutrition physical focused exam; Severe muscle wasting at temporals, clavicles, deltoids, scapulas, calfs. Severe fat wasting at orbitals, ribs and triceps

## 2017-11-24 NOTE — DIETITIAN INITIAL EVALUATION ADULT. - ADHERENCE
Per previous RD note Pt has been receiving Jevity 1.2 @ 55ml/hrx24 hrs via PEG (1584kcals and 73 gm protein)

## 2017-11-24 NOTE — DIETITIAN INITIAL EVALUATION ADULT. - PT NOT SOURCE
Pt with expressive aphasia no family at bedside, limited subjective information collected at this time/other (specify)

## 2017-11-24 NOTE — CONSULT NOTE ADULT - ASSESSMENT
72 year old male w/ h/o stroke with residual right sided weakness, aphasia, s/p PEG tube, Hypertension, HLD, prostate and stomach cancer (GIST) on oral chemo who presented to ED for syncopal episode. Now consulted for PEG tube replacement.    1)PEG tube placement    - will replace PEG tube 20 F at bedside 72 year old male w/ h/o stroke with residual right sided weakness, aphasia, s/p PEG tube, Hypertension, HLD, prostate and stomach cancer (GIST) on oral chemo who presented to ED for syncopal episode. Now consulted for PEG tube replacement.    1)PEG tube placement    - will replace PEG tube 20 F at bedside tomorrow  -if unsuccessful, will plan for endoscopic removal and replacement early next week  -please keep tube feeds off starting at 2AM tomorrow morning until we change the PEG tube

## 2017-11-24 NOTE — PROGRESS NOTE ADULT - SUBJECTIVE AND OBJECTIVE BOX
Patient is a 72y old  Male who presents with a chief complaint of LOC (2017 18:06)      SUBJECTIVE / OVERNIGHT EVENTS: Comfortable without new complaints. No seizure activity.  Review of Systems  limited 2 unclear speech    MEDICATIONS  (STANDING):  amLODIPine   Tablet 5 milliGRAM(s) Oral daily  aspirin  chewable 81 milliGRAM(s) Oral daily  atorvastatin 80 milliGRAM(s) Oral at bedtime  clopidogrel Tablet 75 milliGRAM(s) Oral daily  gabapentin 300 milliGRAM(s) Oral three times a day  heparin  Injectable 5000 Unit(s) SubCutaneous every 8 hours  levETIRAcetam  Solution 500 milliGRAM(s) Oral two times a day  pantoprazole    Tablet 40 milliGRAM(s) Oral before breakfast  polyethylene glycol 3350 17 Gram(s) Oral daily  QUEtiapine 25 milliGRAM(s) Oral daily  senna 2 Tablet(s) Oral at bedtime    MEDICATIONS  (PRN):  oxyCODONE    5 mG/acetaminophen 325 mG 2 Tablet(s) Oral every 6 hours PRN Moderate Pain (4 - 6)          PHYSICAL EXAM:  GENERAL: NAD, well-developed  HEAD:  Atraumatic, Normocephalic  EYES: EOMI, PERRLA, conjunctiva and sclera clear  NECK: Supple, No JVD  CHEST/LUNG: Clear to auscultation bilaterally; No wheeze  HEART: Regular rate and rhythm; No murmurs, rubs, or gallops  ABDOMEN: Soft, Nontender, Nondistended; Bowel sounds present PEG in place, darkened.   EXTREMITIES:  2+ Peripheral Pulses, No clubbing, cyanosis, or edema  NEUROLOGY: R sided weakness, s/p old CVA.  SKIN: No rashes or lesions    LABS:                        12.0   4.21  )-----------( 175      ( 2017 08:32 )             35.0     11-24    140  |  102  |  17  ----------------------------<  73  3.9   |  25  |  1.03    Ca    9.9      2017 08:41  Mg     2.0     11-    TPro  7.7  /  Alb  4.0  /  TBili  1.1  /  DBili  x   /  AST  67<H>  /  ALT  92<H>  /  AlkPhos  116  11-    PT/INR - ( 2017 14:27 )   PT: 11.4 sec;   INR: 1.05 ratio         PTT - ( 2017 14:27 )  PTT:34.4 sec  CARDIAC MARKERS ( 2017 14:27 )  x     / <0.01 ng/mL / 52 U/L / x     / 1.4 ng/mL      Urinalysis Basic - ( 2017 16:59 )    Color: Colorless / Appearance: Clear / S.006 / pH: x  Gluc: x / Ketone: Negative  / Bili: Negative / Urobili: Negative   Blood: x / Protein: 100 mg/dL / Nitrite: Negative   Leuk Esterase: Negative / RBC: 0-2 /HPF / WBC 0-2 /HPF   Sq Epi: x / Non Sq Epi: x / Bacteria: x    Telemetry SR    RADIOLOGY & ADDITIONAL TESTS:    Imaging Personally Reviewed:    Consultant(s) Notes Reviewed:      Care Discussed with Consultants/Other Providers:

## 2017-11-25 DIAGNOSIS — Z93.1 GASTROSTOMY STATUS: ICD-10-CM

## 2017-11-25 DIAGNOSIS — C61 MALIGNANT NEOPLASM OF PROSTATE: ICD-10-CM

## 2017-11-25 LAB
-  AMIKACIN: SIGNIFICANT CHANGE UP
-  AMPICILLIN/SULBACTAM: SIGNIFICANT CHANGE UP
-  AMPICILLIN: SIGNIFICANT CHANGE UP
-  AZTREONAM: SIGNIFICANT CHANGE UP
-  CEFAZOLIN: SIGNIFICANT CHANGE UP
-  CEFEPIME: SIGNIFICANT CHANGE UP
-  CEFOXITIN: SIGNIFICANT CHANGE UP
-  CEFTAZIDIME: SIGNIFICANT CHANGE UP
-  CEFTRIAXONE: SIGNIFICANT CHANGE UP
-  CIPROFLOXACIN: SIGNIFICANT CHANGE UP
-  ERTAPENEM: SIGNIFICANT CHANGE UP
-  GENTAMICIN: SIGNIFICANT CHANGE UP
-  IMIPENEM: SIGNIFICANT CHANGE UP
-  LEVOFLOXACIN: SIGNIFICANT CHANGE UP
-  MEROPENEM: SIGNIFICANT CHANGE UP
-  NITROFURANTOIN: SIGNIFICANT CHANGE UP
-  PIPERACILLIN/TAZOBACTAM: SIGNIFICANT CHANGE UP
-  TOBRAMYCIN: SIGNIFICANT CHANGE UP
-  TRIMETHOPRIM/SULFAMETHOXAZOLE: SIGNIFICANT CHANGE UP
ANION GAP SERPL CALC-SCNC: 19 MMOL/L — HIGH (ref 5–17)
BUN SERPL-MCNC: 22 MG/DL — SIGNIFICANT CHANGE UP (ref 7–23)
CALCIUM SERPL-MCNC: 10.7 MG/DL — HIGH (ref 8.4–10.5)
CHLORIDE SERPL-SCNC: 107 MMOL/L — SIGNIFICANT CHANGE UP (ref 96–108)
CO2 SERPL-SCNC: 22 MMOL/L — SIGNIFICANT CHANGE UP (ref 22–31)
CREAT SERPL-MCNC: 1.32 MG/DL — HIGH (ref 0.5–1.3)
CULTURE RESULTS: SIGNIFICANT CHANGE UP
GLUCOSE SERPL-MCNC: 95 MG/DL — SIGNIFICANT CHANGE UP (ref 70–99)
HCT VFR BLD CALC: 34.9 % — LOW (ref 39–50)
HGB BLD-MCNC: 12 G/DL — LOW (ref 13–17)
MAGNESIUM SERPL-MCNC: 2.1 MG/DL — SIGNIFICANT CHANGE UP (ref 1.6–2.6)
MCHC RBC-ENTMCNC: 34.4 GM/DL — SIGNIFICANT CHANGE UP (ref 32–36)
MCHC RBC-ENTMCNC: 34.5 PG — HIGH (ref 27–34)
MCV RBC AUTO: 100.3 FL — HIGH (ref 80–100)
METHOD TYPE: SIGNIFICANT CHANGE UP
ORGANISM # SPEC MICROSCOPIC CNT: SIGNIFICANT CHANGE UP
ORGANISM # SPEC MICROSCOPIC CNT: SIGNIFICANT CHANGE UP
PHOSPHATE SERPL-MCNC: 4.2 MG/DL — SIGNIFICANT CHANGE UP (ref 2.5–4.5)
PLATELET # BLD AUTO: 168 K/UL — SIGNIFICANT CHANGE UP (ref 150–400)
POTASSIUM SERPL-MCNC: 5.3 MMOL/L — SIGNIFICANT CHANGE UP (ref 3.5–5.3)
POTASSIUM SERPL-SCNC: 5.3 MMOL/L — SIGNIFICANT CHANGE UP (ref 3.5–5.3)
RBC # BLD: 3.48 M/UL — LOW (ref 4.2–5.8)
RBC # FLD: 15 % — HIGH (ref 10.3–14.5)
SODIUM SERPL-SCNC: 148 MMOL/L — HIGH (ref 135–145)
SPECIMEN SOURCE: SIGNIFICANT CHANGE UP
WBC # BLD: 4.48 K/UL — SIGNIFICANT CHANGE UP (ref 3.8–10.5)
WBC # FLD AUTO: 4.48 K/UL — SIGNIFICANT CHANGE UP (ref 3.8–10.5)

## 2017-11-25 PROCEDURE — 99232 SBSQ HOSP IP/OBS MODERATE 35: CPT

## 2017-11-25 RX ADMIN — HEPARIN SODIUM 5000 UNIT(S): 5000 INJECTION INTRAVENOUS; SUBCUTANEOUS at 17:33

## 2017-11-25 RX ADMIN — HEPARIN SODIUM 5000 UNIT(S): 5000 INJECTION INTRAVENOUS; SUBCUTANEOUS at 05:53

## 2017-11-25 RX ADMIN — LEVETIRACETAM 500 MILLIGRAM(S): 250 TABLET, FILM COATED ORAL at 17:32

## 2017-11-25 RX ADMIN — SENNA PLUS 2 TABLET(S): 8.6 TABLET ORAL at 22:35

## 2017-11-25 RX ADMIN — CLOPIDOGREL BISULFATE 75 MILLIGRAM(S): 75 TABLET, FILM COATED ORAL at 17:33

## 2017-11-25 RX ADMIN — GABAPENTIN 300 MILLIGRAM(S): 400 CAPSULE ORAL at 22:35

## 2017-11-25 RX ADMIN — ATORVASTATIN CALCIUM 80 MILLIGRAM(S): 80 TABLET, FILM COATED ORAL at 22:35

## 2017-11-25 RX ADMIN — OXYCODONE AND ACETAMINOPHEN 2 TABLET(S): 5; 325 TABLET ORAL at 17:32

## 2017-11-25 RX ADMIN — AMLODIPINE BESYLATE 5 MILLIGRAM(S): 2.5 TABLET ORAL at 05:53

## 2017-11-25 RX ADMIN — OXYCODONE AND ACETAMINOPHEN 2 TABLET(S): 5; 325 TABLET ORAL at 06:31

## 2017-11-25 RX ADMIN — QUETIAPINE FUMARATE 25 MILLIGRAM(S): 200 TABLET, FILM COATED ORAL at 17:33

## 2017-11-25 RX ADMIN — GABAPENTIN 300 MILLIGRAM(S): 400 CAPSULE ORAL at 17:33

## 2017-11-25 RX ADMIN — POLYETHYLENE GLYCOL 3350 17 GRAM(S): 17 POWDER, FOR SOLUTION ORAL at 17:33

## 2017-11-25 RX ADMIN — OXYCODONE AND ACETAMINOPHEN 2 TABLET(S): 5; 325 TABLET ORAL at 05:55

## 2017-11-25 RX ADMIN — HEPARIN SODIUM 5000 UNIT(S): 5000 INJECTION INTRAVENOUS; SUBCUTANEOUS at 22:35

## 2017-11-25 RX ADMIN — GABAPENTIN 300 MILLIGRAM(S): 400 CAPSULE ORAL at 05:53

## 2017-11-25 RX ADMIN — Medication 81 MILLIGRAM(S): at 17:32

## 2017-11-25 RX ADMIN — LEVETIRACETAM 500 MILLIGRAM(S): 250 TABLET, FILM COATED ORAL at 05:54

## 2017-11-25 RX ADMIN — OXYCODONE AND ACETAMINOPHEN 2 TABLET(S): 5; 325 TABLET ORAL at 18:30

## 2017-11-25 NOTE — CHART NOTE - NSCHARTNOTEFT_GEN_A_CORE
Notified at this time 0500, that patient had 5 beats of WCT at 11pm. No additional event overnight.    MEDICATIONS  (STANDING):  amLODIPine   Tablet 5 milliGRAM(s) Oral daily  aspirin  chewable 81 milliGRAM(s) Oral daily  atorvastatin 80 milliGRAM(s) Oral at bedtime  clopidogrel Tablet 75 milliGRAM(s) Oral daily  gabapentin 300 milliGRAM(s) Oral three times a day  heparin  Injectable 5000 Unit(s) SubCutaneous every 8 hours  levETIRAcetam  Solution 500 milliGRAM(s) Oral two times a day  pantoprazole    Tablet 40 milliGRAM(s) Oral before breakfast  polyethylene glycol 3350 17 Gram(s) Oral daily  QUEtiapine 25 milliGRAM(s) Oral daily  senna 2 Tablet(s) Oral at bedtime    MEDICATIONS  (PRN):  oxyCODONE    5 mG/acetaminophen 325 mG 2 Tablet(s) Oral every 6 hours PRN Moderate Pain (4 - 6)    Plan  magnesium, serum  phosphorus serum     Will endorse to primary team, attending to follow

## 2017-11-25 NOTE — PROGRESS NOTE ADULT - SUBJECTIVE AND OBJECTIVE BOX
Patient is a 72y old  Male who presents with a chief complaint of LOC (2017 18:06)      SUBJECTIVE / OVERNIGHT EVENTS: Comfortable without new complaints.   Review of Systems  chest pain no  palpitations no  sob no  nausea no  headache no    MEDICATIONS  (STANDING):  amLODIPine   Tablet 5 milliGRAM(s) Oral daily  aspirin  chewable 81 milliGRAM(s) Oral daily  atorvastatin 80 milliGRAM(s) Oral at bedtime  clopidogrel Tablet 75 milliGRAM(s) Oral daily  gabapentin 300 milliGRAM(s) Oral three times a day  heparin  Injectable 5000 Unit(s) SubCutaneous every 8 hours  levETIRAcetam  Solution 500 milliGRAM(s) Oral two times a day  pantoprazole    Tablet 40 milliGRAM(s) Oral before breakfast  polyethylene glycol 3350 17 Gram(s) Oral daily  QUEtiapine 25 milliGRAM(s) Oral daily  senna 2 Tablet(s) Oral at bedtime    MEDICATIONS  (PRN):  oxyCODONE    5 mG/acetaminophen 325 mG 2 Tablet(s) Oral every 6 hours PRN Moderate Pain (4 - 6)          PHYSICAL EXAM:  GENERAL: NAD, well-developed  HEAD:  Atraumatic, Normocephalic  EYES: EOMI, PERRLA, conjunctiva and sclera clear  NECK: Supple, No JVD  CHEST/LUNG: Clear to auscultation bilaterally; No wheeze  HEART: Regular rate and rhythm; No murmurs, rubs, or gallops  ABDOMEN: Soft, Nontender, Nondistended; Bowel sounds present PEG in place.  EXTREMITIES:  2+ Peripheral Pulses, No clubbing, cyanosis, or edema  PSYCH: AAOx3  NEUROLOGY: R sided weakness s/p old cva.  SKIN: No rashes or lesions    LABS:                        12.0   4.48  )-----------( 168      ( 2017 07:01 )             34.9     11-25    148<H>  |  107  |  22  ----------------------------<  95  5.3   |  22  |  1.32<H>    Ca    10.7<H>      2017 07:01  Phos  4.2     11-25  Mg     2.1     11-25    TPro  7.7  /  Alb  4.0  /  TBili  1.1  /  DBili  x   /  AST  67<H>  /  ALT  92<H>  /  AlkPhos  116  11-    PT/INR - ( 2017 14:27 )   PT: 11.4 sec;   INR: 1.05 ratio         PTT - ( 2017 14:27 )  PTT:34.4 sec  CARDIAC MARKERS ( 2017 14:27 )  x     / <0.01 ng/mL / 52 U/L / x     / 1.4 ng/mL      Urinalysis Basic - ( 2017 16:59 )    Color: Colorless / Appearance: Clear / S.006 / pH: x  Gluc: x / Ketone: Negative  / Bili: Negative / Urobili: Negative   Blood: x / Protein: 100 mg/dL / Nitrite: Negative   Leuk Esterase: Negative / RBC: 0-2 /HPF / WBC 0-2 /HPF   Sq Epi: x / Non Sq Epi: x / Bacteria: x    EEG Classification / Summary:  Abnormal  EEG Study   -Continuous Slowing, Lateralized, Left hemisphere  -Background Slowing    Clinical Impression:  This EEG is suggestive of a mild diffuse encephalopathy and a structural lesion in the left hemisphere.  There were no epileptiform abnormalities recorded.      RADIOLOGY & ADDITIONAL TESTS:    Imaging Personally Reviewed:    Consultant(s) Notes Reviewed:      Care Discussed with Consultants/Other Providers:

## 2017-11-25 NOTE — PROGRESS NOTE ADULT - ASSESSMENT
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  Mental status change, Possible seizure- continue Keppra, Neurology follow  Gastric cancer- Oncology evaluation noted. CT abdomen/ pelvis to evaluate extent of disease.  Prostate cancer- Rx as per Oncol;ogy  PEG replacemen pendingt- Gastroenterology evaluation noted. Continue Peg feeds.  Pain control  HTN control  CAD stable.  Depression- continue Rx  Jose Medina MD pager [phone] This is a 72 year old male with h/o CVA with R sided residual weakness, HTN, HLD, s/p PEG tube, prostate and stomach cancer on oral chemo through PEG tube who presented to ED for syncopal episode s/p code stroke symptoms likely secondary to seizure.  Mental status change, Possible seizure- continue Keppra, Neurology follow  Gastric cancer- Oncology evaluation noted. CT abdomen/ pelvis to evaluate extent of disease.  Prostate cancer- Rx as per Oncology  PEG replacemen pendingt- Gastroenterology evaluation noted. Continue Peg feeds.  Pain control  HTN control  CAD stable.  Depression- continue Rx  Jose Medina MD pager [phone]

## 2017-11-25 NOTE — EEG REPORT - NS EEG TEXT BOX
History:  H/o stroke, aphasia, HTN, HLD, prostate/stomach cancer on oral chemo, seizure    P/w syncopal episode, clonic jerking of RUE    Medication	  <Medication>Neurontin	  <Medication>Keppra	    Study Interpretation:    FINDINGS:  The background was continuous, spontaneously variable and reactive.  During wakefulness, the posteriorly dominant rhythm consisted of symmetric, well modulated 7 Hz activity, with an amplitude to 30 uV, that attenuated to eye opening.  Low amplitude central beta was noted in wakefulness.    Background Slowing:  Generalized slowing: none was present.  Focal slowing: Continuous Slowing, Lateralized, Left hemisphere    Sleep Background:  Drowsiness was characterized by fragmentation, attenuation, and slowing of the background activity.    Stage II sleep transients were not recorded.    Epileptiform Activity:   No epileptiform discharges were present.    Events:  No clinical events were recorded.  No seizures were recorded.    Activation Procedures:   -Hyperventilation was not performed.    -Photic stimulation was not performed.    Artifacts:  Intermittent myogenic and movement artifacts were noted.    ECG:  The heart rate on single channel ECG was predominantly between 60-70 BPM.    EEG Classification / Summary:  Abnormal  EEG Study   -Continuous Slowing, Lateralized, Left hemisphere  -Background Slowing    Clinical Impression:  This EEG is suggestive of a mild diffuse encephalopathy and a structural lesion in the left hemisphere.  There were no epileptiform abnormalities recorded.      Watson Desai MD		    Attending Physician, [facility]

## 2017-11-25 NOTE — CONSULT NOTE ADULT - SUBJECTIVE AND OBJECTIVE BOX
Patient is a 72y old  Male who presents with a chief complaint of LOC (23 Nov 2017 18:06)  H/o obtained from wife over telephone via interpretor [id number]    HPI:  This is a 72 year old male w/ h/o stroke with residual right sided weakness, aphasia, s/p PEG tube, Hypertension, HLD, prostate and stomach cancer on oral chemo who presented to ED for syncopal episode. Patient was sitting on side of bed and fell down - wife feels may have hit head. Family found him on side on floor 10 minutes later. During episode, eyes rolled back, with shakiness of left hand.. Was less responsive then his normal self for about 15-20 minutes, taking longer to respond to questions. Denied tongue biting. no prior h/o seizures.  Patient ran out of gleevec 15-20 years ago. Patient saw MD in september - received injection      PAST MEDICAL & SURGICAL HISTORY:  Prostate hypertrophy  HLD (hyperlipidemia)  Agitation  Depression  Stroke  Syncope: 2/2013, 9/2010  HTN (hypertension)  Gastric tumor  Gastric ulcer with hemorrhage, perforation, and obstruction, acute  No Past Surgical History      SOCIAL HISTORY:    FAMILY HISTORY:  No pertinent family history in first degree relatives      MEDICATIONS  (STANDING):  amLODIPine   Tablet 5 milliGRAM(s) Oral daily  aspirin  chewable 81 milliGRAM(s) Oral daily  atorvastatin 80 milliGRAM(s) Oral at bedtime  clopidogrel Tablet 75 milliGRAM(s) Oral daily  gabapentin 300 milliGRAM(s) Oral three times a day  heparin  Injectable 5000 Unit(s) SubCutaneous every 8 hours  levETIRAcetam  Solution 500 milliGRAM(s) Oral two times a day  pantoprazole    Tablet 40 milliGRAM(s) Oral before breakfast  polyethylene glycol 3350 17 Gram(s) Oral daily  QUEtiapine 25 milliGRAM(s) Oral daily  senna 2 Tablet(s) Oral at bedtime    MEDICATIONS  (PRN):  oxyCODONE    5 mG/acetaminophen 325 mG 2 Tablet(s) Oral every 6 hours PRN Moderate Pain (4 - 6)      Allergies    No Known Allergies    Intolerances        Vital Signs Last 24 Hrs  T(C): 36.4 (25 Nov 2017 11:17), Max: 36.7 (24 Nov 2017 20:43)  T(F): 97.5 (25 Nov 2017 11:17), Max: 98 (24 Nov 2017 20:43)  HR: 65 (25 Nov 2017 11:17) (65 - 86)  BP: 166/97 (25 Nov 2017 11:17) (127/72 - 166/97)  BP(mean): --  RR: 18 (25 Nov 2017 11:17) (18 - 18)  SpO2: 98% (25 Nov 2017 11:17) (96% - 98%)    REVIEW OF SYSTEMS:    CONSTITUTIONAL: No weakness, fevers or chills  EYES/ENT: No visual changes;  No vertigo or throat pain   NECK: No pain or stiffness  RESPIRATORY: No cough, wheezing, hemoptysis; No shortness of breath  CARDIOVASCULAR: No chest pain or palpitations  GASTROINTESTINAL: No abdominal or epigastric pain. No nausea, vomiting, or hematemesis; No diarrhea or constipation. No melena or hematochezia.  GENITOURINARY: No dysuria, frequency or hematuria  NEUROLOGICAL: No numbness or weakness  SKIN: No itching, burning, rashes, or lesions     PHYSICAL EXAM  General: adult in NAD  HEENT: clear oropharynx, anicteric sclera, pink conjunctiva  Neck: supple  CV: normal S1/S2 with no murmur rubs or gallops  Lungs: positive air movement b/l ant lungs,clear to auscultation, no wheezes, no rales  Abdomen: soft non-tender non-distended, no hepatosplenomegaly  Ext: no clubbing cyanosis or edema  Skin: no rashes and no petechiae  Neuro: alert and oriented X 4, no focal deficits      LABS:                          12.0   4.48  )-----------( 168      ( 25 Nov 2017 07:01 )             34.9         Mean Cell Volume : 100.3 fl  Mean Cell Hemoglobin : 34.5 pg  Mean Cell Hemoglobin Concentration : 34.4 gm/dL    11-25    148<H>  |  107  |  22  ----------------------------<  95  5.3   |  22  |  1.32<H>    Ca    10.7<H>      25 Nov 2017 07:01  Phos  4.2     11-25  Mg     2.1     11-25    TPro  7.7  /  Alb  4.0  /  TBili  1.1  /  DBili  x   /  AST  67<H>  /  ALT  92<H>  /  AlkPhos  116  11-23      PT/INR - ( 23 Nov 2017 14:27 )   PT: 11.4 sec;   INR: 1.05 ratio         PTT - ( 23 Nov 2017 14:27 )  PTT:34.4 sec      BLOOD SMEAR INTERPRETATION:       RADIOLOGY & ADDITIONAL STUDIES: Patient is a 72y old  Male who presents with a chief complaint of LOC (23 Nov 2017 18:06)  H/o obtained from wife over telephone via interpretor [id number]    HPI:  This is a 72 year old male w/ h/o stroke with residual right sided weakness, aphasia, s/p PEG tube, Hypertension, HLD, prostate and stomach cancer on oral chemo who presented to ED for syncopal episode. Patient was sitting on side of bed and fell down - wife feels may have hit head. Family found him on side on floor 10 minutes later. During episode, eyes rolled back, with shakiness of left hand.. Was less responsive then his normal self for about 15-20 minutes, taking longer to respond to questions. Denied tongue biting. no prior h/o seizures.  Patient ran out of gleevec 15-20 years ago. Patient saw MD in september - received injection - details unclear      PAST MEDICAL & SURGICAL HISTORY:  Prostate hypertrophy  HLD (hyperlipidemia)  Agitation  Depression  Stroke  Syncope: 2/2013, 9/2010  HTN (hypertension)  Gastric tumor  Gastric ulcer with hemorrhage, perforation, and obstruction, acute  No Past Surgical History      SOCIAL HISTORY:  lives with wife    FAMILY HISTORY:  No pertinent family history in first degree relatives      MEDICATIONS  (STANDING):  amLODIPine   Tablet 5 milliGRAM(s) Oral daily  aspirin  chewable 81 milliGRAM(s) Oral daily  atorvastatin 80 milliGRAM(s) Oral at bedtime  clopidogrel Tablet 75 milliGRAM(s) Oral daily  gabapentin 300 milliGRAM(s) Oral three times a day  heparin  Injectable 5000 Unit(s) SubCutaneous every 8 hours  levETIRAcetam  Solution 500 milliGRAM(s) Oral two times a day  pantoprazole    Tablet 40 milliGRAM(s) Oral before breakfast  polyethylene glycol 3350 17 Gram(s) Oral daily  QUEtiapine 25 milliGRAM(s) Oral daily  senna 2 Tablet(s) Oral at bedtime    MEDICATIONS  (PRN):  oxyCODONE    5 mG/acetaminophen 325 mG 2 Tablet(s) Oral every 6 hours PRN Moderate Pain (4 - 6)      Allergies    No Known Allergies    Intolerances        Vital Signs Last 24 Hrs  T(C): 36.4 (25 Nov 2017 11:17), Max: 36.7 (24 Nov 2017 20:43)  T(F): 97.5 (25 Nov 2017 11:17), Max: 98 (24 Nov 2017 20:43)  HR: 65 (25 Nov 2017 11:17) (65 - 86)  BP: 166/97 (25 Nov 2017 11:17) (127/72 - 166/97)  BP(mean): --  RR: 18 (25 Nov 2017 11:17) (18 - 18)  SpO2: 98% (25 Nov 2017 11:17) (96% - 98%)    REVIEW OF SYSTEMS:    CONSTITUTIONAL: No  fevers or chills. weakness,  EYES/ENT: No visual changes;  No vertigo or throat pain   NECK: No pain or stiffness  RESPIRATORY: No cough, wheezing, hemoptysis; No shortness of breath  CARDIOVASCULAR: No chest pain or palpitations  GASTROINTESTINAL: No abdominal or epigastric pain. No nausea, vomiting, or hematemesis; No diarrhea or constipation. No melena or hematochezia.  GENITOURINARY: No dysuria, frequency or hematuria  NEUROLOGICAL: No numbness or weakness. c/o weakness due to old stroke, c/o headaches  SKIN: No itching, burning, rashes, or lesions     PHYSICAL EXAM  General: adult chronically ill, aphasic, uncomfortable  HEENT: clear oropharynx, anicteric sclera, pink conjunctiva  Neck: supple  CV: normal S1/S2 with no murmur rubs or gallops  Lungs: positive air movement b/l ant lungs,clear to auscultation, no wheezes, no rales  Abdomen: soft non-tender non-distended, no hepatosplenomegaly, PEG tube +  Ext: no clubbing cyanosis or edema  Skin: no rashes and no petechiae  Neuro: awake, aphasic, right weakness +      LABS:                          12.0   4.48  )-----------( 168      ( 25 Nov 2017 07:01 )             34.9         Mean Cell Volume : 100.3 fl  Mean Cell Hemoglobin : 34.5 pg  Mean Cell Hemoglobin Concentration : 34.4 gm/dL    11-25    148<H>  |  107  |  22  ----------------------------<  95  5.3   |  22  |  1.32<H>    Ca    10.7<H>      25 Nov 2017 07:01  Phos  4.2     11-25  Mg     2.1     11-25    TPro  7.7  /  Alb  4.0  /  TBili  1.1  /  DBili  x   /  AST  67<H>  /  ALT  92<H>  /  AlkPhos  116  11-23      PT/INR - ( 23 Nov 2017 14:27 )   PT: 11.4 sec;   INR: 1.05 ratio         PTT - ( 23 Nov 2017 14:27 )  PTT:34.4 sec      BLOOD SMEAR INTERPRETATION:       RADIOLOGY & ADDITIONAL STUDIES:

## 2017-11-25 NOTE — PROGRESS NOTE ADULT - PROBLEM SELECTOR PLAN 5
BP elevated on presentation, to 200's, s/p hydralazine with improvement to 170's. Will c/w current home medication regimen of Norvasc 5mg. Was previously on Lisinopril and Metoprolol on hospitalization in July however not currently taking.   - Up-titrate BP med's as needed
BP elevated on presentation, to 200's, s/p hydralazine with improvement to 170's. Will c/w current home medication regimen of Norvasc 5mg. Was previously on Lisinopril and Metoprolol on hospitalization in July however not currently taking.   - Up-titrate BP med's as needed

## 2017-11-26 LAB
ANION GAP SERPL CALC-SCNC: 16 MMOL/L — SIGNIFICANT CHANGE UP (ref 5–17)
BUN SERPL-MCNC: 21 MG/DL — SIGNIFICANT CHANGE UP (ref 7–23)
CALCIUM SERPL-MCNC: 10.3 MG/DL — SIGNIFICANT CHANGE UP (ref 8.4–10.5)
CHLORIDE SERPL-SCNC: 100 MMOL/L — SIGNIFICANT CHANGE UP (ref 96–108)
CO2 SERPL-SCNC: 23 MMOL/L — SIGNIFICANT CHANGE UP (ref 22–31)
CREAT SERPL-MCNC: 1.33 MG/DL — HIGH (ref 0.5–1.3)
GLUCOSE SERPL-MCNC: 71 MG/DL — SIGNIFICANT CHANGE UP (ref 70–99)
HCT VFR BLD CALC: 38.5 % — LOW (ref 39–50)
HGB BLD-MCNC: 13.4 G/DL — SIGNIFICANT CHANGE UP (ref 13–17)
LDH SERPL L TO P-CCNC: 327 U/L — HIGH (ref 50–242)
MCHC RBC-ENTMCNC: 34.8 GM/DL — SIGNIFICANT CHANGE UP (ref 32–36)
MCHC RBC-ENTMCNC: 35 PG — HIGH (ref 27–34)
MCV RBC AUTO: 100.5 FL — HIGH (ref 80–100)
PLATELET # BLD AUTO: 160 K/UL — SIGNIFICANT CHANGE UP (ref 150–400)
POTASSIUM SERPL-MCNC: 4.4 MMOL/L — SIGNIFICANT CHANGE UP (ref 3.5–5.3)
POTASSIUM SERPL-SCNC: 4.4 MMOL/L — SIGNIFICANT CHANGE UP (ref 3.5–5.3)
PSA FLD-MCNC: 28.63 NG/ML — HIGH (ref 0–4)
RBC # BLD: 3.83 M/UL — LOW (ref 4.2–5.8)
RBC # FLD: 15 % — HIGH (ref 10.3–14.5)
SODIUM SERPL-SCNC: 139 MMOL/L — SIGNIFICANT CHANGE UP (ref 135–145)
WBC # BLD: 3.39 K/UL — LOW (ref 3.8–10.5)
WBC # FLD AUTO: 3.39 K/UL — LOW (ref 3.8–10.5)

## 2017-11-26 PROCEDURE — 43760 CHANGE GASTROSTOMY TUBE PERCUTANEOUS W/O GUIDE: CPT

## 2017-11-26 PROCEDURE — 74177 CT ABD & PELVIS W/CONTRAST: CPT | Mod: 26

## 2017-11-26 RX ORDER — SODIUM CHLORIDE 9 MG/ML
1000 INJECTION, SOLUTION INTRAVENOUS
Qty: 0 | Refills: 0 | Status: DISCONTINUED | OUTPATIENT
Start: 2017-11-26 | End: 2017-12-10

## 2017-11-26 RX ADMIN — OXYCODONE AND ACETAMINOPHEN 2 TABLET(S): 5; 325 TABLET ORAL at 20:24

## 2017-11-26 RX ADMIN — CLOPIDOGREL BISULFATE 75 MILLIGRAM(S): 75 TABLET, FILM COATED ORAL at 16:04

## 2017-11-26 RX ADMIN — ATORVASTATIN CALCIUM 80 MILLIGRAM(S): 80 TABLET, FILM COATED ORAL at 21:27

## 2017-11-26 RX ADMIN — OXYCODONE AND ACETAMINOPHEN 2 TABLET(S): 5; 325 TABLET ORAL at 14:23

## 2017-11-26 RX ADMIN — OXYCODONE AND ACETAMINOPHEN 2 TABLET(S): 5; 325 TABLET ORAL at 09:40

## 2017-11-26 RX ADMIN — GABAPENTIN 300 MILLIGRAM(S): 400 CAPSULE ORAL at 21:27

## 2017-11-26 RX ADMIN — HEPARIN SODIUM 5000 UNIT(S): 5000 INJECTION INTRAVENOUS; SUBCUTANEOUS at 21:27

## 2017-11-26 RX ADMIN — OXYCODONE AND ACETAMINOPHEN 2 TABLET(S): 5; 325 TABLET ORAL at 15:30

## 2017-11-26 RX ADMIN — GABAPENTIN 300 MILLIGRAM(S): 400 CAPSULE ORAL at 06:44

## 2017-11-26 RX ADMIN — LEVETIRACETAM 500 MILLIGRAM(S): 250 TABLET, FILM COATED ORAL at 06:42

## 2017-11-26 RX ADMIN — OXYCODONE AND ACETAMINOPHEN 2 TABLET(S): 5; 325 TABLET ORAL at 20:54

## 2017-11-26 RX ADMIN — PANTOPRAZOLE SODIUM 40 MILLIGRAM(S): 20 TABLET, DELAYED RELEASE ORAL at 06:42

## 2017-11-26 RX ADMIN — AMLODIPINE BESYLATE 5 MILLIGRAM(S): 2.5 TABLET ORAL at 06:42

## 2017-11-26 RX ADMIN — SENNA PLUS 2 TABLET(S): 8.6 TABLET ORAL at 21:27

## 2017-11-26 RX ADMIN — OXYCODONE AND ACETAMINOPHEN 2 TABLET(S): 5; 325 TABLET ORAL at 08:42

## 2017-11-26 RX ADMIN — QUETIAPINE FUMARATE 25 MILLIGRAM(S): 200 TABLET, FILM COATED ORAL at 16:04

## 2017-11-26 RX ADMIN — LEVETIRACETAM 500 MILLIGRAM(S): 250 TABLET, FILM COATED ORAL at 16:04

## 2017-11-26 RX ADMIN — HEPARIN SODIUM 5000 UNIT(S): 5000 INJECTION INTRAVENOUS; SUBCUTANEOUS at 06:42

## 2017-11-26 RX ADMIN — Medication 81 MILLIGRAM(S): at 16:04

## 2017-11-26 NOTE — PROGRESS NOTE ADULT - SUBJECTIVE AND OBJECTIVE BOX
Patient is a 72y old  Male who presents with a chief complaint of LOC (23 Nov 2017 18:06)      SUBJECTIVE / OVERNIGHT EVENTS: awake, more alert today.  Review of Systems  chest pain no  palpitations no  sob no  nausea no  headache no    MEDICATIONS  (STANDING):  amLODIPine   Tablet 5 milliGRAM(s) Oral daily  aspirin  chewable 81 milliGRAM(s) Oral daily  atorvastatin 80 milliGRAM(s) Oral at bedtime  clopidogrel Tablet 75 milliGRAM(s) Oral daily  gabapentin 300 milliGRAM(s) Oral three times a day  heparin  Injectable 5000 Unit(s) SubCutaneous every 8 hours  levETIRAcetam  Solution 500 milliGRAM(s) Oral two times a day  pantoprazole    Tablet 40 milliGRAM(s) Oral before breakfast  polyethylene glycol 3350 17 Gram(s) Oral daily  QUEtiapine 25 milliGRAM(s) Oral daily  senna 2 Tablet(s) Oral at bedtime    MEDICATIONS  (PRN):  oxyCODONE    5 mG/acetaminophen 325 mG 2 Tablet(s) Oral every 6 hours PRN Moderate Pain (4 - 6)          PHYSICAL EXAM:  GENERAL: NAD, well-developed  HEAD:  Atraumatic, Normocephalic  EYES: EOMI, PERRLA, conjunctiva and sclera clear  NECK: Supple, No JVD  CHEST/LUNG: Clear to auscultation bilaterally; No wheeze  HEART: Regular rate and rhythm; No murmurs, rubs, or gallops  ABDOMEN: Soft, Nontender, Nondistended; Bowel sounds present PEG in place.  EXTREMITIES:  2+ Peripheral Pulses, No clubbing, cyanosis, or edema  NEUROLOGY: R weakness s/p old CVA  SKIN: No rashes or lesions    LABS:                        13.4   3.39  )-----------( 160      ( 26 Nov 2017 08:43 )             38.5     11-26    139  |  100  |  21  ----------------------------<  71  4.4   |  23  |  1.33<H>    Ca    10.3      26 Nov 2017 08:39  Phos  4.2     11-25  Mg     2.1     11-25                RADIOLOGY & ADDITIONAL TESTS:    Imaging Personally Reviewed:    Consultant(s) Notes Reviewed:      Care Discussed with Consultants/Other Providers:

## 2017-11-26 NOTE — CHART NOTE - NSCHARTNOTEFT_GEN_A_CORE
Procedure: PEG replacement for old peg tube    20F externally removable PEG was removed at bedside. New 20F peg tube was replaced with no complications.    -Can resume feeds today  -if any difficulty with feeds, please obtain KUB, peg study  -We will sign off

## 2017-11-26 NOTE — PROGRESS NOTE ADULT - ASSESSMENT
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  Mental status change, Possible seizure- continue Keppra, Neurology follow  Gastric cancer- Oncology evaluation noted. CT abdomen/ pelvis to evaluate extent of disease.  Prostate cancer- Rx as per Oncology  PEG replacement pending Gastroenterology evaluation noted. Continue Peg feeds.  Pain control  HTN control  CAD stable.  Depression- continue Rx  Functional quadriplegia -supportive care  Jose Medina MD pager [phone]

## 2017-11-27 DIAGNOSIS — C49.A2 GASTROINTESTINAL STROMAL TUMOR OF STOMACH: ICD-10-CM

## 2017-11-27 LAB
ANION GAP SERPL CALC-SCNC: 12 MMOL/L — SIGNIFICANT CHANGE UP (ref 5–17)
BUN SERPL-MCNC: 20 MG/DL — SIGNIFICANT CHANGE UP (ref 7–23)
CALCIUM SERPL-MCNC: 9.6 MG/DL — SIGNIFICANT CHANGE UP (ref 8.4–10.5)
CHLORIDE SERPL-SCNC: 98 MMOL/L — SIGNIFICANT CHANGE UP (ref 96–108)
CO2 SERPL-SCNC: 25 MMOL/L — SIGNIFICANT CHANGE UP (ref 22–31)
CREAT SERPL-MCNC: 1.12 MG/DL — SIGNIFICANT CHANGE UP (ref 0.5–1.3)
GLUCOSE SERPL-MCNC: 102 MG/DL — HIGH (ref 70–99)
HCT VFR BLD CALC: 34.8 % — LOW (ref 39–50)
HGB BLD-MCNC: 11.5 G/DL — LOW (ref 13–17)
MCHC RBC-ENTMCNC: 33 GM/DL — SIGNIFICANT CHANGE UP (ref 32–36)
MCHC RBC-ENTMCNC: 33.2 PG — SIGNIFICANT CHANGE UP (ref 27–34)
MCV RBC AUTO: 100.6 FL — HIGH (ref 80–100)
PLATELET # BLD AUTO: 166 K/UL — SIGNIFICANT CHANGE UP (ref 150–400)
POTASSIUM SERPL-MCNC: 4.1 MMOL/L — SIGNIFICANT CHANGE UP (ref 3.5–5.3)
POTASSIUM SERPL-SCNC: 4.1 MMOL/L — SIGNIFICANT CHANGE UP (ref 3.5–5.3)
RBC # BLD: 3.46 M/UL — LOW (ref 4.2–5.8)
RBC # FLD: 14.4 % — SIGNIFICANT CHANGE UP (ref 10.3–14.5)
SODIUM SERPL-SCNC: 135 MMOL/L — SIGNIFICANT CHANGE UP (ref 135–145)
WBC # BLD: 3.4 K/UL — LOW (ref 3.8–10.5)
WBC # FLD AUTO: 3.4 K/UL — LOW (ref 3.8–10.5)

## 2017-11-27 RX ORDER — FENTANYL CITRATE 50 UG/ML
200 INJECTION INTRAVENOUS
Qty: 0 | Refills: 0 | Status: DISCONTINUED | OUTPATIENT
Start: 2017-11-27 | End: 2017-12-04

## 2017-11-27 RX ORDER — HYDROMORPHONE HYDROCHLORIDE 2 MG/ML
1 INJECTION INTRAMUSCULAR; INTRAVENOUS; SUBCUTANEOUS EVERY 4 HOURS
Qty: 0 | Refills: 0 | Status: DISCONTINUED | OUTPATIENT
Start: 2017-11-27 | End: 2017-11-27

## 2017-11-27 RX ADMIN — HYDROMORPHONE HYDROCHLORIDE 1 MILLIGRAM(S): 2 INJECTION INTRAMUSCULAR; INTRAVENOUS; SUBCUTANEOUS at 15:42

## 2017-11-27 RX ADMIN — GABAPENTIN 300 MILLIGRAM(S): 400 CAPSULE ORAL at 15:41

## 2017-11-27 RX ADMIN — FENTANYL CITRATE 200 MICROGRAM(S): 50 INJECTION INTRAVENOUS at 21:30

## 2017-11-27 RX ADMIN — ATORVASTATIN CALCIUM 80 MILLIGRAM(S): 80 TABLET, FILM COATED ORAL at 21:30

## 2017-11-27 RX ADMIN — OXYCODONE AND ACETAMINOPHEN 2 TABLET(S): 5; 325 TABLET ORAL at 10:23

## 2017-11-27 RX ADMIN — SENNA PLUS 2 TABLET(S): 8.6 TABLET ORAL at 21:30

## 2017-11-27 RX ADMIN — QUETIAPINE FUMARATE 25 MILLIGRAM(S): 200 TABLET, FILM COATED ORAL at 11:37

## 2017-11-27 RX ADMIN — CLOPIDOGREL BISULFATE 75 MILLIGRAM(S): 75 TABLET, FILM COATED ORAL at 11:37

## 2017-11-27 RX ADMIN — GABAPENTIN 300 MILLIGRAM(S): 400 CAPSULE ORAL at 21:30

## 2017-11-27 RX ADMIN — OXYCODONE AND ACETAMINOPHEN 2 TABLET(S): 5; 325 TABLET ORAL at 10:33

## 2017-11-27 RX ADMIN — OXYCODONE AND ACETAMINOPHEN 2 TABLET(S): 5; 325 TABLET ORAL at 02:55

## 2017-11-27 RX ADMIN — LEVETIRACETAM 500 MILLIGRAM(S): 250 TABLET, FILM COATED ORAL at 17:36

## 2017-11-27 RX ADMIN — OXYCODONE AND ACETAMINOPHEN 2 TABLET(S): 5; 325 TABLET ORAL at 02:25

## 2017-11-27 RX ADMIN — AMLODIPINE BESYLATE 5 MILLIGRAM(S): 2.5 TABLET ORAL at 05:13

## 2017-11-27 RX ADMIN — HEPARIN SODIUM 5000 UNIT(S): 5000 INJECTION INTRAVENOUS; SUBCUTANEOUS at 21:31

## 2017-11-27 RX ADMIN — Medication 81 MILLIGRAM(S): at 11:37

## 2017-11-27 RX ADMIN — GABAPENTIN 300 MILLIGRAM(S): 400 CAPSULE ORAL at 05:13

## 2017-11-27 RX ADMIN — LEVETIRACETAM 500 MILLIGRAM(S): 250 TABLET, FILM COATED ORAL at 05:13

## 2017-11-27 RX ADMIN — HEPARIN SODIUM 5000 UNIT(S): 5000 INJECTION INTRAVENOUS; SUBCUTANEOUS at 15:41

## 2017-11-27 RX ADMIN — PANTOPRAZOLE SODIUM 40 MILLIGRAM(S): 20 TABLET, DELAYED RELEASE ORAL at 05:13

## 2017-11-27 RX ADMIN — POLYETHYLENE GLYCOL 3350 17 GRAM(S): 17 POWDER, FOR SOLUTION ORAL at 11:37

## 2017-11-27 RX ADMIN — FENTANYL CITRATE 200 MICROGRAM(S): 50 INJECTION INTRAVENOUS at 20:35

## 2017-11-27 RX ADMIN — HEPARIN SODIUM 5000 UNIT(S): 5000 INJECTION INTRAVENOUS; SUBCUTANEOUS at 05:13

## 2017-11-27 NOTE — CONSULT NOTE ADULT - SUBJECTIVE AND OBJECTIVE BOX
The history of this patient's pain was obtained from the nurse and the nurse practitioner caring for the patient.  Patient was admitted and is having significant  abdominal pain.  The patient was using Percocet as an outpatient but that became ineffective with this hospitalization.  The patient was recently given a small dose of intravenous Dilaudid which is very effective in reducing the patient pain.  The pain seems to be episodic.  Patient is intolerant of oral medications and is being fed through the PEG.  The disposition for this patient will be back to his home.  Please see hospital  record for complete review of this patient's history.

## 2017-11-27 NOTE — PROGRESS NOTE ADULT - SUBJECTIVE AND OBJECTIVE BOX
Patient is a 72y old  Male who presents with a chief complaint of LOC (23 Nov 2017 18:06)      SUBJECTIVE / OVERNIGHT EVENTS: awake, pain better controlled.   Review of Systems  chest pain no  palpitations no  sob no  nausea no  headache no    MEDICATIONS  (STANDING):  amLODIPine   Tablet 5 milliGRAM(s) Oral daily  aspirin  chewable 81 milliGRAM(s) Oral daily  atorvastatin 80 milliGRAM(s) Oral at bedtime  clopidogrel Tablet 75 milliGRAM(s) Oral daily  gabapentin 300 milliGRAM(s) Oral three times a day  heparin  Injectable 5000 Unit(s) SubCutaneous every 8 hours  levETIRAcetam  Solution 500 milliGRAM(s) Oral two times a day  pantoprazole    Tablet 40 milliGRAM(s) Oral before breakfast  polyethylene glycol 3350 17 Gram(s) Oral daily  QUEtiapine 25 milliGRAM(s) Oral daily  senna 2 Tablet(s) Oral at bedtime  sodium chloride 0.45%. 1000 milliLiter(s) (75 mL/Hr) IV Continuous <Continuous>    MEDICATIONS  (PRN):  fentaNYL     Lozenge 200 MICROGram(s) Transmucosal every 3 hours PRN severe pain          PHYSICAL EXAM:  GENERAL: NAD, well-developed  HEAD:  Atraumatic, Normocephalic  EYES: EOMI, PERRLA, conjunctiva and sclera clear  NECK: Supple, No JVD  CHEST/LUNG: Clear to auscultation bilaterally; No wheeze  HEART: Regular rate and rhythm; No murmurs, rubs, or gallops  ABDOMEN: Soft, Nontender, Nondistended; Bowel sounds present PEG in place.  EXTREMITIES:  2+ Peripheral Pulses, No clubbing, cyanosis, or edema  PSYCH: AAOx3  NEUROLOGY: non-focal  SKIN: No rashes or lesions    LABS:                        11.5   3.40  )-----------( 166      ( 27 Nov 2017 06:11 )             34.8     11-27    135  |  98  |  20  ----------------------------<  102<H>  4.1   |  25  |  1.12    Ca    9.6      27 Nov 2017 07:30                RADIOLOGY & ADDITIONAL TESTS:    Imaging Personally Reviewed:  < from: CT Abdomen and Pelvis w/ IV Cont (11.26.17 @ 21:15) >  FINDINGS:    LOWERCHEST: Cardiomegaly. Coronary calcification.    LIVER: Small hypodense hepatic foci, stable from prior imaging.  BILE DUCTS: Mild intra and extrahepatic biliary ductal dilatation,   unchanged.  GALLBLADDER: Within normal limits.  SPLEEN: Within normal limits.  PANCREAS: Within normal limits.  ADRENALS: Within normal limits.  KIDNEYS/URETERS: Bilateral renal cysts and subcentimeter hyperdense foci,   too small to characterize, unchanged.     BLADDER: Within normal limits.  REPRODUCTIVE ORGANS: Theprostate is markedly enlarged.    BOWEL: Partial gastrectomy. Previously noted soft tissue mass at the   gastrectomy margin is not well demonstrated on this study. No bowel   obstruction. A new soft tissue mass in the right pelvis (2:100) measures   4.5 x 3.2 cm. This abuts the right aspect of the rectum and is concerning   for peritoneal disease.  PERITONEUM: Small volume ascites.  VESSELS:  A 4.4 cm suprarenal abdominal aortic aneurysm, stable  RETROPERITONEUM: No lymphadenopathy.    ABDOMINAL WALL: Within normal limits.  BONES: Within normal limits.    IMPRESSION:     New large soft tissue mass in the right pelvis concerning for peritoneal   disease.    No acute pathology.    A 4.4 cm suprarenal abdominal aortic aneurysm without change.      < end of copied text >    Consultant(s) Notes Reviewed:      Care Discussed with Consultants/Other Providers:

## 2017-11-27 NOTE — PROGRESS NOTE ADULT - ASSESSMENT
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  Mental status change, Possible seizure- continue Keppra, Neurology follow  Gastric cancer metastatic- Oncology follow.  Prostate cancer- Rx as per Oncology  s/p PEG replacement. Continue Peg feeds.  Pain control/ Pain management.  HTN control  CAD stable.  Depression- continue Rx  Functional quadriplegia -supportive care  PT  DCP in progress  Jose Medina MD pager [phone]

## 2017-11-27 NOTE — CHART NOTE - NSCHARTNOTEFT_GEN_A_CORE
Source: Patient [X ]    Family [ ]     other [X ] RN, PCA, NP.       Pt seen, laying in bed, remains with aphasia. Pt nods yes to feeling well today. Per RN, Pt tolerating EN via PEG @ 55ml/srp07wop well. Last BM unknown.   Pt admitted for syncopal episode. Pt with history of Stroke with right sided weakness, aphasia, PEG a/w prostate and stomach cancer on oral chemo. Per chart, Pt code stroke, likely seizure like activity. Pt s/p PEG tube exchange on 11/26.       Diet : NPO with EN via PEG    Patient reports no GI distress     Enteral /Parenteral Nutrition: Jevity 1.2 @ 55ml/vav76tum via PEG Provides: 1584kcals and 73 gm protein (29.3kcals/kg and 1.35gm pro/kg based on dosing Wt: 54kg)     Current Weight: Weight (kg): 113lbs, decrease of 2 lbs ? Wt loss vs accuracy of Wt obtained   % Weight Change    Pertinent Medications: MEDICATIONS  (STANDING):  amLODIPine   Tablet 5 milliGRAM(s) Oral daily  aspirin  chewable 81 milliGRAM(s) Oral daily  atorvastatin 80 milliGRAM(s) Oral at bedtime  clopidogrel Tablet 75 milliGRAM(s) Oral daily  gabapentin 300 milliGRAM(s) Oral three times a day  heparin  Injectable 5000 Unit(s) SubCutaneous every 8 hours  levETIRAcetam  Solution 500 milliGRAM(s) Oral two times a day  pantoprazole    Tablet 40 milliGRAM(s) Oral before breakfast  polyethylene glycol 3350 17 Gram(s) Oral daily  QUEtiapine 25 milliGRAM(s) Oral daily  senna 2 Tablet(s) Oral at bedtime  sodium chloride 0.45%. 1000 milliLiter(s) (75 mL/Hr) IV Continuous <Continuous>    MEDICATIONS  (PRN):  oxyCODONE    5 mG/acetaminophen 325 mG 2 Tablet(s) Oral every 6 hours PRN Moderate Pain (4 - 6)    Pertinent Labs:  11-27 Na135 mmol/L Glu 102 mg/dL<H> K+ 4.1 mmol/L Cr  1.12 mg/dL BUN 20 mg/dL       Skin: intact     Estimated Needs:   [ X] no change since previous assessment  [ ] recalculated:       Previous Nutrition Diagnosis:      [ X] Malnutrition          Nutrition Diagnosis is [X ] ongoing     New Nutrition Diagnosis: [ X] not applicable       Interventions:     Recommend    1. Recommend to increase EN to Jevity 1.2 @ 60ml/xnr42wcv. Increased goal rate will provide 1728kcals and 79gm protein ( 32kcals/kg and 1.4gm pro/kg based on current Wt: 54kg)       Monitoring and Evaluation:     [X ] Tolerance to diet prescription [X ] weights [X ] follow up per protocol    [X ] other: RD remains available Sarah Siegler RD. Pager #[phone] Source: Patient [X ]    Family [ ]     other [X ] RN, PCA, NP.       Pt seen for malnutrition follow up and Nutrition consult received for failure to thrive. Pt was laying in bed, remains with aphasia. Pt nods yes to feeling well today. Per RN, Pt tolerating EN via PEG @ 55ml/opo96vcr well. Last BM unknown.   Pt admitted for syncopal episode. Pt with history of Stroke with right sided weakness, aphasia, PEG a/w prostate and stomach cancer on oral chemo. Per chart, Pt code stroke, likely seizure like activity. Pt s/p PEG tube exchange on 11/26.       Diet : NPO with EN via PEG    Patient reports no GI distress     Enteral /Parenteral Nutrition: Jevity 1.2 @ 55ml/wob32aad via PEG Provides: 1584kcals and 73 gm protein (29.3kcals/kg and 1.35gm pro/kg based on dosing Wt: 54kg)     Current Weight: Weight (kg): 113lbs, decrease of 2 lbs ? Wt loss vs accuracy of Wt obtained   % Weight Change    Pertinent Medications: MEDICATIONS  (STANDING):  amLODIPine   Tablet 5 milliGRAM(s) Oral daily  aspirin  chewable 81 milliGRAM(s) Oral daily  atorvastatin 80 milliGRAM(s) Oral at bedtime  clopidogrel Tablet 75 milliGRAM(s) Oral daily  gabapentin 300 milliGRAM(s) Oral three times a day  heparin  Injectable 5000 Unit(s) SubCutaneous every 8 hours  levETIRAcetam  Solution 500 milliGRAM(s) Oral two times a day  pantoprazole    Tablet 40 milliGRAM(s) Oral before breakfast  polyethylene glycol 3350 17 Gram(s) Oral daily  QUEtiapine 25 milliGRAM(s) Oral daily  senna 2 Tablet(s) Oral at bedtime  sodium chloride 0.45%. 1000 milliLiter(s) (75 mL/Hr) IV Continuous <Continuous>    MEDICATIONS  (PRN):  oxyCODONE    5 mG/acetaminophen 325 mG 2 Tablet(s) Oral every 6 hours PRN Moderate Pain (4 - 6)    Pertinent Labs:  11-27 Na135 mmol/L Glu 102 mg/dL<H> K+ 4.1 mmol/L Cr  1.12 mg/dL BUN 20 mg/dL       Skin: intact     Estimated Needs:   [ X] no change since previous assessment  [ ] recalculated:       Previous Nutrition Diagnosis:      [ X] Malnutrition          Nutrition Diagnosis is [X ] ongoing     New Nutrition Diagnosis: [ X] not applicable       Interventions:     Recommend    1. Recommend to increase EN to Jevity 1.2 @ 60ml/wyl55boj. Increased goal rate will provide 1728kcals and 79gm protein ( 32kcals/kg and 1.4gm pro/kg based on current Wt: 54kg)       Monitoring and Evaluation:     [X ] Tolerance to diet prescription [X ] weights [X ] follow up per protocol    [X ] other: RD remains available Sarah Siegler RD. Pager #[phone]

## 2017-11-27 NOTE — PROGRESS NOTE ADULT - ASSESSMENT
72 year old male w/ PMHx of stroke with R sided residual weakness, PEG tube, Hypertension, prostate and stomach cancer on oral chemo through PEG tube, hyperlipidemia presents to ED for loss of balance and upper extremity shaking, s/p code stroke, NIHSS 10, MRS 4, not tPA candidate, neurological exam significant for R-sided facial droop and weakness.  Symptoms likely secondary to seizure from presentation.    Plan:  - start Keppra 500mg BID  - seizure/fall precautions  - If patient back to baseline, he can follow-up at epilepsy clinic [phone] 72 year old male w/ PMHx of stroke with R sided residual weakness, PEG tube, Hypertension, prostate and stomach cancer on oral chemo through PEG tube, hyperlipidemia presents to ED for loss of balance and upper extremity shaking, s/p code stroke, NIHSS 10, MRS 4, not tPA candidate, neurological exam significant for R-sided facial droop and weakness.    Symptoms likely secondary to seizure with unclear etiology.     Will check routine EEG and MRI. If stable imaging and EEG unrevealing, can d/c keppra.

## 2017-11-27 NOTE — PROGRESS NOTE ADULT - SUBJECTIVE AND OBJECTIVE BOX
Pt is seen and examined  pt is awake and lying in bed  Not verbalizing bed bound  pt seems in no distress        MEDICATIONS  (STANDING):  amLODIPine   Tablet 5 milliGRAM(s) Oral daily  aspirin  chewable 81 milliGRAM(s) Oral daily  atorvastatin 80 milliGRAM(s) Oral at bedtime  clopidogrel Tablet 75 milliGRAM(s) Oral daily  gabapentin 300 milliGRAM(s) Oral three times a day  heparin  Injectable 5000 Unit(s) SubCutaneous every 8 hours  levETIRAcetam  Solution 500 milliGRAM(s) Oral two times a day  pantoprazole    Tablet 40 milliGRAM(s) Oral before breakfast  polyethylene glycol 3350 17 Gram(s) Oral daily  QUEtiapine 25 milliGRAM(s) Oral daily  senna 2 Tablet(s) Oral at bedtime  sodium chloride 0.45%. 1000 milliLiter(s) (75 mL/Hr) IV Continuous <Continuous>    MEDICATIONS  (PRN):  oxyCODONE    5 mG/acetaminophen 325 mG 2 Tablet(s) Oral every 6 hours PRN Moderate Pain (4 - 6)      Allergies    No Known Allergies    Intolerances        Vital Signs Last 24 Hrs  T(C): 36.5 (27 Nov 2017 03:57), Max: 36.7 (26 Nov 2017 21:42)  T(F): 97.7 (27 Nov 2017 03:57), Max: 98 (26 Nov 2017 21:42)  HR: 65 (27 Nov 2017 03:57) (58 - 76)  BP: 127/82 (27 Nov 2017 03:57) (127/82 - 175/90)  BP(mean): --  RR: 18 (27 Nov 2017 03:57) (18 - 18)  SpO2: 95% (27 Nov 2017 03:57) (95% - 97%)    PHYSICAL EXAM  General: adult in NAD  HEENT: anicteric sclera, pink conjunctiva  Neck: supple  CV: normal S1/S2 with no murmur rubs or gallops  Lungs: positive air movement b/l ant lungs,clear to auscultation, no wheezes, no rales  Abdomen: soft non-tender non-distended, no hepatosplenomegaly, PEG tube in place  Ext: no clubbing cyanosis or edema  Skin: no rashes and no petechiae  Neuro: alert and generalzed weakness, ? left leg weakness - not cooperative for neuro exam.   LABS:                          11.5   3.40  )-----------( 166      ( 27 Nov 2017 06:11 )             34.8         Mean Cell Volume : 100.6 fl  Mean Cell Hemoglobin : 33.2 pg  Mean Cell Hemoglobin Concentration : 33.0 gm/dL  Auto Neutrophil # : x  Auto Lymphocyte # : x  Auto Monocyte # : x  Auto Eosinophil # : x  Auto Basophil # : x  Auto Neutrophil % : x  Auto Lymphocyte % : x  Auto Monocyte % : x  Auto Eosinophil % : x  Auto Basophil % : x      11-27    135  |  98  |  20  ----------------------------<  102<H>  4.1   |  25  |  1.12    Ca    9.6      27 Nov 2017 07:30    Lactate Dehydrogenase, Serum: 327 U/L (11-26 @ 08:39)      RADIOLOGY & ADDITIONAL STUDIES:

## 2017-11-27 NOTE — PROGRESS NOTE ADULT - SUBJECTIVE AND OBJECTIVE BOX
Follow-up Note    Subjective:  Patient seen and examined with neurology attending and team at bedside.     Patient is a 72 year old male w/ PMHx of stroke with R sided residual weakness, PEG tube, Hypertension, prostate and stomach cancer on oral chemo through PEG tube, hyperlipidemia presents to ED for loss of balance and upper extremity shaking.  Per history from wife, patient was last seen normal at about 8AM, he did not want to get out of bed, so the wife left him in bed for several hours, when he got up to go to the bathroom, his legs buckled and he began to shake his upper extremities.  Episode lasted for a few minutes, no tongue biting, but patient wears diapers so urinary incontinence is unknown.  According to the wife, he was taking longer than usual to answer questions, so she called EMS.  Code stroke called on arrival, NIHSS 10, MRS 4, tPA not administered as patient out of window.                                                  MEDICATIONS  (STANDING):  amLODIPine   Tablet 5 milliGRAM(s) Oral daily  aspirin  chewable 81 milliGRAM(s) Oral daily  atorvastatin 80 milliGRAM(s) Oral at bedtime  clopidogrel Tablet 75 milliGRAM(s) Oral daily  gabapentin 300 milliGRAM(s) Oral three times a day  heparin  Injectable 5000 Unit(s) SubCutaneous every 8 hours  levETIRAcetam  Solution 500 milliGRAM(s) Oral two times a day  pantoprazole    Tablet 40 milliGRAM(s) Oral before breakfast  polyethylene glycol 3350 17 Gram(s) Oral daily  QUEtiapine 25 milliGRAM(s) Oral daily  senna 2 Tablet(s) Oral at bedtime  sodium chloride 0.45%. 1000 milliLiter(s) (75 mL/Hr) IV Continuous <Continuous>    MEDICATIONS  (PRN):  oxyCODONE 5 mG/acetaminophen 325 mG 2 Tablet(s) Oral every 6 hours PRN Moderate Pain (4 - 6)      Allergies    No Known Allergies    Intolerances        Objective  Vital Signs Last 24 Hrs  T(C): --  T(F): --  HR: 61 (23 Nov 2017 14:15) (60 - 61)  BP: 193/98 (23 Nov 2017 14:15) (193/98 - 213/112)  BP(mean): --  RR: 18 (23 Nov 2017 14:15) (16 - 18)  SpO2: 98% (23 Nov 2017 14:15) (96% - 98%)    General Exam   General appearance: No acute distress, emaciated  Respiratory:    non-labored respirations               Neurological Exam  Mental Status:  alert and oriented x2, able to follow some simple commands only    Cranial Nerves: PERRL, EOMI without nystagmus, no blink to threat on R, R facial droop, mildly dysarthric    Motor:   Tone:   normal               Strength:  Upper extremity                          Delt       Bicep    Tricep                                                  R         4/5        4/5        4/5       4/5                                               L          5/5        5/5        5/5      5/5    Lower extremity                           HF          KE          KF        DF         PF                                               R        4-/5        4/5        4/5       4/5       4/5                                               L         5/5        5/5        5/5      5/5        5/5    Pronator drift:   none           Dysmetria: none with finger-to-nose testing  Tremor:  none appreciated at rest or in action    Sensation: intact grossly to light touch    Deep Tendon Reflexes:  +3 RUE/RLE, 2+ otherwise  Toes upgoing on R, down L      Other Studies    11-23    141  |  102  |  18  ----------------------------<  128<H>  5.2   |  26  |  1.10    Ca    10.0      23 Nov 2017 14:27    TPro  7.7  /  Alb  4.0  /  TBili  1.1  /  DBili  x   /  AST  67<H>  /  ALT  92<H>  /  AlkPhos  116  11-23 11-23    141  |  102  |  18  ----------------------------<  128<H>  5.2   |  26  |  1.10    Ca    10.0      23 Nov 2017 14:27    TPro  7.7  /  Alb  4.0  /  TBili  1.1  /  DBili  x   /  AST  67<H>  /  ALT  92<H>  /  AlkPhos  116  11-23    LIVER FUNCTIONS - ( 23 Nov 2017 14:27 )  Alb: 4.0 g/dL / Pro: 7.7 g/dL / ALK PHOS: 116 U/L / ALT: 92 U/L RC / AST: 67 U/L / GGT: x             Radiology    CTH:  No acute abnormalities Follow-up Note    Subjective:  Patient seen and examined with neurology attending and team at bedside. No complaints. No events reported from nursing.     Initial HPI:  Patient is a 72 year old male w/ PMHx of stroke with R sided residual weakness, PEG tube, Hypertension, prostate and stomach cancer on oral chemo through PEG tube, hyperlipidemia presents to ED for loss of balance and upper extremity shaking.  Per history from wife, patient was last seen normal at about 8AM, he did not want to get out of bed, so the wife left him in bed for several hours, when he got up to go to the bathroom, his legs buckled and he began to shake his upper extremities.  Episode lasted for a few minutes, no tongue biting, but patient wears diapers so urinary incontinence is unknown.  According to the wife, he was taking longer than usual to answer questions, so she called EMS.  Code stroke called on arrival, NIHSS 10, MRS 4, tPA not administered as patient out of window.                                                  MEDICATIONS  (STANDING):  amLODIPine   Tablet 5 milliGRAM(s) Oral daily  aspirin  chewable 81 milliGRAM(s) Oral daily  atorvastatin 80 milliGRAM(s) Oral at bedtime  clopidogrel Tablet 75 milliGRAM(s) Oral daily  gabapentin 300 milliGRAM(s) Oral three times a day  heparin  Injectable 5000 Unit(s) SubCutaneous every 8 hours  levETIRAcetam  Solution 500 milliGRAM(s) Oral two times a day  pantoprazole    Tablet 40 milliGRAM(s) Oral before breakfast  polyethylene glycol 3350 17 Gram(s) Oral daily  QUEtiapine 25 milliGRAM(s) Oral daily  senna 2 Tablet(s) Oral at bedtime  sodium chloride 0.45%. 1000 milliLiter(s) (75 mL/Hr) IV Continuous <Continuous>    MEDICATIONS  (PRN):  oxyCODONE 5 mG/acetaminophen 325 mG 2 Tablet(s) Oral every 6 hours PRN Moderate Pain (4 - 6)      Allergies    No Known Allergies    Intolerances        Objective  Vital Signs Last 24 Hrs  T(C): 36.5 (27 Nov 2017 03:57), Max: 36.7 (26 Nov 2017 21:42)  T(F): 97.7 (27 Nov 2017 03:57), Max: 98 (26 Nov 2017 21:42)  HR: 65 (27 Nov 2017 03:57) (60 - 76)  BP: 127/82 (27 Nov 2017 03:57) (127/82 - 166/88)  BP(mean): --  RR: 18 (27 Nov 2017 03:57) (18 - 18)  SpO2: 95% (27 Nov 2017 03:57) (95% - 97%)  General Exam   General appearance: No acute distress, emaciated  Respiratory:    non-labored respirations               Neurological Exam  Mental Status:  alert and oriented x2, able to follow some simple commands only    Cranial Nerves: PERRL, EOMI without nystagmus, no blink to threat on R, R facial droop, mildly dysarthric    Motor:   Tone:   normal               Strength:  Upper extremity                          Delt       Bicep    Tricep                                                  R         4/5        4/5        4/5       4/5                                               L          5/5        5/5        5/5      5/5    Lower extremity                           HF          KE          KF        DF         PF                                               R        4-/5        4/5        4/5       4/5       4/5                                               L         5/5        5/5        5/5      5/5        5/5    Pronator drift:   none           Dysmetria: none with finger-to-nose testing  Tremor:  none appreciated at rest or in action    Sensation: intact grossly to light touch    Deep Tendon Reflexes:  +3 RUE/RLE, 2+ otherwise  Toes upgoing on R, down L      Other Studies                          11.5   3.40  )-----------( 166      ( 27 Nov 2017 06:11 )             34.8     11-27    135  |  98  |  20  ----------------------------<  102<H>  4.1   |  25  |  1.12    Ca    9.6      27 Nov 2017 07:30        Radiology    CTH:  No acute abnormalities

## 2017-11-28 LAB
ANION GAP SERPL CALC-SCNC: 10 MMOL/L — SIGNIFICANT CHANGE UP (ref 5–17)
BUN SERPL-MCNC: 20 MG/DL — SIGNIFICANT CHANGE UP (ref 7–23)
CALCIUM SERPL-MCNC: 9.2 MG/DL — SIGNIFICANT CHANGE UP (ref 8.4–10.5)
CHLORIDE SERPL-SCNC: 102 MMOL/L — SIGNIFICANT CHANGE UP (ref 96–108)
CO2 SERPL-SCNC: 29 MMOL/L — SIGNIFICANT CHANGE UP (ref 22–31)
CREAT SERPL-MCNC: 1.02 MG/DL — SIGNIFICANT CHANGE UP (ref 0.5–1.3)
GLUCOSE SERPL-MCNC: 115 MG/DL — HIGH (ref 70–99)
HCT VFR BLD CALC: 39.5 % — SIGNIFICANT CHANGE UP (ref 39–50)
HGB BLD-MCNC: 13 G/DL — SIGNIFICANT CHANGE UP (ref 13–17)
MCHC RBC-ENTMCNC: 32.8 GM/DL — SIGNIFICANT CHANGE UP (ref 32–36)
MCHC RBC-ENTMCNC: 34.7 PG — HIGH (ref 27–34)
MCV RBC AUTO: 106 FL — HIGH (ref 80–100)
PLATELET # BLD AUTO: 163 K/UL — SIGNIFICANT CHANGE UP (ref 150–400)
POTASSIUM SERPL-MCNC: 4.9 MMOL/L — SIGNIFICANT CHANGE UP (ref 3.5–5.3)
POTASSIUM SERPL-SCNC: 4.9 MMOL/L — SIGNIFICANT CHANGE UP (ref 3.5–5.3)
RBC # BLD: 3.73 M/UL — LOW (ref 4.2–5.8)
RBC # FLD: 13.1 % — SIGNIFICANT CHANGE UP (ref 10.3–14.5)
SODIUM SERPL-SCNC: 141 MMOL/L — SIGNIFICANT CHANGE UP (ref 135–145)
WBC # BLD: 4.8 K/UL — SIGNIFICANT CHANGE UP (ref 3.8–10.5)
WBC # FLD AUTO: 4.8 K/UL — SIGNIFICANT CHANGE UP (ref 3.8–10.5)

## 2017-11-28 PROCEDURE — 70551 MRI BRAIN STEM W/O DYE: CPT | Mod: 26

## 2017-11-28 RX ORDER — PANTOPRAZOLE SODIUM 20 MG/1
40 TABLET, DELAYED RELEASE ORAL DAILY
Qty: 0 | Refills: 0 | Status: DISCONTINUED | OUTPATIENT
Start: 2017-11-28 | End: 2017-12-19

## 2017-11-28 RX ADMIN — CLOPIDOGREL BISULFATE 75 MILLIGRAM(S): 75 TABLET, FILM COATED ORAL at 12:10

## 2017-11-28 RX ADMIN — HEPARIN SODIUM 5000 UNIT(S): 5000 INJECTION INTRAVENOUS; SUBCUTANEOUS at 05:39

## 2017-11-28 RX ADMIN — PANTOPRAZOLE SODIUM 40 MILLIGRAM(S): 20 TABLET, DELAYED RELEASE ORAL at 12:15

## 2017-11-28 RX ADMIN — LEVETIRACETAM 500 MILLIGRAM(S): 250 TABLET, FILM COATED ORAL at 17:00

## 2017-11-28 RX ADMIN — LEVETIRACETAM 500 MILLIGRAM(S): 250 TABLET, FILM COATED ORAL at 05:39

## 2017-11-28 RX ADMIN — HEPARIN SODIUM 5000 UNIT(S): 5000 INJECTION INTRAVENOUS; SUBCUTANEOUS at 12:11

## 2017-11-28 RX ADMIN — SENNA PLUS 2 TABLET(S): 8.6 TABLET ORAL at 23:07

## 2017-11-28 RX ADMIN — AMLODIPINE BESYLATE 5 MILLIGRAM(S): 2.5 TABLET ORAL at 05:39

## 2017-11-28 RX ADMIN — FENTANYL CITRATE 200 MICROGRAM(S): 50 INJECTION INTRAVENOUS at 06:35

## 2017-11-28 RX ADMIN — FENTANYL CITRATE 200 MICROGRAM(S): 50 INJECTION INTRAVENOUS at 19:53

## 2017-11-28 RX ADMIN — FENTANYL CITRATE 200 MICROGRAM(S): 50 INJECTION INTRAVENOUS at 11:47

## 2017-11-28 RX ADMIN — FENTANYL CITRATE 200 MICROGRAM(S): 50 INJECTION INTRAVENOUS at 16:53

## 2017-11-28 RX ADMIN — POLYETHYLENE GLYCOL 3350 17 GRAM(S): 17 POWDER, FOR SOLUTION ORAL at 12:10

## 2017-11-28 RX ADMIN — GABAPENTIN 300 MILLIGRAM(S): 400 CAPSULE ORAL at 05:39

## 2017-11-28 RX ADMIN — GABAPENTIN 300 MILLIGRAM(S): 400 CAPSULE ORAL at 12:11

## 2017-11-28 RX ADMIN — FENTANYL CITRATE 200 MICROGRAM(S): 50 INJECTION INTRAVENOUS at 12:17

## 2017-11-28 RX ADMIN — HEPARIN SODIUM 5000 UNIT(S): 5000 INJECTION INTRAVENOUS; SUBCUTANEOUS at 23:07

## 2017-11-28 RX ADMIN — FENTANYL CITRATE 200 MICROGRAM(S): 50 INJECTION INTRAVENOUS at 21:00

## 2017-11-28 RX ADMIN — GABAPENTIN 300 MILLIGRAM(S): 400 CAPSULE ORAL at 23:07

## 2017-11-28 RX ADMIN — ATORVASTATIN CALCIUM 80 MILLIGRAM(S): 80 TABLET, FILM COATED ORAL at 23:06

## 2017-11-28 RX ADMIN — QUETIAPINE FUMARATE 25 MILLIGRAM(S): 200 TABLET, FILM COATED ORAL at 12:10

## 2017-11-28 RX ADMIN — Medication 81 MILLIGRAM(S): at 12:10

## 2017-11-28 RX ADMIN — FENTANYL CITRATE 200 MICROGRAM(S): 50 INJECTION INTRAVENOUS at 05:51

## 2017-11-28 NOTE — PROGRESS NOTE ADULT - SUBJECTIVE AND OBJECTIVE BOX
Patient is a 72y old  Male who presents with a chief complaint of LOC (23 Nov 2017 18:06)      SUBJECTIVE / OVERNIGHT EVENTS: Comfortable. Abdominal pain on -off. Controlled with pain meds.  Review of Systems  chest pain no  palpitations no  sob no  nausea no  headache no    MEDICATIONS  (STANDING):  amLODIPine   Tablet 5 milliGRAM(s) Oral daily  aspirin  chewable 81 milliGRAM(s) Oral daily  atorvastatin 80 milliGRAM(s) Oral at bedtime  clopidogrel Tablet 75 milliGRAM(s) Oral daily  gabapentin 300 milliGRAM(s) Oral three times a day  heparin  Injectable 5000 Unit(s) SubCutaneous every 8 hours  levETIRAcetam  Solution 500 milliGRAM(s) Oral two times a day  pantoprazole   Suspension 40 milliGRAM(s) Oral daily  polyethylene glycol 3350 17 Gram(s) Oral daily  QUEtiapine 25 milliGRAM(s) Oral daily  senna 2 Tablet(s) Oral at bedtime  sodium chloride 0.45%. 1000 milliLiter(s) (75 mL/Hr) IV Continuous <Continuous>    MEDICATIONS  (PRN):  fentaNYL     Lozenge 200 MICROGram(s) Transmucosal every 3 hours PRN severe pain          PHYSICAL EXAM:  GENERAL: NAD, cachectic   HEAD:  Atraumatic, Normocephalic  EYES: EOMI, PERRLA, conjunctiva and sclera clear  NECK: Supple, No JVD  CHEST/LUNG: Clear to auscultation bilaterally; No wheeze  HEART: Regular rate and rhythm; No murmurs, rubs, or gallops  ABDOMEN: Soft, Nontender, Nondistended; Bowel sounds present PEG in place  EXTREMITIES:  2+ Peripheral Pulses, No clubbing, cyanosis, or edema  PSYCH: AAOx3  NEUROLOGY: non-focal  SKIN: No rashes or lesions    LABS:                        13.0   4.8   )-----------( 163      ( 28 Nov 2017 11:01 )             39.5     11-28    141  |  102  |  20  ----------------------------<  115<H>  4.9   |  29  |  1.02    Ca    9.2      28 Nov 2017 11:00                RADIOLOGY & ADDITIONAL TESTS:    Imaging Personally Reviewed:    Consultant(s) Notes Reviewed:      Care Discussed with Consultants/Other Providers:

## 2017-11-28 NOTE — CONSULT NOTE ADULT - SUBJECTIVE AND OBJECTIVE BOX
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 Now consulted for PEG tube replacement..  He has had a h/o stroke with residual right sided weakness, aphasia, Patient was sitting on side of bed and fell down - wife feels may have hit head. Family found him on side on floor 10 minutes later. During episode, eyes rolled back, with shakiness of left hand.. Was less responsive then his normal self for about 15-20 minutes, taking longer to respond to questions. Denied tongue biting. no prior h/o seizures.  Patient ran out of gleevec 15-20 years ago. He has had urinary retention and incontinence. 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  Due to multiple co-morbidities and a life expectancy believed to be < 10 years and poor functional status, the family opted for no active treatment.  His general condition has worsened since last gu evaluation.      PAST MEDICAL & SURGICAL HISTORY:  Prostate hypertrophy  HLD (hyperlipidemia)  Agitation  Depression  Stroke  Syncope: 2/2013, 9/2010  HTN (hypertension)  Gastric tumor  Gastric ulcer with hemorrhage, perforation, and obstruction, acute  No Past Surgical History      MEDICATIONS  (STANDING):  amLODIPine   Tablet 5 milliGRAM(s) Oral daily  aspirin  chewable 81 milliGRAM(s) Oral daily  atorvastatin 80 milliGRAM(s) Oral at bedtime  clopidogrel Tablet 75 milliGRAM(s) Oral daily  gabapentin 300 milliGRAM(s) Oral three times a day  heparin  Injectable 5000 Unit(s) SubCutaneous every 8 hours  levETIRAcetam  Solution 500 milliGRAM(s) Oral two times a day  pantoprazole   Suspension 40 milliGRAM(s) Oral daily  polyethylene glycol 3350 17 Gram(s) Oral daily  QUEtiapine 25 milliGRAM(s) Oral daily  senna 2 Tablet(s) Oral at bedtime  sodium chloride 0.45%. 1000 milliLiter(s) (75 mL/Hr) IV Continuous <Continuous>    MEDICATIONS  (PRN):  fentaNYL     Lozenge 200 MICROGram(s) Transmucosal every 3 hours PRN severe pain      FAMILY HISTORY:  No pertinent family history in first degree relatives      Allergies    No Known Allergies    Intolerances        SOCIAL HISTORY:  no tobacco etoh    REVIEW OF SYSTEMS: Otherwise negative as stated in HPI    Physical Exam  Vital signs  T(C): 36.7 (11-28-17 @ 11:14), Max: 36.7 (11-28-17 @ 11:14)  HR: 68 (11-28-17 @ 14:22)  BP: 138/80 (11-28-17 @ 14:22)  SpO2: 97% (11-28-17 @ 11:14)  Wt(kg): --    Output  I&O's Summary    27 Nov 2017 07:01  -  28 Nov 2017 07:00  --------------------------------------------------------  IN: 720 mL / OUT: 1200 mL / NET: -480 mL    28 Nov 2017 07:01  -  28 Nov 2017 19:38  --------------------------------------------------------  IN: 0 mL / OUT: 750 mL / NET: -750 mL          Gen: elderly male not able to answer directly but responds to stimuli  heent ncat neck symm cor reg  chest clear  gi peg no mass  gu 3+ prostate nml genitalia  ext no cce      LABS:      11-28 @ 11:01    WBC 4.8   / Hct 39.5  / SCr --       11-28 @ 11:00    WBC --    / Hct --    / SCr 1.02     11-28    141  |  102  |  20  ----------------------------<  115<H>  4.9   |  29  |  1.02    Ca    9.2      28 Nov 2017 11:00            RADIOLOGY: reviewed and noted

## 2017-11-28 NOTE — PROGRESS NOTE ADULT - ASSESSMENT
Reviewed CT scan with radiologist - no overt recurrence of gist tumor in upper abdomen or stomach. New perirectal mass.  This could be a metastasis from the initial GIST tumor, a new primary, less likely metastatic from prostate cancer. Would need to be biopsied to know how to treat. This could possibly be  causing the  pain he is having. Discussed with the family. They will discuss this among themselves and get back to me if they want to pursue this.

## 2017-11-28 NOTE — CONSULT NOTE ADULT - ASSESSMENT
patient with prostate cancer and multiple comorbidities  check psa  will discuss lhrh therapy with family after psa available.

## 2017-11-29 LAB
ANION GAP SERPL CALC-SCNC: 14 MMOL/L — SIGNIFICANT CHANGE UP (ref 5–17)
BASOPHILS # BLD AUTO: 0.03 K/UL — SIGNIFICANT CHANGE UP (ref 0–0.2)
BASOPHILS NFR BLD AUTO: 0.8 % — SIGNIFICANT CHANGE UP (ref 0–2)
BUN SERPL-MCNC: 18 MG/DL — SIGNIFICANT CHANGE UP (ref 7–23)
CALCIUM SERPL-MCNC: 9.8 MG/DL — SIGNIFICANT CHANGE UP (ref 8.4–10.5)
CHLORIDE SERPL-SCNC: 98 MMOL/L — SIGNIFICANT CHANGE UP (ref 96–108)
CO2 SERPL-SCNC: 27 MMOL/L — SIGNIFICANT CHANGE UP (ref 22–31)
CREAT SERPL-MCNC: 0.96 MG/DL — SIGNIFICANT CHANGE UP (ref 0.5–1.3)
EOSINOPHIL # BLD AUTO: 0.28 K/UL — SIGNIFICANT CHANGE UP (ref 0–0.5)
EOSINOPHIL NFR BLD AUTO: 7.8 % — HIGH (ref 0–6)
GLUCOSE SERPL-MCNC: 112 MG/DL — HIGH (ref 70–99)
HCT VFR BLD CALC: 36 % — LOW (ref 39–50)
HGB BLD-MCNC: 12.5 G/DL — LOW (ref 13–17)
IMM GRANULOCYTES NFR BLD AUTO: 0 % — SIGNIFICANT CHANGE UP (ref 0–1.5)
LYMPHOCYTES # BLD AUTO: 1.18 K/UL — SIGNIFICANT CHANGE UP (ref 1–3.3)
LYMPHOCYTES # BLD AUTO: 33 % — SIGNIFICANT CHANGE UP (ref 13–44)
MCHC RBC-ENTMCNC: 34.7 GM/DL — SIGNIFICANT CHANGE UP (ref 32–36)
MCHC RBC-ENTMCNC: 34.8 PG — HIGH (ref 27–34)
MCV RBC AUTO: 100.3 FL — HIGH (ref 80–100)
MONOCYTES # BLD AUTO: 0.43 K/UL — SIGNIFICANT CHANGE UP (ref 0–0.9)
MONOCYTES NFR BLD AUTO: 12 % — SIGNIFICANT CHANGE UP (ref 2–14)
NEUTROPHILS # BLD AUTO: 1.66 K/UL — LOW (ref 1.8–7.4)
NEUTROPHILS NFR BLD AUTO: 46.4 % — SIGNIFICANT CHANGE UP (ref 43–77)
PLATELET # BLD AUTO: 168 K/UL — SIGNIFICANT CHANGE UP (ref 150–400)
POTASSIUM SERPL-MCNC: 4.1 MMOL/L — SIGNIFICANT CHANGE UP (ref 3.5–5.3)
POTASSIUM SERPL-SCNC: 4.1 MMOL/L — SIGNIFICANT CHANGE UP (ref 3.5–5.3)
RBC # BLD: 3.59 M/UL — LOW (ref 4.2–5.8)
RBC # FLD: 14.4 % — SIGNIFICANT CHANGE UP (ref 10.3–14.5)
SODIUM SERPL-SCNC: 139 MMOL/L — SIGNIFICANT CHANGE UP (ref 135–145)
T3 SERPL-MCNC: 65 NG/DL — LOW (ref 80–200)
T4 AB SER-ACNC: 5.9 UG/DL — SIGNIFICANT CHANGE UP (ref 4.6–12)
TSH SERPL-MCNC: 3.06 UIU/ML — SIGNIFICANT CHANGE UP (ref 0.27–4.2)
WBC # BLD: 3.58 K/UL — LOW (ref 3.8–10.5)
WBC # FLD AUTO: 3.58 K/UL — LOW (ref 3.8–10.5)

## 2017-11-29 RX ADMIN — POLYETHYLENE GLYCOL 3350 17 GRAM(S): 17 POWDER, FOR SOLUTION ORAL at 12:55

## 2017-11-29 RX ADMIN — GABAPENTIN 300 MILLIGRAM(S): 400 CAPSULE ORAL at 05:13

## 2017-11-29 RX ADMIN — GABAPENTIN 300 MILLIGRAM(S): 400 CAPSULE ORAL at 21:49

## 2017-11-29 RX ADMIN — QUETIAPINE FUMARATE 25 MILLIGRAM(S): 200 TABLET, FILM COATED ORAL at 12:55

## 2017-11-29 RX ADMIN — FENTANYL CITRATE 200 MICROGRAM(S): 50 INJECTION INTRAVENOUS at 18:16

## 2017-11-29 RX ADMIN — CLOPIDOGREL BISULFATE 75 MILLIGRAM(S): 75 TABLET, FILM COATED ORAL at 12:55

## 2017-11-29 RX ADMIN — HEPARIN SODIUM 5000 UNIT(S): 5000 INJECTION INTRAVENOUS; SUBCUTANEOUS at 13:06

## 2017-11-29 RX ADMIN — SENNA PLUS 2 TABLET(S): 8.6 TABLET ORAL at 21:49

## 2017-11-29 RX ADMIN — AMLODIPINE BESYLATE 5 MILLIGRAM(S): 2.5 TABLET ORAL at 05:13

## 2017-11-29 RX ADMIN — HEPARIN SODIUM 5000 UNIT(S): 5000 INJECTION INTRAVENOUS; SUBCUTANEOUS at 21:49

## 2017-11-29 RX ADMIN — Medication 81 MILLIGRAM(S): at 12:55

## 2017-11-29 RX ADMIN — GABAPENTIN 300 MILLIGRAM(S): 400 CAPSULE ORAL at 13:09

## 2017-11-29 RX ADMIN — HEPARIN SODIUM 5000 UNIT(S): 5000 INJECTION INTRAVENOUS; SUBCUTANEOUS at 05:13

## 2017-11-29 RX ADMIN — LEVETIRACETAM 500 MILLIGRAM(S): 250 TABLET, FILM COATED ORAL at 17:49

## 2017-11-29 RX ADMIN — ATORVASTATIN CALCIUM 80 MILLIGRAM(S): 80 TABLET, FILM COATED ORAL at 21:49

## 2017-11-29 RX ADMIN — FENTANYL CITRATE 200 MICROGRAM(S): 50 INJECTION INTRAVENOUS at 17:44

## 2017-11-29 RX ADMIN — PANTOPRAZOLE SODIUM 40 MILLIGRAM(S): 20 TABLET, DELAYED RELEASE ORAL at 12:55

## 2017-11-29 RX ADMIN — LEVETIRACETAM 500 MILLIGRAM(S): 250 TABLET, FILM COATED ORAL at 05:13

## 2017-11-29 NOTE — CONSULT NOTE ADULT - ASSESSMENT
73 y/o male with h/o CVA with residual R sided weakness, HTN, HLD, PEG tube, prostate ca and gastric GIST currently on chemo via PEG who initially presented to ED for LOC likely 2/2 seizure, now with new pelvic mass concerning for metastatic malignancy; GI consulted for further recommendations.    # Pelvic mass: concerning for metastatic GIST vs new primary vs metastatic prostate ca. Onc and primary team requesting guidance on most appropriate approach for biospy 73 y/o male with h/o CVA with residual R sided weakness, HTN, HLD, PEG tube, prostate ca and gastric GIST currently on chemo via PEG who initially presented to ED for LOC likely 2/2 seizure, now with new pelvic mass concerning for metastatic malignancy; GI consulted for further recommendations.    # Pelvic mass: concerning for metastatic GIST vs new primary vs metastatic prostate ca. Onc and primary team requesting guidance on most appropriate approach for biospy  - will discuss utility of rectal EUS for biospy with advanced team

## 2017-11-29 NOTE — PROGRESS NOTE ADULT - SUBJECTIVE AND OBJECTIVE BOX
Follow-up Note    Subjective:  In bed, pleasant, no new neurologic events.     Medications:  amLODIPine   Tablet 5 milliGRAM(s) Oral daily  aspirin  chewable 81 milliGRAM(s) Oral daily  atorvastatin 80 milliGRAM(s) Oral at bedtime  clopidogrel Tablet 75 milliGRAM(s) Oral daily  fentaNYL     Lozenge 200 MICROGram(s) Transmucosal every 3 hours PRN  gabapentin 300 milliGRAM(s) Oral three times a day  heparin  Injectable 5000 Unit(s) SubCutaneous every 8 hours  levETIRAcetam  Solution 500 milliGRAM(s) Oral two times a day  pantoprazole   Suspension 40 milliGRAM(s) Oral daily  polyethylene glycol 3350 17 Gram(s) Oral daily  QUEtiapine 25 milliGRAM(s) Oral daily  senna 2 Tablet(s) Oral at bedtime  sodium chloride 0.45%. 1000 milliLiter(s) IV Continuous <Continuous>      Labs:  CBC Full  -  ( 29 Nov 2017 07:14 )  WBC Count : 3.58 K/uL  Hemoglobin : 12.5 g/dL  Hematocrit : 36.0 %  Platelet Count - Automated : 168 K/uL  Mean Cell Volume : 100.3 fl  Mean Cell Hemoglobin : 34.8 pg  Mean Cell Hemoglobin Concentration : 34.7 gm/dL  Auto Neutrophil # : 1.66 K/uL  Auto Lymphocyte # : 1.18 K/uL  Auto Monocyte # : 0.43 K/uL  Auto Eosinophil # : 0.28 K/uL  Auto Basophil # : 0.03 K/uL  Auto Neutrophil % : 46.4 %  Auto Lymphocyte % : 33.0 %  Auto Monocyte % : 12.0 %  Auto Eosinophil % : 7.8 %  Auto Basophil % : 0.8 %    11-29    139  |  98  |  18  ----------------------------<  112<H>  4.1   |  27  |  0.96    Ca    9.8      29 Nov 2017 07:12      CAPILLARY BLOOD GLUCOSE    Vitals:  Vital Signs Last 24 Hrs  T(C): 36.6 (29 Nov 2017 11:23), Max: 36.7 (29 Nov 2017 04:50)  T(F): 97.8 (29 Nov 2017 11:23), Max: 98 (29 Nov 2017 04:50)  HR: 68 (29 Nov 2017 11:23) (60 - 88)  BP: 146/85 (29 Nov 2017 11:23) (138/80 - 162/95)  BP(mean): --  RR: 18 (29 Nov 2017 11:23) (18 - 18)  SpO2: 96% (29 Nov 2017 11:23) (95% - 97%)    Radiology    CTH:  No acute abnormalities

## 2017-11-29 NOTE — PROGRESS NOTE ADULT - SUBJECTIVE AND OBJECTIVE BOX
Subjective  no new complaints but confused    Objective  no distress    Vital signs  T(F): , Max: 98.1 (11-28-17 @ 11:14)  HR: 60 (11-29-17 @ 04:50)  BP: 151/86 (11-29-17 @ 04:50)  SpO2: 95% (11-29-17 @ 04:50)  Wt(kg): --    Output     11-28 @ 07:01  -  11-29 @ 07:00  --------------------------------------------------------  IN: 720 mL / OUT: 1650 mL / NET: -930 mL        Gen  awake and alert  Abd  soft    no palpable bladder    Labs      11-28 @ 11:01    WBC 4.8   / Hct 39.5  / SCr --       11-28 @ 11:00    WBC --    / Hct --    / SCr 1.02       Imaging  pelvic mass noted of ? etiology without biopsy

## 2017-11-29 NOTE — PROGRESS NOTE ADULT - ASSESSMENT
72 year old male w/ PMHx of stroke with R sided residual weakness, PEG tube, Hypertension, prostate and prior GIST with recent finding of new rectal mass,  previously chemo through PEG tube, hyperlipidemia presented to ED for loss of balance and upper extremity shaking, s/p code stroke, NIHSS 10, MRS 4, not tPA candidate, neurological exam significant for R-sided facial droop and weakness - on review of imaging now likely reflects chronic cerebrovascular disease described below.      1. Reviewed MRI brain - no obvious evidence of metastatic disease (though limited by absence of contrast); however there is extensive cerebrovascular disease with numerous chronic left greater than right side subcortical infarcts.     2. Presence of prior infarcts and diffuse cerebral atrophy in presence of cancer history and likely future chemotherapy exposure, feel there may be a risk for seizure; though his recent episode remains ill-defined.     3. Because he appears to be tolerating keppra 500mg without adverse effects and this therapy will not interact with either his present or past medications, will continue.    4. Should follow up with neurology clinic as outpatient for future management.     5. No further inpatient neurologic intervention at this time

## 2017-11-29 NOTE — PROGRESS NOTE ADULT - SUBJECTIVE AND OBJECTIVE BOX
Patient is a 72y old  Male who presents with a chief complaint of LOC (23 Nov 2017 18:06)      SUBJECTIVE / OVERNIGHT EVENTS: No new complaints. Wife at bedside.  Review of Systems  chest pain no  palpitations no  sob no  nausea no  headache no    MEDICATIONS  (STANDING):  amLODIPine   Tablet 5 milliGRAM(s) Oral daily  aspirin  chewable 81 milliGRAM(s) Oral daily  atorvastatin 80 milliGRAM(s) Oral at bedtime  clopidogrel Tablet 75 milliGRAM(s) Oral daily  gabapentin 300 milliGRAM(s) Oral three times a day  heparin  Injectable 5000 Unit(s) SubCutaneous every 8 hours  levETIRAcetam  Solution 500 milliGRAM(s) Oral two times a day  pantoprazole   Suspension 40 milliGRAM(s) Oral daily  polyethylene glycol 3350 17 Gram(s) Oral daily  QUEtiapine 25 milliGRAM(s) Oral daily  senna 2 Tablet(s) Oral at bedtime  sodium chloride 0.45%. 1000 milliLiter(s) (75 mL/Hr) IV Continuous <Continuous>    MEDICATIONS  (PRN):  fentaNYL     Lozenge 200 MICROGram(s) Transmucosal every 3 hours PRN severe pain          PHYSICAL EXAM:  GENERAL: NAD, cachectic   HEAD:  Atraumatic, Normocephalic  EYES: EOMI, PERRLA, conjunctiva and sclera clear  NECK: Supple, No JVD  CHEST/LUNG: Clear to auscultation bilaterally; No wheeze  HEART: Regular rate and rhythm; No murmurs, rubs, or gallops  ABDOMEN: Soft, Nontender, Nondistended; Bowel sounds present PEG in place  EXTREMITIES:  2+ Peripheral Pulses, No clubbing, cyanosis, or edema  PSYCH: AAOx3  NEUROLOGY: non-focal  SKIN: No rashes or lesions    LABS:                        12.5   3.58  )-----------( 168      ( 29 Nov 2017 07:14 )             36.0     11-29    139  |  98  |  18  ----------------------------<  112<H>  4.1   |  27  |  0.96    Ca    9.8      29 Nov 2017 07:12                RADIOLOGY & ADDITIONAL TESTS:    Imaging Personally Reviewed:    Consultant(s) Notes Reviewed:      Care Discussed with Consultants/Other Providers:

## 2017-11-29 NOTE — PROGRESS NOTE ADULT - SUBJECTIVE AND OBJECTIVE BOX
Pt is seen and examined  pt is awake and lying in bed/  pt seems comfortable today         MEDICATIONS  (STANDING):  amLODIPine   Tablet 5 milliGRAM(s) Oral daily  aspirin  chewable 81 milliGRAM(s) Oral daily  atorvastatin 80 milliGRAM(s) Oral at bedtime  clopidogrel Tablet 75 milliGRAM(s) Oral daily  gabapentin 300 milliGRAM(s) Oral three times a day  heparin  Injectable 5000 Unit(s) SubCutaneous every 8 hours  levETIRAcetam  Solution 500 milliGRAM(s) Oral two times a day  pantoprazole   Suspension 40 milliGRAM(s) Oral daily  polyethylene glycol 3350 17 Gram(s) Oral daily  QUEtiapine 25 milliGRAM(s) Oral daily  senna 2 Tablet(s) Oral at bedtime  sodium chloride 0.45%. 1000 milliLiter(s) (75 mL/Hr) IV Continuous <Continuous>    MEDICATIONS  (PRN):  fentaNYL     Lozenge 200 MICROGram(s) Transmucosal every 3 hours PRN severe pain      Allergies    No Known Allergies    Intolerances        Vital Signs Last 24 Hrs  T(C): 36.7 (29 Nov 2017 04:50), Max: 36.7 (28 Nov 2017 11:14)  T(F): 98 (29 Nov 2017 04:50), Max: 98.1 (28 Nov 2017 11:14)  HR: 60 (29 Nov 2017 04:50) (60 - 88)  BP: 151/86 (29 Nov 2017 04:50) (138/80 - 162/95)  BP(mean): --  RR: 18 (29 Nov 2017 04:50) (17 - 18)  SpO2: 95% (29 Nov 2017 04:50) (95% - 97%)    PHYSICAL EXAM  General: adult in NAD  HEENT: clear oropharynx, anicteric sclera, pink conjunctiva  Neck: supple  CV: normal S1/S2 with no murmur rubs or gallops  Lungs: positive air movement b/l ant lungs,clear to auscultation, no wheezes, no rales  Abdomen: soft non-tender non-distended, no hepatosplenomegaly  Ext: no clubbing cyanosis or edema  Skin: no rashes and no petechiae  Neuro: alert and mild right sided weakness.  LABS:                          12.5   3.58  )-----------( 168      ( 29 Nov 2017 07:14 )             36.0         Mean Cell Volume : 100.3 fl  Mean Cell Hemoglobin : 34.8 pg  Mean Cell Hemoglobin Concentration : 34.7 gm/dL  Auto Neutrophil # : 1.66 K/uL  Auto Lymphocyte # : 1.18 K/uL  Auto Monocyte # : 0.43 K/uL  Auto Eosinophil # : 0.28 K/uL  Auto Basophil # : 0.03 K/uL  Auto Neutrophil % : 46.4 %  Auto Lymphocyte % : 33.0 %  Auto Monocyte % : 12.0 %  Auto Eosinophil % : 7.8 %  Auto Basophil % : 0.8 %      11-29    139  |  98  |  18  ----------------------------<  112<H>  4.1   |  27  |  0.96    Ca    9.8      29 Nov 2017 07:12    Lactate Dehydrogenase, Serum: 327 U/L (11-26 @ 08:39)

## 2017-11-29 NOTE — PROGRESS NOTE ADULT - ASSESSMENT
pelvic mass and elevated psa 25 with cap and no treatment at this time given co-morbidities    consider addition of lupron, cosdex vs observation  bilateral orchiectomy remains an option but would not recommend if mass is consistent with advanced/ progressive gist

## 2017-11-29 NOTE — PROGRESS NOTE ADULT - ASSESSMENT
Reviewed CT scan with radiologist - no overt recurrence of gist tumor in upper abdomen or stomach. New perirectal mass.  This could be a metastasis from the initial GIST tumor, a new primary, less likely metastatic from prostate cancer. Would need to be biopsied to know how to treat. This could possibly be  causing the  pain he is having.   Family wishes to proceed.  Would get  Gi  and IR evaluation to decide on optimal approach for a biopsy

## 2017-11-29 NOTE — PROGRESS NOTE ADULT - ASSESSMENT
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  Mental status change, Possible seizure- continue Keppra, Neurology follow noted. MRI brain pending   Gastric cancer metastatic- Oncology follow.  Pelvic mass- family to decide re mass biopsy and further Rx. GI evaluation noted.To decide re possible rectal EUS.  Prostate cancer- Rx as per Oncology Urology evaluation noted  s/p PEG replacement. Continue Peg feeds.  Pain control/ Pain management evaluation noted.  HTN control  CAD stable.  Depression- continue Rx  Functional quadriplegia -supportive care  PT  Jose Medina MD pager [phone]

## 2017-11-30 LAB
ANION GAP SERPL CALC-SCNC: 12 MMOL/L — SIGNIFICANT CHANGE UP (ref 5–17)
BUN SERPL-MCNC: 16 MG/DL — SIGNIFICANT CHANGE UP (ref 7–23)
CALCIUM SERPL-MCNC: 9.7 MG/DL — SIGNIFICANT CHANGE UP (ref 8.4–10.5)
CHLORIDE SERPL-SCNC: 99 MMOL/L — SIGNIFICANT CHANGE UP (ref 96–108)
CO2 SERPL-SCNC: 29 MMOL/L — SIGNIFICANT CHANGE UP (ref 22–31)
CREAT SERPL-MCNC: 0.92 MG/DL — SIGNIFICANT CHANGE UP (ref 0.5–1.3)
GLUCOSE SERPL-MCNC: 99 MG/DL — SIGNIFICANT CHANGE UP (ref 70–99)
HCT VFR BLD CALC: 36.3 % — LOW (ref 39–50)
HGB BLD-MCNC: 12.4 G/DL — LOW (ref 13–17)
MCHC RBC-ENTMCNC: 34.2 GM/DL — SIGNIFICANT CHANGE UP (ref 32–36)
MCHC RBC-ENTMCNC: 34.2 PG — HIGH (ref 27–34)
MCV RBC AUTO: 100 FL — SIGNIFICANT CHANGE UP (ref 80–100)
PLATELET # BLD AUTO: 174 K/UL — SIGNIFICANT CHANGE UP (ref 150–400)
POTASSIUM SERPL-MCNC: 4.6 MMOL/L — SIGNIFICANT CHANGE UP (ref 3.5–5.3)
POTASSIUM SERPL-SCNC: 4.6 MMOL/L — SIGNIFICANT CHANGE UP (ref 3.5–5.3)
RBC # BLD: 3.63 M/UL — LOW (ref 4.2–5.8)
RBC # FLD: 14.2 % — SIGNIFICANT CHANGE UP (ref 10.3–14.5)
SODIUM SERPL-SCNC: 140 MMOL/L — SIGNIFICANT CHANGE UP (ref 135–145)
WBC # BLD: 4.88 K/UL — SIGNIFICANT CHANGE UP (ref 3.8–10.5)
WBC # FLD AUTO: 4.88 K/UL — SIGNIFICANT CHANGE UP (ref 3.8–10.5)

## 2017-11-30 RX ADMIN — HEPARIN SODIUM 5000 UNIT(S): 5000 INJECTION INTRAVENOUS; SUBCUTANEOUS at 22:31

## 2017-11-30 RX ADMIN — Medication 81 MILLIGRAM(S): at 15:07

## 2017-11-30 RX ADMIN — FENTANYL CITRATE 200 MICROGRAM(S): 50 INJECTION INTRAVENOUS at 05:00

## 2017-11-30 RX ADMIN — FENTANYL CITRATE 200 MICROGRAM(S): 50 INJECTION INTRAVENOUS at 04:33

## 2017-11-30 RX ADMIN — LEVETIRACETAM 500 MILLIGRAM(S): 250 TABLET, FILM COATED ORAL at 17:27

## 2017-11-30 RX ADMIN — HEPARIN SODIUM 5000 UNIT(S): 5000 INJECTION INTRAVENOUS; SUBCUTANEOUS at 15:08

## 2017-11-30 RX ADMIN — FENTANYL CITRATE 200 MICROGRAM(S): 50 INJECTION INTRAVENOUS at 20:29

## 2017-11-30 RX ADMIN — LEVETIRACETAM 500 MILLIGRAM(S): 250 TABLET, FILM COATED ORAL at 06:02

## 2017-11-30 RX ADMIN — SENNA PLUS 2 TABLET(S): 8.6 TABLET ORAL at 22:31

## 2017-11-30 RX ADMIN — HEPARIN SODIUM 5000 UNIT(S): 5000 INJECTION INTRAVENOUS; SUBCUTANEOUS at 06:01

## 2017-11-30 RX ADMIN — AMLODIPINE BESYLATE 5 MILLIGRAM(S): 2.5 TABLET ORAL at 06:02

## 2017-11-30 RX ADMIN — PANTOPRAZOLE SODIUM 40 MILLIGRAM(S): 20 TABLET, DELAYED RELEASE ORAL at 15:07

## 2017-11-30 RX ADMIN — GABAPENTIN 300 MILLIGRAM(S): 400 CAPSULE ORAL at 15:08

## 2017-11-30 RX ADMIN — GABAPENTIN 300 MILLIGRAM(S): 400 CAPSULE ORAL at 22:31

## 2017-11-30 RX ADMIN — FENTANYL CITRATE 200 MICROGRAM(S): 50 INJECTION INTRAVENOUS at 19:16

## 2017-11-30 RX ADMIN — GABAPENTIN 300 MILLIGRAM(S): 400 CAPSULE ORAL at 06:02

## 2017-11-30 RX ADMIN — CLOPIDOGREL BISULFATE 75 MILLIGRAM(S): 75 TABLET, FILM COATED ORAL at 15:07

## 2017-11-30 RX ADMIN — ATORVASTATIN CALCIUM 80 MILLIGRAM(S): 80 TABLET, FILM COATED ORAL at 22:31

## 2017-11-30 RX ADMIN — POLYETHYLENE GLYCOL 3350 17 GRAM(S): 17 POWDER, FOR SOLUTION ORAL at 15:08

## 2017-11-30 RX ADMIN — QUETIAPINE FUMARATE 25 MILLIGRAM(S): 200 TABLET, FILM COATED ORAL at 12:25

## 2017-11-30 NOTE — PROGRESS NOTE ADULT - SUBJECTIVE AND OBJECTIVE BOX
Patient is a 72y old  Male who presents with a chief complaint of LOC (23 Nov 2017 18:06)      SUBJECTIVE / OVERNIGHT EVENTS:  Review of Systems  unobtainable     MEDICATIONS  (STANDING):  amLODIPine   Tablet 5 milliGRAM(s) Oral daily  aspirin  chewable 81 milliGRAM(s) Oral daily  atorvastatin 80 milliGRAM(s) Oral at bedtime  clopidogrel Tablet 75 milliGRAM(s) Oral daily  gabapentin 300 milliGRAM(s) Oral three times a day  heparin  Injectable 5000 Unit(s) SubCutaneous every 8 hours  levETIRAcetam  Solution 500 milliGRAM(s) Oral two times a day  pantoprazole   Suspension 40 milliGRAM(s) Oral daily  polyethylene glycol 3350 17 Gram(s) Oral daily  QUEtiapine 25 milliGRAM(s) Oral daily  senna 2 Tablet(s) Oral at bedtime  sodium chloride 0.45%. 1000 milliLiter(s) (75 mL/Hr) IV Continuous <Continuous>    MEDICATIONS  (PRN):  fentaNYL     Lozenge 200 MICROGram(s) Transmucosal every 3 hours PRN severe pain          PHYSICAL EXAM:  GENERAL: NAD, cachectic   HEAD:  Atraumatic, Normocephalic  EYES: EOMI, PERRLA, conjunctiva and sclera clear  NECK: Supple, No JVD  CHEST/LUNG: Clear to auscultation bilaterally; No wheeze  HEART: Regular rate and rhythm; No murmurs, rubs, or gallops  ABDOMEN: Soft, Nontender, Nondistended; Bowel sounds present PEG in place  EXTREMITIES:  2+ Peripheral Pulses, No clubbing, cyanosis, or edema  PSYCH: AAOx3  NEUROLOGY: non-focal  SKIN: No rashes or lesions    LABS:                        12.4   4.88  )-----------( 174      ( 30 Nov 2017 07:34 )             36.3     11-30    140  |  99  |  16  ----------------------------<  99  4.6   |  29  |  0.92    Ca    9.7      30 Nov 2017 07:29                RADIOLOGY & ADDITIONAL TESTS:    Imaging Personally Reviewed:    Consultant(s) Notes Reviewed:      Care Discussed with Consultants/Other Providers:

## 2017-11-30 NOTE — PROGRESS NOTE ADULT - ASSESSMENT
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  Mental status change, Possible seizure- continue Keppra, Neurology follow noted. MRI brain pending   Gastric cancer metastatic- Oncology follow.  Pelvic mass-GII evaluation noted. For rectal EUS. Will hold ASA/Plavix.  Prostate cancer- Rx as per Oncology Urology evaluation noted  s/p PEG replacement. Continue Peg feeds.  Pain control/ Pain management evaluation noted.  HTN control  CAD stable.  Depression- continue Rx  Functional quadriplegia -supportive care  PT  Jose Medina MD pager [phone]

## 2017-11-30 NOTE — CHART NOTE - NSCHARTNOTEFT_GEN_A_CORE
Source: Patient [ ]    Family [ ]     other [X ] RN, Medical record    Pt seen, sleeping in bed. Per RN, Pt is tolerating EN well via PEG with no GI distress. Pt with 4BM yesterday and non thus far today.    Pt admitted for syncopal episode. Pt with history of Stroke with right sided weakness, aphasia, PEG a/w prostate and stomach cancer on oral chemo. Per chart, Pt code stroke, likely seizure like activity. Pt s/p PEG tube exchange on 11/26. Pt now with pelvic mass concerning for new gastrointestinal stromal tumor vs metastatic prostate cancer. ? Bx.    Diet : NPO with EN via PEG       Patient reports no GI distress        Enteral /Parenteral Nutrition: Jevity 1.2 @ 55ml/hhi29kgx via PEG Provides: 1584kcals and 73 gm protein (29.3kcals/kg and 1.35gm pro/kg based on dosing Wt: 54kg)       Current Weight: Weight (kg): 117.2lbs, increase of 5lbs x4 days. ? positive weight gain in the setting of EN vs accuracy of weight obtained.   % Weight Change    Pertinent Medications: MEDICATIONS  (STANDING):  amLODIPine   Tablet 5 milliGRAM(s) Oral daily  aspirin  chewable 81 milliGRAM(s) Oral daily  atorvastatin 80 milliGRAM(s) Oral at bedtime  clopidogrel Tablet 75 milliGRAM(s) Oral daily  gabapentin 300 milliGRAM(s) Oral three times a day  heparin  Injectable 5000 Unit(s) SubCutaneous every 8 hours  levETIRAcetam  Solution 500 milliGRAM(s) Oral two times a day  pantoprazole   Suspension 40 milliGRAM(s) Oral daily  polyethylene glycol 3350 17 Gram(s) Oral daily  QUEtiapine 25 milliGRAM(s) Oral daily  senna 2 Tablet(s) Oral at bedtime  sodium chloride 0.45%. 1000 milliLiter(s) (75 mL/Hr) IV Continuous <Continuous>    MEDICATIONS  (PRN):  fentaNYL     Lozenge 200 MICROGram(s) Transmucosal every 3 hours PRN severe pain    Pertinent Labs:  11-30 Na140 mmol/L Glu 99 mg/dL K+ 4.6 mmol/L Cr  0.92 mg/dL BUN 16 mg/dL       Skin: intact     Estimated Needs:   [X ] no change since previous assessment  [ ] recalculated:       Previous Nutrition Diagnosis:    [X ] Malnutrition          Nutrition Diagnosis is [ X] ongoing          New Nutrition Diagnosis: [X ] not applicable      Recommend    1. Recommend to continue Jevity 1.2 @ 60ml/[id number] via PEG          Monitoring and Evaluation:    [X ] Tolerance to diet prescription [ X] weights [ X] follow up per protocol    [X ] other: RD remains available Sarah Siegler RD. Pager #[phone]

## 2017-11-30 NOTE — CHART NOTE - NSCHARTNOTEFT_GEN_A_CORE
Pt needs to be off aspirin or plavix (may be on one) prior to EUS. Will schedule for Rectal EUS once off asa or plavix for 5 days. If patient may be off either plavix or aspirin, would appreciate holding one prior to EUS/FNA of peritoneal mass. May be single antiplatelet agent for EUS/FNA. Will schedule for Rectal EUS once off asa or plavix for 5 days. If unable to get off ASA/Plavix altogether, will still be able to undergo EUS/FNA but there is higher risk of bleeding.

## 2017-11-30 NOTE — PROGRESS NOTE ADULT - ASSESSMENT
Reviewed CT scan with radiologist - no overt recurrence of gist tumor in upper abdomen or stomach. New perirectal mass.  This could be a metastasis from the initial GIST tumor, a new primary, less likely metastatic from prostate cancer. Would need to be biopsied to know how to treat. This could possibly be  causing the  pain he is having.   Family wishes to proceed.  GI deciding on EUS/transrectal biopsy

## 2017-11-30 NOTE — PROGRESS NOTE ADULT - ASSESSMENT
stable at this time but ? biopsy  will hold off on hormonal therapy pending family decision re biopsy and treatment

## 2017-11-30 NOTE — PROGRESS NOTE ADULT - SUBJECTIVE AND OBJECTIVE BOX
Pt is seen and examined  pt is awake and lying in bed  pt seems comfortable   Reportedlyhad an episode of agitation, ?pain last night      MEDICATIONS  (STANDING):  amLODIPine   Tablet 5 milliGRAM(s) Oral daily  aspirin  chewable 81 milliGRAM(s) Oral daily  atorvastatin 80 milliGRAM(s) Oral at bedtime  clopidogrel Tablet 75 milliGRAM(s) Oral daily  gabapentin 300 milliGRAM(s) Oral three times a day  heparin  Injectable 5000 Unit(s) SubCutaneous every 8 hours  levETIRAcetam  Solution 500 milliGRAM(s) Oral two times a day  pantoprazole   Suspension 40 milliGRAM(s) Oral daily  polyethylene glycol 3350 17 Gram(s) Oral daily  QUEtiapine 25 milliGRAM(s) Oral daily  senna 2 Tablet(s) Oral at bedtime  sodium chloride 0.45%. 1000 milliLiter(s) (75 mL/Hr) IV Continuous <Continuous>    MEDICATIONS  (PRN):  fentaNYL     Lozenge 200 MICROGram(s) Transmucosal every 3 hours PRN severe pain      Allergies    No Known Allergies    Intolerances        Vital Signs Last 24 Hrs  T(C): 35.9 (30 Nov 2017 04:58), Max: 36.6 (29 Nov 2017 11:23)  T(F): 96.6 (30 Nov 2017 04:58), Max: 97.9 (29 Nov 2017 21:21)  HR: 77 (30 Nov 2017 04:58) (68 - 83)  BP: 150/84 (30 Nov 2017 04:58) (146/85 - 150/84)  BP(mean): --  RR: 19 (30 Nov 2017 04:58) (18 - 19)  SpO2: 96% (30 Nov 2017 04:58) (96% - 98%)    PHYSICAL EXAM  General: adult in NAD  HEENT:  anicteric sclera, pink conjunctiva  Neck: supple  CV: normal S1/S2 with no murmur rubs or gallops  Lungs: positive air movement b/l ant lungs,clear to auscultation, no wheezes, no rales  Abdomen: soft non-tender non-distended, no hepatosplenomegaly  Ext: no clubbing cyanosis or edema  Skin: no rashes and no petechiae  Neuro: alert and oriented X 4, no focal deficits  LABS:                          12.4   4.88  )-----------( 174      ( 30 Nov 2017 07:34 )             36.3         Mean Cell Volume : 100.0 fl  Mean Cell Hemoglobin : 34.2 pg  Mean Cell Hemoglobin Concentration : 34.2 gm/dL  Auto Neutrophil # : x  Auto Lymphocyte # : x  Auto Monocyte # : x  Auto Eosinophil # : x  Auto Basophil # : x  Auto Neutrophil % : x  Auto Lymphocyte % : x  Auto Monocyte % : x  Auto Eosinophil % : x  Auto Basophil % : x      11-30    140  |  99  |  16  ----------------------------<  99  4.6   |  29  |  0.92    Ca    9.7      30 Nov 2017 07:29    Lactate Dehydrogenase, Serum: 327 U/L (11-26 @ 08:39)

## 2017-12-01 LAB
ANION GAP SERPL CALC-SCNC: 15 MMOL/L — SIGNIFICANT CHANGE UP (ref 5–17)
BUN SERPL-MCNC: 15 MG/DL — SIGNIFICANT CHANGE UP (ref 7–23)
CALCIUM SERPL-MCNC: 10 MG/DL — SIGNIFICANT CHANGE UP (ref 8.4–10.5)
CHLORIDE SERPL-SCNC: 97 MMOL/L — SIGNIFICANT CHANGE UP (ref 96–108)
CO2 SERPL-SCNC: 28 MMOL/L — SIGNIFICANT CHANGE UP (ref 22–31)
CREAT SERPL-MCNC: 0.87 MG/DL — SIGNIFICANT CHANGE UP (ref 0.5–1.3)
GLUCOSE SERPL-MCNC: 88 MG/DL — SIGNIFICANT CHANGE UP (ref 70–99)
HCT VFR BLD CALC: 36.7 % — LOW (ref 39–50)
HGB BLD-MCNC: 12.3 G/DL — LOW (ref 13–17)
MCHC RBC-ENTMCNC: 33.5 GM/DL — SIGNIFICANT CHANGE UP (ref 32–36)
MCHC RBC-ENTMCNC: 34.1 PG — HIGH (ref 27–34)
MCV RBC AUTO: 101.7 FL — HIGH (ref 80–100)
PLATELET # BLD AUTO: 186 K/UL — SIGNIFICANT CHANGE UP (ref 150–400)
POTASSIUM SERPL-MCNC: 4.5 MMOL/L — SIGNIFICANT CHANGE UP (ref 3.5–5.3)
POTASSIUM SERPL-SCNC: 4.5 MMOL/L — SIGNIFICANT CHANGE UP (ref 3.5–5.3)
RBC # BLD: 3.61 M/UL — LOW (ref 4.2–5.8)
RBC # FLD: 13.9 % — SIGNIFICANT CHANGE UP (ref 10.3–14.5)
SODIUM SERPL-SCNC: 140 MMOL/L — SIGNIFICANT CHANGE UP (ref 135–145)
WBC # BLD: 5.01 K/UL — SIGNIFICANT CHANGE UP (ref 3.8–10.5)
WBC # FLD AUTO: 5.01 K/UL — SIGNIFICANT CHANGE UP (ref 3.8–10.5)

## 2017-12-01 RX ORDER — GABAPENTIN 400 MG/1
300 CAPSULE ORAL THREE TIMES A DAY
Qty: 0 | Refills: 0 | Status: DISCONTINUED | OUTPATIENT
Start: 2017-12-01 | End: 2017-12-19

## 2017-12-01 RX ADMIN — FENTANYL CITRATE 200 MICROGRAM(S): 50 INJECTION INTRAVENOUS at 19:15

## 2017-12-01 RX ADMIN — HEPARIN SODIUM 5000 UNIT(S): 5000 INJECTION INTRAVENOUS; SUBCUTANEOUS at 21:49

## 2017-12-01 RX ADMIN — AMLODIPINE BESYLATE 5 MILLIGRAM(S): 2.5 TABLET ORAL at 05:46

## 2017-12-01 RX ADMIN — LEVETIRACETAM 500 MILLIGRAM(S): 250 TABLET, FILM COATED ORAL at 17:24

## 2017-12-01 RX ADMIN — GABAPENTIN 300 MILLIGRAM(S): 400 CAPSULE ORAL at 22:46

## 2017-12-01 RX ADMIN — GABAPENTIN 300 MILLIGRAM(S): 400 CAPSULE ORAL at 05:46

## 2017-12-01 RX ADMIN — QUETIAPINE FUMARATE 25 MILLIGRAM(S): 200 TABLET, FILM COATED ORAL at 12:36

## 2017-12-01 RX ADMIN — POLYETHYLENE GLYCOL 3350 17 GRAM(S): 17 POWDER, FOR SOLUTION ORAL at 12:36

## 2017-12-01 RX ADMIN — FENTANYL CITRATE 200 MICROGRAM(S): 50 INJECTION INTRAVENOUS at 04:33

## 2017-12-01 RX ADMIN — FENTANYL CITRATE 200 MICROGRAM(S): 50 INJECTION INTRAVENOUS at 19:30

## 2017-12-01 RX ADMIN — LEVETIRACETAM 500 MILLIGRAM(S): 250 TABLET, FILM COATED ORAL at 05:46

## 2017-12-01 RX ADMIN — ATORVASTATIN CALCIUM 80 MILLIGRAM(S): 80 TABLET, FILM COATED ORAL at 21:49

## 2017-12-01 RX ADMIN — SENNA PLUS 2 TABLET(S): 8.6 TABLET ORAL at 21:49

## 2017-12-01 RX ADMIN — HEPARIN SODIUM 5000 UNIT(S): 5000 INJECTION INTRAVENOUS; SUBCUTANEOUS at 14:02

## 2017-12-01 RX ADMIN — GABAPENTIN 300 MILLIGRAM(S): 400 CAPSULE ORAL at 14:02

## 2017-12-01 RX ADMIN — FENTANYL CITRATE 200 MICROGRAM(S): 50 INJECTION INTRAVENOUS at 05:00

## 2017-12-01 RX ADMIN — HEPARIN SODIUM 5000 UNIT(S): 5000 INJECTION INTRAVENOUS; SUBCUTANEOUS at 05:46

## 2017-12-01 RX ADMIN — PANTOPRAZOLE SODIUM 40 MILLIGRAM(S): 20 TABLET, DELAYED RELEASE ORAL at 12:36

## 2017-12-01 NOTE — PROVIDER CONTACT NOTE (MEDICATION) - BACKGROUND
pt admitted for upper extremity shaking, a code stroke was called upon arrival. Admitting diaagnosis is altered mental status. Pt negative for brain hemorrhage and fracture.

## 2017-12-01 NOTE — PROGRESS NOTE ADULT - SUBJECTIVE AND OBJECTIVE BOX
Patient is a 72y old  Male who presents with a chief complaint of LOC (23 Nov 2017 18:06)      SUBJECTIVE / OVERNIGHT EVENTS: No new complaints.   Review of Systems  chest pain no  palpitations no  sob no  nausea no  headache no    MEDICATIONS  (STANDING):  amLODIPine   Tablet 5 milliGRAM(s) Oral daily  atorvastatin 80 milliGRAM(s) Oral at bedtime  gabapentin 300 milliGRAM(s) Oral three times a day  heparin  Injectable 5000 Unit(s) SubCutaneous every 8 hours  levETIRAcetam  Solution 500 milliGRAM(s) Oral two times a day  pantoprazole   Suspension 40 milliGRAM(s) Oral daily  polyethylene glycol 3350 17 Gram(s) Oral daily  QUEtiapine 25 milliGRAM(s) Oral daily  senna 2 Tablet(s) Oral at bedtime  sodium chloride 0.45%. 1000 milliLiter(s) (75 mL/Hr) IV Continuous <Continuous>    MEDICATIONS  (PRN):  fentaNYL     Lozenge 200 MICROGram(s) Transmucosal every 3 hours PRN severe pain          PHYSICAL EXAM:  GENERAL: NAD, cachectic   HEAD:  Atraumatic, Normocephalic  EYES: EOMI, PERRLA, conjunctiva and sclera clear  NECK: Supple, No JVD  CHEST/LUNG: Clear to auscultation bilaterally; No wheeze  HEART: Regular rate and rhythm; No murmurs, rubs, or gallops  ABDOMEN: Soft, Nontender, Nondistended; Bowel sounds present  EXTREMITIES:  2+ Peripheral Pulses, No clubbing, cyanosis, or edema  PSYCH: AAOx3  NEUROLOGY: non-focal  SKIN: No rashes or lesions    LABS:                        12.3   5.01  )-----------( 186      ( 01 Dec 2017 07:29 )             36.7     12-01    140  |  97  |  15  ----------------------------<  88  4.5   |  28  |  0.87    Ca    10.0      01 Dec 2017 07:29                RADIOLOGY & ADDITIONAL TESTS:    Imaging Personally Reviewed:    Consultant(s) Notes Reviewed:      Care Discussed with Consultants/Other Providers:

## 2017-12-01 NOTE — PROGRESS NOTE ADULT - ASSESSMENT
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  Mental status change, Possible seizure- continue Keppra, Neurology follow noted. MRI brain pending   Gastric cancer metastatic- Oncology follow.  Pelvic mass-GII evaluation noted. For rectal EUS. hold ASA/Plavix.  Prostate cancer- Rx as per Oncology Urology evaluation noted  s/p PEG replacement. Continue Peg feeds.  Pain control/ Pain management evaluation noted.  HTN control  CAD stable.  Depression- continue Rx  Functional quadriplegia -supportive care  PT  oJse Medina MD pager [phone]

## 2017-12-01 NOTE — PROGRESS NOTE ADULT - SUBJECTIVE AND OBJECTIVE BOX
Pt is seen and examined  pt is awake and lying in bed/  pt seems comfortable         MEDICATIONS  (STANDING):  amLODIPine   Tablet 5 milliGRAM(s) Oral daily  atorvastatin 80 milliGRAM(s) Oral at bedtime  gabapentin 300 milliGRAM(s) Oral three times a day  heparin  Injectable 5000 Unit(s) SubCutaneous every 8 hours  levETIRAcetam  Solution 500 milliGRAM(s) Oral two times a day  pantoprazole   Suspension 40 milliGRAM(s) Oral daily  polyethylene glycol 3350 17 Gram(s) Oral daily  QUEtiapine 25 milliGRAM(s) Oral daily  senna 2 Tablet(s) Oral at bedtime  sodium chloride 0.45%. 1000 milliLiter(s) (75 mL/Hr) IV Continuous <Continuous>    MEDICATIONS  (PRN):  fentaNYL     Lozenge 200 MICROGram(s) Transmucosal every 3 hours PRN severe pain      Allergies    No Known Allergies    Intolerances        Vital Signs Last 24 Hrs  T(C): 36.4 (01 Dec 2017 04:30), Max: 36.6 (30 Nov 2017 14:18)  T(F): 97.5 (01 Dec 2017 04:30), Max: 97.9 (30 Nov 2017 14:18)  HR: 63 (01 Dec 2017 04:30) (63 - 65)  BP: 160/79 (01 Dec 2017 04:30) (152/92 - 160/79)  BP(mean): --  RR: 18 (01 Dec 2017 04:30) (18 - 18)  SpO2: 97% (01 Dec 2017 04:30) (97% - 98%)    PHYSICAL EXAM  General: adult in NAD  HEENT: clear oropharynx, anicteric sclera, pink conjunctiva  Neck: supple  CV: normal S1/S2 with no murmur rubs or gallops  Lungs: positive air movement b/l ant lungs,clear to auscultation, no wheezes, no rales  Abdomen: soft non-tender non-distended, no hepatosplenomegaly  Ext: no clubbing cyanosis or edema  Skin: no rashes and no petechiae  Neuro: alert and oriented X 4, no focal deficits  LABS:                          12.3   5.01  )-----------( 186      ( 01 Dec 2017 07:29 )             36.7         Mean Cell Volume : 101.7 fl  Mean Cell Hemoglobin : 34.1 pg  Mean Cell Hemoglobin Concentration : 33.5 gm/dL  Auto Neutrophil # : x  Auto Lymphocyte # : x  Auto Monocyte # : x  Auto Eosinophil # : x  Auto Basophil # : x  Auto Neutrophil % : x  Auto Lymphocyte % : x  Auto Monocyte % : x  Auto Eosinophil % : x  Auto Basophil % : x      12-01    140  |  97  |  15  ----------------------------<  88  4.5   |  28  |  0.87    Ca    10.0      01 Dec 2017 07:29    RADIOLOGY & ADDITIONAL STUDIES:

## 2017-12-01 NOTE — PROGRESS NOTE ADULT - ASSESSMENT
Reviewed CT scan with radiologist - no overt recurrence of gist tumor in upper abdomen or stomach. New perirectal mass.  This could be a metastasis from the initial GIST tumor, a new primary, less likely metastatic from prostate cancer. Would need to be biopsied to know how to treat. This could possibly be  causing the  pain he is having.   Family wishes to proceed.  GI deciding on EUS/transrectal biopsy  Awaiting 4-5 day period off ASA/Plavix  before GI procedure.

## 2017-12-02 DIAGNOSIS — K62.9 DISEASE OF ANUS AND RECTUM, UNSPECIFIED: ICD-10-CM

## 2017-12-02 LAB
ANION GAP SERPL CALC-SCNC: 10 MMOL/L — SIGNIFICANT CHANGE UP (ref 5–17)
ANION GAP SERPL CALC-SCNC: 12 MMOL/L — SIGNIFICANT CHANGE UP (ref 5–17)
BUN SERPL-MCNC: 18 MG/DL — SIGNIFICANT CHANGE UP (ref 7–23)
BUN SERPL-MCNC: 19 MG/DL — SIGNIFICANT CHANGE UP (ref 7–23)
CALCIUM SERPL-MCNC: 9.3 MG/DL — SIGNIFICANT CHANGE UP (ref 8.4–10.5)
CALCIUM SERPL-MCNC: 9.8 MG/DL — SIGNIFICANT CHANGE UP (ref 8.4–10.5)
CHLORIDE SERPL-SCNC: 100 MMOL/L — SIGNIFICANT CHANGE UP (ref 96–108)
CHLORIDE SERPL-SCNC: 98 MMOL/L — SIGNIFICANT CHANGE UP (ref 96–108)
CO2 SERPL-SCNC: 26 MMOL/L — SIGNIFICANT CHANGE UP (ref 22–31)
CO2 SERPL-SCNC: 30 MMOL/L — SIGNIFICANT CHANGE UP (ref 22–31)
CREAT SERPL-MCNC: 0.99 MG/DL — SIGNIFICANT CHANGE UP (ref 0.5–1.3)
CREAT SERPL-MCNC: 1.01 MG/DL — SIGNIFICANT CHANGE UP (ref 0.5–1.3)
GLUCOSE SERPL-MCNC: 90 MG/DL — SIGNIFICANT CHANGE UP (ref 70–99)
GLUCOSE SERPL-MCNC: 94 MG/DL — SIGNIFICANT CHANGE UP (ref 70–99)
HCT VFR BLD CALC: 36.5 % — LOW (ref 39–50)
HGB BLD-MCNC: 12.5 G/DL — LOW (ref 13–17)
MAGNESIUM SERPL-MCNC: 2 MG/DL — SIGNIFICANT CHANGE UP (ref 1.6–2.6)
MCHC RBC-ENTMCNC: 34.2 GM/DL — SIGNIFICANT CHANGE UP (ref 32–36)
MCHC RBC-ENTMCNC: 34.4 PG — HIGH (ref 27–34)
MCV RBC AUTO: 100.6 FL — HIGH (ref 80–100)
PHOSPHATE SERPL-MCNC: 3.5 MG/DL — SIGNIFICANT CHANGE UP (ref 2.5–4.5)
PLATELET # BLD AUTO: 188 K/UL — SIGNIFICANT CHANGE UP (ref 150–400)
POTASSIUM SERPL-MCNC: 4.3 MMOL/L — SIGNIFICANT CHANGE UP (ref 3.5–5.3)
POTASSIUM SERPL-MCNC: 4.6 MMOL/L — SIGNIFICANT CHANGE UP (ref 3.5–5.3)
POTASSIUM SERPL-SCNC: 4.3 MMOL/L — SIGNIFICANT CHANGE UP (ref 3.5–5.3)
POTASSIUM SERPL-SCNC: 4.6 MMOL/L — SIGNIFICANT CHANGE UP (ref 3.5–5.3)
RBC # BLD: 3.63 M/UL — LOW (ref 4.2–5.8)
RBC # FLD: 13.8 % — SIGNIFICANT CHANGE UP (ref 10.3–14.5)
SODIUM SERPL-SCNC: 138 MMOL/L — SIGNIFICANT CHANGE UP (ref 135–145)
SODIUM SERPL-SCNC: 138 MMOL/L — SIGNIFICANT CHANGE UP (ref 135–145)
WBC # BLD: 3.92 K/UL — SIGNIFICANT CHANGE UP (ref 3.8–10.5)
WBC # FLD AUTO: 3.92 K/UL — SIGNIFICANT CHANGE UP (ref 3.8–10.5)

## 2017-12-02 RX ORDER — METOPROLOL TARTRATE 50 MG
12.5 TABLET ORAL
Qty: 0 | Refills: 0 | Status: DISCONTINUED | OUTPATIENT
Start: 2017-12-02 | End: 2017-12-19

## 2017-12-02 RX ADMIN — LEVETIRACETAM 500 MILLIGRAM(S): 250 TABLET, FILM COATED ORAL at 06:06

## 2017-12-02 RX ADMIN — FENTANYL CITRATE 200 MICROGRAM(S): 50 INJECTION INTRAVENOUS at 12:55

## 2017-12-02 RX ADMIN — HEPARIN SODIUM 5000 UNIT(S): 5000 INJECTION INTRAVENOUS; SUBCUTANEOUS at 21:47

## 2017-12-02 RX ADMIN — QUETIAPINE FUMARATE 25 MILLIGRAM(S): 200 TABLET, FILM COATED ORAL at 13:12

## 2017-12-02 RX ADMIN — SENNA PLUS 2 TABLET(S): 8.6 TABLET ORAL at 21:47

## 2017-12-02 RX ADMIN — HEPARIN SODIUM 5000 UNIT(S): 5000 INJECTION INTRAVENOUS; SUBCUTANEOUS at 06:06

## 2017-12-02 RX ADMIN — PANTOPRAZOLE SODIUM 40 MILLIGRAM(S): 20 TABLET, DELAYED RELEASE ORAL at 13:12

## 2017-12-02 RX ADMIN — AMLODIPINE BESYLATE 5 MILLIGRAM(S): 2.5 TABLET ORAL at 06:06

## 2017-12-02 RX ADMIN — GABAPENTIN 300 MILLIGRAM(S): 400 CAPSULE ORAL at 21:47

## 2017-12-02 RX ADMIN — GABAPENTIN 300 MILLIGRAM(S): 400 CAPSULE ORAL at 06:06

## 2017-12-02 RX ADMIN — FENTANYL CITRATE 200 MICROGRAM(S): 50 INJECTION INTRAVENOUS at 19:30

## 2017-12-02 RX ADMIN — HEPARIN SODIUM 5000 UNIT(S): 5000 INJECTION INTRAVENOUS; SUBCUTANEOUS at 13:12

## 2017-12-02 RX ADMIN — ATORVASTATIN CALCIUM 80 MILLIGRAM(S): 80 TABLET, FILM COATED ORAL at 21:47

## 2017-12-02 RX ADMIN — LEVETIRACETAM 500 MILLIGRAM(S): 250 TABLET, FILM COATED ORAL at 17:50

## 2017-12-02 RX ADMIN — FENTANYL CITRATE 200 MICROGRAM(S): 50 INJECTION INTRAVENOUS at 06:04

## 2017-12-02 RX ADMIN — FENTANYL CITRATE 200 MICROGRAM(S): 50 INJECTION INTRAVENOUS at 13:55

## 2017-12-02 RX ADMIN — GABAPENTIN 300 MILLIGRAM(S): 400 CAPSULE ORAL at 13:14

## 2017-12-02 RX ADMIN — Medication 12.5 MILLIGRAM(S): at 23:05

## 2017-12-02 RX ADMIN — FENTANYL CITRATE 200 MICROGRAM(S): 50 INJECTION INTRAVENOUS at 06:35

## 2017-12-02 RX ADMIN — FENTANYL CITRATE 200 MICROGRAM(S): 50 INJECTION INTRAVENOUS at 19:23

## 2017-12-02 RX ADMIN — POLYETHYLENE GLYCOL 3350 17 GRAM(S): 17 POWDER, FOR SOLUTION ORAL at 13:12

## 2017-12-02 NOTE — PROGRESS NOTE ADULT - ASSESSMENT
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  Mental status change, Possible seizure- continue Keppra, Neurology follow noted. MRI brain pending   Gastric cancer metastatic- Oncology follow.  Pelvic mass-GII evaluation noted. For rectal EUS/ biopsy. hold ASA/Plavix.  Prostate cancer- Rx as per Oncology Urology evaluation noted  s/p PEG replacement. Continue Peg feeds.  Pain control/ Pain management evaluation noted.  HTN control  CAD stable.  Depression- continue Rx  Functional quadriplegia -supportive care  PT  Jose Medina MD pager [phone]

## 2017-12-02 NOTE — PROGRESS NOTE ADULT - SUBJECTIVE AND OBJECTIVE BOX
Patient is a 72y old  Male who presents with a chief complaint of LOC (23 Nov 2017 18:06)      SUBJECTIVE / OVERNIGHT EVENTS: Comfortable without new complaints.   Review of Systems  chest pain no  palpitations no  sob no  nausea no  headache no    MEDICATIONS  (STANDING):  amLODIPine   Tablet 5 milliGRAM(s) Oral daily  atorvastatin 80 milliGRAM(s) Oral at bedtime  gabapentin   Solution 300 milliGRAM(s) Oral three times a day  heparin  Injectable 5000 Unit(s) SubCutaneous every 8 hours  levETIRAcetam  Solution 500 milliGRAM(s) Oral two times a day  pantoprazole   Suspension 40 milliGRAM(s) Oral daily  polyethylene glycol 3350 17 Gram(s) Oral daily  QUEtiapine 25 milliGRAM(s) Oral daily  senna 2 Tablet(s) Oral at bedtime  sodium chloride 0.45%. 1000 milliLiter(s) (75 mL/Hr) IV Continuous <Continuous>    MEDICATIONS  (PRN):  fentaNYL     Lozenge 200 MICROGram(s) Transmucosal every 3 hours PRN severe pain          PHYSICAL EXAM:  GENERAL: NAD, well-developed  HEAD:  Atraumatic, Normocephalic  EYES: EOMI, PERRLA, conjunctiva and sclera clear  NECK: Supple, No JVD  CHEST/LUNG: Clear to auscultation bilaterally; No wheeze  HEART: Regular rate and rhythm; No murmurs, rubs, or gallops  ABDOMEN: Soft, Nontender, Nondistended; Bowel sounds present PEG in place.  EXTREMITIES:  2+ Peripheral Pulses, No clubbing, cyanosis, or edema  PSYCH: AAOx3  NEUROLOGY: non-focal  SKIN: No rashes or lesions    LABS:                        12.5   3.92  )-----------( 188      ( 02 Dec 2017 08:33 )             36.5     12-02    138  |  100  |  18  ----------------------------<  94  4.6   |  26  |  1.01    Ca    9.8      02 Dec 2017 08:38                RADIOLOGY & ADDITIONAL TESTS:    Imaging Personally Reviewed:    Consultant(s) Notes Reviewed:      Care Discussed with Consultants/Other Providers:

## 2017-12-02 NOTE — PROGRESS NOTE ADULT - SUBJECTIVE AND OBJECTIVE BOX
Patient is a 72y old  Male who presents with a chief complaint of LOC (23 Nov 2017 18:06)       Pt is seen and examined  pt is awake and lying in bed/out of bed to chair  pt seems comfortable and denies any complaints at this time      REVIEW OF SYSTEMS:    CONSTITUTIONAL: No weakness, fevers or chills  EYES/ENT: No visual changes;  No vertigo or throat pain   NECK: No pain or stiffness  RESPIRATORY: No cough, wheezing, hemoptysis; No shortness of breath  CARDIOVASCULAR: No chest pain or palpitations  GASTROINTESTINAL: No abdominal or epigastric pain. No nausea, vomiting, or hematemesis; No diarrhea or constipation. No melena or hematochezia.  GENITOURINARY: No dysuria, frequency or hematuria  NEUROLOGICAL: No numbness or weakness  SKIN: No itching, burning, rashes, or lesions     MEDICATIONS  (STANDING):  amLODIPine   Tablet 5 milliGRAM(s) Oral daily  atorvastatin 80 milliGRAM(s) Oral at bedtime  gabapentin   Solution 300 milliGRAM(s) Oral three times a day  heparin  Injectable 5000 Unit(s) SubCutaneous every 8 hours  levETIRAcetam  Solution 500 milliGRAM(s) Oral two times a day  pantoprazole   Suspension 40 milliGRAM(s) Oral daily  polyethylene glycol 3350 17 Gram(s) Oral daily  QUEtiapine 25 milliGRAM(s) Oral daily  senna 2 Tablet(s) Oral at bedtime  sodium chloride 0.45%. 1000 milliLiter(s) (75 mL/Hr) IV Continuous <Continuous>    MEDICATIONS  (PRN):  fentaNYL     Lozenge 200 MICROGram(s) Transmucosal every 3 hours PRN severe pain      Allergies    No Known Allergies    Intolerances        Vital Signs Last 24 Hrs  T(C): 36.4 (02 Dec 2017 03:33), Max: 36.6 (01 Dec 2017 11:57)  T(F): 97.6 (02 Dec 2017 03:33), Max: 97.9 (01 Dec 2017 11:57)  HR: 70 (02 Dec 2017 03:33) (61 - 74)  BP: 151/93 (02 Dec 2017 03:33) (151/93 - 166/98)  BP(mean): --  RR: 18 (02 Dec 2017 03:33) (18 - 18)  SpO2: 97% (02 Dec 2017 03:33) (97% - 98%)    PHYSICAL EXAM  General: adult in NAD  HEENT: clear oropharynx, anicteric sclera, pink conjunctiva  Neck: supple  CV: normal S1/S2 with no murmur rubs or gallops  Lungs: positive air movement b/l ant lungs,clear to auscultation, no wheezes, no rales  Abdomen: soft non-tender non-distended, no hepatosplenomegaly  Ext: no clubbing cyanosis or edema  Skin: no rashes and no petechiae  Neuro: alert and oriented X 4, no focal deficits  LABS:                          12.5   3.92  )-----------( 188      ( 02 Dec 2017 08:33 )             36.5         Mean Cell Volume : 100.6 fl  Mean Cell Hemoglobin : 34.4 pg  Mean Cell Hemoglobin Concentration : 34.2 gm/dL      Serial CBC's  12-02 @ 08:33  Hct-36.5 / Hgb-12.5 / Plat-188 / RBC-3.63 / WBC-3.92      12-02    138  |  100  |  18  ----------------------------<  94  4.6   |  26  |  1.01    Ca    9.8      02 Dec 2017 08:38        Assessment

## 2017-12-02 NOTE — CHART NOTE - NSCHARTNOTEFT_GEN_A_CORE
Notified By RN that had 12 beats of WCT. Patient had 6 beats of WCT on this admission. Patient in no acute distress. Patient with expressive aphasia no signs and symptoms of pain, chest pain, accessory muscle use.     Vital Signs Last 24 Hrs  T(C): 36.6 (02 Dec 2017 21:07), Max: 36.6 (02 Dec 2017 21:07)  T(F): 97.8 (02 Dec 2017 21:07), Max: 97.8 (02 Dec 2017 21:07)  HR: 65 (02 Dec 2017 21:07) (65 - 72)  BP: 134/67 (02 Dec 2017 21:07) (134/67 - 163/90)  BP(mean): --  RR: 18 (02 Dec 2017 21:07) (18 - 18)  SpO2: 97% (02 Dec 2017 21:07) (97% - 98%)    MEDICATIONS  (STANDING):  amLODIPine   Tablet 5 milliGRAM(s) Oral daily  atorvastatin 80 milliGRAM(s) Oral at bedtime  gabapentin   Solution 300 milliGRAM(s) Oral three times a day  heparin  Injectable 5000 Unit(s) SubCutaneous every 8 hours  levETIRAcetam  Solution 500 milliGRAM(s) Oral two times a day  pantoprazole   Suspension 40 milliGRAM(s) Oral daily  polyethylene glycol 3350 17 Gram(s) Oral daily  QUEtiapine 25 milliGRAM(s) Oral daily  senna 2 Tablet(s) Oral at bedtime  sodium chloride 0.45%. 1000 milliLiter(s) (75 mL/Hr) IV Continuous <Continuous>    MEDICATIONS  (PRN):  fentaNYL     Lozenge 200 MICROGram(s) Transmucosal every 3 hours PRN severe pain    HPI:  This is a 72 year old male w/ h/o stroke with residual right sided weakness, aphasia, s/p PEG tube, Hypertension, HLD, prostate and stomach cancer on oral chemo who presented to ED for syncopal episode. Admitted with AMS and seizure and was a code stroke on admission now with 12 beats of wct.    plan  mag  phos  bmp  start Metoprolol 12.5mg bid  consult house cards (per Dr. Medina)    Discussed with Dr. Medina. Will continue to monitor will endorse to primary day team, attending to follow.

## 2017-12-03 LAB
ANION GAP SERPL CALC-SCNC: 14 MMOL/L — SIGNIFICANT CHANGE UP (ref 5–17)
BUN SERPL-MCNC: 22 MG/DL — SIGNIFICANT CHANGE UP (ref 7–23)
CALCIUM SERPL-MCNC: 10 MG/DL — SIGNIFICANT CHANGE UP (ref 8.4–10.5)
CHLORIDE SERPL-SCNC: 98 MMOL/L — SIGNIFICANT CHANGE UP (ref 96–108)
CO2 SERPL-SCNC: 26 MMOL/L — SIGNIFICANT CHANGE UP (ref 22–31)
CREAT SERPL-MCNC: 1.09 MG/DL — SIGNIFICANT CHANGE UP (ref 0.5–1.3)
GLUCOSE SERPL-MCNC: 91 MG/DL — SIGNIFICANT CHANGE UP (ref 70–99)
HCT VFR BLD CALC: 37.7 % — LOW (ref 39–50)
HGB BLD-MCNC: 13 G/DL — SIGNIFICANT CHANGE UP (ref 13–17)
MCHC RBC-ENTMCNC: 34.5 GM/DL — SIGNIFICANT CHANGE UP (ref 32–36)
MCHC RBC-ENTMCNC: 34.8 PG — HIGH (ref 27–34)
MCV RBC AUTO: 100.8 FL — HIGH (ref 80–100)
PLATELET # BLD AUTO: 182 K/UL — SIGNIFICANT CHANGE UP (ref 150–400)
POTASSIUM SERPL-MCNC: 4.5 MMOL/L — SIGNIFICANT CHANGE UP (ref 3.5–5.3)
POTASSIUM SERPL-SCNC: 4.5 MMOL/L — SIGNIFICANT CHANGE UP (ref 3.5–5.3)
RBC # BLD: 3.74 M/UL — LOW (ref 4.2–5.8)
RBC # FLD: 14 % — SIGNIFICANT CHANGE UP (ref 10.3–14.5)
SODIUM SERPL-SCNC: 138 MMOL/L — SIGNIFICANT CHANGE UP (ref 135–145)
WBC # BLD: 4.68 K/UL — SIGNIFICANT CHANGE UP (ref 3.8–10.5)
WBC # FLD AUTO: 4.68 K/UL — SIGNIFICANT CHANGE UP (ref 3.8–10.5)

## 2017-12-03 PROCEDURE — 99223 1ST HOSP IP/OBS HIGH 75: CPT

## 2017-12-03 RX ADMIN — FENTANYL CITRATE 200 MICROGRAM(S): 50 INJECTION INTRAVENOUS at 22:06

## 2017-12-03 RX ADMIN — SENNA PLUS 2 TABLET(S): 8.6 TABLET ORAL at 22:07

## 2017-12-03 RX ADMIN — GABAPENTIN 300 MILLIGRAM(S): 400 CAPSULE ORAL at 13:26

## 2017-12-03 RX ADMIN — FENTANYL CITRATE 200 MICROGRAM(S): 50 INJECTION INTRAVENOUS at 00:28

## 2017-12-03 RX ADMIN — LEVETIRACETAM 500 MILLIGRAM(S): 250 TABLET, FILM COATED ORAL at 05:52

## 2017-12-03 RX ADMIN — FENTANYL CITRATE 200 MICROGRAM(S): 50 INJECTION INTRAVENOUS at 18:46

## 2017-12-03 RX ADMIN — Medication 12.5 MILLIGRAM(S): at 05:53

## 2017-12-03 RX ADMIN — PANTOPRAZOLE SODIUM 40 MILLIGRAM(S): 20 TABLET, DELAYED RELEASE ORAL at 13:26

## 2017-12-03 RX ADMIN — FENTANYL CITRATE 200 MICROGRAM(S): 50 INJECTION INTRAVENOUS at 14:11

## 2017-12-03 RX ADMIN — QUETIAPINE FUMARATE 25 MILLIGRAM(S): 200 TABLET, FILM COATED ORAL at 13:26

## 2017-12-03 RX ADMIN — POLYETHYLENE GLYCOL 3350 17 GRAM(S): 17 POWDER, FOR SOLUTION ORAL at 13:26

## 2017-12-03 RX ADMIN — HEPARIN SODIUM 5000 UNIT(S): 5000 INJECTION INTRAVENOUS; SUBCUTANEOUS at 05:52

## 2017-12-03 RX ADMIN — LEVETIRACETAM 500 MILLIGRAM(S): 250 TABLET, FILM COATED ORAL at 17:22

## 2017-12-03 RX ADMIN — FENTANYL CITRATE 200 MICROGRAM(S): 50 INJECTION INTRAVENOUS at 22:37

## 2017-12-03 RX ADMIN — FENTANYL CITRATE 200 MICROGRAM(S): 50 INJECTION INTRAVENOUS at 00:57

## 2017-12-03 RX ADMIN — HEPARIN SODIUM 5000 UNIT(S): 5000 INJECTION INTRAVENOUS; SUBCUTANEOUS at 13:26

## 2017-12-03 RX ADMIN — GABAPENTIN 300 MILLIGRAM(S): 400 CAPSULE ORAL at 22:06

## 2017-12-03 RX ADMIN — HEPARIN SODIUM 5000 UNIT(S): 5000 INJECTION INTRAVENOUS; SUBCUTANEOUS at 22:07

## 2017-12-03 RX ADMIN — Medication 12.5 MILLIGRAM(S): at 17:22

## 2017-12-03 RX ADMIN — FENTANYL CITRATE 200 MICROGRAM(S): 50 INJECTION INTRAVENOUS at 13:40

## 2017-12-03 RX ADMIN — GABAPENTIN 300 MILLIGRAM(S): 400 CAPSULE ORAL at 05:56

## 2017-12-03 RX ADMIN — FENTANYL CITRATE 200 MICROGRAM(S): 50 INJECTION INTRAVENOUS at 10:09

## 2017-12-03 RX ADMIN — AMLODIPINE BESYLATE 5 MILLIGRAM(S): 2.5 TABLET ORAL at 05:52

## 2017-12-03 RX ADMIN — FENTANYL CITRATE 200 MICROGRAM(S): 50 INJECTION INTRAVENOUS at 09:53

## 2017-12-03 RX ADMIN — ATORVASTATIN CALCIUM 80 MILLIGRAM(S): 80 TABLET, FILM COATED ORAL at 22:07

## 2017-12-03 RX ADMIN — FENTANYL CITRATE 200 MICROGRAM(S): 50 INJECTION INTRAVENOUS at 19:16

## 2017-12-03 NOTE — PROGRESS NOTE ADULT - SUBJECTIVE AND OBJECTIVE BOX
Patient is a 72y old  Male who presents with a chief complaint of LOC (23 Nov 2017 18:06)       Pt is seen and examined  pt is awake and lying in bed  pt mildly agitated --- unable to voice concerns but points to bathroom -- assisted to bathroom with aide     Events of last 24 hr noted -- had episode of NSVT and seen by Cardiology      REVIEW OF SYSTEMS:    CONSTITUTIONAL: No weakness, fevers or chills  EYES/ENT: No visual changes;  No vertigo or throat pain   NECK: No pain or stiffness  RESPIRATORY: No cough, wheezing, hemoptysis; No shortness of breath  CARDIOVASCULAR: No chest pain or palpitations  GASTROINTESTINAL: No abdominal or epigastric pain. No nausea, vomiting, or hematemesis; No diarrhea or constipation. No melena or hematochezia.  GENITOURINARY: No dysuria, frequency or hematuria  NEUROLOGICAL: No numbness or weakness  SKIN: No itching, burning, rashes, or lesions     MEDICATIONS  (STANDING):  amLODIPine   Tablet 5 milliGRAM(s) Oral daily  atorvastatin 80 milliGRAM(s) Oral at bedtime  gabapentin   Solution 300 milliGRAM(s) Oral three times a day  heparin  Injectable 5000 Unit(s) SubCutaneous every 8 hours  levETIRAcetam  Solution 500 milliGRAM(s) Oral two times a day  metoprolol     tartrate 12.5 milliGRAM(s) Oral two times a day  pantoprazole   Suspension 40 milliGRAM(s) Oral daily  polyethylene glycol 3350 17 Gram(s) Oral daily  QUEtiapine 25 milliGRAM(s) Oral daily  senna 2 Tablet(s) Oral at bedtime  sodium chloride 0.45%. 1000 milliLiter(s) (75 mL/Hr) IV Continuous <Continuous>    MEDICATIONS  (PRN):  fentaNYL     Lozenge 200 MICROGram(s) Transmucosal every 3 hours PRN severe pain      Allergies    No Known Allergies    Intolerances        Vital Signs Last 24 Hrs  T(C): 36.4 (03 Dec 2017 04:16), Max: 36.6 (02 Dec 2017 21:07)  T(F): 97.6 (03 Dec 2017 04:16), Max: 97.8 (02 Dec 2017 21:07)  HR: 63 (03 Dec 2017 04:16) (63 - 65)  BP: 150/87 (03 Dec 2017 04:16) (134/67 - 150/87)  BP(mean): --  RR: 18 (03 Dec 2017 04:16) (18 - 18)  SpO2: 98% (03 Dec 2017 04:16) (97% - 98%)    PHYSICAL EXAM  General: adult in NAD  HEENT: clear oropharynx, anicteric sclera, pink conjunctiva  Neck: supple  CV: normal S1/S2 with no murmur rubs or gallops  Lungs: positive air movement b/l ant lungs,clear to auscultation, no wheezes, no rales  Abdomen: soft non-tender non-distended, no hepatosplenomegaly  Ext: no clubbing cyanosis or edema  Skin: no rashes and no petechiae  Neuro: alert and oriented X 4, no focal deficits    LABS:                        13.0   4.68  )-----------( 182      ( 03 Dec 2017 08:27 )             37.7       Mean Cell Volume : 100.8 fl  Mean Cell Hemoglobin : 34.8 pg  Mean Cell Hemoglobin Concentration : 34.5 gm/dL      Serial CBC's  12-03 @ 08:27  Hct-37.7 / Hgb-13.0 / Plat-182 / RBC-3.74 / WBC-4.68    Serial CBC's  12-02 @ 08:33  Hct-36.5 / Hgb-12.5 / Plat-188 / RBC-3.63 / WBC-3.92    Serial CBC's  12-01 @ 07:29  Hct-36.7 / Hgb-12.3 / Plat-186 / RBC-3.61 / WBC-5.01      Serial CBC's  11-30 @ 07:34  Hct-36.3 / Hgb-12.4 / Plat-174 / RBC-3.63 / WBC-4.88          12-03    138  |  98  |  22  ----------------------------<  91  4.5   |  26  |  1.09    Ca    10.0      03 Dec 2017 08:36  Phos  3.5     12-02  Mg     2.0     12-02      RADIOLOGY & ADDITIONAL STUDIES:    Assessment

## 2017-12-03 NOTE — CONSULT NOTE ADULT - SUBJECTIVE AND OBJECTIVE BOX
Cardiology Attending Consult Note      CHIEF COMPLAINT/REASON FOR CONSULT: NSVT  Date of Admission: 11-23-17    HISTORY OF PRESENT ILLNESS:    72y.o. Male with HTN, HL, Nonobstructive CAD (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 Hospital course c/b intermittent brief episodes of NSVT (11/25, and again 12/03/2017) for which cardiology was consulted.    Patient seen and examined, awake and laying in bed, appears comfortable. Unable to obtain history from patient due to aphasia.    ALLERGIES:      MEDICATIONS:  amLODIPine   Tablet 5 milliGRAM(s) Oral daily  heparin  Injectable 5000 Unit(s) SubCutaneous every 8 hours  metoprolol     tartrate 12.5 milliGRAM(s) Oral two times a day        gabapentin   Solution 300 milliGRAM(s) Oral three times a day  levETIRAcetam  Solution 500 milliGRAM(s) Oral two times a day  QUEtiapine 25 milliGRAM(s) Oral daily    pantoprazole   Suspension 40 milliGRAM(s) Oral daily  polyethylene glycol 3350 17 Gram(s) Oral daily  senna 2 Tablet(s) Oral at bedtime    atorvastatin 80 milliGRAM(s) Oral at bedtime    sodium chloride 0.45%. 1000 milliLiter(s) IV Continuous <Continuous>      PAST MEDICAL & SURGICAL HISTORY:  Prostate hypertrophy  HLD (hyperlipidemia)  Agitation  Depression  Stroke  Syncope: 2/2013, 9/2010  HTN (hypertension)  Gastric tumor  Gastric ulcer with hemorrhage, perforation, and obstruction, acute    No Past Surgical History      FAMILY HISTORY:  No pertinent family history in first degree relatives      SOCIAL HISTORY:    Never smoked, no ETOH per notes    REVIEW OF SYSTEMS:  Unable to obtain    PHYSICAL EXAM:  T(C): 36.6 (12-02-17 @ 21:07), Max: 36.6 (12-02-17 @ 21:07)  HR: 65 (12-02-17 @ 21:07) (65 - 72)  BP: 134/67 (12-02-17 @ 21:07) (134/67 - 163/90)  RR: 18 (12-02-17 @ 21:07) (18 - 18)  SpO2: 97% (12-02-17 @ 21:07) (97% - 98%)  Wt(kg): --    Drug Dosing Weight  Height (cm): 170.18 (23 Nov 2017 21:00)  Weight (kg): 54 (23 Nov 2017 21:00)  BMI (kg/m2): 18.6 (23 Nov 2017 21:00)  BSA (m2): 1.62 (23 Nov 2017 21:00)    I&O's Summary    01 Dec 2017 07:01  -  02 Dec 2017 07:00  --------------------------------------------------------  IN: 1240 mL / OUT: 850 mL / NET: 390 mL    02 Dec 2017 07:01  -  03 Dec 2017 01:28  --------------------------------------------------------  IN: 0 mL / OUT: 500 mL / NET: -500 mL        Appearance: Normal	  HEENT:   Normal oral mucosa, PERRL, EOMI	  Lymphatic: No lymphadenopathy  Cardiovascular: Normal S1, absent S2, No JVD, 3/6 diastolic murmur  Respiratory: Lungs clear to auscultation	  Psychiatry: A & O x 0, aphasic  Gastrointestinal:  Soft, Non-tender, + BS, +PEG  Skin: No rashes, No ecchymoses, No cyanosis	  Neurologic: Non-focal  Extremities:  contracted, No clubbing, cyanosis or edema  Vascular: Peripheral pulses palpable 2+ bilaterally    LABS:	 	    CBC Full  -  ( 02 Dec 2017 08:33 )  WBC Count : 3.92 K/uL  Hemoglobin : 12.5 g/dL  Hematocrit : 36.5 %  Platelet Count - Automated : 188 K/uL  Mean Cell Volume : 100.6 fl  Mean Cell Hemoglobin : 34.4 pg  Mean Cell Hemoglobin Concentration : 34.2 gm/dL  Auto Neutrophil # : x  Auto Lymphocyte # : x  Auto Monocyte # : x  Auto Eosinophil # : x  Auto Basophil # : x  Auto Neutrophil % : x  Auto Lymphocyte % : x  Auto Monocyte % : x  Auto Eosinophil % : x  Auto Basophil % : x    12-02    138  |  98  |  19  ----------------------------<  90  4.3   |  30  |  0.99  12-02    138  |  100  |  18  ----------------------------<  94  4.6   |  26  |  1.01    Ca    9.3      02 Dec 2017 22:35  Ca    9.8      02 Dec 2017 08:38  Phos  3.5     12-02  Mg     2.0     12-02      EKG:    Telemetry: NSR, NSVT (12 beats@150bpm) on 12/02/2017 @ 21:29    CXR: 11/23/2017:  FINDINGS:     The cardiac silhouette is enlarged, similar to previous radiograph.  Aorta is uncoiled and dilated, similar to the prior study.  No new focal consolidations.  No pneumothorax.  Surgical change sutures in the left upper quadrant.      IMPRESSION:   Cardiomegaly and dilated aorta, similar to the prior studies.    US Abdominal Aorta: 04/2016:  IMPRESSION: Aneurysmal dilatation of the aorta measuring up to 5 cm in   the proximal aorta, and tapering more distally. Aneurysmal dilatation of   the common iliac arteries bilaterally. Atherosclerotic calcification is   noted in the aorta and bilateral common iliac arteries.    Catheterization:  Study date: 08/31/2010  Summary: Nonobstructive CAD. Normal LV function. At least moderate AI.      Stress Test:  	  STUDY DATE: 05/05/2013  IMPRESSIONS:Normal Study    TTE: 04/22/2016:  EF (Teicholtz): 62 %  ------------------------------------------------------------------------  Observations:  Mitral Valve: Mitral annular calcification. Thickened and  tethered mitral valve. Mild mitral regurgitation.  Aortic Valve/Aorta: Calcified trileaflet aortic valve with  normal opening. Moderate aortic regurgitation.  Normal aortic root (Ao: 3.4 cm at the sinuses of Valsalva).  Technically difficult, however in certain views, the  descending aorta appears dilated. Suggest CT to further  evaluate.  Left Atrium: Left atrium not well visualized, probably  normal. The left atrium is extrinsically compressed by a  tortuous aorta vs a large hiatal hernia.  Left Ventricle: Normal left ventricular systolic function.  No segmental wall motion abnormalities. There is a false  tendon in the LV cavity (normal variant). Increased  relative wall thickness with normal left ventricular mass  index, consistent with concentric left ventricular  remodeling. Mild diastolic dysfunction (Stage I).  Right Heart: Normal right atrium. Normal right ventricular  size and function. Normal tricuspid valve. Minimal  tricuspid regurgitation. Normal pulmonic valve.  Pericardium/Pleura: Normal pericardium with no pericardial  effusion.  Hemodynamic: Estimated right atrial pressure is 8 mm Hg.  Estimated right ventricular systolic pressure equals 12 mm  Hg, assuming right atrial pressure equals 8 mm Hg,  consistent with normal pulmonary pressures. Bubble study  was technically difficult. No definitive bubbles seen  crossing the interatrial septum.  ------------------------------------------------------------------------  Conclusions:  1.  Moderate aortic regurgitation.  2. Normal aortic root (Ao: 3.4 cm at the sinuses of  Valsalva). Technically difficult, however in certain views,  the descending aorta appears dilated. Suggest CT to further  evaluate.  3. Left atrium not well visualized, probably normal. The  left atrium is extrinsically compressed by a tortuous aorta  vs a large hiatal hernia.  4. Increased relative wall thickness with normal left  ventricular mass index, consistent with concentric left  ventricular remodeling.  5. Normal left ventricular systolic function. No segmental  wall motion abnormalities. There is a false tendon in the  LV cavity (normal variant).  6. Bubble study was technically difficult. No definitive  bubbles seen crossing the interatrial septum.  *** Compared with echocardiogram of 5/3/2013, no  significant changes noted.    11/23/2017: Trop <0.01    A/P: 72y.o. Male with HTN, HL, Nonobstructive CAD (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 Hospital course c/b intermittent brief episodes of NSVT (11/25, and again 12/03/2017) for which cardiology was consulted.    1. NSVT - Intermittent episodes of NSVT (6-11 beats) since admission noted on telemetry, likely 2/2 structural heart disease (Moderate AR, LVH, diastolic dysfunction). Less likely scar related as no hx of MI, last [facility] 2010 with non-obstructive CAD.  -NSVT episodes appear asymptomatic at this time, do not seem to coincide with LOC/syncope. Cardiac enzymes negativex1 on admission.  -Cont Metoprolol 12.5 mg po BID  -Maintain K>4, Mag>2  -Please order routine TTE    2. CAD - [facility] 2010 with non-obstructive CAD  -Off ASA/Plavix for EUS/rectal mass biopsy    3. HTN - BP controlled  -Cont Norvasc 5mg po QD    4. HL -   Cont atorvastatin 80 mg po QHS      Homero Royal MD  Cardiology Attending  [facility]   Cell: [phone]  (Cardiology Nocturnist cell number available 7 pm - 7 am every night; available daytime week days for follow-up only; daytime weekends covered by general cardiology consult service)

## 2017-12-03 NOTE — PROGRESS NOTE ADULT - SUBJECTIVE AND OBJECTIVE BOX
Patient is a 72y old  Male who presents with a chief complaint of LOC (23 Nov 2017 18:06)      SUBJECTIVE / OVERNIGHT EVENTS: Comfortable without new complaints.   Review of Systems  chest pain no  palpitations no  sob no  nausea no  headache no    MEDICATIONS  (STANDING):  amLODIPine   Tablet 5 milliGRAM(s) Oral daily  atorvastatin 80 milliGRAM(s) Oral at bedtime  gabapentin   Solution 300 milliGRAM(s) Oral three times a day  heparin  Injectable 5000 Unit(s) SubCutaneous every 8 hours  levETIRAcetam  Solution 500 milliGRAM(s) Oral two times a day  metoprolol     tartrate 12.5 milliGRAM(s) Oral two times a day  pantoprazole   Suspension 40 milliGRAM(s) Oral daily  polyethylene glycol 3350 17 Gram(s) Oral daily  QUEtiapine 25 milliGRAM(s) Oral daily  senna 2 Tablet(s) Oral at bedtime  sodium chloride 0.45%. 1000 milliLiter(s) (75 mL/Hr) IV Continuous <Continuous>    MEDICATIONS  (PRN):  fentaNYL     Lozenge 200 MICROGram(s) Transmucosal every 3 hours PRN severe pain          PHYSICAL EXAM:  GENERAL: NAD, cachectic   HEAD:  Atraumatic, Normocephalic  EYES: EOMI, PERRLA, conjunctiva and sclera clear  NECK: Supple, No JVD  CHEST/LUNG: Clear to auscultation bilaterally; No wheeze  HEART: Regular rate and rhythm; No murmurs, rubs, or gallops  ABDOMEN: Soft, Nontender, Nondistended; Bowel sounds present PEG in place  EXTREMITIES:  2+ Peripheral Pulses, No clubbing, cyanosis, or edema  NEUROLOGY: R weakness s/pld CVA  SKIN: No rashes or lesions    LABS:                        13.0   4.68  )-----------( 182      ( 03 Dec 2017 08:27 )             37.7     12-03    138  |  98  |  22  ----------------------------<  91  4.5   |  26  |  1.09    Ca    10.0      03 Dec 2017 08:36  Phos  3.5     12-02  Mg     2.0     12-02                RADIOLOGY & ADDITIONAL TESTS:    Imaging Personally Reviewed:    Consultant(s) Notes Reviewed:      Care Discussed with Consultants/Other Providers:

## 2017-12-03 NOTE — PROGRESS NOTE ADULT - ASSESSMENT
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  Mental status change, Possible seizure- continue Keppra, Neurology follow noted. MRI brain pending   Gastric cancer metastatic- Oncology follow.  Pelvic mass-GI evaluation noted. For rectal EUS/ biopsy. hold ASA/Plavix.  Prostate cancer- Rx as per Oncology Urology evaluation noted  s/p PEG replacement. Continue Peg feeds.  Pain control/ Pain management evaluation noted.  HTN control  CAD stable.  Depression- continue Rx  Functional quadriplegia -supportive care  PT  Jose Medina MD pager [phone]

## 2017-12-04 LAB
ALBUMIN SERPL ELPH-MCNC: 3.2 G/DL — LOW (ref 3.3–5)
ALP SERPL-CCNC: 84 U/L — SIGNIFICANT CHANGE UP (ref 40–120)
ALT FLD-CCNC: 20 U/L — SIGNIFICANT CHANGE UP (ref 10–45)
ANION GAP SERPL CALC-SCNC: 13 MMOL/L — SIGNIFICANT CHANGE UP (ref 5–17)
APTT BLD: 30.7 SEC — SIGNIFICANT CHANGE UP (ref 27.5–37.4)
AST SERPL-CCNC: 29 U/L — SIGNIFICANT CHANGE UP (ref 10–40)
BILIRUB SERPL-MCNC: 0.5 MG/DL — SIGNIFICANT CHANGE UP (ref 0.2–1.2)
BUN SERPL-MCNC: 20 MG/DL — SIGNIFICANT CHANGE UP (ref 7–23)
CALCIUM SERPL-MCNC: 9.5 MG/DL — SIGNIFICANT CHANGE UP (ref 8.4–10.5)
CHLORIDE SERPL-SCNC: 98 MMOL/L — SIGNIFICANT CHANGE UP (ref 96–108)
CO2 SERPL-SCNC: 25 MMOL/L — SIGNIFICANT CHANGE UP (ref 22–31)
CREAT SERPL-MCNC: 1 MG/DL — SIGNIFICANT CHANGE UP (ref 0.5–1.3)
GLUCOSE SERPL-MCNC: 115 MG/DL — HIGH (ref 70–99)
HCT VFR BLD CALC: 33.3 % — LOW (ref 39–50)
HGB BLD-MCNC: 11.4 G/DL — LOW (ref 13–17)
INR BLD: 1.02 RATIO — SIGNIFICANT CHANGE UP (ref 0.88–1.16)
MCHC RBC-ENTMCNC: 34.2 GM/DL — SIGNIFICANT CHANGE UP (ref 32–36)
MCHC RBC-ENTMCNC: 34.5 PG — HIGH (ref 27–34)
MCV RBC AUTO: 100.9 FL — HIGH (ref 80–100)
PLATELET # BLD AUTO: 167 K/UL — SIGNIFICANT CHANGE UP (ref 150–400)
POTASSIUM SERPL-MCNC: 4.4 MMOL/L — SIGNIFICANT CHANGE UP (ref 3.5–5.3)
POTASSIUM SERPL-SCNC: 4.4 MMOL/L — SIGNIFICANT CHANGE UP (ref 3.5–5.3)
PROT SERPL-MCNC: 6.8 G/DL — SIGNIFICANT CHANGE UP (ref 6–8.3)
PROTHROM AB SERPL-ACNC: 11.5 SEC — SIGNIFICANT CHANGE UP (ref 10–13.1)
RBC # BLD: 3.3 M/UL — LOW (ref 4.2–5.8)
RBC # FLD: 13.5 % — SIGNIFICANT CHANGE UP (ref 10.3–14.5)
SODIUM SERPL-SCNC: 136 MMOL/L — SIGNIFICANT CHANGE UP (ref 135–145)
WBC # BLD: 4.01 K/UL — SIGNIFICANT CHANGE UP (ref 3.8–10.5)
WBC # FLD AUTO: 4.01 K/UL — SIGNIFICANT CHANGE UP (ref 3.8–10.5)

## 2017-12-04 RX ADMIN — GABAPENTIN 300 MILLIGRAM(S): 400 CAPSULE ORAL at 21:33

## 2017-12-04 RX ADMIN — GABAPENTIN 300 MILLIGRAM(S): 400 CAPSULE ORAL at 05:56

## 2017-12-04 RX ADMIN — AMLODIPINE BESYLATE 5 MILLIGRAM(S): 2.5 TABLET ORAL at 05:56

## 2017-12-04 RX ADMIN — QUETIAPINE FUMARATE 25 MILLIGRAM(S): 200 TABLET, FILM COATED ORAL at 14:21

## 2017-12-04 RX ADMIN — ATORVASTATIN CALCIUM 80 MILLIGRAM(S): 80 TABLET, FILM COATED ORAL at 21:33

## 2017-12-04 RX ADMIN — LEVETIRACETAM 500 MILLIGRAM(S): 250 TABLET, FILM COATED ORAL at 18:06

## 2017-12-04 RX ADMIN — POLYETHYLENE GLYCOL 3350 17 GRAM(S): 17 POWDER, FOR SOLUTION ORAL at 14:21

## 2017-12-04 RX ADMIN — FENTANYL CITRATE 200 MICROGRAM(S): 50 INJECTION INTRAVENOUS at 16:55

## 2017-12-04 RX ADMIN — HEPARIN SODIUM 5000 UNIT(S): 5000 INJECTION INTRAVENOUS; SUBCUTANEOUS at 14:21

## 2017-12-04 RX ADMIN — SENNA PLUS 2 TABLET(S): 8.6 TABLET ORAL at 21:33

## 2017-12-04 RX ADMIN — PANTOPRAZOLE SODIUM 40 MILLIGRAM(S): 20 TABLET, DELAYED RELEASE ORAL at 14:21

## 2017-12-04 RX ADMIN — FENTANYL CITRATE 200 MICROGRAM(S): 50 INJECTION INTRAVENOUS at 16:19

## 2017-12-04 RX ADMIN — Medication 12.5 MILLIGRAM(S): at 18:06

## 2017-12-04 RX ADMIN — Medication 12.5 MILLIGRAM(S): at 05:56

## 2017-12-04 RX ADMIN — FENTANYL CITRATE 200 MICROGRAM(S): 50 INJECTION INTRAVENOUS at 21:10

## 2017-12-04 RX ADMIN — FENTANYL CITRATE 200 MICROGRAM(S): 50 INJECTION INTRAVENOUS at 20:11

## 2017-12-04 RX ADMIN — FENTANYL CITRATE 200 MICROGRAM(S): 50 INJECTION INTRAVENOUS at 23:13

## 2017-12-04 RX ADMIN — LEVETIRACETAM 500 MILLIGRAM(S): 250 TABLET, FILM COATED ORAL at 05:56

## 2017-12-04 RX ADMIN — HEPARIN SODIUM 5000 UNIT(S): 5000 INJECTION INTRAVENOUS; SUBCUTANEOUS at 21:41

## 2017-12-04 RX ADMIN — GABAPENTIN 300 MILLIGRAM(S): 400 CAPSULE ORAL at 14:21

## 2017-12-04 NOTE — CHART NOTE - NSCHARTNOTEFT_GEN_A_CORE
Source: Patient [ ]    Family [ ]     other [X ] RN, medical record    Pt seen, sleeping in bed. Per RN , Pt is tolerating EN via PEG well without GI distress. Noted 3BM thus far today.  Per chart  Pt admitted for syncopal episode. Pt with history of Stroke with right sided weakness, aphasia, PEG a/w prostate and stomach cancer on oral chemo. Per chart, Pt code stroke, likely seizure like activity. Pt s/p PEG tube exchange on 11/26. Pt now with pelvic mass concerning for new gastrointestinal stromal tumor vs metastatic prostate cancer.  plan for Rectal Bx tomorrow.     Diet : NPO with EN via PEG       Patient reports no GI distress      Enteral /Parenteral Nutrition: Pt receiving Jevity 1.2 @ 60ml/[id number] via PEG. Goal rate provides 1728kcals and 79.9gm protein. ( 32kcals/kg and 1.48gm pro/kg based on dosing Wt 54kg.)       Current Weight: 116.8lbs, stable.   % Weight Change    Pertinent Medications: MEDICATIONS  (STANDING):  amLODIPine   Tablet 5 milliGRAM(s) Oral daily  atorvastatin 80 milliGRAM(s) Oral at bedtime  gabapentin   Solution 300 milliGRAM(s) Oral three times a day  heparin  Injectable 5000 Unit(s) SubCutaneous every 8 hours  levETIRAcetam  Solution 500 milliGRAM(s) Oral two times a day  metoprolol     tartrate 12.5 milliGRAM(s) Oral two times a day  pantoprazole   Suspension 40 milliGRAM(s) Oral daily  polyethylene glycol 3350 17 Gram(s) Oral daily  QUEtiapine 25 milliGRAM(s) Oral daily  senna 2 Tablet(s) Oral at bedtime  sodium chloride 0.45%. 1000 milliLiter(s) (75 mL/Hr) IV Continuous <Continuous>    MEDICATIONS  (PRN):  fentaNYL     Lozenge 200 MICROGram(s) Transmucosal every 3 hours PRN severe pain    Pertinent Labs:  None    Skin: intact     Estimated Needs:   [X ] no change since previous assessment  [ ] recalculated:       Previous Nutrition Diagnosis:   [ X] Malnutrition          Nutrition Diagnosis is [X ] ongoing, addressed with EN     New Nutrition Diagnosis: [ X] not applicable       Interventions:     Recommend    1. Recommend to continue Jevity1.2 @60ml/[id number] via PEG.        Monitoring and Evaluation:     [ ] PO intake [X ] Tolerance to diet prescription [ X] weights [X ] follow up per protocol    [ X] other: RD remains available Sarah Siegler RD. Pager #[phone]

## 2017-12-04 NOTE — PROGRESS NOTE ADULT - SUBJECTIVE AND OBJECTIVE BOX
Patient is a 72y old  Male who presents with a chief complaint of LOC (23 Nov 2017 18:06)      SUBJECTIVE / OVERNIGHT EVENTS: No new complaints.   Review of Systems  chest pain no  palpitations no  sob no  nausea no  headache no    MEDICATIONS  (STANDING):  amLODIPine   Tablet 5 milliGRAM(s) Oral daily  atorvastatin 80 milliGRAM(s) Oral at bedtime  gabapentin   Solution 300 milliGRAM(s) Oral three times a day  heparin  Injectable 5000 Unit(s) SubCutaneous every 8 hours  levETIRAcetam  Solution 500 milliGRAM(s) Oral two times a day  metoprolol     tartrate 12.5 milliGRAM(s) Oral two times a day  pantoprazole   Suspension 40 milliGRAM(s) Oral daily  polyethylene glycol 3350 17 Gram(s) Oral daily  QUEtiapine 25 milliGRAM(s) Oral daily  senna 2 Tablet(s) Oral at bedtime  sodium chloride 0.45%. 1000 milliLiter(s) (75 mL/Hr) IV Continuous <Continuous>    MEDICATIONS  (PRN):  fentaNYL     Lozenge 200 MICROGram(s) Transmucosal every 3 hours PRN severe pain          PHYSICAL EXAM:  GENERAL: NAD, cachectic   HEAD:  Atraumatic, Normocephalic  EYES: EOMI, PERRLA, conjunctiva and sclera clear  NECK: Supple, No JVD  CHEST/LUNG: Clear to auscultation bilaterally; No wheeze  HEART: Regular rate and rhythm; No murmurs, rubs, or gallops  ABDOMEN: Soft, Nontender, Nondistended; Bowel sounds present PEG in place  EXTREMITIES:  2+ Peripheral Pulses, No clubbing, cyanosis, or edema  PSYCH: AAOx3  NEUROLOGY: non-focal  SKIN: No rashes or lesions    LABS:                        11.4   4.01  )-----------( 167      ( 04 Dec 2017 06:37 )             33.3     12-04    136  |  98  |  20  ----------------------------<  115<H>  4.4   |  25  |  1.00    Ca    9.5      04 Dec 2017 15:54  Phos  3.5     12-02  Mg     2.0     12-02    TPro  6.8  /  Alb  3.2<L>  /  TBili  0.5  /  DBili  x   /  AST  29  /  ALT  20  /  AlkPhos  84  12-04    PT/INR - ( 04 Dec 2017 15:54 )   PT: 11.5 sec;   INR: 1.02 ratio         PTT - ( 04 Dec 2017 15:54 )  PTT:30.7 sec          RADIOLOGY & ADDITIONAL TESTS:    Imaging Personally Reviewed:    Consultant(s) Notes Reviewed:      Care Discussed with Consultants/Other Providers:

## 2017-12-04 NOTE — PROGRESS NOTE ADULT - SUBJECTIVE AND OBJECTIVE BOX
Patient is a 72y old  Male who presents with a chief complaint of LOC (23 Nov 2017 18:06)       Pt is seen and examined  pt is awake and lying in bed  pt seems comfortable and denies any complaints at this time      REVIEW OF SYSTEMS:    CONSTITUTIONAL: No weakness, fevers or chills  EYES/ENT: No visual changes;  No vertigo or throat pain   NECK: No pain or stiffness  RESPIRATORY: No cough, wheezing, hemoptysis; No shortness of breath  CARDIOVASCULAR: No chest pain or palpitations  GASTROINTESTINAL: No abdominal or epigastric pain. No nausea, vomiting, or hematemesis; No diarrhea or constipation. No melena or hematochezia.  GENITOURINARY: No dysuria, frequency or hematuria  NEUROLOGICAL: No numbness or weakness  SKIN: No itching, burning, rashes, or lesions     MEDICATIONS  (STANDING):  amLODIPine   Tablet 5 milliGRAM(s) Oral daily  atorvastatin 80 milliGRAM(s) Oral at bedtime  gabapentin   Solution 300 milliGRAM(s) Oral three times a day  heparin  Injectable 5000 Unit(s) SubCutaneous every 8 hours  levETIRAcetam  Solution 500 milliGRAM(s) Oral two times a day  metoprolol     tartrate 12.5 milliGRAM(s) Oral two times a day  pantoprazole   Suspension 40 milliGRAM(s) Oral daily  polyethylene glycol 3350 17 Gram(s) Oral daily  QUEtiapine 25 milliGRAM(s) Oral daily  senna 2 Tablet(s) Oral at bedtime  sodium chloride 0.45%. 1000 milliLiter(s) (75 mL/Hr) IV Continuous <Continuous>    MEDICATIONS  (PRN):  fentaNYL     Lozenge 200 MICROGram(s) Transmucosal every 3 hours PRN severe pain      Allergies    No Known Allergies    Intolerances        Vital Signs Last 24 Hrs  T(C): 36.7 (04 Dec 2017 04:41), Max: 37 (03 Dec 2017 11:40)  T(F): 98 (04 Dec 2017 04:41), Max: 98.6 (03 Dec 2017 11:40)  HR: 71 (04 Dec 2017 04:41) (67 - 73)  BP: 130/72 (04 Dec 2017 04:41) (130/72 - 156/80)  BP(mean): --  RR: 17 (04 Dec 2017 04:41) (17 - 18)  SpO2: 93% (04 Dec 2017 04:41) (93% - 98%)    PHYSICAL EXAM  General: adult in NAD  HEENT: clear oropharynx, anicteric sclera, pink conjunctiva  Neck: supple  CV: normal S1/S2 with no murmur rubs or gallops  Lungs: positive air movement b/l ant lungs,clear to auscultation, no wheezes, no rales  Abdomen: soft non-tender non-distended, no hepatosplenomegaly  Ext: no clubbing cyanosis or edema  Skin: no rashes and no petechiae  Neuro: alert and oriented X 4, no focal deficits    LABS:                        11.4   4.01  )-----------( 167      ( 04 Dec 2017 06:37 )             33.3     Mean Cell Volume : 100.9 fl  Mean Cell Hemoglobin : 34.5 pg  Mean Cell Hemoglobin Concentration : 34.2 gm/dL        12-03    138  |  98  |  22  ----------------------------<  91  4.5   |  26  |  1.09    Ca    10.0      03 Dec 2017 08:36  Phos  3.5     12-02  Mg     2.0     12-02          RADIOLOGY & ADDITIONAL STUDIES:    Assessment

## 2017-12-04 NOTE — PROGRESS NOTE ADULT - ASSESSMENT
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  Mental status change, Possible seizure- continue Keppra, Neurology follow noted. MRI brain pending   Gastric cancer metastatic- Oncology follow.  Pelvic mass-GI evaluation noted. For rectal EUS/ biopsy in am. hold ASA/Plavix.  Prostate cancer- Rx as per Oncology Urology evaluation noted  s/p PEG replacement. Continue Peg feeds.  Pain control/ Pain management evaluation noted.  HTN control  CAD stable.  Depression- continue Rx  Functional quadriplegia -supportive care  PT  Jose Medina MD pager [phone]

## 2017-12-05 LAB
APTT BLD: 30.9 SEC — SIGNIFICANT CHANGE UP (ref 27.5–37.4)
BLD GP AB SCN SERPL QL: NEGATIVE — SIGNIFICANT CHANGE UP
HCT VFR BLD CALC: 32.6 % — LOW (ref 39–50)
HGB BLD-MCNC: 11 G/DL — LOW (ref 13–17)
INR BLD: 1.04 RATIO — SIGNIFICANT CHANGE UP (ref 0.88–1.16)
MCHC RBC-ENTMCNC: 33.7 GM/DL — SIGNIFICANT CHANGE UP (ref 32–36)
MCHC RBC-ENTMCNC: 34.4 PG — HIGH (ref 27–34)
MCV RBC AUTO: 101.9 FL — HIGH (ref 80–100)
PLATELET # BLD AUTO: 167 K/UL — SIGNIFICANT CHANGE UP (ref 150–400)
PROTHROM AB SERPL-ACNC: 11.8 SEC — SIGNIFICANT CHANGE UP (ref 10–13.1)
RBC # BLD: 3.2 M/UL — LOW (ref 4.2–5.8)
RBC # FLD: 13.5 % — SIGNIFICANT CHANGE UP (ref 10.3–14.5)
RH IG SCN BLD-IMP: POSITIVE — SIGNIFICANT CHANGE UP
WBC # BLD: 4.14 K/UL — SIGNIFICANT CHANGE UP (ref 3.8–10.5)
WBC # FLD AUTO: 4.14 K/UL — SIGNIFICANT CHANGE UP (ref 3.8–10.5)

## 2017-12-05 PROCEDURE — 99232 SBSQ HOSP IP/OBS MODERATE 35: CPT

## 2017-12-05 RX ORDER — FENTANYL CITRATE 50 UG/ML
200 INJECTION INTRAVENOUS
Qty: 0 | Refills: 0 | Status: DISCONTINUED | OUTPATIENT
Start: 2017-12-05 | End: 2017-12-12

## 2017-12-05 RX ADMIN — Medication 12.5 MILLIGRAM(S): at 05:27

## 2017-12-05 RX ADMIN — POLYETHYLENE GLYCOL 3350 17 GRAM(S): 17 POWDER, FOR SOLUTION ORAL at 17:23

## 2017-12-05 RX ADMIN — LEVETIRACETAM 500 MILLIGRAM(S): 250 TABLET, FILM COATED ORAL at 05:27

## 2017-12-05 RX ADMIN — QUETIAPINE FUMARATE 25 MILLIGRAM(S): 200 TABLET, FILM COATED ORAL at 17:24

## 2017-12-05 RX ADMIN — FENTANYL CITRATE 200 MICROGRAM(S): 50 INJECTION INTRAVENOUS at 14:34

## 2017-12-05 RX ADMIN — FENTANYL CITRATE 200 MICROGRAM(S): 50 INJECTION INTRAVENOUS at 19:53

## 2017-12-05 RX ADMIN — Medication 12.5 MILLIGRAM(S): at 17:23

## 2017-12-05 RX ADMIN — FENTANYL CITRATE 200 MICROGRAM(S): 50 INJECTION INTRAVENOUS at 00:13

## 2017-12-05 RX ADMIN — AMLODIPINE BESYLATE 5 MILLIGRAM(S): 2.5 TABLET ORAL at 05:27

## 2017-12-05 RX ADMIN — FENTANYL CITRATE 200 MICROGRAM(S): 50 INJECTION INTRAVENOUS at 06:51

## 2017-12-05 RX ADMIN — GABAPENTIN 300 MILLIGRAM(S): 400 CAPSULE ORAL at 05:27

## 2017-12-05 RX ADMIN — SENNA PLUS 2 TABLET(S): 8.6 TABLET ORAL at 22:05

## 2017-12-05 RX ADMIN — PANTOPRAZOLE SODIUM 40 MILLIGRAM(S): 20 TABLET, DELAYED RELEASE ORAL at 17:23

## 2017-12-05 RX ADMIN — HEPARIN SODIUM 5000 UNIT(S): 5000 INJECTION INTRAVENOUS; SUBCUTANEOUS at 22:05

## 2017-12-05 RX ADMIN — ATORVASTATIN CALCIUM 80 MILLIGRAM(S): 80 TABLET, FILM COATED ORAL at 22:05

## 2017-12-05 RX ADMIN — LEVETIRACETAM 500 MILLIGRAM(S): 250 TABLET, FILM COATED ORAL at 17:23

## 2017-12-05 RX ADMIN — FENTANYL CITRATE 200 MICROGRAM(S): 50 INJECTION INTRAVENOUS at 06:06

## 2017-12-05 RX ADMIN — GABAPENTIN 300 MILLIGRAM(S): 400 CAPSULE ORAL at 22:05

## 2017-12-05 NOTE — PROGRESS NOTE ADULT - SUBJECTIVE AND OBJECTIVE BOX
Patient is a 72y old  Male who presents with a chief complaint of LOC (23 Nov 2017 18:06)       Pt is seen and examined  pt is awake and lying in bed  pt seems comfortable and denies any complaints at this time; scheduled for EUS rectum today      REVIEW OF SYSTEMS:    CONSTITUTIONAL: No weakness, fevers or chills  EYES/ENT: No visual changes;  No vertigo or throat pain   NECK: No pain or stiffness  RESPIRATORY: No cough, wheezing, hemoptysis; No shortness of breath  CARDIOVASCULAR: No chest pain or palpitations  GASTROINTESTINAL: No abdominal or epigastric pain. No nausea, vomiting, or hematemesis; No diarrhea or constipation. No melena or hematochezia.  GENITOURINARY: No dysuria, frequency or hematuria  NEUROLOGICAL: No numbness or weakness  SKIN: No itching, burning, rashes, or lesions     MEDICATIONS  (STANDING):  amLODIPine   Tablet 5 milliGRAM(s) Oral daily  atorvastatin 80 milliGRAM(s) Oral at bedtime  gabapentin   Solution 300 milliGRAM(s) Oral three times a day  heparin  Injectable 5000 Unit(s) SubCutaneous every 8 hours  levETIRAcetam  Solution 500 milliGRAM(s) Oral two times a day  metoprolol     tartrate 12.5 milliGRAM(s) Oral two times a day  pantoprazole   Suspension 40 milliGRAM(s) Oral daily  polyethylene glycol 3350 17 Gram(s) Oral daily  QUEtiapine 25 milliGRAM(s) Oral daily  senna 2 Tablet(s) Oral at bedtime  sodium chloride 0.45%. 1000 milliLiter(s) (75 mL/Hr) IV Continuous <Continuous>    MEDICATIONS  (PRN):  fentaNYL     Lozenge 200 MICROGram(s) Transmucosal every 3 hours PRN severe pain      Allergies    No Known Allergies    Intolerances            PHYSICAL EXAM  Vital Signs Last 24 Hrs  T(C): 36.7 (05 Dec 2017 04:35), Max: 36.9 (04 Dec 2017 21:40)  T(F): 98 (05 Dec 2017 04:35), Max: 98.5 (04 Dec 2017 21:40)  HR: 78 (05 Dec 2017 04:35) (71 - 78)  BP: 134/80 (05 Dec 2017 04:35) (134/80 - 144/82)  BP(mean): --  RR: 18 (05 Dec 2017 04:35) (18 - 18)  SpO2: 96% (05 Dec 2017 04:35) (95% - 97%)  General: adult in NAD  HEENT: clear oropharynx, anicteric sclera, pink conjunctiva  Neck: supple  CV: normal S1/S2 with no murmur rubs or gallops  Lungs: positive air movement b/l ant lungs,clear to auscultation, no wheezes, no rales  Abdomen: soft non-tender non-distended, no hepatosplenomegaly  Ext: no clubbing cyanosis or edema  Skin: no rashes and no petechiae  Neuro: alert and oriented X 4, no focal deficits    LABS:                          11.4   4.01  )-----------( 167      ( 04 Dec 2017 06:37 )             33.3       Mean Cell Volume : 100.9 fl  Mean Cell Hemoglobin : 34.5 pg  Mean Cell Hemoglobin Concentration : 34.2 gm/dL        12-04    136  |  98  |  20  ----------------------------<  115<H>  4.4   |  25  |  1.00    Ca    9.5      04 Dec 2017 15:54    TPro  6.8  /  Alb  3.2<L>  /  TBili  0.5  /  DBili  x   /  AST  29  /  ALT  20  /  AlkPhos  84  12-04      PT/INR - ( 04 Dec 2017 15:54 )   PT: 11.5 sec;   INR: 1.02 ratio         PTT - ( 04 Dec 2017 15:54 )  PTT:30.7 sec          Assessment

## 2017-12-05 NOTE — PROGRESS NOTE ADULT - ASSESSMENT
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  Mental status change, Possible seizure- continue Keppra, Neurology follow noted. MRI brain pending   Gastric cancer metastatic- Oncology follow.  Pelvic mass-GI evaluation noted. For rectal EUS/ biopsy in am. hold ASA/Plavix.  Prostate cancer- Rx as per Oncology Urology evaluation noted  s/p PEG replacement. Continue Peg feeds.  Pain control/ Pain management evaluation noted.  HTN control  CAD stable.  Depression- continue Rx  Functional quadriplegia -supportive care  PT  Jose Medina MD pager [phone]

## 2017-12-05 NOTE — PROGRESS NOTE ADULT - SUBJECTIVE AND OBJECTIVE BOX
SUBJ:  Laying comfortably. Unable to obtain history due to aphasia. Nods yes or no to some questions that he has no pain. No other episodes of NSVT on telemetry, rare PVCs.     MEDICATIONS  (STANDING):  amLODIPine   Tablet 5 milliGRAM(s) Oral daily  atorvastatin 80 milliGRAM(s) Oral at bedtime  gabapentin   Solution 300 milliGRAM(s) Oral three times a day  heparin  Injectable 5000 Unit(s) SubCutaneous every 8 hours  levETIRAcetam  Solution 500 milliGRAM(s) Oral two times a day  metoprolol     tartrate 12.5 milliGRAM(s) Oral two times a day  pantoprazole   Suspension 40 milliGRAM(s) Oral daily  polyethylene glycol 3350 17 Gram(s) Oral daily  QUEtiapine 25 milliGRAM(s) Oral daily  senna 2 Tablet(s) Oral at bedtime  sodium chloride 0.45%. 1000 milliLiter(s) (75 mL/Hr) IV Continuous <Continuous>    MEDICATIONS  (PRN):  fentaNYL     Lozenge 200 MICROGram(s) Transmucosal every 3 hours PRN severe pain    Vital Signs Last 24 Hrs  T(C): 36.6 (05 Dec 2017 11:48), Max: 36.9 (04 Dec 2017 21:40)  T(F): 97.9 (05 Dec 2017 11:48), Max: 98.5 (04 Dec 2017 21:40)  HR: 61 (05 Dec 2017 11:48) (61 - 78)  BP: 157/84 (05 Dec 2017 11:48) (134/80 - 157/84)  BP(mean): --  RR: 18 (05 Dec 2017 11:48) (18 - 18)  SpO2: 95% (05 Dec 2017 11:48) (95% - 97%)    REVIEW OF SYSTEMS:  Unable to obtain due to aphasia.     PHYSICAL EXAM:  · CONSTITUTIONAL: Well-developed, well nourished      · EYES: EOMI  · NECK: Supple  ·RESPIRATORY:   airway patent; breath sounds equal; good air movement; respirations non-labored  · CARDIOVASCULAR: regular rate and rhythm  . GASTROINTESTINAL:  no distention; no masses palpable  · EXTREMITIES: No cyanosis, clubbing or edema  · VASCULAR:  Equal and normal pulses (radial, femoral)  · SKIN: No lesions; no rash  . LYMPH NODES: No lymphadedenopathy  · MUSCULOSKELETAL:  No calf tenderness 	    TELEMETRY: rare PVCs, no NSVT within the last 24 hours.     LABS:   CBC Full  -  ( 05 Dec 2017 08:46 )  WBC Count : 4.14 K/uL  Hemoglobin : 11.0 g/dL  Hematocrit : 32.6 %  Platelet Count - Automated : 167 K/uL  Mean Cell Volume : 101.9 fl  Mean Cell Hemoglobin : 34.4 pg  Mean Cell Hemoglobin Concentration : 33.7 gm/dL    12-04    136  |  98  |  20  ----------------------------<  115<H>  4.4   |  25  |  1.00    Ca    9.5      04 Dec 2017 15:54    TPro  6.8  /  Alb  3.2<L>  /  TBili  0.5  /  DBili  x   /  AST  29  /  ALT  20  /  AlkPhos  84  12-04    IMPRESSION AND PLAN:  72y.o. 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     1. NSVT  Rare episodes.   Likely secondary to structural heart disease (Moderate AR, LVH, diastolic dysfunction).   [facility] 2010 with non-obstructive CAD.    ·	Cont Metoprolol 12.5 mg po BID  ·	Maintain K>4, Mag>2  ·	Pending ECHO, This can be canceled if ready for discharge. I don't anticipate any new changes from his known pathology.   ·	No further testing is indicated for any upcoming procedures as he is as optimized as he can be from a cardiac standpoint. RCRI 2 with 6.6% risk of MACE event smith-operatively.     2. CAD   [facility] 2010 with non-obstructive CAD    ·	Holding ASA/Plavix for EUS/rectal mass biopsy, and can be resumed when safe thereafter.     3. HTN  BP controlled    ·	Cont Norvasc 5mg po QD    4. HL   CAD history     ·	Cont atorvastatin 80 mg po QHS    Signing off the case as there are no active cardiac issues. Call with questions.     Xiomy Stauffer MD, MPH, MALACHI  Cardiovascular Specialist Attending  [facility]  C: [phone]  E: [email or web address]  (Cardiology nocturnist available every night 7 pm - 7 am; available week days for follow-up; general cardiology consult coverage weekend days)

## 2017-12-05 NOTE — PROGRESS NOTE ADULT - SUBJECTIVE AND OBJECTIVE BOX
Patient is a 72y old  Male who presents with a chief complaint of LOC (23 Nov 2017 18:06)      SUBJECTIVE / OVERNIGHT EVENTS: Comfortable without new complaints.   Review of Systems  chest pain no  palpitations no  sob no  nausea no  headache no    MEDICATIONS  (STANDING):  amLODIPine   Tablet 5 milliGRAM(s) Oral daily  atorvastatin 80 milliGRAM(s) Oral at bedtime  gabapentin   Solution 300 milliGRAM(s) Oral three times a day  heparin  Injectable 5000 Unit(s) SubCutaneous every 8 hours  levETIRAcetam  Solution 500 milliGRAM(s) Oral two times a day  metoprolol     tartrate 12.5 milliGRAM(s) Oral two times a day  pantoprazole   Suspension 40 milliGRAM(s) Oral daily  polyethylene glycol 3350 17 Gram(s) Oral daily  QUEtiapine 25 milliGRAM(s) Oral daily  senna 2 Tablet(s) Oral at bedtime  sodium chloride 0.45%. 1000 milliLiter(s) (75 mL/Hr) IV Continuous <Continuous>    MEDICATIONS  (PRN):  fentaNYL     Lozenge 200 MICROGram(s) Transmucosal every 3 hours PRN severe pain          PHYSICAL EXAM:  GENERAL: NAD, cachectic   HEAD:  Atraumatic, Normocephalic  EYES: EOMI, PERRLA, conjunctiva and sclera clear  NECK: Supple, No JVD  CHEST/LUNG: Clear to auscultation bilaterally; No wheeze  HEART: Regular rate and rhythm; No murmurs, rubs, or gallops  ABDOMEN: Soft, Nontender, Nondistended; Bowel sounds present PEG in place  EXTREMITIES:  2+ Peripheral Pulses, No clubbing, cyanosis, or edema  PSYCH: AAOx3  NEUROLOGY: non-focal  SKIN: No rashes or lesions    LABS:                        11.0   4.14  )-----------( 167      ( 05 Dec 2017 08:46 )             32.6     12-04    136  |  98  |  20  ----------------------------<  115<H>  4.4   |  25  |  1.00    Ca    9.5      04 Dec 2017 15:54    TPro  6.8  /  Alb  3.2<L>  /  TBili  0.5  /  DBili  x   /  AST  29  /  ALT  20  /  AlkPhos  84  12-04    PT/INR - ( 05 Dec 2017 08:46 )   PT: 11.8 sec;   INR: 1.04 ratio         PTT - ( 05 Dec 2017 08:46 )  PTT:30.9 sec          RADIOLOGY & ADDITIONAL TESTS:    Imaging Personally Reviewed:    Consultant(s) Notes Reviewed:      Care Discussed with Consultants/Other Providers:

## 2017-12-06 LAB
HCT VFR BLD CALC: 33 % — LOW (ref 39–50)
HGB BLD-MCNC: 11.4 G/DL — LOW (ref 13–17)
MCHC RBC-ENTMCNC: 34.5 GM/DL — SIGNIFICANT CHANGE UP (ref 32–36)
MCHC RBC-ENTMCNC: 34.9 PG — HIGH (ref 27–34)
MCV RBC AUTO: 100.9 FL — HIGH (ref 80–100)
PLATELET # BLD AUTO: 164 K/UL — SIGNIFICANT CHANGE UP (ref 150–400)
RBC # BLD: 3.27 M/UL — LOW (ref 4.2–5.8)
RBC # FLD: 13.3 % — SIGNIFICANT CHANGE UP (ref 10.3–14.5)
WBC # BLD: 3.37 K/UL — LOW (ref 3.8–10.5)
WBC # FLD AUTO: 3.37 K/UL — LOW (ref 3.8–10.5)

## 2017-12-06 PROCEDURE — 45330 DIAGNOSTIC SIGMOIDOSCOPY: CPT | Mod: 53,GC

## 2017-12-06 PROCEDURE — 45341 SIGMOIDOSCOPY W/ULTRASOUND: CPT | Mod: 53,GC

## 2017-12-06 RX ORDER — HYDROMORPHONE HYDROCHLORIDE 2 MG/ML
1 INJECTION INTRAMUSCULAR; INTRAVENOUS; SUBCUTANEOUS ONCE
Qty: 0 | Refills: 0 | Status: DISCONTINUED | OUTPATIENT
Start: 2017-12-06 | End: 2017-12-06

## 2017-12-06 RX ADMIN — FENTANYL CITRATE 200 MICROGRAM(S): 50 INJECTION INTRAVENOUS at 16:02

## 2017-12-06 RX ADMIN — PANTOPRAZOLE SODIUM 40 MILLIGRAM(S): 20 TABLET, DELAYED RELEASE ORAL at 16:04

## 2017-12-06 RX ADMIN — FENTANYL CITRATE 200 MICROGRAM(S): 50 INJECTION INTRAVENOUS at 19:25

## 2017-12-06 RX ADMIN — FENTANYL CITRATE 200 MICROGRAM(S): 50 INJECTION INTRAVENOUS at 18:55

## 2017-12-06 RX ADMIN — FENTANYL CITRATE 200 MICROGRAM(S): 50 INJECTION INTRAVENOUS at 11:28

## 2017-12-06 RX ADMIN — LEVETIRACETAM 500 MILLIGRAM(S): 250 TABLET, FILM COATED ORAL at 16:04

## 2017-12-06 RX ADMIN — SENNA PLUS 2 TABLET(S): 8.6 TABLET ORAL at 22:47

## 2017-12-06 RX ADMIN — HEPARIN SODIUM 5000 UNIT(S): 5000 INJECTION INTRAVENOUS; SUBCUTANEOUS at 05:01

## 2017-12-06 RX ADMIN — QUETIAPINE FUMARATE 25 MILLIGRAM(S): 200 TABLET, FILM COATED ORAL at 16:04

## 2017-12-06 RX ADMIN — FENTANYL CITRATE 200 MICROGRAM(S): 50 INJECTION INTRAVENOUS at 00:34

## 2017-12-06 RX ADMIN — GABAPENTIN 300 MILLIGRAM(S): 400 CAPSULE ORAL at 22:47

## 2017-12-06 RX ADMIN — FENTANYL CITRATE 200 MICROGRAM(S): 50 INJECTION INTRAVENOUS at 03:56

## 2017-12-06 RX ADMIN — POLYETHYLENE GLYCOL 3350 17 GRAM(S): 17 POWDER, FOR SOLUTION ORAL at 16:04

## 2017-12-06 RX ADMIN — HEPARIN SODIUM 5000 UNIT(S): 5000 INJECTION INTRAVENOUS; SUBCUTANEOUS at 22:47

## 2017-12-06 RX ADMIN — FENTANYL CITRATE 200 MICROGRAM(S): 50 INJECTION INTRAVENOUS at 11:55

## 2017-12-06 RX ADMIN — FENTANYL CITRATE 200 MICROGRAM(S): 50 INJECTION INTRAVENOUS at 04:26

## 2017-12-06 RX ADMIN — Medication 12.5 MILLIGRAM(S): at 16:04

## 2017-12-06 RX ADMIN — LEVETIRACETAM 500 MILLIGRAM(S): 250 TABLET, FILM COATED ORAL at 05:00

## 2017-12-06 RX ADMIN — GABAPENTIN 300 MILLIGRAM(S): 400 CAPSULE ORAL at 05:01

## 2017-12-06 RX ADMIN — AMLODIPINE BESYLATE 5 MILLIGRAM(S): 2.5 TABLET ORAL at 05:00

## 2017-12-06 RX ADMIN — HYDROMORPHONE HYDROCHLORIDE 1 MILLIGRAM(S): 2 INJECTION INTRAMUSCULAR; INTRAVENOUS; SUBCUTANEOUS at 21:09

## 2017-12-06 RX ADMIN — FENTANYL CITRATE 200 MICROGRAM(S): 50 INJECTION INTRAVENOUS at 16:43

## 2017-12-06 RX ADMIN — ATORVASTATIN CALCIUM 80 MILLIGRAM(S): 80 TABLET, FILM COATED ORAL at 22:47

## 2017-12-06 NOTE — PROGRESS NOTE ADULT - SUBJECTIVE AND OBJECTIVE BOX
Patient is a 72y old  Male who presents with a chief complaint of LOC (23 Nov 2017 18:06)      SUBJECTIVE / OVERNIGHT EVENTS: Comfortable.  Review of Systems  unobtainable     MEDICATIONS  (STANDING):  amLODIPine   Tablet 5 milliGRAM(s) Oral daily  atorvastatin 80 milliGRAM(s) Oral at bedtime  gabapentin   Solution 300 milliGRAM(s) Oral three times a day  heparin  Injectable 5000 Unit(s) SubCutaneous every 8 hours  levETIRAcetam  Solution 500 milliGRAM(s) Oral two times a day  metoprolol     tartrate 12.5 milliGRAM(s) Oral two times a day  pantoprazole   Suspension 40 milliGRAM(s) Oral daily  polyethylene glycol 3350 17 Gram(s) Oral daily  QUEtiapine 25 milliGRAM(s) Oral daily  senna 2 Tablet(s) Oral at bedtime  sodium chloride 0.45%. 1000 milliLiter(s) (75 mL/Hr) IV Continuous <Continuous>    MEDICATIONS  (PRN):  fentaNYL     Lozenge 200 MICROGram(s) Transmucosal every 3 hours PRN severe pain          PHYSICAL EXAM:  GENERAL: NAD, well-developed  HEAD:  Atraumatic, Normocephalic  EYES: EOMI, PERRLA, conjunctiva and sclera clear  NECK: Supple, No JVD  CHEST/LUNG: Clear to auscultation bilaterally; No wheeze  HEART: Regular rate and rhythm; No murmurs, rubs, or gallops  ABDOMEN: Soft, Nontender, Nondistended; Bowel sounds present PEG in place.  EXTREMITIES:  2+ Peripheral Pulses, No clubbing, cyanosis, or edema  NEUROLOGY: R side weakness s/p CVA  SKIN: No rashes or lesions    LABS:                        11.4   3.37  )-----------( 164      ( 06 Dec 2017 07:26 )             33.0           PT/INR - ( 05 Dec 2017 08:46 )   PT: 11.8 sec;   INR: 1.04 ratio         PTT - ( 05 Dec 2017 08:46 )  PTT:30.9 sec          RADIOLOGY & ADDITIONAL TESTS:    Imaging Personally Reviewed:    Consultant(s) Notes Reviewed:      Care Discussed with Consultants/Other Providers:

## 2017-12-06 NOTE — PHYSICAL THERAPY INITIAL EVALUATION ADULT - PERTINENT HX OF CURRENT PROBLEM, REHAB EVAL
72 year old male w/ h/o stroke with residual right sided weakness, aphasia, s/p PEG tube, Hypertension, HLD, prostate and stomach cancer on oral chemo who presented to ED for syncopal episode. As per wife, the patient was last seen normal at about 8AM.  contd below:

## 2017-12-06 NOTE — PHYSICAL THERAPY INITIAL EVALUATION ADULT - CRITERIA FOR SKILLED THERAPEUTIC INTERVENTIONS
predicted duration of therapy intervention/anticipated equipment needs at discharge/functional limitations in following categories/impairments found/therapy frequency/anticipated discharge recommendation

## 2017-12-06 NOTE — PHYSICAL THERAPY INITIAL EVALUATION ADULT - ADDITIONAL COMMENTS
contd from above: At that time he did not want to get out of bed this morning, so he was left there in bed. Several hours later his wife attempted to help get the patient out of bed so he could go top the bathroom, and when she did so, his legs buckled and he began to shake his right upper extremity. During episode, eyes rolled back. Was less responsive then his normal self for about 15-20 minutes, taking longer to respond to questions. MRI head: (-)hem; CT abd/pelvis: new large soft tissue mass right pelvis contd from above: At that time he did not want to get out of bed this morning, so he was left there in bed. Several hours later his wife attempted to help get the patient out of bed so he could go top the bathroom, and when she did so, his legs buckled and he began to shake his right upper extremity. During episode, eyes rolled back. Was less responsive then his normal self for about 15-20 minutes, taking longer to respond to questions. MRI head: (-)hem; CT abd/pelvis: new large soft tissue mass right pelvis    social history: pt poor historian, as per chart pt has HHA 24h

## 2017-12-06 NOTE — PROGRESS NOTE ADULT - SUBJECTIVE AND OBJECTIVE BOX
Pt is seen and examined  pt is awake and lying in bed  pt seems comfortable   Seems more withdrawn, ?depressed.        MEDICATIONS  (STANDING):  amLODIPine   Tablet 5 milliGRAM(s) Oral daily  atorvastatin 80 milliGRAM(s) Oral at bedtime  gabapentin   Solution 300 milliGRAM(s) Oral three times a day  heparin  Injectable 5000 Unit(s) SubCutaneous every 8 hours  levETIRAcetam  Solution 500 milliGRAM(s) Oral two times a day  metoprolol     tartrate 12.5 milliGRAM(s) Oral two times a day  pantoprazole   Suspension 40 milliGRAM(s) Oral daily  polyethylene glycol 3350 17 Gram(s) Oral daily  QUEtiapine 25 milliGRAM(s) Oral daily  senna 2 Tablet(s) Oral at bedtime  sodium chloride 0.45%. 1000 milliLiter(s) (75 mL/Hr) IV Continuous <Continuous>    MEDICATIONS  (PRN):  fentaNYL     Lozenge 200 MICROGram(s) Transmucosal every 3 hours PRN severe pain      Allergies    No Known Allergies    Intolerances        Vital Signs Last 24 Hrs  T(C): 36.5 (06 Dec 2017 04:53), Max: 36.7 (05 Dec 2017 20:57)  T(F): 97.7 (06 Dec 2017 04:53), Max: 98 (05 Dec 2017 20:57)  HR: 60 (06 Dec 2017 04:53) (60 - 63)  BP: 137/74 (06 Dec 2017 04:53) (137/74 - 157/84)  BP(mean): --  RR: 18 (06 Dec 2017 04:53) (18 - 18)  SpO2: 96% (06 Dec 2017 04:53) (95% - 96%)    PHYSICAL EXAM  General: adult in NAD  HEENT:  anicteric sclera, pink conjunctiva  Abdomen: soft non-tender non-distended, no hepatosplenomegaly  Ext: no clubbing cyanosis or edema  Neuro: alert and  no focal deficits  LABS:                          11.4   3.37  )-----------( 164      ( 06 Dec 2017 07:26 )             33.0         Mean Cell Volume : 100.9 fl  Mean Cell Hemoglobin : 34.9 pg  Mean Cell Hemoglobin Concentration : 34.5 gm/dL  Auto Neutrophil # : x  Auto Lymphocyte # : x  Auto Monocyte # : x  Auto Eosinophil # : x  Auto Basophil # : x  Auto Neutrophil % : x  Auto Lymphocyte % : x  Auto Monocyte % : x  Auto Eosinophil % : x  Auto Basophil % : x      12-04    136  |  98  |  20  ----------------------------<  115<H>  4.4   |  25  |  1.00    Ca    9.5      04 Dec 2017 15:54    TPro  6.8  /  Alb  3.2<L>  /  TBili  0.5  /  DBili  x   /  AST  29  /  ALT  20  /  AlkPhos  84  12-04      PT/INR - ( 05 Dec 2017 08:46 )   PT: 11.8 sec;   INR: 1.04 ratio         PTT - ( 05 Dec 2017 08:46 )  PTT:30.9 sec      RADIOLOGY & ADDITIONAL STUDIES:

## 2017-12-06 NOTE — PROGRESS NOTE ADULT - SUBJECTIVE AND OBJECTIVE BOX
Pre-Endoscopy Evaluation      Referring Physician:   Dr. Medina                             Procedure: Lower EUS    Indication for Procedure: Pelvic Mass    Pertinent History: 72 year old male with hx of CVA with residual R sided weakness, HTN, HLD, PEG tube, Prostate ca and gastric GIST currently on chemo via PEG who initially presented to ED for LOC likely 2/2 seizure, now with new pelvic mass concerning for metastatic malignancy    Sedation by Anesthesia [X]    PAST MEDICAL & SURGICAL HISTORY:  Prostate hypertrophy  HLD (hyperlipidemia)  Agitation  Depression  Stroke  Syncope: 2013, 2010  HTN (hypertension)  Gastric tumor  Gastric ulcer with hemorrhage, perforation, and obstruction, acute  No Past Surgical History      PMH of Gastroparesis [ ]  Gastric Surgery [ ]  Gastric Outlet Obstruction [ ]    Allergies    No Known Allergies    Intolerances    Latex allergy: [ ] yes [x] no    Medications:MEDICATIONS  (STANDING):  amLODIPine   Tablet 5 milliGRAM(s) Oral daily  atorvastatin 80 milliGRAM(s) Oral at bedtime  gabapentin   Solution 300 milliGRAM(s) Oral three times a day  heparin  Injectable 5000 Unit(s) SubCutaneous every 8 hours  levETIRAcetam  Solution 500 milliGRAM(s) Oral two times a day  metoprolol     tartrate 12.5 milliGRAM(s) Oral two times a day  pantoprazole   Suspension 40 milliGRAM(s) Oral daily  polyethylene glycol 3350 17 Gram(s) Oral daily  QUEtiapine 25 milliGRAM(s) Oral daily  senna 2 Tablet(s) Oral at bedtime  sodium chloride 0.45%. 1000 milliLiter(s) (75 mL/Hr) IV Continuous <Continuous>    MEDICATIONS  (PRN):  fentaNYL     Lozenge 200 MICROGram(s) Transmucosal every 3 hours PRN severe pain      Smoking: [ ] yes  [x] no    AICD/PPM: [ ] yes   [x] no    Pertinent lab data:                        11.4   3.37  )-----------( 164      ( 06 Dec 2017 07:26 )             33.0     12-04    136  |  98  |  20  ----------------------------<  115<H>  4.4   |  25  |  1.00    Ca    9.5      04 Dec 2017 15:54    TPro  6.8  /  Alb  3.2<L>  /  TBili  0.5  /  DBili  x   /  AST  29  /  ALT  20  /  AlkPhos  84  12-04    PT/INR - ( 05 Dec 2017 08:46 )   PT: 11.8 sec;   INR: 1.04 ratio       PTT - ( 05 Dec 2017 08:46 )  PTT:30.9 sec    < from: Transthoracic Echocardiogram (16 @ 18:04) >  Patient name: OTILIA PADGETT  YOB: 1945   Age: 71 (M)   MR#: [id number]  Study Date: 2016  [facility]  Study quality: Technically good  Referring Physician: Richard B Libman, MD  Blood Pressure: 145/90 mmHg  Height: 5ft 6in  Weight: 135 lb  BSA: 1.7 m2  ------------------------------------------------------------------------  PROCEDURE: Transthoracic echocardiogram with 2-D, M-Mode  and complete spectral and color flow Doppler.  INDICATION: Other cerebral infarction (I63.8)  ------------------------------------------------------------------------  Dimensions:    Normal Values:  LA:     3.3    2.0 - 4.0 cm  Ao:     3.4    2.0 - 3.8 cm  SEPTUM: 1.2    0.6 - 1.2 cm  PWT:    1.2    0.6 - 1.1 cm  LVIDd:  3.7    3.0 - 5.6 cm  LVIDs:  2.5    1.8 - 4.0 cm  Derived variables:  LVMI: 87 g/m2  RWT: 0.64  Fractional short: 32 %  EF (Whitneytz): 62 %  ------------------------------------------------------------------------  Observations:  Mitral Valve: Mitral annular calcification. Thickened and  tethered mitral valve. Mild mitral regurgitation.  Aortic Valve/Aorta: Calcified trileaflet aortic valve with  normal opening. Moderate aortic regurgitation.  Normal aortic root (Ao: 3.4 cm at the sinuses of Valsalva).  Technically difficult, however in certain views, the  descending aorta appears dilated. Suggest CT to further  evaluate.  Left Atrium: Left atrium not well visualized, probably  normal. The left atrium is extrinsically compressed by a  tortuous aorta vs a large hiatal hernia.  Left Ventricle: Normal left ventricular systolic function.  No segmental wall motion abnormalities. There is a false  tendon in the LV cavity (normal variant). Increased  relative wall thickness with normal left ventricular mass  index, consistent with concentric left ventricular  remodeling. Mild diastolic dysfunction (Stage I).  Right Heart: Normal right atrium. Normal right ventricular  size and function. Normal tricuspid valve. Minimal  tricuspid regurgitation. Normal pulmonic valve.  Pericardium/Pleura: Normal pericardium with no pericardial  effusion.  Hemodynamic: Estimated right atrial pressure is 8 mm Hg.  Estimated right ventricular systolic pressure equals 12 mm  Hg, assuming right atrial pressure equals 8 mm Hg,  consistent with normal pulmonary pressures. Bubble study  was technically difficult. No definitive bubbles seen  crossing the interatrial septum.  ------------------------------------------------------------------------  Conclusions:  1.  Moderate aortic regurgitation.  2. Normal aortic root (Ao: 3.4 cm at the sinuses of  Valsalva). Technically difficult, however in certain views,  the descending aorta appears dilated. Suggest CT to further  evaluate.  3. Leftatrium not well visualized, probably normal. The  left atrium is extrinsically compressed by a tortuous aorta  vs a large hiatal hernia.  4. Increased relative wall thickness with normal left  ventricular mass index, consistent with concentric left  ventricular remodeling.  5. Normal left ventricular systolic function. No segmental  wall motion abnormalities. There is a false tendon in the  LV cavity (normal variant).  6. Bubble study was technically difficult. No definitive  bubbles seen crossing the interatrial septum.  *** Compared with echocardiogram of 5/3/2013, no  significant changes noted.  ------------------------------------------------------------------------  Confirmed on  2016 - 17:47:24 by Sridhar Cooney MD  ------------------------------------------------------------------------        Physical Examination:  Daily     Daily Weight in k.2 (06 Dec 2017 07:39)  Vital Signs Last 24 Hrs  T(C): 36.8 (06 Dec 2017 12:18), Max: 36.8 (06 Dec 2017 12:18)  T(F): 98.3 (06 Dec 2017 12:18), Max: 98.3 (06 Dec 2017 12:18)  HR: 63 (06 Dec 2017 12:18) (60 - 63)  BP: 155/80 (06 Dec 2017 12:18) (137/74 - 155/80)  BP(mean): --  RR: 18 (06 Dec 2017 12:18) (18 - 18)  SpO2: 94% (06 Dec 2017 12:18) (94% - 96%)        Constitutional: NAD    Neck:  No JVD    Respiratory: CTAB/L    Cardiovascular: S1 and S2    Gastrointestinal: BS+, soft, NT/ND    Extremities: No peripheral edema    : No Myers    Skin: No rashes    Comments:    ASA Class: I [ ]  II [ ]  III [ ]  IV [X]    The patient is a suitable candidate for the planned procedure unless box checked [ ]  No, explain:

## 2017-12-06 NOTE — PROGRESS NOTE ADULT - ASSESSMENT
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  Mental status change, Possible seizure- continue Keppra, Neurology follow noted. MRI brain pending   Gastric cancer metastatic- Oncology follow.  Pelvic mass-GI evaluation noted. For repeat rectal EUS/ biopsy in am 2 to poor prep. hold ASA/Plavix.  Prostate cancer- Rx as per Oncology Urology evaluation noted  s/p PEG replacement. Continue Peg feeds.  Pain control/ Pain management evaluation noted.  HTN control  CAD stable.  Depression- continue Rx  Functional quadriplegia -supportive care  PT  d/w family QA  Jose Medina MD pager [phone]

## 2017-12-06 NOTE — PHYSICAL THERAPY INITIAL EVALUATION ADULT - ACTIVE RANGE OF MOTION EXAMINATION, REHAB EVAL
B shoulder flex: 90 degrees/bilateral upper extremity Active ROM was WFL (within functional limits)/bilateral  lower extremity Active ROM was WFL (within functional limits)

## 2017-12-07 ENCOUNTER — RESULT REVIEW (OUTPATIENT)
Age: 72
End: 2017-12-07

## 2017-12-07 LAB
HCT VFR BLD CALC: 38.7 % — LOW (ref 39–50)
HGB BLD-MCNC: 13.3 G/DL — SIGNIFICANT CHANGE UP (ref 13–17)
MCHC RBC-ENTMCNC: 34.4 GM/DL — SIGNIFICANT CHANGE UP (ref 32–36)
MCHC RBC-ENTMCNC: 34.7 PG — HIGH (ref 27–34)
MCV RBC AUTO: 101 FL — HIGH (ref 80–100)
PLATELET # BLD AUTO: 190 K/UL — SIGNIFICANT CHANGE UP (ref 150–400)
RBC # BLD: 3.83 M/UL — LOW (ref 4.2–5.8)
RBC # FLD: 13.2 % — SIGNIFICANT CHANGE UP (ref 10.3–14.5)
WBC # BLD: 4.83 K/UL — SIGNIFICANT CHANGE UP (ref 3.8–10.5)
WBC # FLD AUTO: 4.83 K/UL — SIGNIFICANT CHANGE UP (ref 3.8–10.5)

## 2017-12-07 PROCEDURE — 93306 TTE W/DOPPLER COMPLETE: CPT | Mod: 26

## 2017-12-07 PROCEDURE — 88342 IMHCHEM/IMCYTCHM 1ST ANTB: CPT | Mod: 26,59

## 2017-12-07 PROCEDURE — 88341 IMHCHEM/IMCYTCHM EA ADD ANTB: CPT | Mod: 26,59

## 2017-12-07 PROCEDURE — 88173 CYTOPATH EVAL FNA REPORT: CPT | Mod: 26

## 2017-12-07 PROCEDURE — 45342 SIGMOIDOSCOPY W/US GUIDE BX: CPT | Mod: GC

## 2017-12-07 PROCEDURE — 88360 TUMOR IMMUNOHISTOCHEM/MANUAL: CPT | Mod: 26

## 2017-12-07 PROCEDURE — 88305 TISSUE EXAM BY PATHOLOGIST: CPT | Mod: 26

## 2017-12-07 RX ORDER — CIPROFLOXACIN LACTATE 400MG/40ML
500 VIAL (ML) INTRAVENOUS EVERY 12 HOURS
Qty: 0 | Refills: 0 | Status: COMPLETED | OUTPATIENT
Start: 2017-12-07 | End: 2017-12-10

## 2017-12-07 RX ORDER — METRONIDAZOLE 500 MG
500 TABLET ORAL
Qty: 0 | Refills: 0 | Status: DISCONTINUED | OUTPATIENT
Start: 2017-12-07 | End: 2017-12-07

## 2017-12-07 RX ORDER — METRONIDAZOLE 500 MG
500 TABLET ORAL EVERY 8 HOURS
Qty: 0 | Refills: 0 | Status: COMPLETED | OUTPATIENT
Start: 2017-12-07 | End: 2017-12-10

## 2017-12-07 RX ADMIN — FENTANYL CITRATE 200 MICROGRAM(S): 50 INJECTION INTRAVENOUS at 20:33

## 2017-12-07 RX ADMIN — FENTANYL CITRATE 200 MICROGRAM(S): 50 INJECTION INTRAVENOUS at 00:02

## 2017-12-07 RX ADMIN — Medication 12.5 MILLIGRAM(S): at 20:42

## 2017-12-07 RX ADMIN — FENTANYL CITRATE 200 MICROGRAM(S): 50 INJECTION INTRAVENOUS at 12:57

## 2017-12-07 RX ADMIN — LEVETIRACETAM 500 MILLIGRAM(S): 250 TABLET, FILM COATED ORAL at 05:43

## 2017-12-07 RX ADMIN — Medication 12.5 MILLIGRAM(S): at 05:43

## 2017-12-07 RX ADMIN — HEPARIN SODIUM 5000 UNIT(S): 5000 INJECTION INTRAVENOUS; SUBCUTANEOUS at 05:43

## 2017-12-07 RX ADMIN — PANTOPRAZOLE SODIUM 40 MILLIGRAM(S): 20 TABLET, DELAYED RELEASE ORAL at 12:30

## 2017-12-07 RX ADMIN — QUETIAPINE FUMARATE 25 MILLIGRAM(S): 200 TABLET, FILM COATED ORAL at 12:30

## 2017-12-07 RX ADMIN — LEVETIRACETAM 500 MILLIGRAM(S): 250 TABLET, FILM COATED ORAL at 20:43

## 2017-12-07 RX ADMIN — SENNA PLUS 2 TABLET(S): 8.6 TABLET ORAL at 21:11

## 2017-12-07 RX ADMIN — AMLODIPINE BESYLATE 5 MILLIGRAM(S): 2.5 TABLET ORAL at 05:43

## 2017-12-07 RX ADMIN — ATORVASTATIN CALCIUM 80 MILLIGRAM(S): 80 TABLET, FILM COATED ORAL at 21:11

## 2017-12-07 RX ADMIN — FENTANYL CITRATE 200 MICROGRAM(S): 50 INJECTION INTRAVENOUS at 23:12

## 2017-12-07 RX ADMIN — GABAPENTIN 300 MILLIGRAM(S): 400 CAPSULE ORAL at 05:42

## 2017-12-07 RX ADMIN — Medication 500 MILLIGRAM(S): at 21:11

## 2017-12-07 RX ADMIN — FENTANYL CITRATE 200 MICROGRAM(S): 50 INJECTION INTRAVENOUS at 23:45

## 2017-12-07 RX ADMIN — FENTANYL CITRATE 200 MICROGRAM(S): 50 INJECTION INTRAVENOUS at 12:27

## 2017-12-07 RX ADMIN — FENTANYL CITRATE 200 MICROGRAM(S): 50 INJECTION INTRAVENOUS at 21:10

## 2017-12-07 RX ADMIN — HEPARIN SODIUM 5000 UNIT(S): 5000 INJECTION INTRAVENOUS; SUBCUTANEOUS at 21:11

## 2017-12-07 RX ADMIN — GABAPENTIN 300 MILLIGRAM(S): 400 CAPSULE ORAL at 21:11

## 2017-12-07 NOTE — CHART NOTE - NSCHARTNOTEFT_GEN_A_CORE
Source: Patient [X ]    Family [ ]     other [ X] RN, Medical record    Pt seen for malnutrition follow up. Pt currently sleeping in bed, feeds on hold for Bx today. Per RN, Pt has been tolerating Jevity 1.2 via PEG @ 60ml/[id number] well.       Per chart, Pt admitted for syncopal episode. Pt with history of Stroke with right sided weakness, aphasia, PEG a/w prostate and stomach cancer on oral chemo. Per chart, Pt code stroke, likely seizure like activity. Pt s/p PEG tube exchange on 11/26. Pt now with pelvic mass concerning for new gastrointestinal stromal tumor vs metastatic prostate cancer.  Plan for Rectal Bx today, Pt NPO after midnight.     Diet : NPO       RN reports no GI distress       Enteral /Parenteral Nutrition: Jevity 1.2 @ 60ml/[id number].  Goal rate provides 1728kcals and 79.9gm protein. ( 32kcals/kg and 1.48gm pro/kg based on dosing Wt 54kg.)       Current Weight: 111.3lbs, decrease noted from 115lbs on 12/6. ? accuracy of 4lbs Wt difference x1 day.   % Weight Change    Pertinent Medications: MEDICATIONS  (STANDING):  amLODIPine   Tablet 5 milliGRAM(s) Oral daily  atorvastatin 80 milliGRAM(s) Oral at bedtime  gabapentin   Solution 300 milliGRAM(s) Oral three times a day  heparin  Injectable 5000 Unit(s) SubCutaneous every 8 hours  levETIRAcetam  Solution 500 milliGRAM(s) Oral two times a day  metoprolol     tartrate 12.5 milliGRAM(s) Oral two times a day  pantoprazole   Suspension 40 milliGRAM(s) Oral daily  polyethylene glycol 3350 17 Gram(s) Oral daily  QUEtiapine 25 milliGRAM(s) Oral daily  senna 2 Tablet(s) Oral at bedtime  sodium chloride 0.45%. 1000 milliLiter(s) (75 mL/Hr) IV Continuous <Continuous>    MEDICATIONS  (PRN):  fentaNYL     Lozenge 200 MICROGram(s) Transmucosal every 3 hours PRN severe pain    Pertinent Labs:  Glu: 115    Skin: intact     Estimated Needs:   [ X] no change since previous assessment  [ ] recalculated:       Previous Nutrition Diagnosis:   [ X] Malnutrition          Nutrition Diagnosis is [X ] ongoing , addressed with EN          New Nutrition Diagnosis: [X] not applicable      Recommend  1. Recommend to continue Jevity 1.2 @60ml/[id number] as tolerated.          Monitoring and Evaluation:     [ ] PO intake [X ] Tolerance to diet prescription [ X] weights [X ] follow up per protocol    [X ] other: RD remains available Sarah Siegler RD. Pager #[phone]

## 2017-12-07 NOTE — PROGRESS NOTE ADULT - SUBJECTIVE AND OBJECTIVE BOX
Patient is a 72y old  Male who presents with a chief complaint of LOC (23 Nov 2017 18:06)       Pt is seen and examined  pt is awake and lying in bed  pt seems comfortable and denies any complaints at this time    Rectal EUS was unsuccessful due to retained stool. Hopefully will be able to be performed today.      REVIEW OF SYSTEMS:    CONSTITUTIONAL: No weakness, fevers or chills  EYES/ENT: No visual changes;  No vertigo or throat pain   NECK: No pain or stiffness  RESPIRATORY: No cough, wheezing, hemoptysis; No shortness of breath  CARDIOVASCULAR: No chest pain or palpitations  GASTROINTESTINAL: No abdominal or epigastric pain. No nausea, vomiting, or hematemesis; No diarrhea or constipation. No melena or hematochezia.  GENITOURINARY: No dysuria, frequency or hematuria  NEUROLOGICAL: No numbness or weakness  SKIN: No itching, burning, rashes, or lesions     MEDICATIONS  (STANDING):  amLODIPine   Tablet 5 milliGRAM(s) Oral daily  atorvastatin 80 milliGRAM(s) Oral at bedtime  gabapentin   Solution 300 milliGRAM(s) Oral three times a day  heparin  Injectable 5000 Unit(s) SubCutaneous every 8 hours  levETIRAcetam  Solution 500 milliGRAM(s) Oral two times a day  metoprolol     tartrate 12.5 milliGRAM(s) Oral two times a day  pantoprazole   Suspension 40 milliGRAM(s) Oral daily  polyethylene glycol 3350 17 Gram(s) Oral daily  QUEtiapine 25 milliGRAM(s) Oral daily  senna 2 Tablet(s) Oral at bedtime  sodium chloride 0.45%. 1000 milliLiter(s) (75 mL/Hr) IV Continuous <Continuous>    MEDICATIONS  (PRN):  fentaNYL     Lozenge 200 MICROGram(s) Transmucosal every 3 hours PRN severe pain      Allergies    No Known Allergies    Intolerances        PHYSICAL EXAM  Vital Signs Last 24 Hrs  T(C): 36.6 (07 Dec 2017 03:42), Max: 36.8 (06 Dec 2017 12:18)  T(F): 97.8 (07 Dec 2017 03:42), Max: 98.3 (06 Dec 2017 12:18)  HR: 72 (07 Dec 2017 03:42) (60 - 72)  BP: 157/78 (07 Dec 2017 03:42) (136/75 - 157/78)  BP(mean): --  RR: 18 (07 Dec 2017 03:42) (18 - 18)  SpO2: 97% (07 Dec 2017 03:42) (94% - 97%)    General: adult in NAD  HEENT: clear oropharynx, anicteric sclera, pink conjunctiva  Neck: supple  CV: normal S1/S2 with no murmur rubs or gallops  Lungs: positive air movement b/l ant lungs,clear to auscultation, no wheezes, no rales  Abdomen: soft non-tender non-distended, no hepatosplenomegaly  Ext: no clubbing cyanosis or edema  Skin: no rashes and no petechiae  Neuro: alert and oriented X 4, no focal deficits    LABS:                          13.3   4.83  )-----------( 190      ( 07 Dec 2017 07:30 )             38.7         Mean Cell Volume : 101.0 fl  Mean Cell Hemoglobin : 34.7 pg  Mean Cell Hemoglobin Concentration : 34.4 gm/dL      Serial CBC's  12-07 @ 07:30  Hct-38.7 / Hgb-13.3 / Plat-190 / RBC-3.83 / WBC-4.83    Serial CBC's  12-06 @ 07:26  Hct-33.0 / Hgb-11.4 / Plat-164 / RBC-3.27 / WBC-3.37  Serial CBC's  12-05 @ 08:46  Hct-32.6 / Hgb-11.0 / Plat-167 / RBC-3.20 / WBC-4.14    Serial CBC's  12-04 @ 06:37  Hct-33.3 / Hgb-11.4 / Plat-167 / RBC-3.30 / WBC-4.01      Serial CBC's  12-03 @ 08:27  Hct-37.7 / Hgb-13.0 / Plat-182 / RBC-3.74 / WBC-4.68        PT/INR - ( 05 Dec 2017 08:46 )   PT: 11.8 sec;   INR: 1.04 ratio         PTT - ( 05 Dec 2017 08:46 )  PTT:30.9 sec          Assessment

## 2017-12-07 NOTE — PROGRESS NOTE ADULT - SUBJECTIVE AND OBJECTIVE BOX
Pre-Endoscopy Evaluation      Referring Physician:  Dr. Medina                                  Procedure: Lower EUS    Indication for Procedure: Pelvic Mass    Pertinent History: 72 year old male with hx of CVA with residual R sided weakness, HTN, HLD, PEG tube, Prostate ca and gastric GIST currently on chemo via PEG who initially presented to ED for LOC likely 2/2 seizure, now with new pelvic mass concerning for metastatic malignancy      Sedation by Anesthesia [x]    PAST MEDICAL & SURGICAL HISTORY:  Prostate hypertrophy  HLD (hyperlipidemia)  Agitation  Depression  Stroke  Syncope: 2013, 2010  HTN (hypertension)  Gastric tumor  Gastric ulcer with hemorrhage, perforation, and obstruction, acute  No Past Surgical History      PMH of Gastroparesis [ ]  Gastric Surgery [ ]  Gastric Outlet Obstruction [ ]    Allergies:    No Known Allergies    Intolerances:    Latex allergy: [ ] yes [x] no    Medications:MEDICATIONS  (STANDING):  amLODIPine   Tablet 5 milliGRAM(s) Oral daily  atorvastatin 80 milliGRAM(s) Oral at bedtime  gabapentin   Solution 300 milliGRAM(s) Oral three times a day  heparin  Injectable 5000 Unit(s) SubCutaneous every 8 hours  levETIRAcetam  Solution 500 milliGRAM(s) Oral two times a day  metoprolol     tartrate 12.5 milliGRAM(s) Oral two times a day  pantoprazole   Suspension 40 milliGRAM(s) Oral daily  polyethylene glycol 3350 17 Gram(s) Oral daily  QUEtiapine 25 milliGRAM(s) Oral daily  senna 2 Tablet(s) Oral at bedtime  sodium chloride 0.45%. 1000 milliLiter(s) (75 mL/Hr) IV Continuous <Continuous>    MEDICATIONS  (PRN):  fentaNYL     Lozenge 200 MICROGram(s) Transmucosal every 3 hours PRN severe pain      Smoking: [ ] yes  [ ] no    AICD/PPM: [ ] yes   [ ] no    Pertinent lab data:                        13.3   4.83  )-----------( 190      ( 07 Dec 2017 07:30 )             38.7   < from: Transthoracic Echocardiogram (17 @ 09:25) >  Patient name: OTILIA PADGETT  YOB: 1945   Age: 72 (M)   MR#: [id number]  Study Date: 2017  Location: [address] [place], [place] [id number]: Doretha Lane RDCS  Study quality: Technically difficult  Referring Physician: Jose Medina MD  Blood Pressure: 157/78 mmHg  Height: 170 cm  Weight: 54 kg  BSA: 1.6 m2  ------------------------------------------------------------------------  PROCEDURE: Transthoracic echocardiogram with 2-D, M-Mode  and complete spectral and color flow Doppler.  INDICATION: Ventricular tachycardia (I47.2)  ------------------------------------------------------------------------  Dimensions:    Normal Values:  LA:     4.8    2.0 - 4.0 cm  Ao:     2.8    2.0 - 3.8 cm  SEPTUM: 1.6    0.6 - 1.2 cm  PWT:    1.5    0.6 - 1.1 cm  LVIDd:  4.5    3.0 - 5.6 cm  LVIDs:  3.3    1.8 - 4.0 cm  Derived variables:  LVMI: 179 g/m2  RWT: 0.66  Fractional short: 27 %  ------------------------------------------------------------------------  Observations:  Mitral Valve: Mitral valve not well visualized.  Aortic Valve/Aorta: Calcified trileaflet aortic valve with  normal opening. Moderate aortic regurgitation.  Grossly normal aortic root size. (Ao: 2.8 cm at the sinuses  of Valsalva).  Left Atrium: Left atrium not well visualized.  Left Ventricle: Endocardium not well visualized; grossly  normal left ventricular systolic function. Unable to rule  out wall motion abnormalities.  Moderate concentric left  ventricular hypertrophy. Mild diastolic dysfunction (Stage  I).  Right Heart: Right atrium not well visualized. The right  ventricle is not well visualized. Tricuspid valve not well  visualized. Minimal tricuspid regurgitation. Pulmonic valve  not well visualized.  Pericardium/Pleura: Trace pericardial effusion.  Hemodynamic: Estimated right atrial pressure is 8 mm Hg.  ------------------------------------------------------------------------  Conclusions:  1. Calcified trileaflet aortic valve with normal opening.  Moderate aortic regurgitation.  2. Moderateconcentric left ventricular hypertrophy.  3. Endocardium not well visualized; grossly normal left  ventricular systolic function. Unable to rule out wall  motion abnormalities.  4. Trace pericardial effusion.  *** Compared with echocardiogram of 2016, findings are  grossly similar. Very technically difficult study.  ------------------------------------------------------------------------  Confirmed on  2017 - 11:39:55 by Axel Sands M.D.  ------------------------------------------------------------------------          Physical Examination:  Daily     Daily Weight in k.5 (07 Dec 2017 07:14)  Vital Signs Last 24 Hrs  T(C): 36.4 (07 Dec 2017 11:34), Max: 36.6 (07 Dec 2017 03:42)  T(F): 97.5 (07 Dec 2017 11:34), Max: 97.8 (07 Dec 2017 03:42)  HR: 70 (07 Dec 2017 11:34) (60 - 72)  BP: 158/88 (07 Dec 2017 12:24) (136/75 - 158/88)  BP(mean): --  RR: 18 (07 Dec 2017 11:34) (18 - 18)  SpO2: 95% (07 Dec 2017 11:34) (95% - 97%)    Constitutional: NAD    Neck:  No JVD    Respiratory: CTAB/L    Cardiovascular: S1 and S2    Gastrointestinal: BS+, soft, NT/ND    Extremities: No peripheral edema    Neurological: A/O x 3, no focal deficits    Psychiatric: Normal mood, normal affect    : No Myers    Skin: No rashes    Comments:    ASA Class: I [ ]  II [ ]  III [ ]  IV [x]    The patient is a suitable candidate for the planned procedure unless box checked [ ]  No, explain:

## 2017-12-07 NOTE — PROGRESS NOTE ADULT - SUBJECTIVE AND OBJECTIVE BOX
Patient is a 72y old  Male who presents with a chief complaint of LOC (23 Nov 2017 18:06)      SUBJECTIVE / OVERNIGHT EVENTS: No new complaints.   Review of Systems  chest pain no  palpitations no  sob no  nausea no  headache no    MEDICATIONS  (STANDING):  amLODIPine   Tablet 5 milliGRAM(s) Oral daily  atorvastatin 80 milliGRAM(s) Oral at bedtime  gabapentin   Solution 300 milliGRAM(s) Oral three times a day  heparin  Injectable 5000 Unit(s) SubCutaneous every 8 hours  levETIRAcetam  Solution 500 milliGRAM(s) Oral two times a day  metoprolol     tartrate 12.5 milliGRAM(s) Oral two times a day  pantoprazole   Suspension 40 milliGRAM(s) Oral daily  polyethylene glycol 3350 17 Gram(s) Oral daily  QUEtiapine 25 milliGRAM(s) Oral daily  senna 2 Tablet(s) Oral at bedtime  sodium chloride 0.45%. 1000 milliLiter(s) (75 mL/Hr) IV Continuous <Continuous>    MEDICATIONS  (PRN):  fentaNYL     Lozenge 200 MICROGram(s) Transmucosal every 3 hours PRN severe pain          PHYSICAL EXAM:  GENERAL: NAD, cachectic   HEAD:  Atraumatic, Normocephalic  EYES: EOMI, PERRLA, conjunctiva and sclera clear  NECK: Supple, No JVD  CHEST/LUNG: Clear to auscultation bilaterally; No wheeze  HEART: Regular rate and rhythm; No murmurs, rubs, or gallops  ABDOMEN: Soft, Nontender, Nondistended; Bowel sounds present PEG in place  EXTREMITIES:  2+ Peripheral Pulses, No clubbing, cyanosis, or edema  PSYCH: AAOx3  NEUROLOGY: non-focal  SKIN: No rashes or lesions    LABS:                        13.3   4.83  )-----------( 190      ( 07 Dec 2017 07:30 )             38.7                     RADIOLOGY & ADDITIONAL TESTS:    Imaging Personally Reviewed:    Consultant(s) Notes Reviewed:      Care Discussed with Consultants/Other Providers:

## 2017-12-08 LAB
ANION GAP SERPL CALC-SCNC: 14 MMOL/L — SIGNIFICANT CHANGE UP (ref 5–17)
BUN SERPL-MCNC: 14 MG/DL — SIGNIFICANT CHANGE UP (ref 7–23)
CALCIUM SERPL-MCNC: 9.9 MG/DL — SIGNIFICANT CHANGE UP (ref 8.4–10.5)
CHLORIDE SERPL-SCNC: 100 MMOL/L — SIGNIFICANT CHANGE UP (ref 96–108)
CO2 SERPL-SCNC: 25 MMOL/L — SIGNIFICANT CHANGE UP (ref 22–31)
CREAT SERPL-MCNC: 1.05 MG/DL — SIGNIFICANT CHANGE UP (ref 0.5–1.3)
GLUCOSE SERPL-MCNC: 122 MG/DL — HIGH (ref 70–99)
HCT VFR BLD CALC: 35.7 % — LOW (ref 39–50)
HGB BLD-MCNC: 11.9 G/DL — LOW (ref 13–17)
MAGNESIUM SERPL-MCNC: 1.9 MG/DL — SIGNIFICANT CHANGE UP (ref 1.6–2.6)
MCHC RBC-ENTMCNC: 33.3 GM/DL — SIGNIFICANT CHANGE UP (ref 32–36)
MCHC RBC-ENTMCNC: 34 PG — SIGNIFICANT CHANGE UP (ref 27–34)
MCV RBC AUTO: 102 FL — HIGH (ref 80–100)
PHOSPHATE SERPL-MCNC: 2.8 MG/DL — SIGNIFICANT CHANGE UP (ref 2.5–4.5)
PLATELET # BLD AUTO: 183 K/UL — SIGNIFICANT CHANGE UP (ref 150–400)
POTASSIUM SERPL-MCNC: 4.9 MMOL/L — SIGNIFICANT CHANGE UP (ref 3.5–5.3)
POTASSIUM SERPL-SCNC: 4.9 MMOL/L — SIGNIFICANT CHANGE UP (ref 3.5–5.3)
RBC # BLD: 3.5 M/UL — LOW (ref 4.2–5.8)
RBC # FLD: 13.4 % — SIGNIFICANT CHANGE UP (ref 10.3–14.5)
SODIUM SERPL-SCNC: 139 MMOL/L — SIGNIFICANT CHANGE UP (ref 135–145)
WBC # BLD: 4.74 K/UL — SIGNIFICANT CHANGE UP (ref 3.8–10.5)
WBC # FLD AUTO: 4.74 K/UL — SIGNIFICANT CHANGE UP (ref 3.8–10.5)

## 2017-12-08 RX ORDER — MAGNESIUM SULFATE 500 MG/ML
1 VIAL (ML) INJECTION ONCE
Qty: 0 | Refills: 0 | Status: COMPLETED | OUTPATIENT
Start: 2017-12-08 | End: 2017-12-08

## 2017-12-08 RX ADMIN — Medication 12.5 MILLIGRAM(S): at 17:33

## 2017-12-08 RX ADMIN — Medication 500 MILLIGRAM(S): at 05:27

## 2017-12-08 RX ADMIN — Medication 500 MILLIGRAM(S): at 17:32

## 2017-12-08 RX ADMIN — HEPARIN SODIUM 5000 UNIT(S): 5000 INJECTION INTRAVENOUS; SUBCUTANEOUS at 15:35

## 2017-12-08 RX ADMIN — LEVETIRACETAM 500 MILLIGRAM(S): 250 TABLET, FILM COATED ORAL at 17:33

## 2017-12-08 RX ADMIN — GABAPENTIN 300 MILLIGRAM(S): 400 CAPSULE ORAL at 15:34

## 2017-12-08 RX ADMIN — FENTANYL CITRATE 200 MICROGRAM(S): 50 INJECTION INTRAVENOUS at 12:59

## 2017-12-08 RX ADMIN — FENTANYL CITRATE 200 MICROGRAM(S): 50 INJECTION INTRAVENOUS at 13:29

## 2017-12-08 RX ADMIN — LEVETIRACETAM 500 MILLIGRAM(S): 250 TABLET, FILM COATED ORAL at 05:27

## 2017-12-08 RX ADMIN — FENTANYL CITRATE 200 MICROGRAM(S): 50 INJECTION INTRAVENOUS at 09:02

## 2017-12-08 RX ADMIN — GABAPENTIN 300 MILLIGRAM(S): 400 CAPSULE ORAL at 05:27

## 2017-12-08 RX ADMIN — HEPARIN SODIUM 5000 UNIT(S): 5000 INJECTION INTRAVENOUS; SUBCUTANEOUS at 05:27

## 2017-12-08 RX ADMIN — FENTANYL CITRATE 200 MICROGRAM(S): 50 INJECTION INTRAVENOUS at 23:15

## 2017-12-08 RX ADMIN — FENTANYL CITRATE 200 MICROGRAM(S): 50 INJECTION INTRAVENOUS at 09:32

## 2017-12-08 RX ADMIN — FENTANYL CITRATE 200 MICROGRAM(S): 50 INJECTION INTRAVENOUS at 06:00

## 2017-12-08 RX ADMIN — Medication 500 MILLIGRAM(S): at 15:35

## 2017-12-08 RX ADMIN — AMLODIPINE BESYLATE 5 MILLIGRAM(S): 2.5 TABLET ORAL at 05:27

## 2017-12-08 RX ADMIN — GABAPENTIN 300 MILLIGRAM(S): 400 CAPSULE ORAL at 21:31

## 2017-12-08 RX ADMIN — FENTANYL CITRATE 200 MICROGRAM(S): 50 INJECTION INTRAVENOUS at 05:25

## 2017-12-08 RX ADMIN — Medication 12.5 MILLIGRAM(S): at 05:27

## 2017-12-08 RX ADMIN — ATORVASTATIN CALCIUM 80 MILLIGRAM(S): 80 TABLET, FILM COATED ORAL at 21:31

## 2017-12-08 RX ADMIN — Medication 100 GRAM(S): at 02:13

## 2017-12-08 RX ADMIN — PANTOPRAZOLE SODIUM 40 MILLIGRAM(S): 20 TABLET, DELAYED RELEASE ORAL at 12:41

## 2017-12-08 RX ADMIN — QUETIAPINE FUMARATE 25 MILLIGRAM(S): 200 TABLET, FILM COATED ORAL at 12:41

## 2017-12-08 RX ADMIN — HEPARIN SODIUM 5000 UNIT(S): 5000 INJECTION INTRAVENOUS; SUBCUTANEOUS at 21:31

## 2017-12-08 RX ADMIN — Medication 500 MILLIGRAM(S): at 21:31

## 2017-12-08 RX ADMIN — POLYETHYLENE GLYCOL 3350 17 GRAM(S): 17 POWDER, FOR SOLUTION ORAL at 12:41

## 2017-12-08 RX ADMIN — SENNA PLUS 2 TABLET(S): 8.6 TABLET ORAL at 21:31

## 2017-12-08 RX ADMIN — FENTANYL CITRATE 200 MICROGRAM(S): 50 INJECTION INTRAVENOUS at 22:45

## 2017-12-08 NOTE — PROGRESS NOTE ADULT - SUBJECTIVE AND OBJECTIVE BOX
Patient is a 72y old  Male who presents with a chief complaint of LOC (23 Nov 2017 18:06)       Pt is seen and examined  pt is awake and lying in bed  pt seems comfortable and denies any complaints at this time  Had Rectal EUS --- (+) mass which was biopsied -- pt w/o complaints      REVIEW OF SYSTEMS:    CONSTITUTIONAL: No weakness, fevers or chills  EYES/ENT: No visual changes;  No vertigo or throat pain   NECK: No pain or stiffness  RESPIRATORY: No cough, wheezing, hemoptysis; No shortness of breath  CARDIOVASCULAR: No chest pain or palpitations  GASTROINTESTINAL: No abdominal or epigastric pain. No nausea, vomiting, or hematemesis; No diarrhea or constipation. No melena or hematochezia.  GENITOURINARY: No dysuria, frequency or hematuria  NEUROLOGICAL: No numbness or weakness  SKIN: No itching, burning, rashes, or lesions     MEDICATIONS  (STANDING):  amLODIPine   Tablet 5 milliGRAM(s) Oral daily  atorvastatin 80 milliGRAM(s) Oral at bedtime  ciprofloxacin     Tablet 500 milliGRAM(s) Oral every 12 hours  gabapentin   Solution 300 milliGRAM(s) Oral three times a day  heparin  Injectable 5000 Unit(s) SubCutaneous every 8 hours  levETIRAcetam  Solution 500 milliGRAM(s) Oral two times a day  metoprolol     tartrate 12.5 milliGRAM(s) Oral two times a day  metroNIDAZOLE    Tablet 500 milliGRAM(s) Oral every 8 hours  pantoprazole   Suspension 40 milliGRAM(s) Oral daily  polyethylene glycol 3350 17 Gram(s) Oral daily  QUEtiapine 25 milliGRAM(s) Oral daily  senna 2 Tablet(s) Oral at bedtime  sodium chloride 0.45%. 1000 milliLiter(s) (75 mL/Hr) IV Continuous <Continuous>    MEDICATIONS  (PRN):  fentaNYL     Lozenge 200 MICROGram(s) Transmucosal every 3 hours PRN severe pain      Allergies    No Known Allergies    Intolerances      PHYSICAL EXAM  Vital Signs Last 24 Hrs  T(C): 36.5 (08 Dec 2017 04:24), Max: 36.6 (07 Dec 2017 20:25)  T(F): 97.7 (08 Dec 2017 04:24), Max: 97.9 (07 Dec 2017 20:25)  HR: 61 (08 Dec 2017 04:24) (61 - 71)  BP: 137/78 (08 Dec 2017 04:24) (137/78 - 171/85)  BP(mean): --  RR: 18 (08 Dec 2017 04:24) (18 - 18)  SpO2: 99% (08 Dec 2017 04:24) (95% - 100%)    General: adult in NAD  HEENT: clear oropharynx, anicteric sclera, pink conjunctiva  Neck: supple  CV: normal S1/S2 with no murmur rubs or gallops  Lungs: positive air movement b/l ant lungs,clear to auscultation, no wheezes, no rales  Abdomen: soft non-tender non-distended, no hepatosplenomegaly  Ext: no clubbing cyanosis or edema  Skin: no rashes and no petechiae  Neuro: alert and oriented X 4, no focal deficits    LABS:                          11.9   4.74  )-----------( 183      ( 08 Dec 2017 07:28 )             35.7       Mean Cell Volume : 102.0 fl  Mean Cell Hemoglobin : 34.0 pg  Mean Cell Hemoglobin Concentration : 33.3 gm/dL      Serial CBC's  12-08 @ 07:28  Hct-35.7 / Hgb-11.9 / Plat-183 / RBC-3.50 / WBC-4.74      Serial CBC's  12-07 @ 07:30  Hct-38.7 / Hgb-13.3 / Plat-190 / RBC-3.83 / WBC-4.83      Serial CBC's  12-06 @ 07:26  Hct-33.0 / Hgb-11.4 / Plat-164 / RBC-3.27 / WBC-3.37        Serial CBC's  12-05 @ 08:46  Hct-32.6 / Hgb-11.0 / Plat-167 / RBC-3.20 / WBC-4.14        Phos  2.8     12-07  Mg     1.9     12-07        BLOOD SMEAR INTERPRETATION:       RADIOLOGY & ADDITIONAL STUDIES:    Assessment

## 2017-12-08 NOTE — PROGRESS NOTE ADULT - ASSESSMENT
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  Mental status change, Possible seizure- continue Keppra, Neurology follow noted. MRI brain pending   Gastric cancer metastatic- Oncology follow.  Pelvic mass-GI evaluation noted. S/P rectal EUS/ biopsy . Restart ASA/Plavix in am.  Antibiotics for prophylaxis.  Prostate cancer- Rx as per Oncology Urology evaluation noted  s/p PEG replacement. Continue Peg feeds.  Pain control/ Pain management evaluation noted.  HTN control  CAD stable.  Depression- continue Rx  Functional quadriplegia -supportive care  PT  d/w family QA  Jose Medina MD pager [phone]

## 2017-12-08 NOTE — CHART NOTE - NSCHARTNOTEFT_GEN_A_CORE
Episode of 13 bts of wide complex last night discussed with Cardiology Dr. Jenkins. [facility] 2010 with non-obstructive CAD. No need to change current management. Continue Metoprolol 12.5 mg BID    Macarena ARTIS  #[id number]

## 2017-12-08 NOTE — PROGRESS NOTE ADULT - SUBJECTIVE AND OBJECTIVE BOX
Patient is a 72y old  Male who presents with a chief complaint of LOC (23 Nov 2017 18:06)      SUBJECTIVE / OVERNIGHT EVENTS: Comfortable without new complaints.   Review of Systems  chest pain no  palpitations no  sob no  nausea no  headache no    MEDICATIONS  (STANDING):  amLODIPine   Tablet 5 milliGRAM(s) Oral daily  atorvastatin 80 milliGRAM(s) Oral at bedtime  ciprofloxacin     Tablet 500 milliGRAM(s) Oral every 12 hours  gabapentin   Solution 300 milliGRAM(s) Oral three times a day  heparin  Injectable 5000 Unit(s) SubCutaneous every 8 hours  levETIRAcetam  Solution 500 milliGRAM(s) Oral two times a day  metoprolol     tartrate 12.5 milliGRAM(s) Oral two times a day  metroNIDAZOLE    Tablet 500 milliGRAM(s) Oral every 8 hours  pantoprazole   Suspension 40 milliGRAM(s) Oral daily  polyethylene glycol 3350 17 Gram(s) Oral daily  QUEtiapine 25 milliGRAM(s) Oral daily  senna 2 Tablet(s) Oral at bedtime  sodium chloride 0.45%. 1000 milliLiter(s) (75 mL/Hr) IV Continuous <Continuous>    MEDICATIONS  (PRN):  fentaNYL     Lozenge 200 MICROGram(s) Transmucosal every 3 hours PRN severe pain          PHYSICAL EXAM:  GENERAL: NAD, well-developed  HEAD:  Atraumatic, Normocephalic  EYES: EOMI, PERRLA, conjunctiva and sclera clear  NECK: Supple, No JVD  CHEST/LUNG: Clear to auscultation bilaterally; No wheeze  HEART: Regular rate and rhythm; No murmurs, rubs, or gallops  ABDOMEN: Soft, Nontender, Nondistended; Bowel sounds present PEG in place  EXTREMITIES:  2+ Peripheral Pulses, No clubbing, cyanosis, or edema  PSYCH: AAOx3  NEUROLOGY: non-focal  SKIN: No rashes or lesions    LABS:                        11.9   4.74  )-----------( 183      ( 08 Dec 2017 07:28 )             35.7     12-08    139  |  100  |  14  ----------------------------<  122<H>  4.9   |  25  |  1.05    Ca    9.9      08 Dec 2017 07:28  Phos  2.8     12-07  Mg     1.9     12-07                RADIOLOGY & ADDITIONAL TESTS:    Imaging Personally Reviewed:    Consultant(s) Notes Reviewed:      Care Discussed with Consultants/Other Providers:

## 2017-12-09 RX ORDER — ASPIRIN/CALCIUM CARB/MAGNESIUM 324 MG
81 TABLET ORAL DAILY
Qty: 0 | Refills: 0 | Status: DISCONTINUED | OUTPATIENT
Start: 2017-12-09 | End: 2017-12-19

## 2017-12-09 RX ORDER — CLOPIDOGREL BISULFATE 75 MG/1
75 TABLET, FILM COATED ORAL DAILY
Qty: 0 | Refills: 0 | Status: DISCONTINUED | OUTPATIENT
Start: 2017-12-09 | End: 2017-12-19

## 2017-12-09 RX ADMIN — GABAPENTIN 300 MILLIGRAM(S): 400 CAPSULE ORAL at 21:40

## 2017-12-09 RX ADMIN — SENNA PLUS 2 TABLET(S): 8.6 TABLET ORAL at 21:40

## 2017-12-09 RX ADMIN — FENTANYL CITRATE 200 MICROGRAM(S): 50 INJECTION INTRAVENOUS at 11:36

## 2017-12-09 RX ADMIN — FENTANYL CITRATE 200 MICROGRAM(S): 50 INJECTION INTRAVENOUS at 08:22

## 2017-12-09 RX ADMIN — AMLODIPINE BESYLATE 5 MILLIGRAM(S): 2.5 TABLET ORAL at 05:26

## 2017-12-09 RX ADMIN — FENTANYL CITRATE 200 MICROGRAM(S): 50 INJECTION INTRAVENOUS at 16:01

## 2017-12-09 RX ADMIN — HEPARIN SODIUM 5000 UNIT(S): 5000 INJECTION INTRAVENOUS; SUBCUTANEOUS at 21:40

## 2017-12-09 RX ADMIN — Medication 500 MILLIGRAM(S): at 05:26

## 2017-12-09 RX ADMIN — FENTANYL CITRATE 200 MICROGRAM(S): 50 INJECTION INTRAVENOUS at 08:54

## 2017-12-09 RX ADMIN — FENTANYL CITRATE 200 MICROGRAM(S): 50 INJECTION INTRAVENOUS at 11:55

## 2017-12-09 RX ADMIN — FENTANYL CITRATE 200 MICROGRAM(S): 50 INJECTION INTRAVENOUS at 19:49

## 2017-12-09 RX ADMIN — QUETIAPINE FUMARATE 25 MILLIGRAM(S): 200 TABLET, FILM COATED ORAL at 12:46

## 2017-12-09 RX ADMIN — ATORVASTATIN CALCIUM 80 MILLIGRAM(S): 80 TABLET, FILM COATED ORAL at 21:40

## 2017-12-09 RX ADMIN — LEVETIRACETAM 500 MILLIGRAM(S): 250 TABLET, FILM COATED ORAL at 05:26

## 2017-12-09 RX ADMIN — FENTANYL CITRATE 200 MICROGRAM(S): 50 INJECTION INTRAVENOUS at 16:43

## 2017-12-09 RX ADMIN — GABAPENTIN 300 MILLIGRAM(S): 400 CAPSULE ORAL at 19:16

## 2017-12-09 RX ADMIN — HEPARIN SODIUM 5000 UNIT(S): 5000 INJECTION INTRAVENOUS; SUBCUTANEOUS at 05:26

## 2017-12-09 RX ADMIN — POLYETHYLENE GLYCOL 3350 17 GRAM(S): 17 POWDER, FOR SOLUTION ORAL at 12:46

## 2017-12-09 RX ADMIN — GABAPENTIN 300 MILLIGRAM(S): 400 CAPSULE ORAL at 05:27

## 2017-12-09 RX ADMIN — Medication 500 MILLIGRAM(S): at 16:03

## 2017-12-09 RX ADMIN — CLOPIDOGREL BISULFATE 75 MILLIGRAM(S): 75 TABLET, FILM COATED ORAL at 21:45

## 2017-12-09 RX ADMIN — Medication 500 MILLIGRAM(S): at 19:16

## 2017-12-09 RX ADMIN — FENTANYL CITRATE 200 MICROGRAM(S): 50 INJECTION INTRAVENOUS at 06:39

## 2017-12-09 RX ADMIN — FENTANYL CITRATE 200 MICROGRAM(S): 50 INJECTION INTRAVENOUS at 05:25

## 2017-12-09 RX ADMIN — Medication 500 MILLIGRAM(S): at 21:42

## 2017-12-09 RX ADMIN — PANTOPRAZOLE SODIUM 40 MILLIGRAM(S): 20 TABLET, DELAYED RELEASE ORAL at 12:46

## 2017-12-09 RX ADMIN — Medication 12.5 MILLIGRAM(S): at 19:16

## 2017-12-09 RX ADMIN — FENTANYL CITRATE 200 MICROGRAM(S): 50 INJECTION INTRAVENOUS at 19:12

## 2017-12-09 RX ADMIN — Medication 12.5 MILLIGRAM(S): at 05:26

## 2017-12-09 RX ADMIN — HEPARIN SODIUM 5000 UNIT(S): 5000 INJECTION INTRAVENOUS; SUBCUTANEOUS at 16:09

## 2017-12-09 RX ADMIN — LEVETIRACETAM 500 MILLIGRAM(S): 250 TABLET, FILM COATED ORAL at 19:15

## 2017-12-09 NOTE — PROGRESS NOTE ADULT - ASSESSMENT
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  Mental status change, Possible seizure- continue Keppra, Neurology follow noted. MRI brain pending   Gastric cancer metastatic- Oncology follow.  Pelvic mass-GI evaluation noted. S/P rectal EUS/ biopsy . Restart ASA/Plavix Pathology pending .  Antibiotics for prophylaxis.  Prostate cancer- Rx as per Oncology Urology evaluation noted  s/p PEG replacement. Continue Peg feeds.  Pain control/ Pain management evaluation noted.  HTN control  CAD stable.  Depression- continue Rx  Functional quadriplegia -supportive care  PT  DC telemetry  d/w family QA  Jose Medina MD pager [phone]

## 2017-12-09 NOTE — PROGRESS NOTE ADULT - SUBJECTIVE AND OBJECTIVE BOX
Patient is a 72y old  Male who presents with a chief complaint of LOC (23 Nov 2017 18:06)      SUBJECTIVE / OVERNIGHT EVENTS: Comfortable without new complaints.   Review of Systems  chest pain no  palpitations no  sob no  nausea no  headache no    MEDICATIONS  (STANDING):  amLODIPine   Tablet 5 milliGRAM(s) Oral daily  atorvastatin 80 milliGRAM(s) Oral at bedtime  ciprofloxacin     Tablet 500 milliGRAM(s) Oral every 12 hours  gabapentin   Solution 300 milliGRAM(s) Oral three times a day  heparin  Injectable 5000 Unit(s) SubCutaneous every 8 hours  levETIRAcetam  Solution 500 milliGRAM(s) Oral two times a day  metoprolol     tartrate 12.5 milliGRAM(s) Oral two times a day  metroNIDAZOLE    Tablet 500 milliGRAM(s) Oral every 8 hours  pantoprazole   Suspension 40 milliGRAM(s) Oral daily  polyethylene glycol 3350 17 Gram(s) Oral daily  QUEtiapine 25 milliGRAM(s) Oral daily  senna 2 Tablet(s) Oral at bedtime  sodium chloride 0.45%. 1000 milliLiter(s) (75 mL/Hr) IV Continuous <Continuous>    MEDICATIONS  (PRN):  fentaNYL     Lozenge 200 MICROGram(s) Transmucosal every 3 hours PRN severe pain          PHYSICAL EXAM:  GENERAL: NAD, well-developed  HEAD:  Atraumatic, Normocephalic  EYES: EOMI, PERRLA, conjunctiva and sclera clear  NECK: Supple, No JVD  CHEST/LUNG: Clear to auscultation bilaterally; No wheeze  HEART: Regular rate and rhythm; No murmurs, rubs, or gallops  ABDOMEN: Soft, Nontender, Nondistended; Bowel sounds present PEG in place.  EXTREMITIES:  2+ Peripheral Pulses, No clubbing, cyanosis, or edema  PSYCH: AAOx3  NEUROLOGY: non-focal  SKIN: No rashes or lesions    LABS:                        11.9   4.74  )-----------( 183      ( 08 Dec 2017 07:28 )             35.7     12-08    139  |  100  |  14  ----------------------------<  122<H>  4.9   |  25  |  1.05    Ca    9.9      08 Dec 2017 07:28  Phos  2.8     12-07  Mg     1.9     12-07            Telemetry SR    RADIOLOGY & ADDITIONAL TESTS:    Imaging Personally Reviewed:    Consultant(s) Notes Reviewed:      Care Discussed with Consultants/Other Providers:

## 2017-12-10 RX ADMIN — Medication 500 MILLIGRAM(S): at 06:03

## 2017-12-10 RX ADMIN — HEPARIN SODIUM 5000 UNIT(S): 5000 INJECTION INTRAVENOUS; SUBCUTANEOUS at 06:03

## 2017-12-10 RX ADMIN — LEVETIRACETAM 500 MILLIGRAM(S): 250 TABLET, FILM COATED ORAL at 18:46

## 2017-12-10 RX ADMIN — Medication 81 MILLIGRAM(S): at 10:44

## 2017-12-10 RX ADMIN — FENTANYL CITRATE 200 MICROGRAM(S): 50 INJECTION INTRAVENOUS at 20:23

## 2017-12-10 RX ADMIN — FENTANYL CITRATE 200 MICROGRAM(S): 50 INJECTION INTRAVENOUS at 11:09

## 2017-12-10 RX ADMIN — FENTANYL CITRATE 200 MICROGRAM(S): 50 INJECTION INTRAVENOUS at 13:41

## 2017-12-10 RX ADMIN — CLOPIDOGREL BISULFATE 75 MILLIGRAM(S): 75 TABLET, FILM COATED ORAL at 10:44

## 2017-12-10 RX ADMIN — POLYETHYLENE GLYCOL 3350 17 GRAM(S): 17 POWDER, FOR SOLUTION ORAL at 10:44

## 2017-12-10 RX ADMIN — QUETIAPINE FUMARATE 25 MILLIGRAM(S): 200 TABLET, FILM COATED ORAL at 10:43

## 2017-12-10 RX ADMIN — FENTANYL CITRATE 200 MICROGRAM(S): 50 INJECTION INTRAVENOUS at 08:19

## 2017-12-10 RX ADMIN — FENTANYL CITRATE 200 MICROGRAM(S): 50 INJECTION INTRAVENOUS at 01:43

## 2017-12-10 RX ADMIN — FENTANYL CITRATE 200 MICROGRAM(S): 50 INJECTION INTRAVENOUS at 10:42

## 2017-12-10 RX ADMIN — Medication 500 MILLIGRAM(S): at 18:47

## 2017-12-10 RX ADMIN — AMLODIPINE BESYLATE 5 MILLIGRAM(S): 2.5 TABLET ORAL at 06:03

## 2017-12-10 RX ADMIN — GABAPENTIN 300 MILLIGRAM(S): 400 CAPSULE ORAL at 06:02

## 2017-12-10 RX ADMIN — HEPARIN SODIUM 5000 UNIT(S): 5000 INJECTION INTRAVENOUS; SUBCUTANEOUS at 13:42

## 2017-12-10 RX ADMIN — FENTANYL CITRATE 200 MICROGRAM(S): 50 INJECTION INTRAVENOUS at 02:15

## 2017-12-10 RX ADMIN — Medication 500 MILLIGRAM(S): at 13:45

## 2017-12-10 RX ADMIN — FENTANYL CITRATE 200 MICROGRAM(S): 50 INJECTION INTRAVENOUS at 16:17

## 2017-12-10 RX ADMIN — FENTANYL CITRATE 200 MICROGRAM(S): 50 INJECTION INTRAVENOUS at 08:05

## 2017-12-10 RX ADMIN — GABAPENTIN 300 MILLIGRAM(S): 400 CAPSULE ORAL at 18:46

## 2017-12-10 RX ADMIN — FENTANYL CITRATE 200 MICROGRAM(S): 50 INJECTION INTRAVENOUS at 13:40

## 2017-12-10 RX ADMIN — Medication 12.5 MILLIGRAM(S): at 18:47

## 2017-12-10 RX ADMIN — FENTANYL CITRATE 200 MICROGRAM(S): 50 INJECTION INTRAVENOUS at 21:00

## 2017-12-10 RX ADMIN — GABAPENTIN 300 MILLIGRAM(S): 400 CAPSULE ORAL at 23:52

## 2017-12-10 RX ADMIN — ATORVASTATIN CALCIUM 80 MILLIGRAM(S): 80 TABLET, FILM COATED ORAL at 22:45

## 2017-12-10 RX ADMIN — FENTANYL CITRATE 200 MICROGRAM(S): 50 INJECTION INTRAVENOUS at 20:31

## 2017-12-10 RX ADMIN — LEVETIRACETAM 500 MILLIGRAM(S): 250 TABLET, FILM COATED ORAL at 06:02

## 2017-12-10 RX ADMIN — PANTOPRAZOLE SODIUM 40 MILLIGRAM(S): 20 TABLET, DELAYED RELEASE ORAL at 10:44

## 2017-12-10 RX ADMIN — HEPARIN SODIUM 5000 UNIT(S): 5000 INJECTION INTRAVENOUS; SUBCUTANEOUS at 22:45

## 2017-12-10 RX ADMIN — Medication 12.5 MILLIGRAM(S): at 06:03

## 2017-12-10 NOTE — PROGRESS NOTE ADULT - SUBJECTIVE AND OBJECTIVE BOX
Patient is a 72y old  Male who presents with a chief complaint of LOC (23 Nov 2017 18:06)      SUBJECTIVE / OVERNIGHT EVENTS: No new complaints.   Review of Systems  chest pain no  palpitations no  sob no  nausea no  headache no    MEDICATIONS  (STANDING):  amLODIPine   Tablet 5 milliGRAM(s) Oral daily  aspirin enteric coated 81 milliGRAM(s) Oral daily  atorvastatin 80 milliGRAM(s) Oral at bedtime  ciprofloxacin     Tablet 500 milliGRAM(s) Oral every 12 hours  clopidogrel Tablet 75 milliGRAM(s) Oral daily  gabapentin   Solution 300 milliGRAM(s) Oral three times a day  heparin  Injectable 5000 Unit(s) SubCutaneous every 8 hours  levETIRAcetam  Solution 500 milliGRAM(s) Oral two times a day  metoprolol     tartrate 12.5 milliGRAM(s) Oral two times a day  metroNIDAZOLE    Tablet 500 milliGRAM(s) Oral every 8 hours  pantoprazole   Suspension 40 milliGRAM(s) Oral daily  polyethylene glycol 3350 17 Gram(s) Oral daily  QUEtiapine 25 milliGRAM(s) Oral daily  senna 2 Tablet(s) Oral at bedtime    MEDICATIONS  (PRN):  fentaNYL     Lozenge 200 MICROGram(s) Transmucosal every 3 hours PRN severe pain          PHYSICAL EXAM:  GENERAL: NAD, cachectic   HEAD:  Atraumatic, Normocephalic  EYES: EOMI, PERRLA, conjunctiva and sclera clear  NECK: Supple, No JVD  CHEST/LUNG: Clear to auscultation bilaterally; No wheeze  HEART: Regular rate and rhythm; No murmurs, rubs, or gallops  ABDOMEN: Soft, Nontender, Nondistended; Bowel sounds present PEG  EXTREMITIES:  2+ Peripheral Pulses, No clubbing, cyanosis, or edema  PSYCH: AAOx3  NEUROLOGY: non-focal  SKIN: No rashes or lesions    LABS:                    RADIOLOGY & ADDITIONAL TESTS:    Imaging Personally Reviewed:    Consultant(s) Notes Reviewed:      Care Discussed with Consultants/Other Providers:

## 2017-12-10 NOTE — PROGRESS NOTE ADULT - ASSESSMENT
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  Mental status change, Possible seizure- continue Keppra, Neurology follow noted. MRI brain pending   Gastric cancer metastatic- Oncology follow.  Pelvic mass-GI evaluation noted. S/P rectal EUS/ biopsy . Restart ASA/Plavix Pathology pending .  Antibiotics for prophylaxis.  Prostate cancer- Rx as per Oncology Urology evaluation noted  s/p PEG replacement. Continue Peg feeds.  Pain control/ Pain management evaluation noted.  HTN control  CAD stable.  Depression- continue Rx  Functional quadriplegia -supportive care  PT  DC telemetry  DCP in progress.  Jose Medina MD pager [phone]

## 2017-12-11 LAB
ANION GAP SERPL CALC-SCNC: 18 MMOL/L — HIGH (ref 5–17)
BUN SERPL-MCNC: 17 MG/DL — SIGNIFICANT CHANGE UP (ref 7–23)
CALCIUM SERPL-MCNC: 9.7 MG/DL — SIGNIFICANT CHANGE UP (ref 8.4–10.5)
CHLORIDE SERPL-SCNC: 104 MMOL/L — SIGNIFICANT CHANGE UP (ref 96–108)
CO2 SERPL-SCNC: 23 MMOL/L — SIGNIFICANT CHANGE UP (ref 22–31)
CREAT SERPL-MCNC: 1.04 MG/DL — SIGNIFICANT CHANGE UP (ref 0.5–1.3)
GLUCOSE SERPL-MCNC: 92 MG/DL — SIGNIFICANT CHANGE UP (ref 70–99)
HCT VFR BLD CALC: 35.6 % — LOW (ref 39–50)
HGB BLD-MCNC: 11.8 G/DL — LOW (ref 13–17)
MAGNESIUM SERPL-MCNC: 2 MG/DL — SIGNIFICANT CHANGE UP (ref 1.6–2.6)
MCHC RBC-ENTMCNC: 33.1 GM/DL — SIGNIFICANT CHANGE UP (ref 32–36)
MCHC RBC-ENTMCNC: 34 PG — SIGNIFICANT CHANGE UP (ref 27–34)
MCV RBC AUTO: 102.6 FL — HIGH (ref 80–100)
PHOSPHATE SERPL-MCNC: 3.1 MG/DL — SIGNIFICANT CHANGE UP (ref 2.5–4.5)
PLATELET # BLD AUTO: 187 K/UL — SIGNIFICANT CHANGE UP (ref 150–400)
POTASSIUM SERPL-MCNC: 4.5 MMOL/L — SIGNIFICANT CHANGE UP (ref 3.5–5.3)
POTASSIUM SERPL-SCNC: 4.5 MMOL/L — SIGNIFICANT CHANGE UP (ref 3.5–5.3)
RBC # BLD: 3.47 M/UL — LOW (ref 4.2–5.8)
RBC # FLD: 13.5 % — SIGNIFICANT CHANGE UP (ref 10.3–14.5)
SODIUM SERPL-SCNC: 145 MMOL/L — SIGNIFICANT CHANGE UP (ref 135–145)
WBC # BLD: 4.88 K/UL — SIGNIFICANT CHANGE UP (ref 3.8–10.5)
WBC # FLD AUTO: 4.88 K/UL — SIGNIFICANT CHANGE UP (ref 3.8–10.5)

## 2017-12-11 RX ADMIN — FENTANYL CITRATE 200 MICROGRAM(S): 50 INJECTION INTRAVENOUS at 23:57

## 2017-12-11 RX ADMIN — Medication 12.5 MILLIGRAM(S): at 17:15

## 2017-12-11 RX ADMIN — QUETIAPINE FUMARATE 25 MILLIGRAM(S): 200 TABLET, FILM COATED ORAL at 23:25

## 2017-12-11 RX ADMIN — FENTANYL CITRATE 200 MICROGRAM(S): 50 INJECTION INTRAVENOUS at 18:54

## 2017-12-11 RX ADMIN — GABAPENTIN 300 MILLIGRAM(S): 400 CAPSULE ORAL at 05:35

## 2017-12-11 RX ADMIN — FENTANYL CITRATE 200 MICROGRAM(S): 50 INJECTION INTRAVENOUS at 14:03

## 2017-12-11 RX ADMIN — HEPARIN SODIUM 5000 UNIT(S): 5000 INJECTION INTRAVENOUS; SUBCUTANEOUS at 05:36

## 2017-12-11 RX ADMIN — AMLODIPINE BESYLATE 5 MILLIGRAM(S): 2.5 TABLET ORAL at 05:35

## 2017-12-11 RX ADMIN — FENTANYL CITRATE 200 MICROGRAM(S): 50 INJECTION INTRAVENOUS at 06:00

## 2017-12-11 RX ADMIN — CLOPIDOGREL BISULFATE 75 MILLIGRAM(S): 75 TABLET, FILM COATED ORAL at 11:34

## 2017-12-11 RX ADMIN — FENTANYL CITRATE 200 MICROGRAM(S): 50 INJECTION INTRAVENOUS at 01:46

## 2017-12-11 RX ADMIN — FENTANYL CITRATE 200 MICROGRAM(S): 50 INJECTION INTRAVENOUS at 11:48

## 2017-12-11 RX ADMIN — Medication 12.5 MILLIGRAM(S): at 05:35

## 2017-12-11 RX ADMIN — HEPARIN SODIUM 5000 UNIT(S): 5000 INJECTION INTRAVENOUS; SUBCUTANEOUS at 23:24

## 2017-12-11 RX ADMIN — FENTANYL CITRATE 200 MICROGRAM(S): 50 INJECTION INTRAVENOUS at 05:35

## 2017-12-11 RX ADMIN — GABAPENTIN 300 MILLIGRAM(S): 400 CAPSULE ORAL at 15:44

## 2017-12-11 RX ADMIN — SENNA PLUS 2 TABLET(S): 8.6 TABLET ORAL at 23:25

## 2017-12-11 RX ADMIN — FENTANYL CITRATE 200 MICROGRAM(S): 50 INJECTION INTRAVENOUS at 11:38

## 2017-12-11 RX ADMIN — FENTANYL CITRATE 200 MICROGRAM(S): 50 INJECTION INTRAVENOUS at 23:34

## 2017-12-11 RX ADMIN — PANTOPRAZOLE SODIUM 40 MILLIGRAM(S): 20 TABLET, DELAYED RELEASE ORAL at 11:34

## 2017-12-11 RX ADMIN — HEPARIN SODIUM 5000 UNIT(S): 5000 INJECTION INTRAVENOUS; SUBCUTANEOUS at 14:02

## 2017-12-11 RX ADMIN — ATORVASTATIN CALCIUM 80 MILLIGRAM(S): 80 TABLET, FILM COATED ORAL at 23:25

## 2017-12-11 RX ADMIN — FENTANYL CITRATE 200 MICROGRAM(S): 50 INJECTION INTRAVENOUS at 15:36

## 2017-12-11 RX ADMIN — LEVETIRACETAM 500 MILLIGRAM(S): 250 TABLET, FILM COATED ORAL at 17:15

## 2017-12-11 RX ADMIN — FENTANYL CITRATE 200 MICROGRAM(S): 50 INJECTION INTRAVENOUS at 17:15

## 2017-12-11 RX ADMIN — GABAPENTIN 300 MILLIGRAM(S): 400 CAPSULE ORAL at 23:24

## 2017-12-11 RX ADMIN — FENTANYL CITRATE 200 MICROGRAM(S): 50 INJECTION INTRAVENOUS at 09:10

## 2017-12-11 RX ADMIN — FENTANYL CITRATE 200 MICROGRAM(S): 50 INJECTION INTRAVENOUS at 01:14

## 2017-12-11 RX ADMIN — LEVETIRACETAM 500 MILLIGRAM(S): 250 TABLET, FILM COATED ORAL at 05:35

## 2017-12-11 RX ADMIN — Medication 81 MILLIGRAM(S): at 11:34

## 2017-12-11 NOTE — CHART NOTE - NSCHARTNOTEFT_GEN_A_CORE
Source: Patient [ X]    Family [ ]     other [ X]    Pt seen for malnutrition follow up. Per RN, Pt tolerating Jevity 1.2 @ 60ml/[id number] well via PEG. Pt seen laying in bed, appears comfortable   Per chart, Pt admitted for syncopal episode. Pt with history of Stroke with right sided weakness, aphasia, PEG a/w prostate and stomach cancer on oral chemo. Per chart, Pt code stroke, likely seizure like activity. Pt s/p PEG tube exchange on 11/26. Pt now with pelvic mass concerning for new gastrointestinal stromal tumor vs metastatic prostate cancer.  Pt S/p rectal EUS BX.     Diet : NPO with EN via PEG.     Patient reports no GI distress, 2 BM's today        Enteral /Parenteral Nutrition: Jevity 1.2 @ 60ml/[id number]. Goal rate provides 1728kcals and 79.9gm protein. ( 32kcals/kg and 1.48gm pro/kg based on dosing Wt 54kg.)      Current Weight: 115.3lbs Pt is weight stable   % Weight Change    Pertinent Medications: MEDICATIONS  (STANDING):  amLODIPine   Tablet 5 milliGRAM(s) Oral daily  aspirin enteric coated 81 milliGRAM(s) Oral daily  atorvastatin 80 milliGRAM(s) Oral at bedtime  clopidogrel Tablet 75 milliGRAM(s) Oral daily  gabapentin   Solution 300 milliGRAM(s) Oral three times a day  heparin  Injectable 5000 Unit(s) SubCutaneous every 8 hours  levETIRAcetam  Solution 500 milliGRAM(s) Oral two times a day  metoprolol     tartrate 12.5 milliGRAM(s) Oral two times a day  pantoprazole   Suspension 40 milliGRAM(s) Oral daily  polyethylene glycol 3350 17 Gram(s) Oral daily  QUEtiapine 25 milliGRAM(s) Oral daily  senna 2 Tablet(s) Oral at bedtime    MEDICATIONS  (PRN):  fentaNYL     Lozenge 200 MICROGram(s) Transmucosal every 3 hours PRN severe pain    Pertinent Labs:  12-11 Na145 mmol/L Glu 92 mg/dL K+ 4.5 mmol/L Cr  1.04 mg/dL BUN 17 mg/dL Phos 3.1 mg/dL       Skin: intact     Estimated Needs:   [X ] no change since previous assessment  [ ] recalculated:       Previous Nutrition Diagnosis:   [X ] Malnutrition       Nutrition Diagnosis is [X ] ongoing  [ ] resolved [ ] not applicable          New Nutrition Diagnosis: [X ] not applicable    Recommend    1. Recommend to continue Jevity 1.2 @ 60ml/[id number] via PEG       Monitoring and Evaluation:     [ ] PO intake [ X] Tolerance to diet prescription [X ] weights [ X] follow up per protocol    [X ] other: RD remains available Sarah Siegler RD. Pager #[phone]

## 2017-12-11 NOTE — PROGRESS NOTE ADULT - SUBJECTIVE AND OBJECTIVE BOX
Patient is a 72y old  Male who presents with a chief complaint of LOC (23 Nov 2017 18:06)      SUBJECTIVE / OVERNIGHT EVENTS: Comfortable without new complaints.   Review of Systems  chest pain no  palpitations no  sob no  nausea no  headache no    MEDICATIONS  (STANDING):  amLODIPine   Tablet 5 milliGRAM(s) Oral daily  aspirin enteric coated 81 milliGRAM(s) Oral daily  atorvastatin 80 milliGRAM(s) Oral at bedtime  clopidogrel Tablet 75 milliGRAM(s) Oral daily  gabapentin   Solution 300 milliGRAM(s) Oral three times a day  heparin  Injectable 5000 Unit(s) SubCutaneous every 8 hours  levETIRAcetam  Solution 500 milliGRAM(s) Oral two times a day  metoprolol     tartrate 12.5 milliGRAM(s) Oral two times a day  pantoprazole   Suspension 40 milliGRAM(s) Oral daily  polyethylene glycol 3350 17 Gram(s) Oral daily  QUEtiapine 25 milliGRAM(s) Oral daily  senna 2 Tablet(s) Oral at bedtime    MEDICATIONS  (PRN):  fentaNYL     Lozenge 200 MICROGram(s) Transmucosal every 3 hours PRN severe pain          PHYSICAL EXAM:  GENERAL: NAD, well-developed  HEAD:  Atraumatic, Normocephalic  EYES: EOMI, PERRLA, conjunctiva and sclera clear  NECK: Supple, No JVD  CHEST/LUNG: Clear to auscultation bilaterally; No wheeze  HEART: Regular rate and rhythm; No murmurs, rubs, or gallops  ABDOMEN: Soft, Nontender, Nondistended; Bowel sounds present  EXTREMITIES:  2+ Peripheral Pulses, No clubbing, cyanosis, or edema  PSYCH: AAOx3  NEUROLOGY: non-focal  SKIN: No rashes or lesions    LABS:                        11.8   4.88  )-----------( 187      ( 11 Dec 2017 07:35 )             35.6     12-11    145  |  104  |  17  ----------------------------<  92  4.5   |  23  |  1.04    Ca    9.7      11 Dec 2017 07:52  Phos  3.1     12-11  Mg     2.0     12-11                RADIOLOGY & ADDITIONAL TESTS:    Imaging Personally Reviewed:    Consultant(s) Notes Reviewed:      Care Discussed with Consultants/Other Providers:

## 2017-12-11 NOTE — PROGRESS NOTE ADULT - SUBJECTIVE AND OBJECTIVE BOX
Pt is seen and examined  pt is awake and lying in bed  pt seems comfortable and denies any complaints at this time        MEDICATIONS  (STANDING):  amLODIPine   Tablet 5 milliGRAM(s) Oral daily  aspirin enteric coated 81 milliGRAM(s) Oral daily  atorvastatin 80 milliGRAM(s) Oral at bedtime  clopidogrel Tablet 75 milliGRAM(s) Oral daily  gabapentin   Solution 300 milliGRAM(s) Oral three times a day  heparin  Injectable 5000 Unit(s) SubCutaneous every 8 hours  levETIRAcetam  Solution 500 milliGRAM(s) Oral two times a day  metoprolol     tartrate 12.5 milliGRAM(s) Oral two times a day  pantoprazole   Suspension 40 milliGRAM(s) Oral daily  polyethylene glycol 3350 17 Gram(s) Oral daily  QUEtiapine 25 milliGRAM(s) Oral daily  senna 2 Tablet(s) Oral at bedtime    MEDICATIONS  (PRN):  fentaNYL     Lozenge 200 MICROGram(s) Transmucosal every 3 hours PRN severe pain      Allergies    No Known Allergies    Intolerances        Vital Signs Last 24 Hrs  T(C): 36.5 (11 Dec 2017 04:17), Max: 36.6 (10 Dec 2017 21:19)  T(F): 97.7 (11 Dec 2017 04:17), Max: 97.8 (10 Dec 2017 21:19)  HR: 66 (11 Dec 2017 04:17) (62 - 66)  BP: 139/79 (11 Dec 2017 04:17) (129/82 - 139/79)  BP(mean): --  RR: 18 (11 Dec 2017 04:17) (18 - 18)  SpO2: 97% (11 Dec 2017 04:17) (97% - 98%)    PHYSICAL EXAM  General: adult in NAD  HEENT: clear oropharynx, anicteric sclera, pink conjunctiva  Neck: supple  CV: normal S1/S2 with no murmur rubs or gallops  Lungs: positive air movement b/l ant lungs, clear to auscultation, no wheezes, no rales  Abdomen: soft non-tender non-distended, no hepatosplenomegaly  Ext: no clubbing cyanosis or edema  Skin: no rashes and no petechiae  Neuro: alert and oriented X 4, no focal deficits  LABS:                          11.8   4.88  )-----------( 187      ( 11 Dec 2017 07:35 )             35.6         Mean Cell Volume : 102.6 fl  Mean Cell Hemoglobin : 34.0 pg  Mean Cell Hemoglobin Concentration : 33.1 gm/dL  Auto Neutrophil # : x  Auto Lymphocyte # : x  Auto Monocyte # : x  Auto Eosinophil # : x  Auto Basophil # : x  Auto Neutrophil % : x  Auto Lymphocyte % : x  Auto Monocyte % : x  Auto Eosinophil % : x  Auto Basophil % : x      12-11    145  |  104  |  17  ----------------------------<  92  4.5   |  23  |  1.04    Ca    9.7      11 Dec 2017 07:52  Phos  3.1     12-11  Mg     2.0     12-11

## 2017-12-11 NOTE — PROGRESS NOTE ADULT - ASSESSMENT
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  Mental status change, Possible seizure- continue Keppra, Neurology follow noted. MRI brain pending   Gastric cancer metastatic- Oncology follow.  Pelvic mass-GI evaluation noted. S/P rectal EUS/ biopsy . Restart ASA/Plavix Pathology pending .  Antibiotics for prophylaxis.  Prostate cancer- Rx as per Oncology Urology evaluation noted  s/p PEG replacement. Continue Peg feeds.  Pain control/ Pain management evaluation noted.  HTN control  CAD stable.  Depression- continue Rx  Functional quadriplegia -supportive care  PT  DC telemetry  DCP in progress.  Jose Medina MD pager [phone]

## 2017-12-12 RX ORDER — HYDROMORPHONE HYDROCHLORIDE 2 MG/ML
1 INJECTION INTRAMUSCULAR; INTRAVENOUS; SUBCUTANEOUS ONCE
Qty: 0 | Refills: 0 | Status: DISCONTINUED | OUTPATIENT
Start: 2017-12-12 | End: 2017-12-12

## 2017-12-12 RX ADMIN — FENTANYL CITRATE 200 MICROGRAM(S): 50 INJECTION INTRAVENOUS at 20:25

## 2017-12-12 RX ADMIN — CLOPIDOGREL BISULFATE 75 MILLIGRAM(S): 75 TABLET, FILM COATED ORAL at 17:15

## 2017-12-12 RX ADMIN — FENTANYL CITRATE 200 MICROGRAM(S): 50 INJECTION INTRAVENOUS at 11:29

## 2017-12-12 RX ADMIN — Medication 12.5 MILLIGRAM(S): at 17:15

## 2017-12-12 RX ADMIN — FENTANYL CITRATE 200 MICROGRAM(S): 50 INJECTION INTRAVENOUS at 04:04

## 2017-12-12 RX ADMIN — FENTANYL CITRATE 200 MICROGRAM(S): 50 INJECTION INTRAVENOUS at 02:04

## 2017-12-12 RX ADMIN — AMLODIPINE BESYLATE 5 MILLIGRAM(S): 2.5 TABLET ORAL at 06:33

## 2017-12-12 RX ADMIN — GABAPENTIN 300 MILLIGRAM(S): 400 CAPSULE ORAL at 14:58

## 2017-12-12 RX ADMIN — HYDROMORPHONE HYDROCHLORIDE 1 MILLIGRAM(S): 2 INJECTION INTRAMUSCULAR; INTRAVENOUS; SUBCUTANEOUS at 22:20

## 2017-12-12 RX ADMIN — ATORVASTATIN CALCIUM 80 MILLIGRAM(S): 80 TABLET, FILM COATED ORAL at 22:20

## 2017-12-12 RX ADMIN — HYDROMORPHONE HYDROCHLORIDE 1 MILLIGRAM(S): 2 INJECTION INTRAMUSCULAR; INTRAVENOUS; SUBCUTANEOUS at 12:34

## 2017-12-12 RX ADMIN — HEPARIN SODIUM 5000 UNIT(S): 5000 INJECTION INTRAVENOUS; SUBCUTANEOUS at 22:20

## 2017-12-12 RX ADMIN — SENNA PLUS 2 TABLET(S): 8.6 TABLET ORAL at 22:20

## 2017-12-12 RX ADMIN — LEVETIRACETAM 500 MILLIGRAM(S): 250 TABLET, FILM COATED ORAL at 06:33

## 2017-12-12 RX ADMIN — FENTANYL CITRATE 200 MICROGRAM(S): 50 INJECTION INTRAVENOUS at 19:55

## 2017-12-12 RX ADMIN — HYDROMORPHONE HYDROCHLORIDE 1 MILLIGRAM(S): 2 INJECTION INTRAMUSCULAR; INTRAVENOUS; SUBCUTANEOUS at 22:50

## 2017-12-12 RX ADMIN — GABAPENTIN 300 MILLIGRAM(S): 400 CAPSULE ORAL at 06:33

## 2017-12-12 RX ADMIN — LEVETIRACETAM 500 MILLIGRAM(S): 250 TABLET, FILM COATED ORAL at 17:15

## 2017-12-12 RX ADMIN — Medication 12.5 MILLIGRAM(S): at 06:33

## 2017-12-12 RX ADMIN — FENTANYL CITRATE 200 MICROGRAM(S): 50 INJECTION INTRAVENOUS at 12:00

## 2017-12-12 RX ADMIN — FENTANYL CITRATE 200 MICROGRAM(S): 50 INJECTION INTRAVENOUS at 15:05

## 2017-12-12 RX ADMIN — HEPARIN SODIUM 5000 UNIT(S): 5000 INJECTION INTRAVENOUS; SUBCUTANEOUS at 14:58

## 2017-12-12 RX ADMIN — GABAPENTIN 300 MILLIGRAM(S): 400 CAPSULE ORAL at 22:20

## 2017-12-12 RX ADMIN — HYDROMORPHONE HYDROCHLORIDE 1 MILLIGRAM(S): 2 INJECTION INTRAMUSCULAR; INTRAVENOUS; SUBCUTANEOUS at 13:00

## 2017-12-12 RX ADMIN — Medication 81 MILLIGRAM(S): at 17:15

## 2017-12-12 RX ADMIN — QUETIAPINE FUMARATE 25 MILLIGRAM(S): 200 TABLET, FILM COATED ORAL at 22:20

## 2017-12-12 RX ADMIN — FENTANYL CITRATE 200 MICROGRAM(S): 50 INJECTION INTRAVENOUS at 05:14

## 2017-12-12 RX ADMIN — HEPARIN SODIUM 5000 UNIT(S): 5000 INJECTION INTRAVENOUS; SUBCUTANEOUS at 06:33

## 2017-12-12 RX ADMIN — PANTOPRAZOLE SODIUM 40 MILLIGRAM(S): 20 TABLET, DELAYED RELEASE ORAL at 14:53

## 2017-12-12 NOTE — PROGRESS NOTE ADULT - ASSESSMENT
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  Mental status change, Possible seizure- continue Keppra, Neurology follow noted. MRI brain pending   Gastric cancer metastatic- Oncology follow.  Pelvic mass-GI evaluation noted. S/P rectal EUS/ biopsy . Restarted ASA/Plavix Pathology pending .  Antibiotics for prophylaxis.  Prostate cancer- Rx as per Oncology Urology evaluation noted  s/p PEG replacement. Continue Peg feeds.  Pain control/ Pain management evaluation noted.  HTN control  CAD stable.  Depression- continue Rx  Functional quadriplegia -supportive care  PT  DC telemetry  DCP in progress.  Jose Medina MD pager [phone]

## 2017-12-12 NOTE — PROGRESS NOTE ADULT - SUBJECTIVE AND OBJECTIVE BOX
Pt is seen and examined  pt is awake and lying in bed/  pt seems comfortable and denies any complaints at this time        MEDICATIONS  (STANDING):  amLODIPine   Tablet 5 milliGRAM(s) Oral daily  aspirin enteric coated 81 milliGRAM(s) Oral daily  atorvastatin 80 milliGRAM(s) Oral at bedtime  clopidogrel Tablet 75 milliGRAM(s) Oral daily  gabapentin   Solution 300 milliGRAM(s) Oral three times a day  heparin  Injectable 5000 Unit(s) SubCutaneous every 8 hours  levETIRAcetam  Solution 500 milliGRAM(s) Oral two times a day  metoprolol     tartrate 12.5 milliGRAM(s) Oral two times a day  pantoprazole   Suspension 40 milliGRAM(s) Oral daily  polyethylene glycol 3350 17 Gram(s) Oral daily  QUEtiapine 25 milliGRAM(s) Oral daily  senna 2 Tablet(s) Oral at bedtime    MEDICATIONS  (PRN):  fentaNYL     Lozenge 200 MICROGram(s) Transmucosal every 3 hours PRN severe pain      Allergies    No Known Allergies    Intolerances        Vital Signs Last 24 Hrs  T(C): 36.6 (12 Dec 2017 04:21), Max: 36.7 (11 Dec 2017 21:47)  T(F): 97.8 (12 Dec 2017 04:21), Max: 98 (11 Dec 2017 21:47)  HR: 61 (12 Dec 2017 04:21) (61 - 65)  BP: 154/89 (12 Dec 2017 04:21) (133/75 - 154/89)  BP(mean): --  RR: 18 (12 Dec 2017 04:21) (18 - 18)  SpO2: 97% (12 Dec 2017 04:21) (97% - 100%)    PHYSICAL EXAM  General: adult in NAD  HEENT: clear oropharynx, anicteric sclera, pink conjunctiva  Neck: supple  CV: normal S1/S2 with no murmur rubs or gallops  Lungs: positive air movement b/l ant lungs,clear to auscultation, no wheezes, no rales  Abdomen: soft non-tender non-distended, no hepatosplenomegaly  Ext: no clubbing cyanosis or edema  Skin: no rashes and no petechiae  Neuro: alert and oriented X 4, no focal deficits  LABS:                          11.8   4.88  )-----------( 187      ( 11 Dec 2017 07:35 )             35.6         Mean Cell Volume : 102.6 fl  Mean Cell Hemoglobin : 34.0 pg  Mean Cell Hemoglobin Concentration : 33.1 gm/dL  Auto Neutrophil # : x  Auto Lymphocyte # : x  Auto Monocyte # : x  Auto Eosinophil # : x  Auto Basophil # : x  Auto Neutrophil % : x  Auto Lymphocyte % : x  Auto Monocyte % : x  Auto Eosinophil % : x  Auto Basophil % : x      12-11    145  |  104  |  17  ----------------------------<  92  4.5   |  23  |  1.04    Ca    9.7      11 Dec 2017 07:52  Phos  3.1     12-11  Mg     2.0     12-11

## 2017-12-13 LAB
HCT VFR BLD CALC: 34.9 % — LOW (ref 39–50)
HGB BLD-MCNC: 11.5 G/DL — LOW (ref 13–17)
MCHC RBC-ENTMCNC: 33 GM/DL — SIGNIFICANT CHANGE UP (ref 32–36)
MCHC RBC-ENTMCNC: 34.1 PG — HIGH (ref 27–34)
MCV RBC AUTO: 103.6 FL — HIGH (ref 80–100)
NON-GYNECOLOGICAL CYTOLOGY STUDY: SIGNIFICANT CHANGE UP
PLATELET # BLD AUTO: 183 K/UL — SIGNIFICANT CHANGE UP (ref 150–400)
RBC # BLD: 3.37 M/UL — LOW (ref 4.2–5.8)
RBC # FLD: 13.3 % — SIGNIFICANT CHANGE UP (ref 10.3–14.5)
TESTOST FREE+TOTAL PANEL SERPL-MCNC: 215.7 NG/DL — SIGNIFICANT CHANGE UP (ref 193–740)
WBC # BLD: 4.78 K/UL — SIGNIFICANT CHANGE UP (ref 3.8–10.5)
WBC # FLD AUTO: 4.78 K/UL — SIGNIFICANT CHANGE UP (ref 3.8–10.5)

## 2017-12-13 RX ORDER — FENTANYL CITRATE 50 UG/ML
200 INJECTION INTRAVENOUS
Qty: 0 | Refills: 0 | Status: DISCONTINUED | OUTPATIENT
Start: 2017-12-13 | End: 2017-12-15

## 2017-12-13 RX ORDER — HYDROMORPHONE HYDROCHLORIDE 2 MG/ML
1 INJECTION INTRAMUSCULAR; INTRAVENOUS; SUBCUTANEOUS ONCE
Qty: 0 | Refills: 0 | Status: DISCONTINUED | OUTPATIENT
Start: 2017-12-13 | End: 2017-12-13

## 2017-12-13 RX ADMIN — GABAPENTIN 300 MILLIGRAM(S): 400 CAPSULE ORAL at 06:03

## 2017-12-13 RX ADMIN — FENTANYL CITRATE 200 MICROGRAM(S): 50 INJECTION INTRAVENOUS at 01:41

## 2017-12-13 RX ADMIN — QUETIAPINE FUMARATE 25 MILLIGRAM(S): 200 TABLET, FILM COATED ORAL at 21:35

## 2017-12-13 RX ADMIN — Medication 12.5 MILLIGRAM(S): at 17:43

## 2017-12-13 RX ADMIN — LEVETIRACETAM 500 MILLIGRAM(S): 250 TABLET, FILM COATED ORAL at 17:43

## 2017-12-13 RX ADMIN — FENTANYL CITRATE 200 MICROGRAM(S): 50 INJECTION INTRAVENOUS at 21:35

## 2017-12-13 RX ADMIN — FENTANYL CITRATE 200 MICROGRAM(S): 50 INJECTION INTRAVENOUS at 22:05

## 2017-12-13 RX ADMIN — AMLODIPINE BESYLATE 5 MILLIGRAM(S): 2.5 TABLET ORAL at 06:03

## 2017-12-13 RX ADMIN — PANTOPRAZOLE SODIUM 40 MILLIGRAM(S): 20 TABLET, DELAYED RELEASE ORAL at 14:45

## 2017-12-13 RX ADMIN — CLOPIDOGREL BISULFATE 75 MILLIGRAM(S): 75 TABLET, FILM COATED ORAL at 14:46

## 2017-12-13 RX ADMIN — GABAPENTIN 300 MILLIGRAM(S): 400 CAPSULE ORAL at 14:46

## 2017-12-13 RX ADMIN — FENTANYL CITRATE 200 MICROGRAM(S): 50 INJECTION INTRAVENOUS at 02:11

## 2017-12-13 RX ADMIN — POLYETHYLENE GLYCOL 3350 17 GRAM(S): 17 POWDER, FOR SOLUTION ORAL at 14:46

## 2017-12-13 RX ADMIN — Medication 81 MILLIGRAM(S): at 14:45

## 2017-12-13 RX ADMIN — FENTANYL CITRATE 200 MICROGRAM(S): 50 INJECTION INTRAVENOUS at 04:55

## 2017-12-13 RX ADMIN — HEPARIN SODIUM 5000 UNIT(S): 5000 INJECTION INTRAVENOUS; SUBCUTANEOUS at 06:03

## 2017-12-13 RX ADMIN — ATORVASTATIN CALCIUM 80 MILLIGRAM(S): 80 TABLET, FILM COATED ORAL at 21:35

## 2017-12-13 RX ADMIN — LEVETIRACETAM 500 MILLIGRAM(S): 250 TABLET, FILM COATED ORAL at 06:03

## 2017-12-13 RX ADMIN — HEPARIN SODIUM 5000 UNIT(S): 5000 INJECTION INTRAVENOUS; SUBCUTANEOUS at 21:35

## 2017-12-13 RX ADMIN — FENTANYL CITRATE 200 MICROGRAM(S): 50 INJECTION INTRAVENOUS at 17:42

## 2017-12-13 RX ADMIN — HYDROMORPHONE HYDROCHLORIDE 1 MILLIGRAM(S): 2 INJECTION INTRAMUSCULAR; INTRAVENOUS; SUBCUTANEOUS at 19:49

## 2017-12-13 RX ADMIN — FENTANYL CITRATE 200 MICROGRAM(S): 50 INJECTION INTRAVENOUS at 09:50

## 2017-12-13 RX ADMIN — HEPARIN SODIUM 5000 UNIT(S): 5000 INJECTION INTRAVENOUS; SUBCUTANEOUS at 14:46

## 2017-12-13 RX ADMIN — HYDROMORPHONE HYDROCHLORIDE 1 MILLIGRAM(S): 2 INJECTION INTRAMUSCULAR; INTRAVENOUS; SUBCUTANEOUS at 20:15

## 2017-12-13 RX ADMIN — Medication 12.5 MILLIGRAM(S): at 06:03

## 2017-12-13 RX ADMIN — GABAPENTIN 300 MILLIGRAM(S): 400 CAPSULE ORAL at 21:37

## 2017-12-13 RX ADMIN — SENNA PLUS 2 TABLET(S): 8.6 TABLET ORAL at 21:34

## 2017-12-13 RX ADMIN — FENTANYL CITRATE 200 MICROGRAM(S): 50 INJECTION INTRAVENOUS at 15:00

## 2017-12-13 NOTE — PROGRESS NOTE ADULT - SUBJECTIVE AND OBJECTIVE BOX
Pt is seen and examined  pt is asleep and lying in bed  pt seems comfortable   Had episode agitation pain - received Fentanyl lozenge  Now sedated.      MEDICATIONS  (STANDING):  amLODIPine   Tablet 5 milliGRAM(s) Oral daily  aspirin enteric coated 81 milliGRAM(s) Oral daily  atorvastatin 80 milliGRAM(s) Oral at bedtime  clopidogrel Tablet 75 milliGRAM(s) Oral daily  gabapentin   Solution 300 milliGRAM(s) Oral three times a day  heparin  Injectable 5000 Unit(s) SubCutaneous every 8 hours  levETIRAcetam  Solution 500 milliGRAM(s) Oral two times a day  metoprolol     tartrate 12.5 milliGRAM(s) Oral two times a day  pantoprazole   Suspension 40 milliGRAM(s) Oral daily  polyethylene glycol 3350 17 Gram(s) Oral daily  QUEtiapine 25 milliGRAM(s) Oral daily  senna 2 Tablet(s) Oral at bedtime    MEDICATIONS  (PRN):  fentaNYL     Lozenge 200 MICROGram(s) Transmucosal every 3 hours PRN severe pain      Allergies    No Known Allergies    Intolerances        Vital Signs Last 24 Hrs  T(C): 36.8 (13 Dec 2017 05:12), Max: 36.8 (12 Dec 2017 21:18)  T(F): 98.2 (13 Dec 2017 05:12), Max: 98.2 (12 Dec 2017 21:18)  HR: 78 (13 Dec 2017 05:12) (72 - 78)  BP: 169/95 (13 Dec 2017 05:12) (131/73 - 169/95)  BP(mean): --  RR: 17 (13 Dec 2017 05:12) (17 - 19)  SpO2: 96% (13 Dec 2017 05:12) (95% - 96%)    PHYSICAL EXAM  General: adult in NAD  HEENT:  anicteric sclera, pink conjunctiva  Abdomen: soft non-tender non-distended, no hepatosplenomegaly  Ext: no clubbing cyanosis or edema  Neuro: sedated, arousible  LABS:                       11.5   4.78  )-----------( 183      ( 13 Dec 2017 07:14 )             34.9     Mean Cell Volume : 103.6 fl  Mean Cell Hemoglobin : 34.1 pg  Mean Cell Hemoglobin Concentration : 33.0 gm/dL  Auto Neutrophil # : x  Auto Lymphocyte # : x  Auto Monocyte # : x  Auto Eosinophil # : x  Auto Basophil # : x  Auto Neutrophil % : x  Auto Lymphocyte % : x  Auto Monocyte % : x  Auto Eosinophil % : x  Auto Basophil % : x    Testosterone, Total Serum: 215.7: Testosterone Reference Range Males:

## 2017-12-13 NOTE — PROGRESS NOTE ADULT - ASSESSMENT
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  Mental status change, Possible seizure- continue Keppra, Neurology follow noted. MRI brain pending   Gastric cancer metastatic- Oncology follow.  Pelvic mass-GI evaluation noted. S/P rectal EUS/ biopsy . Restarted ASA/Plavix  Pathology pending .  Prostate cancer- Rx as per Oncology Urology evaluation noted May need Lupron.  s/p PEG replacement. Continue Peg feeds.  Pain control/ Pain management evaluation noted.  HTN control  CAD stable.  Depression- continue Rx  Functional quadriplegia -supportive care  PT  DCP in progress.  If no Rx planned may consider Palliative evaluation re [facility].  Jose Medina MD pager [phone]

## 2017-12-13 NOTE — PROGRESS NOTE ADULT - SUBJECTIVE AND OBJECTIVE BOX
Patient is a 72y old  Male who presents with a chief complaint of LOC (23 Nov 2017 18:06)      SUBJECTIVE / OVERNIGHT EVENTS: Comfortable without new complaints.   Review of Systems  chest pain no  palpitations no  sob no  nausea no  headache no    MEDICATIONS  (STANDING):  amLODIPine   Tablet 5 milliGRAM(s) Oral daily  aspirin enteric coated 81 milliGRAM(s) Oral daily  atorvastatin 80 milliGRAM(s) Oral at bedtime  clopidogrel Tablet 75 milliGRAM(s) Oral daily  gabapentin   Solution 300 milliGRAM(s) Oral three times a day  heparin  Injectable 5000 Unit(s) SubCutaneous every 8 hours  levETIRAcetam  Solution 500 milliGRAM(s) Oral two times a day  metoprolol     tartrate 12.5 milliGRAM(s) Oral two times a day  pantoprazole   Suspension 40 milliGRAM(s) Oral daily  polyethylene glycol 3350 17 Gram(s) Oral daily  QUEtiapine 25 milliGRAM(s) Oral daily  senna 2 Tablet(s) Oral at bedtime    MEDICATIONS  (PRN):  fentaNYL     Lozenge 200 MICROGram(s) Transmucosal every 3 hours PRN severe pain          PHYSICAL EXAM:  GENERAL: NAD, cachectic   HEAD:  Atraumatic, Normocephalic  EYES: EOMI, PERRLA, conjunctiva and sclera clear  NECK: Supple, No JVD  CHEST/LUNG: Clear to auscultation bilaterally; No wheeze  HEART: Regular rate and rhythm; No murmurs, rubs, or gallops  ABDOMEN: Soft, Nontender, Nondistended; Bowel sounds present PEG in place  EXTREMITIES:  2+ Peripheral Pulses, No clubbing, cyanosis, or edema  PSYCH: AAOx3  NEUROLOGY: non-focal  SKIN: No rashes or lesions    LABS:                        11.5   4.78  )-----------( 183      ( 13 Dec 2017 07:14 )             34.9                     RADIOLOGY & ADDITIONAL TESTS:    Imaging Personally Reviewed:    Consultant(s) Notes Reviewed:      Care Discussed with Consultants/Other Providers:

## 2017-12-14 LAB
ANION GAP SERPL CALC-SCNC: 11 MMOL/L — SIGNIFICANT CHANGE UP (ref 5–17)
BUN SERPL-MCNC: 18 MG/DL — SIGNIFICANT CHANGE UP (ref 7–23)
CALCIUM SERPL-MCNC: 9.7 MG/DL — SIGNIFICANT CHANGE UP (ref 8.4–10.5)
CHLORIDE SERPL-SCNC: 103 MMOL/L — SIGNIFICANT CHANGE UP (ref 96–108)
CO2 SERPL-SCNC: 28 MMOL/L — SIGNIFICANT CHANGE UP (ref 22–31)
CREAT SERPL-MCNC: 0.89 MG/DL — SIGNIFICANT CHANGE UP (ref 0.5–1.3)
GLUCOSE SERPL-MCNC: 96 MG/DL — SIGNIFICANT CHANGE UP (ref 70–99)
HCT VFR BLD CALC: 39.8 % — SIGNIFICANT CHANGE UP (ref 39–50)
HGB BLD-MCNC: 13.4 G/DL — SIGNIFICANT CHANGE UP (ref 13–17)
MCHC RBC-ENTMCNC: 33.7 GM/DL — SIGNIFICANT CHANGE UP (ref 32–36)
MCHC RBC-ENTMCNC: 36.2 PG — HIGH (ref 27–34)
MCV RBC AUTO: 107 FL — HIGH (ref 80–100)
PLATELET # BLD AUTO: 179 K/UL — SIGNIFICANT CHANGE UP (ref 150–400)
POTASSIUM SERPL-MCNC: 4.5 MMOL/L — SIGNIFICANT CHANGE UP (ref 3.5–5.3)
POTASSIUM SERPL-SCNC: 4.5 MMOL/L — SIGNIFICANT CHANGE UP (ref 3.5–5.3)
RBC # BLD: 3.71 M/UL — LOW (ref 4.2–5.8)
RBC # FLD: 12.2 % — SIGNIFICANT CHANGE UP (ref 10.3–14.5)
SODIUM SERPL-SCNC: 142 MMOL/L — SIGNIFICANT CHANGE UP (ref 135–145)
WBC # BLD: 5.2 K/UL — SIGNIFICANT CHANGE UP (ref 3.8–10.5)
WBC # FLD AUTO: 5.2 K/UL — SIGNIFICANT CHANGE UP (ref 3.8–10.5)

## 2017-12-14 RX ORDER — KETOROLAC TROMETHAMINE 30 MG/ML
15 SYRINGE (ML) INJECTION ONCE
Qty: 0 | Refills: 0 | Status: DISCONTINUED | OUTPATIENT
Start: 2017-12-14 | End: 2017-12-14

## 2017-12-14 RX ADMIN — FENTANYL CITRATE 200 MICROGRAM(S): 50 INJECTION INTRAVENOUS at 06:00

## 2017-12-14 RX ADMIN — SENNA PLUS 2 TABLET(S): 8.6 TABLET ORAL at 21:20

## 2017-12-14 RX ADMIN — HEPARIN SODIUM 5000 UNIT(S): 5000 INJECTION INTRAVENOUS; SUBCUTANEOUS at 05:32

## 2017-12-14 RX ADMIN — FENTANYL CITRATE 200 MICROGRAM(S): 50 INJECTION INTRAVENOUS at 12:55

## 2017-12-14 RX ADMIN — CLOPIDOGREL BISULFATE 75 MILLIGRAM(S): 75 TABLET, FILM COATED ORAL at 12:36

## 2017-12-14 RX ADMIN — Medication 15 MILLIGRAM(S): at 21:12

## 2017-12-14 RX ADMIN — Medication 12.5 MILLIGRAM(S): at 05:32

## 2017-12-14 RX ADMIN — FENTANYL CITRATE 200 MICROGRAM(S): 50 INJECTION INTRAVENOUS at 01:30

## 2017-12-14 RX ADMIN — FENTANYL CITRATE 200 MICROGRAM(S): 50 INJECTION INTRAVENOUS at 23:07

## 2017-12-14 RX ADMIN — HEPARIN SODIUM 5000 UNIT(S): 5000 INJECTION INTRAVENOUS; SUBCUTANEOUS at 21:20

## 2017-12-14 RX ADMIN — GABAPENTIN 300 MILLIGRAM(S): 400 CAPSULE ORAL at 09:41

## 2017-12-14 RX ADMIN — FENTANYL CITRATE 200 MICROGRAM(S): 50 INJECTION INTRAVENOUS at 11:09

## 2017-12-14 RX ADMIN — FENTANYL CITRATE 200 MICROGRAM(S): 50 INJECTION INTRAVENOUS at 17:24

## 2017-12-14 RX ADMIN — FENTANYL CITRATE 200 MICROGRAM(S): 50 INJECTION INTRAVENOUS at 22:57

## 2017-12-14 RX ADMIN — Medication 12.5 MILLIGRAM(S): at 19:13

## 2017-12-14 RX ADMIN — FENTANYL CITRATE 200 MICROGRAM(S): 50 INJECTION INTRAVENOUS at 01:00

## 2017-12-14 RX ADMIN — Medication 81 MILLIGRAM(S): at 12:36

## 2017-12-14 RX ADMIN — FENTANYL CITRATE 200 MICROGRAM(S): 50 INJECTION INTRAVENOUS at 16:24

## 2017-12-14 RX ADMIN — PANTOPRAZOLE SODIUM 40 MILLIGRAM(S): 20 TABLET, DELAYED RELEASE ORAL at 12:36

## 2017-12-14 RX ADMIN — POLYETHYLENE GLYCOL 3350 17 GRAM(S): 17 POWDER, FOR SOLUTION ORAL at 12:36

## 2017-12-14 RX ADMIN — Medication 15 MILLIGRAM(S): at 21:02

## 2017-12-14 RX ADMIN — LEVETIRACETAM 500 MILLIGRAM(S): 250 TABLET, FILM COATED ORAL at 19:13

## 2017-12-14 RX ADMIN — AMLODIPINE BESYLATE 5 MILLIGRAM(S): 2.5 TABLET ORAL at 05:32

## 2017-12-14 RX ADMIN — HEPARIN SODIUM 5000 UNIT(S): 5000 INJECTION INTRAVENOUS; SUBCUTANEOUS at 12:37

## 2017-12-14 RX ADMIN — QUETIAPINE FUMARATE 25 MILLIGRAM(S): 200 TABLET, FILM COATED ORAL at 21:20

## 2017-12-14 RX ADMIN — LEVETIRACETAM 500 MILLIGRAM(S): 250 TABLET, FILM COATED ORAL at 05:32

## 2017-12-14 RX ADMIN — FENTANYL CITRATE 200 MICROGRAM(S): 50 INJECTION INTRAVENOUS at 13:30

## 2017-12-14 RX ADMIN — ATORVASTATIN CALCIUM 80 MILLIGRAM(S): 80 TABLET, FILM COATED ORAL at 21:20

## 2017-12-14 RX ADMIN — FENTANYL CITRATE 200 MICROGRAM(S): 50 INJECTION INTRAVENOUS at 19:30

## 2017-12-14 RX ADMIN — GABAPENTIN 300 MILLIGRAM(S): 400 CAPSULE ORAL at 12:37

## 2017-12-14 RX ADMIN — FENTANYL CITRATE 200 MICROGRAM(S): 50 INJECTION INTRAVENOUS at 19:20

## 2017-12-14 RX ADMIN — FENTANYL CITRATE 200 MICROGRAM(S): 50 INJECTION INTRAVENOUS at 05:32

## 2017-12-14 RX ADMIN — FENTANYL CITRATE 200 MICROGRAM(S): 50 INJECTION INTRAVENOUS at 09:58

## 2017-12-14 RX ADMIN — GABAPENTIN 300 MILLIGRAM(S): 400 CAPSULE ORAL at 21:20

## 2017-12-14 NOTE — PROGRESS NOTE ADULT - SUBJECTIVE AND OBJECTIVE BOX
Pt is seen and examined  pt is awake and lying in bed  In distress, cannot express where he is having symptoms  Being medicated with Fentanyl lozenge          MEDICATIONS  (STANDING):  amLODIPine   Tablet 5 milliGRAM(s) Oral daily  aspirin enteric coated 81 milliGRAM(s) Oral daily  atorvastatin 80 milliGRAM(s) Oral at bedtime  clopidogrel Tablet 75 milliGRAM(s) Oral daily  gabapentin   Solution 300 milliGRAM(s) Oral three times a day  heparin  Injectable 5000 Unit(s) SubCutaneous every 8 hours  levETIRAcetam  Solution 500 milliGRAM(s) Oral two times a day  metoprolol     tartrate 12.5 milliGRAM(s) Oral two times a day  pantoprazole   Suspension 40 milliGRAM(s) Oral daily  polyethylene glycol 3350 17 Gram(s) Oral daily  QUEtiapine 25 milliGRAM(s) Oral daily  senna 2 Tablet(s) Oral at bedtime    MEDICATIONS  (PRN):  fentaNYL     Lozenge 200 MICROGram(s) Transmucosal every 3 hours PRN severe pain      Allergies    No Known Allergies    Intolerances        Vital Signs Last 24 Hrs  T(C): 36.5 (14 Dec 2017 05:00), Max: 37.1 (13 Dec 2017 21:14)  T(F): 97.7 (14 Dec 2017 05:00), Max: 98.7 (13 Dec 2017 21:14)  HR: 71 (14 Dec 2017 05:00) (63 - 73)  BP: 138/74 (14 Dec 2017 05:00) (129/77 - 150/80)  BP(mean): --  RR: 18 (14 Dec 2017 05:00) (18 - 18)  SpO2: 92% (14 Dec 2017 05:00) (92% - 98%)    PHYSICAL EXAM  General: adult in distress  HEENT: clear oropharynx, anicteric sclera, pink conjunctiva  Neck: supple  CV: normal S1/S2 with no murmur rubs or gallops  Lungs: positive air movement b/l ant lungs,clear to auscultation, no wheezes, no rales  Abdomen: soft non-tender non-distended, no hepatosplenomegaly  Ext: no clubbing cyanosis or edema  Skin: no rashes and no petechiae  Neuro: alert and oriented X 4, no focal deficits  LABS:                          11.5   4.78  )-----------( 183      ( 13 Dec 2017 07:14 )             34.9         Mean Cell Volume : 103.6 fl  Mean Cell Hemoglobin : 34.1 pg  Mean Cell Hemoglobin Concentration : 33.0 gm/dL  Auto Neutrophil # : x  Auto Lymphocyte # : x  Auto Monocyte # : x  Auto Eosinophil # : x  Auto Basophil # : x  Auto Neutrophil % : x  Auto Lymphocyte % : x  Auto Monocyte % : x  Auto Eosinophil % : x  Auto Basophil % : x  Cytopathology - Non Gyn Report (12.07.17 @ 20:35)    Cytopathology - Non Gyn Report:   ACCESSION No:  [id number]    OTILIA PADGETT 2        Cytopathology Report            Final Diagnosis  PERIRECTAL MASS, FNA  POSITIVE FOR NEOPLASM.  Spindle cell neoplasm, consistent with gastrointestinal stromal  tumor, seenote                                            BLOOD SMEAR INTERPRETATION:       RADIOLOGY & ADDITIONAL STUDIES:

## 2017-12-14 NOTE — PROGRESS NOTE ADULT - SUBJECTIVE AND OBJECTIVE BOX
Patient is a 72y old  Male who presents with a chief complaint of LOC (23 Nov 2017 18:06)      SUBJECTIVE / OVERNIGHT EVENTS: Comfortable without new complaints.   Review of Systems  chest pain no  palpitations no  sob no  nausea no  headache no    MEDICATIONS  (STANDING):  amLODIPine   Tablet 5 milliGRAM(s) Oral daily  aspirin enteric coated 81 milliGRAM(s) Oral daily  atorvastatin 80 milliGRAM(s) Oral at bedtime  clopidogrel Tablet 75 milliGRAM(s) Oral daily  gabapentin   Solution 300 milliGRAM(s) Oral three times a day  heparin  Injectable 5000 Unit(s) SubCutaneous every 8 hours  levETIRAcetam  Solution 500 milliGRAM(s) Oral two times a day  metoprolol     tartrate 12.5 milliGRAM(s) Oral two times a day  pantoprazole   Suspension 40 milliGRAM(s) Oral daily  polyethylene glycol 3350 17 Gram(s) Oral daily  QUEtiapine 25 milliGRAM(s) Oral daily  senna 2 Tablet(s) Oral at bedtime    MEDICATIONS  (PRN):  fentaNYL     Lozenge 200 MICROGram(s) Transmucosal every 3 hours PRN severe pain          PHYSICAL EXAM:  GENERAL: NAD, well-developed  HEAD:  Atraumatic, Normocephalic  EYES: EOMI, PERRLA, conjunctiva and sclera clear  NECK: Supple, No JVD  CHEST/LUNG: Clear to auscultation bilaterally; No wheeze  HEART: Regular rate and rhythm; No murmurs, rubs, or gallops  ABDOMEN: Soft, Nontender, Nondistended; Bowel sounds present PEG in place  EXTREMITIES:  2+ Peripheral Pulses, No clubbing, cyanosis, or edema  PSYCH: AAOx3  NEUROLOGY: non-focal  SKIN: No rashes or lesions    LABS:                        13.4   5.2   )-----------( 179      ( 14 Dec 2017 11:50 )             39.8     12-14    142  |  103  |  18  ----------------------------<  96  4.5   |  28  |  0.89    Ca    9.7      14 Dec 2017 11:49          Cytopathology - Non Gyn Report (12.07.17 @ 20:35)    Cytopathology - Non Gyn Report:   ACCESSION No:  [id number]    OTILIA PADGETT 2        Cytopathology Report            Final Diagnosis  PERIRECTAL MASS, FNA  POSITIVE FOR NEOPLASM.  Spindle cell neoplasm, consistent with gastrointestinal stromal  tumor, seenote    Note: The cell block consists shows aggerates of spindle shaped  cells with hyperchromatic nuclei and delicate cytoplasm. The  Thinprep specimen show rare poorly preserved spindle cells.  On immunohistochemistry( Block 1A), tumor cells are positive for  , DOG-1 and CD34, while are negative for Desmin.  Morphological features and immunohistochemical profile is  compatible with patient's known history of gastrointestinal  stromal tumor.    Patient's history of prostatic carcinoma and gastrointestinal  tumor is noted.  This case was reviewed for   with [facility] staff  cytopathologists who concur(s) with the diagnosis on 12/13/17.    Dr. Carol Garcia was notified of the diagnosis on  12/13/17.    Case reported to Tumor Registry.    Slide(s) with built in immunohistochemical study control(s) and  negative control associated with this case has/have been verified  by the sign out pathologist.  For slide(s) without built in controls positive control slides  has/have been reviewed and approved by immunohistochemistry lab  These immunohistochemical/ in-situ hybridization tests have been  developed and their performance characteristics determined by  [facility], Department of Pathology,  Division of Immunopathology, [address] [place] [address].  It has not been cleared or approved by the U.[place]. Food  and Drug Administration.  The FDA has determined that such  clearance or approval is not necessary.  This test is used for  clinical purposes.  The laboratory is certified under the CLIA-88  as qualified to perform high complexity clinical              MELEVIENOOTILIA E                  2        Cytopathology Report          testing.    Screened by: Ashok Mercedes(ASCP)  Verified by: Melinda Acosta MD  (Electronic Signature)  Reported on: 12/13/17 17:55 EST, [address] [place]  [address]  Cytology technical processing performed at [address] [place] [address]  _________________________________________________________________      Specimen(s) Submitted  PERIRECTAL MASS, FNA      Clinical Information  Perirectal mass in patient with prostate cancer and GIST.      Gross Description  Received: 20  ml of red fluid in formalin  Prepared: 1 ThinPrep slide, 1 cell block          RADIOLOGY & ADDITIONAL TESTS:    Imaging Personally Reviewed:    Consultant(s) Notes Reviewed:      Care Discussed with Consultants/Other Providers:

## 2017-12-14 NOTE — CHART NOTE - NSCHARTNOTEFT_GEN_A_CORE
Source: Patient [ ]    Family [ ]     other [X ] RN, NP, Medical record    Pt seen for malnutrition follow up. Per RN, Pt tolerating Jevity 1.2 @ 60ml/[id number] well via PEG. Pt seen sleeping.     Per chart, Pt admitted for syncopal episode. Pt with history of Stroke with right sided weakness, aphasia, PEG a/w prostate and stomach cancer on oral chemo. Per chart, Pt code stroke, likely seizure like activity. Pt s/p PEG tube exchange on 11/26. Pt now with pelvic mass concerning for new gastrointestinal stromal tumor vs metastatic prostate cancer.  Pt S/p rectal EUS Bx, awaiting pathology report.     Diet : NPO with EN via PEG       Patient reports no GI distress noted, 2BM's thus far today        Enteral /Parenteral Nutrition: Jevity 1.2 @ 60ml/[id number]. Goal rate provides 1728kcals and 79.9gm protein. ( 32kcals/kg and 1.48gm pro/kg based on dosing Wt 54kg.)      Current Weight: 115.3lbs   % Weight Change    Pertinent Medications: MEDICATIONS  (STANDING):  amLODIPine   Tablet 5 milliGRAM(s) Oral daily  aspirin enteric coated 81 milliGRAM(s) Oral daily  atorvastatin 80 milliGRAM(s) Oral at bedtime  clopidogrel Tablet 75 milliGRAM(s) Oral daily  gabapentin   Solution 300 milliGRAM(s) Oral three times a day  heparin  Injectable 5000 Unit(s) SubCutaneous every 8 hours  levETIRAcetam  Solution 500 milliGRAM(s) Oral two times a day  metoprolol     tartrate 12.5 milliGRAM(s) Oral two times a day  pantoprazole   Suspension 40 milliGRAM(s) Oral daily  polyethylene glycol 3350 17 Gram(s) Oral daily  QUEtiapine 25 milliGRAM(s) Oral daily  senna 2 Tablet(s) Oral at bedtime    MEDICATIONS  (PRN):  fentaNYL     Lozenge 200 MICROGram(s) Transmucosal every 3 hours PRN severe pain    Pertinent Labs:  None pertinent     Skin: intact     Estimated Needs:   [ ] no change since previous assessment  [X ] recalculated: 30-5kcals/kg and 1.4-1.6gm pro/kg based on dosing Wt: 54kg. ( 1630-1890kcals and 75.6gm pro-86.4gm pro)     Previous Nutrition Diagnosis:   [X ] Malnutrition          Nutrition Diagnosis is [X ] ongoing; addressed with EN Via PEG        New Nutrition Diagnosis: [X ] not applicable      Recommend    1. Recommend to increase Jevity 1.2 via PEG to 65ml/[id number]. Provides 1872kcals and 86.5gm protein; 34.6kcalskg and 1.6gm pro/kg based on dosing Wt: 54kg.   2. Trend Wt, labs and GI tolerance         Monitoring and Evaluation:     [ ] PO intake [ X] Tolerance to diet prescription [ X] weights [ X] follow up per protocol    [ X] other: RD remains available Sarah Siegler RD. Pager #[phone]

## 2017-12-14 NOTE — PROGRESS NOTE ADULT - ASSESSMENT
Path showing GIST tumor.  Problem in the past and ongoing is difficulty with compliance and difficulty clarifying any symptoms he is having whether from his disease, side effects of medication, or unrelated to either. There are several highly effective treatments available for GIST tumors, all oral agents and all with  significant potential side effects. He also has prostate cancer, not clearly metastatic but  could be causing urinary symptoms.  Discussed with urology, would start Lupron. Regarding treatmet for the GiST tumor will recomend starting Sutent after discussions with the family and if they can provide assurance that he could come for regular f/u visits.

## 2017-12-15 DIAGNOSIS — R52 PAIN, UNSPECIFIED: ICD-10-CM

## 2017-12-15 DIAGNOSIS — R19.7 DIARRHEA, UNSPECIFIED: ICD-10-CM

## 2017-12-15 PROCEDURE — 99223 1ST HOSP IP/OBS HIGH 75: CPT

## 2017-12-15 RX ORDER — METHADONE HYDROCHLORIDE 40 MG/1
2.5 TABLET ORAL
Qty: 0 | Refills: 0 | Status: DISCONTINUED | OUTPATIENT
Start: 2017-12-15 | End: 2017-12-16

## 2017-12-15 RX ORDER — ONDANSETRON 8 MG/1
8 TABLET, FILM COATED ORAL EVERY 8 HOURS
Qty: 0 | Refills: 0 | Status: DISCONTINUED | OUTPATIENT
Start: 2017-12-15 | End: 2017-12-19

## 2017-12-15 RX ORDER — HYDROMORPHONE HYDROCHLORIDE 2 MG/ML
0.5 INJECTION INTRAMUSCULAR; INTRAVENOUS; SUBCUTANEOUS
Qty: 0 | Refills: 0 | Status: DISCONTINUED | OUTPATIENT
Start: 2017-12-15 | End: 2017-12-17

## 2017-12-15 RX ORDER — HYDROMORPHONE HYDROCHLORIDE 2 MG/ML
1 INJECTION INTRAMUSCULAR; INTRAVENOUS; SUBCUTANEOUS EVERY 6 HOURS
Qty: 0 | Refills: 0 | Status: DISCONTINUED | OUTPATIENT
Start: 2017-12-15 | End: 2017-12-15

## 2017-12-15 RX ORDER — METHADONE HYDROCHLORIDE 40 MG/1
2.5 TABLET ORAL ONCE
Qty: 0 | Refills: 0 | Status: DISCONTINUED | OUTPATIENT
Start: 2017-12-15 | End: 2017-12-15

## 2017-12-15 RX ORDER — NALOXONE HYDROCHLORIDE 4 MG/.1ML
0.1 SPRAY NASAL
Qty: 0 | Refills: 0 | Status: DISCONTINUED | OUTPATIENT
Start: 2017-12-15 | End: 2017-12-19

## 2017-12-15 RX ORDER — POLYETHYLENE GLYCOL 3350 17 G/17G
17 POWDER, FOR SOLUTION ORAL DAILY
Qty: 0 | Refills: 0 | Status: DISCONTINUED | OUTPATIENT
Start: 2017-12-15 | End: 2017-12-17

## 2017-12-15 RX ORDER — HYDROMORPHONE HYDROCHLORIDE 2 MG/ML
1 INJECTION INTRAMUSCULAR; INTRAVENOUS; SUBCUTANEOUS ONCE
Qty: 0 | Refills: 0 | Status: DISCONTINUED | OUTPATIENT
Start: 2017-12-15 | End: 2017-12-15

## 2017-12-15 RX ADMIN — Medication 12.5 MILLIGRAM(S): at 06:15

## 2017-12-15 RX ADMIN — FENTANYL CITRATE 200 MICROGRAM(S): 50 INJECTION INTRAVENOUS at 01:19

## 2017-12-15 RX ADMIN — METHADONE HYDROCHLORIDE 2.5 MILLIGRAM(S): 40 TABLET ORAL at 22:30

## 2017-12-15 RX ADMIN — Medication 81 MILLIGRAM(S): at 11:37

## 2017-12-15 RX ADMIN — HYDROMORPHONE HYDROCHLORIDE 0.5 MILLIGRAM(S): 2 INJECTION INTRAMUSCULAR; INTRAVENOUS; SUBCUTANEOUS at 20:11

## 2017-12-15 RX ADMIN — HEPARIN SODIUM 5000 UNIT(S): 5000 INJECTION INTRAVENOUS; SUBCUTANEOUS at 06:14

## 2017-12-15 RX ADMIN — GABAPENTIN 300 MILLIGRAM(S): 400 CAPSULE ORAL at 15:03

## 2017-12-15 RX ADMIN — LEVETIRACETAM 500 MILLIGRAM(S): 250 TABLET, FILM COATED ORAL at 06:14

## 2017-12-15 RX ADMIN — PANTOPRAZOLE SODIUM 40 MILLIGRAM(S): 20 TABLET, DELAYED RELEASE ORAL at 11:37

## 2017-12-15 RX ADMIN — FENTANYL CITRATE 200 MICROGRAM(S): 50 INJECTION INTRAVENOUS at 09:41

## 2017-12-15 RX ADMIN — AMLODIPINE BESYLATE 5 MILLIGRAM(S): 2.5 TABLET ORAL at 06:14

## 2017-12-15 RX ADMIN — FENTANYL CITRATE 200 MICROGRAM(S): 50 INJECTION INTRAVENOUS at 05:38

## 2017-12-15 RX ADMIN — ATORVASTATIN CALCIUM 80 MILLIGRAM(S): 80 TABLET, FILM COATED ORAL at 22:30

## 2017-12-15 RX ADMIN — Medication 12.5 MILLIGRAM(S): at 18:05

## 2017-12-15 RX ADMIN — FENTANYL CITRATE 200 MICROGRAM(S): 50 INJECTION INTRAVENOUS at 01:20

## 2017-12-15 RX ADMIN — METHADONE HYDROCHLORIDE 2.5 MILLIGRAM(S): 40 TABLET ORAL at 15:02

## 2017-12-15 RX ADMIN — HYDROMORPHONE HYDROCHLORIDE 1 MILLIGRAM(S): 2 INJECTION INTRAMUSCULAR; INTRAVENOUS; SUBCUTANEOUS at 18:23

## 2017-12-15 RX ADMIN — SENNA PLUS 2 TABLET(S): 8.6 TABLET ORAL at 22:31

## 2017-12-15 RX ADMIN — CLOPIDOGREL BISULFATE 75 MILLIGRAM(S): 75 TABLET, FILM COATED ORAL at 11:37

## 2017-12-15 RX ADMIN — QUETIAPINE FUMARATE 25 MILLIGRAM(S): 200 TABLET, FILM COATED ORAL at 21:58

## 2017-12-15 RX ADMIN — GABAPENTIN 300 MILLIGRAM(S): 400 CAPSULE ORAL at 22:30

## 2017-12-15 RX ADMIN — HYDROMORPHONE HYDROCHLORIDE 1 MILLIGRAM(S): 2 INJECTION INTRAMUSCULAR; INTRAVENOUS; SUBCUTANEOUS at 11:34

## 2017-12-15 RX ADMIN — HYDROMORPHONE HYDROCHLORIDE 0.5 MILLIGRAM(S): 2 INJECTION INTRAMUSCULAR; INTRAVENOUS; SUBCUTANEOUS at 16:42

## 2017-12-15 RX ADMIN — POLYETHYLENE GLYCOL 3350 17 GRAM(S): 17 POWDER, FOR SOLUTION ORAL at 11:38

## 2017-12-15 RX ADMIN — LEVETIRACETAM 500 MILLIGRAM(S): 250 TABLET, FILM COATED ORAL at 18:05

## 2017-12-15 RX ADMIN — GABAPENTIN 300 MILLIGRAM(S): 400 CAPSULE ORAL at 06:14

## 2017-12-15 RX ADMIN — HYDROMORPHONE HYDROCHLORIDE 0.5 MILLIGRAM(S): 2 INJECTION INTRAMUSCULAR; INTRAVENOUS; SUBCUTANEOUS at 20:35

## 2017-12-15 RX ADMIN — HEPARIN SODIUM 5000 UNIT(S): 5000 INJECTION INTRAVENOUS; SUBCUTANEOUS at 22:30

## 2017-12-15 RX ADMIN — HEPARIN SODIUM 5000 UNIT(S): 5000 INJECTION INTRAVENOUS; SUBCUTANEOUS at 15:03

## 2017-12-15 NOTE — CONSULT NOTE ADULT - CONSULT REASON
Pain management and GOC
abdominal pain secondary  to gastric tumor
Metastatic GIST, prostate cancer , loc
NSVT
PEG change
Rectal mass
code stroke
prostate cancer

## 2017-12-15 NOTE — CONSULT NOTE ADULT - PROVIDER SPECIALTY LIST ADULT
Cardiology
Gastroenterology
Gastroenterology
Heme/Onc
Neurology
Pain Medicine
Palliative Care
Urology

## 2017-12-15 NOTE — PROGRESS NOTE ADULT - SUBJECTIVE AND OBJECTIVE BOX
Pt is seen and examined  pt is awake and lying in bed/  Having  frequent  episodes where he is agitated seems in pain.  Requiring  analgesics ATC,  fentanyl losange has not been adequate, dilaudid added        MEDICATIONS  (STANDING):  amLODIPine   Tablet 5 milliGRAM(s) Oral daily  aspirin enteric coated 81 milliGRAM(s) Oral daily  atorvastatin 80 milliGRAM(s) Oral at bedtime  clopidogrel Tablet 75 milliGRAM(s) Oral daily  gabapentin   Solution 300 milliGRAM(s) Oral three times a day  heparin  Injectable 5000 Unit(s) SubCutaneous every 8 hours  levETIRAcetam  Solution 500 milliGRAM(s) Oral two times a day  metoprolol     tartrate 12.5 milliGRAM(s) Oral two times a day  pantoprazole   Suspension 40 milliGRAM(s) Oral daily  polyethylene glycol 3350 17 Gram(s) Oral daily  QUEtiapine 25 milliGRAM(s) Oral daily  senna 2 Tablet(s) Oral at bedtime    MEDICATIONS  (PRN):  fentaNYL     Lozenge 200 MICROGram(s) Transmucosal every 3 hours PRN severe pain  HYDROmorphone  Injectable 1 milliGRAM(s) IV Push every 6 hours PRN Moderate Pain (4 - 6)      Allergies    No Known Allergies    Intolerances        Vital Signs Last 24 Hrs  T(C): 36.5 (15 Dec 2017 05:34), Max: 37.2 (14 Dec 2017 21:12)  T(F): 97.7 (15 Dec 2017 05:34), Max: 98.9 (14 Dec 2017 21:12)  HR: 69 (15 Dec 2017 05:34) (66 - 74)  BP: 149/87 (15 Dec 2017 05:34) (130/73 - 158/81)  BP(mean): --  RR: 18 (15 Dec 2017 05:34) (18 - 18)  SpO2: 98% (15 Dec 2017 05:34) (98% - 98%)    PHYSICAL EXAM  General: adult in NAD  HEENT:  anicteric sclera, pink conjunctiva  CV: normal S1/S2 with no murmur rubs or gallops  Lungs: positive air movement b/l ant lungs,clear to auscultation, no wheezes, no rales  Abdomen: soft non-tender non-distended, no hepatosplenomegaly  Ext: no clubbing cyanosis or edema  Skin: no rashes and no petechiae  Neuro: alert and oriented X 4, no focal deficits  LABS:                          13.4   5.2   )-----------( 179      ( 14 Dec 2017 11:50 )             39.8         Mean Cell Volume : 107.0 fl  Mean Cell Hemoglobin : 36.2 pg  Mean Cell Hemoglobin Concentration : 33.7 gm/dL  Auto Neutrophil # : x  Auto Lymphocyte # : x  Auto Monocyte # : x  Auto Eosinophil # : x  Auto Basophil # : x  Auto Neutrophil % : x  Auto Lymphocyte % : x  Auto Monocyte % : x  Auto Eosinophil % : x  Auto Basophil % : x      12-14    142  |  103  |  18  ----------------------------<  96  4.5   |  28  |  0.89    Ca    9.7      14 Dec 2017 11:49

## 2017-12-15 NOTE — CONSULT NOTE ADULT - PROBLEM SELECTOR PROBLEM 1
Malignant gastrointestinal stromal tumor (GIST) of stomach
GIST (gastrointestinal stromal tumor), malignant
Malignant gastrointestinal stromal tumor (GIST) of stomach
Pain

## 2017-12-15 NOTE — CONSULT NOTE ADULT - ASSESSMENT
This is a 72 year old male w/ h/o stroke with residual right sided weakness, aphasia, s/p PEG tube, Hypertension, HLD, prostate and stomach cancer on oral chemo who presented to ED for syncopal episode and AMS which as per EMR appears to be related to seizures. He was found to have a smith rectal mass with biopsy showing gastrointestinal stromal tumor for which onc is recommending to restart Gleevec. Furthermore they are recommending to continue Lupron for prostate CA. Onc is still to talk to the patient's decision maker in order to define GOC. While in the hospital the patient has had agitation and pain that are the reason why this consult is called.

## 2017-12-15 NOTE — CONSULT NOTE ADULT - SUBJECTIVE AND OBJECTIVE BOX
HPI:  This is a 72 year old male w/ h/o stroke with residual right sided weakness, aphasia, s/p PEG tube, Hypertension, HLD, prostate and stomach cancer on oral chemo who presented to ED for syncopal episode and AMS which as per EMR appears to be related to seizures. He was found to have a smith rectal mass with biopsy showing gastrointestinal stromal tumor for which onc is recommending to restart Gleevec. Furthermore they are recommending to continue Lupron for prostate CA. Onc is still to talk to the patient's decision maker in order to define GOC. While in the hospital the patient has had agitation and pain that are the reason why this consult is called.       PERTINENT PMH REVIEWED:  [ ] YES [ ] NO           SOCIAL HISTORY:   Significant other/partner:  Wife             Children:                   Adventism/Spirituality: Pentecostalism   Substance hx:  [ ] YES   [x] NO                   Tobacco hx:  [ ] YES  [ ] NO                       Alcohol hx: [ ] YES  [ ] NO         Home Opioid hx:  [ ] YES  [x ] NO   Living Situation: [ ] Home  [ ] Long term care  [ ] Rehab [ ] Other  As per : "Patient currently lives  w/ his spouse and son in an apartment  on the 2nd floor w/ 25 steps to enter. Prior to admission patient ambulated w/  a cane but he has a walker also for ambulation and receives assistance for adls  from [facility] [phone]-[facility] 9 hours x 7  days of aide service."  FAMILY HISTORY:  No pertinent family history in first degree relatives    [ ] Family history non-contributory     BASELINE (I)ADLs (prior to admission):  [place]: [ ] total  [ ] moderate [ ] dependent    ADVANCE DIRECTIVES:    DNR [ ] YES [ ] NO                            [ ] Completed  MOLST  [ ] YES [ ] NO                      [ ] Completed  Health Care Proxy [ ] YES  [ ] NO   [ ] Completed  Living Will  [ ] YES [ ] NO             [ ] Surrogate  [ ] HCP  [ ] Guardian:                                                                  Phone#:    Allergies    No Known Allergies    Intolerances    Home meds for symptomatic Rx.   · 	acetaminophen-oxycodone 325 mg-5 mg oral tablet: 2 tab(s) orally every 6 hours, As needed, Moderate Pain (4 - 6), Last Dose Taken:    · 	gabapentin 300 mg oral capsule: 1 cap(s) orally 3 times a day, Last Dose Taken:     · 	QUEtiapine 25 mg oral tablet: 1 tab(s) orally once a day, Last Dose Taken:   · 	polyethylene glycol 3350 oral powder for reconstitution: 17 gram(s) orally once a day, Last Dose Taken:    · 	senna oral tablet: 2 tab(s) orally once a day (at bedtime), Last Dose Taken:          MEDICATIONS  (STANDING):  amLODIPine   Tablet 5 milliGRAM(s) Oral daily  aspirin enteric coated 81 milliGRAM(s) Oral daily  atorvastatin 80 milliGRAM(s) Oral at bedtime  clopidogrel Tablet 75 milliGRAM(s) Oral daily  gabapentin   Solution 300 milliGRAM(s) Oral three times a day  heparin  Injectable 5000 Unit(s) SubCutaneous every 8 hours  levETIRAcetam  Solution 500 milliGRAM(s) Oral two times a day  metoprolol     tartrate 12.5 milliGRAM(s) Oral two times a day  pantoprazole   Suspension 40 milliGRAM(s) Oral daily  polyethylene glycol 3350 17 Gram(s) Oral daily  QUEtiapine 25 milliGRAM(s) Oral daily  senna 2 Tablet(s) Oral at bedtime    MEDICATIONS  (PRN):  fentaNYL     Lozenge 200 MICROGram(s) Transmucosal every 3 hours PRN severe pain  HYDROmorphone  Injectable 1 milliGRAM(s) IV Push every 6 hours PRN Moderate Pain (4 - 6)      PRESENT SYMPTOMS:  Source: [ ] Patient   [ ] Family   [ ] Team     Pain:                        [ ] No [ ] Yes             [ ] Mild [ ] Moderate [ ] Severe    Onset -  Location -  Duration -  Character -  Alleviating/Aggravating -  Radiation -  Timing -      Dyspnea:                [ ] No [ ] Yes             [ ] Mild [ ] Moderate [ ] Severe    Anxiety:                  [ ] No [ ] Yes             [ ] Mild [ ] Moderate [ ] Severe    Fatigue:                  [ ] No [ ] Yes             [ ] Mild [ ] Moderate [ ] Severe    Nausea:                  [ ] No [ ] Yes             [ ] Mild [ ] Moderate [ ] Severe    Loss of appetite:   [ ] No [ ] Yes             [ ] Mild [ ] Moderate [ ] Severe    Constipation:        [ ] No [ ] Yes             [ ] Mild [ ] Moderate [ ] Severe    Other Symptoms:  [ ] All other review of systems negative   [ ] Unable to obtain due to poor mentation     Karnofsky Performance Score/Palliative Performance Status Version 2:         %    PHYSICAL EXAM:  Vital Signs Last 24 Hrs  T(C): 36.5 (15 Dec 2017 05:34), Max: 37.2 (14 Dec 2017 21:12)  T(F): 97.7 (15 Dec 2017 05:34), Max: 98.9 (14 Dec 2017 21:12)  HR: 69 (15 Dec 2017 05:34) (66 - 74)  BP: 149/87 (15 Dec 2017 05:34) (130/73 - 158/81)  BP(mean): --  RR: 18 (15 Dec 2017 05:34) (18 - 18)  SpO2: 98% (15 Dec 2017 05:34) (98% - 98%) I&O's Summary    14 Dec 2017 07:01  -  15 Dec 2017 07:00  --------------------------------------------------------  IN: 0 mL / OUT: 0 mL / NET: 0 mL        General:  [  ] Normal. Alert, Oriented x 3.     HEENT:  [ ] Normal   [ ] Dry mouth   [ ] ET Tube    [ ] Trach  [ ] Oral lesions    Lungs:   [ ] Clear [ ] Tachypnea  [ ] Audible excessive secretions   [ ] Rhonchi        [ ] Right [ ] Left [ ] Bilateral  [ ] Crackles        [ ] Right [ ] Left [ ] Bilateral  [ ] Wheezing     [ ] Right [ ] Left [ ] Bilateral    Cardiovascular:  S1, S2. No S3. No murmurs     Abdomen: [ ] Soft  [ ] Distended   [ ] +BS  [ ] Non tender [ ] Tender  [ ]PEG   [ ]OGT/ NGT   Last BM:   12/14 loose.     Genitourinary: [ ] Normal [ x] Incontinent   [ ] Oliguria/Anuria   [ ] Myers    Musculoskeletal:  [ ] Normal   [ ] Weakness  [ ] Bedbound/Wheelchair bound [ ] Edema    Neurological: [ ] No focal deficits  [ ] Cognitive impairment  [ ] Dysphagia [ ] Dysarthria [ ] Paresis [ ] Other     Skin: [ ] Normal   [ ] Pressure ulcer(s)                  [ ] Rash    LABS:                        13.4   5.2   )-----------( 179      ( 14 Dec 2017 11:50 )             39.8     12-14    142  |  103  |  18  ----------------------------<  96  4.5   |  28  |  0.89    Ca    9.7      14 Dec 2017 11:49            Shock: [ ] Septic [ ] Cardiogenic [ ] Neurologic [ ] Hypovolemic  Vasopressors x   Inotrophs x     Protein Calorie Malnutrition: [ ] Mild [ ] Moderate [ ] Severe    Oral Intake: [ ] Unable/mouth care only [ ] Minimal [ ] Moderate [ ] Full Capability  Diet: [ ] NPO [ ] Tube feeds [ ] TPN [ ] Other     RADIOLOGY & ADDITIONAL STUDIES:  < from: CT Abdomen and Pelvis w/ IV Cont (11.26.17 @ 21:15) >  EXAM:  CT ABDOMEN AND PELVIS IC                            PROCEDURE DATE:  11/26/2017            INTERPRETATION:  CLINICAL INFORMATION: Abdominal pain. Gastric GIST   status post resection. Prostate cancer.IMPRESSION:     New large soft tissue mass in the right pelvis concerning for peritoneal   disease.    No acute pathology.    A 4.4 cm suprarenal abdominal aortic aneurysm without change.    < end of copied text >  < from: CT Brain Stroke Protocol (11.23.17 @ 15:12) >  IMPRESSION:    Head CT: No intracranial hemorrhage or displaced calvarial fracture.   Chronic infarcts and microvascular ischemic changes as described. If   symptoms persist, an MRI of the brain can be obtained for further   evaluation.    Cervical spine CT: No acute displaced fracture or traumatic malalignment.   Degenerative changes as described.      < end of copied text >    REFERRALS:   [ ] Chaplaincy  [ ] Hospice  [ ] Child Life  [ ] Social Work  [ ] Case management [ ] Holistic Therapy     Tyler Veras MD [phone]  Assessment and Plan: HPI:  This is a 72 year old male w/ h/o stroke with residual right sided weakness, aphasia, s/p PEG tube, Hypertension, HLD, prostate and stomach cancer on oral chemo who presented to ED for syncopal episode and AMS which as per EMR appears to be related to seizures. He was found to have a smith rectal mass with biopsy showing gastrointestinal stromal tumor for which onc is recommending to restart Gleevec. Furthermore they are recommending to continue Lupron for prostate CA. Onc is still to talk to the patient's decision maker in order to define GOC. While in the hospital the patient has had agitation and pain that are the reason why this consult is called.       PERTINENT PMH REVIEWED:  [x ] YES [ ] NO           SOCIAL HISTORY:   Significant other/partner:  Wife             Children:                   Jewish/Spirituality: Religion   Substance hx:  [ ] YES   [x] NO                   Tobacco hx:  [ ] YES  [ ] NO                       Alcohol hx: [ ] YES  [ ] NO         Home Opioid hx:  [ ] YES  [x ] NO   Living Situation: [ ] Home  [ ] Long term care  [ ] Rehab [ ] Other  As per : "Patient currently lives  w/ his spouse and son in an apartment  on the 2nd floor w/ 25 steps to enter. Prior to admission patient ambulated w/  a cane but he has a walker also for ambulation and receives assistance for adls  from [facility] [phone]-[facility] 9 hours x 7  days of aide service."  FAMILY HISTORY:  No pertinent family history in first degree relatives    [ ] Family history non-contributory     BASELINE (I)ADLs (prior to admission):  [place]: [ ] total  [x] moderate [ ] dependent    ADVANCE DIRECTIVES:    DNR [ ] YES [x ] NO                            [ ] Completed  MOLST  [ ] YES [x ] NO                      [ ] Completed  Health Care Proxy [x ] YES  [ ] NO   [ ] Completed [phone] Melisa Bhakta  Living Will  [ ] YES [x ] NO             [x] Surrogate  [ ] HCP  [ ] Guardian:    Wife (Savanah Craig)                                            Phone#: [phone]  Emergency contact: [phone] Melisa Bhakta  Allergies    No Known Allergies    Intolerances    Home meds for symptomatic Rx.   · 	acetaminophen-oxycodone 325 mg-5 mg oral tablet: 2 tab(s) orally every 6 hours, As needed, Moderate Pain (4 - 6), Last Dose Taken:    · 	gabapentin 300 mg oral capsule: 1 cap(s) orally 3 times a day, Last Dose Taken:     · 	QUEtiapine 25 mg oral tablet: 1 tab(s) orally once a day, Last Dose Taken:   · 	polyethylene glycol 3350 oral powder for reconstitution: 17 gram(s) orally once a day, Last Dose Taken:    · 	senna oral tablet: 2 tab(s) orally once a day (at bedtime), Last Dose Taken:          MEDICATIONS  (STANDING):  amLODIPine   Tablet 5 milliGRAM(s) Oral daily  aspirin enteric coated 81 milliGRAM(s) Oral daily  atorvastatin 80 milliGRAM(s) Oral at bedtime  clopidogrel Tablet 75 milliGRAM(s) Oral daily  gabapentin   Solution 300 milliGRAM(s) Oral three times a day  heparin  Injectable 5000 Unit(s) SubCutaneous every 8 hours  levETIRAcetam  Solution 500 milliGRAM(s) Oral two times a day  metoprolol     tartrate 12.5 milliGRAM(s) Oral two times a day  pantoprazole   Suspension 40 milliGRAM(s) Oral daily  polyethylene glycol 3350 17 Gram(s) Oral daily  QUEtiapine 25 milliGRAM(s) Oral daily  senna 2 Tablet(s) Oral at bedtime    MEDICATIONS  (PRN):  fentaNYL     Lozenge 200 MICROGram(s) Transmucosal every 3 hours PRN severe pain  HYDROmorphone  Injectable 1 milliGRAM(s) IV Push every 6 hours PRN Moderate Pain (4 - 6)      PRESENT SYMPTOMS:  Source: [ ] Patient   [ ] Family   [ ] Team     Pain:                        [ ] No [ ] Yes             [ ] Mild [ ] Moderate [ ] Severe    Onset -  Location -  Duration -  Character -  Alleviating/Aggravating -  Radiation -  Timing -      Dyspnea:                [ ] No [ ] Yes             [ ] Mild [ ] Moderate [ ] Severe    Anxiety:                  [ ] No [ ] Yes             [ ] Mild [ ] Moderate [ ] Severe    Fatigue:                  [ ] No [ ] Yes             [ ] Mild [ ] Moderate [ ] Severe    Nausea:                  [ ] No [ ] Yes             [ ] Mild [ ] Moderate [ ] Severe    Loss of appetite:   [ ] No [ ] Yes             [ ] Mild [ ] Moderate [ ] Severe    Constipation:        [ ] No [ ] Yes             [ ] Mild [ ] Moderate [ ] Severe    Other Symptoms:  [ ] All other review of systems negative   [ ] Unable to obtain due to poor mentation     Karnofsky Performance Score/Palliative Performance Status Version 2:         %    PHYSICAL EXAM:  Vital Signs Last 24 Hrs  T(C): 36.5 (15 Dec 2017 05:34), Max: 37.2 (14 Dec 2017 21:12)  T(F): 97.7 (15 Dec 2017 05:34), Max: 98.9 (14 Dec 2017 21:12)  HR: 69 (15 Dec 2017 05:34) (66 - 74)  BP: 149/87 (15 Dec 2017 05:34) (130/73 - 158/81)  BP(mean): --  RR: 18 (15 Dec 2017 05:34) (18 - 18)  SpO2: 98% (15 Dec 2017 05:34) (98% - 98%) I&O's Summary    14 Dec 2017 07:01  -  15 Dec 2017 07:00  --------------------------------------------------------  IN: 0 mL / OUT: 0 mL / NET: 0 mL        General:  [  ] Normal. Alert, Oriented x 3.     HEENT:  [ ] Normal   [ ] Dry mouth   [ ] ET Tube    [ ] Trach  [ ] Oral lesions    Lungs:   [ ] Clear [ ] Tachypnea  [ ] Audible excessive secretions   [ ] Rhonchi        [ ] Right [ ] Left [ ] Bilateral  [ ] Crackles        [ ] Right [ ] Left [ ] Bilateral  [ ] Wheezing     [ ] Right [ ] Left [ ] Bilateral    Cardiovascular:  S1, S2. No S3. No murmurs     Abdomen: [ ] Soft  [ ] Distended   [ ] +BS  [ ] Non tender [ ] Tender  [ ]PEG   [ ]OGT/ NGT   Last BM:   12/14 loose.     Genitourinary: [ ] Normal [ x] Incontinent   [ ] Oliguria/Anuria   [ ] Myers    Musculoskeletal:  [ ] Normal   [ ] Weakness  [ ] Bedbound/Wheelchair bound [ ] Edema    Neurological: [ ] No focal deficits  [ ] Cognitive impairment  [ ] Dysphagia [ ] Dysarthria [ ] Paresis [ ] Other     Skin: [ ] Normal   [ ] Pressure ulcer(s)                  [ ] Rash    LABS:                        13.4   5.2   )-----------( 179      ( 14 Dec 2017 11:50 )             39.8     12-14    142  |  103  |  18  ----------------------------<  96  4.5   |  28  |  0.89    Ca    9.7      14 Dec 2017 11:49            Shock: [ ] Septic [ ] Cardiogenic [ ] Neurologic [ ] Hypovolemic  Vasopressors x   Inotrophs x     Protein Calorie Malnutrition: [ ] Mild [ ] Moderate [ ] Severe    Oral Intake: [ ] Unable/mouth care only [ ] Minimal [ ] Moderate [ ] Full Capability  Diet: [ ] NPO [ ] Tube feeds [ ] TPN [ ] Other     RADIOLOGY & ADDITIONAL STUDIES:  < from: CT Abdomen and Pelvis w/ IV Cont (11.26.17 @ 21:15) >  EXAM:  CT ABDOMEN AND PELVIS IC                            PROCEDURE DATE:  11/26/2017            INTERPRETATION:  CLINICAL INFORMATION: Abdominal pain. Gastric GIST   status post resection. Prostate cancer.IMPRESSION:     New large soft tissue mass in the right pelvis concerning for peritoneal   disease.    No acute pathology.    A 4.4 cm suprarenal abdominal aortic aneurysm without change.    < end of copied text >  < from: CT Brain Stroke Protocol (11.23.17 @ 15:12) >  IMPRESSION:    Head CT: No intracranial hemorrhage or displaced calvarial fracture.   Chronic infarcts and microvascular ischemic changes as described. If   symptoms persist, an MRI of the brain can be obtained for further   evaluation.    Cervical spine CT: No acute displaced fracture or traumatic malalignment.   Degenerative changes as described.      < end of copied text >    REFERRALS:   [ ] Chaplaincy  [ ] Hospice  [ ] Child Life  [ ] Social Work  [ ] Case management [ ] Holistic Therapy     Tyler Veras MD [phone]  Assessment and Plan: HPI:  This is a 72 year old male w/ h/o stroke with residual right sided weakness, aphasia, s/p PEG tube, Hypertension, HLD, prostate and stomach cancer on oral chemo who presented to ED for syncopal episode and AMS which as per EMR appears to be related to seizures. He was found to have a smith rectal mass with biopsy showing gastrointestinal stromal tumor for which onc is recommending to restart Gleevec. Furthermore they are recommending to continue Lupron for prostate CA. Onc is still to talk to the patient's decision maker in order to define GOC. While in the hospital the patient has had agitation and pain that are the reason why this consult is called.       PERTINENT PMH REVIEWED:  [x ] YES [ ] NO           SOCIAL HISTORY:   Significant other/partner:  Wife             Children:  x 1                  Congregation/Spirituality: Episcopal   Substance hx:  [ ] YES   [x] NO                   Tobacco hx:  [ ] YES  [ ] NO                       Alcohol hx: [ ] YES  [ ] NO         Home Opioid hx:  [ ] YES  [x ] NO   Living Situation: [x ] Home  [ ] Long term care  [ ] Rehab [ ] Other  As per : "Patient currently lives  w/ his spouse and son in an apartment  on the 2nd floor w/ 25 steps to enter. Prior to admission patient ambulated w/  a cane but he has a walker also for ambulation and receives assistance for adls  from [facility] [phone]-[facility] 9 hours x 7  days of aide service."  FAMILY HISTORY:  No pertinent family history in first degree relatives    [ ] Family history non-contributory     BASELINE (I)ADLs (prior to admission):  [place]: [ ] total  [x] moderate [ ] dependent    ADVANCE DIRECTIVES:    DNR [ ] YES [x ] NO                            [ ] Completed  MOLST  [ ] YES [x ] NO                      [ ] Completed  Health Care Proxy [x ] YES  [ ] NO   [ ] Completed [phone] Melisa Bhakta  Living Will  [ ] YES [x ] NO             [x] Surrogate  [ ] HCP  [ ] Guardian:    Wife (Savanah Craig); however, his niece, Melisa Bhakta, claims to be the patient's HCP and she is supposed to bring the form tho the hospital                                           Phone#: [phone]  Emergency contact: [phone] Melisa Bhakta  Allergies    No Known Allergies    Intolerances    Home meds for symptomatic Rx.   · 	acetaminophen-oxycodone 325 mg-5 mg oral tablet: 2 tab(s) orally every 6 hours, As needed, Moderate Pain (4 - 6), Last Dose Taken:    · 	gabapentin 300 mg oral capsule: 1 cap(s) orally 3 times a day, Last Dose Taken:     · 	QUEtiapine 25 mg oral tablet: 1 tab(s) orally once a day, Last Dose Taken:   · 	polyethylene glycol 3350 oral powder for reconstitution: 17 gram(s) orally once a day, Last Dose Taken:    · 	senna oral tablet: 2 tab(s) orally once a day (at bedtime), Last Dose Taken:          MEDICATIONS  (STANDING):  amLODIPine   Tablet 5 milliGRAM(s) Oral daily  aspirin enteric coated 81 milliGRAM(s) Oral daily  atorvastatin 80 milliGRAM(s) Oral at bedtime  clopidogrel Tablet 75 milliGRAM(s) Oral daily  gabapentin   Solution 300 milliGRAM(s) Oral three times a day  heparin  Injectable 5000 Unit(s) SubCutaneous every 8 hours  levETIRAcetam  Solution 500 milliGRAM(s) Oral two times a day  metoprolol     tartrate 12.5 milliGRAM(s) Oral two times a day  pantoprazole   Suspension 40 milliGRAM(s) Oral daily  polyethylene glycol 3350 17 Gram(s) Oral daily  QUEtiapine 25 milliGRAM(s) Oral daily  senna 2 Tablet(s) Oral at bedtime    MEDICATIONS  (PRN):  fentaNYL     Lozenge 200 MICROGram(s) Transmucosal every 3 hours PRN severe pain  HYDROmorphone  Injectable 1 milliGRAM(s) IV Push every 6 hours PRN Moderate Pain (4 - 6)      PRESENT SYMPTOMS:  Source: [x] Patient   [x ] Family   [x ] Team (nurse and NP taking care of the patient)    Pain:                        [ ] No [ x] Yes             [ ] Mild [x ] Moderate [ ] Severe  PAIN AD 4    Patient with aphasia and not able to indicated the characteristics of his pain. However, he was able to indicate his pain was over his genitalia and lower abdomen       Dyspnea:                [x ] No [ ] Yes             [ ] Mild [ ] Moderate [ ] Severe    Anxiety:                  [x ] No [ ] Yes             [ ] Mild [ ] Moderate [ ] Severe    Fatigue:                  [ ] No [ ] Yes             [ ] Mild [ ] Moderate [ ] Severe    Nausea:                  [ ] No [ ] Yes             [ ] Mild [ ] Moderate [ ] Severe    Loss of appetite:   [ ] No [ ] Yes             [ ] Mild [ ] Moderate [ ] Severe    Constipation:        [ ] No [ ] Yes             [ ] Mild [ ] Moderate [ ] Severe    Other Symptoms:  [ ] All other review of systems negative   [x ] Unable to obtain due to aphasia     Karnofsky Performance Score/Palliative Performance Status Version 2:  40       %    PHYSICAL EXAM:  Vital Signs Last 24 Hrs  T(C): 36.5 (15 Dec 2017 05:34), Max: 37.2 (14 Dec 2017 21:12)  T(F): 97.7 (15 Dec 2017 05:34), Max: 98.9 (14 Dec 2017 21:12)  HR: 69 (15 Dec 2017 05:34) (66 - 74)  BP: 149/87 (15 Dec 2017 05:34) (130/73 - 158/81)  BP(mean): --  RR: 18 (15 Dec 2017 05:34) (18 - 18)  SpO2: 98% (15 Dec 2017 05:34) (98% - 98%) I&O's Summary    14 Dec 2017 07:01  -  15 Dec 2017 07:00  --------------------------------------------------------  IN: 0 mL / OUT: 0 mL / NET: 0 mL        General:  [  ] Ill appearing elderly male. Cachectic     HEENT:  [ x] Normal   [ ] Dry mouth   [ ] ET Tube    [ ] Trach  [ ] Oral lesions    Lungs:   [x ] Clear [ ] Tachypnea  [ ] Audible excessive secretions   [ ] Rhonchi        [ ] Right [ ] Left [ ] Bilateral  [ ] Crackles        [ ] Right [ ] Left [ ] Bilateral  [ ] Wheezing     [ ] Right [ ] Left [ ] Bilateral    Cardiovascular:  S1, S2. No S3. No murmurs     Abdomen: [ ] Soft  [ ] Distended   [ ] +BS  [ ] Non tender [x ] Tender over pelvic and mid-abdominal region  [ ]PEG   [ ]OGT/ NGT   Last BM:  12/14 loose.     Genitourinary: [ ] Normal [ x] Incontinent   [ ] Oliguria/Anuria   [ ] Myers    Musculoskeletal:  [ ] Normal   [x ] Weakness  [ ] Bedbound/Wheelchair bound [ ] Edema [x] evident decreased muscle mass     Neurological: [ ] No focal deficits  [x ] Cognitive impairment  [ ] Dysphagia [ ] Dysarthria [ ] Paresis [x ] Other; Aphasia     Skin: [x ] Normal   [ ] Pressure ulcer(s)                  [ ] Rash    LABS:                        13.4   5.2   )-----------( 179      ( 14 Dec 2017 11:50 )             39.8     12-14    142  |  103  |  18  ----------------------------<  96  4.5   |  28  |  0.89    Ca    9.7      14 Dec 2017 11:49            Shock: [ ] Septic [ ] Cardiogenic [ ] Neurologic [ ] Hypovolemic  Vasopressors x   Inotrophs x     Protein Calorie Malnutrition: [ ] Mild [ ] Moderate [ ] Severe    Oral Intake: [ ] Unable/mouth care only [ ] Minimal [ ] Moderate [ ] Full Capability  Diet: [ ] NPO [ ] Tube feeds [ ] TPN [ ] Other     RADIOLOGY & ADDITIONAL STUDIES:  < from: CT Abdomen and Pelvis w/ IV Cont (11.26.17 @ 21:15) >  EXAM:  CT ABDOMEN AND PELVIS IC                            PROCEDURE DATE:  11/26/2017            INTERPRETATION:  CLINICAL INFORMATION: Abdominal pain. Gastric GIST   status post resection. Prostate cancer.IMPRESSION:     New large soft tissue mass in the right pelvis concerning for peritoneal   disease.    No acute pathology.    A 4.4 cm suprarenal abdominal aortic aneurysm without change.    < end of copied text >  < from: CT Brain Stroke Protocol (11.23.17 @ 15:12) >  IMPRESSION:    Head CT: No intracranial hemorrhage or displaced calvarial fracture.   Chronic infarcts and microvascular ischemic changes as described. If   symptoms persist, an MRI of the brain can be obtained for further   evaluation.    Cervical spine CT: No acute displaced fracture or traumatic malalignment.   Degenerative changes as described.      < end of copied text >    REFERRALS:   [ ] Chaplaincy  [ ] Hospice  [ ] Child Life  [ ] Social Work  [ ] Case management [ ] Holistic Therapy     Tyler Veras MD [phone]  Assessment and Plan:

## 2017-12-15 NOTE — PROGRESS NOTE ADULT - ASSESSMENT
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  Mental status change, Possible seizure- continue Keppra, Neurology follow noted.  Gastric cancer metastatic- Oncology follow re chemoRx.Gleevac on hold..  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 Restarted ASA/Plavix  Prostate cancer- Rx as per Oncology Urology evaluation noted To start Lupron.  s/p PEG replacement. Continue Peg feeds.  Pain control/ Pain management evaluation noted. Palliative evaluation noted.  HTN control  CAD stable.  Depression- continue Rx  Functional quadriplegia -supportive care  PT  DCP in progress.  Jose Medina MD pager [phone]

## 2017-12-15 NOTE — PROGRESS NOTE ADULT - SUBJECTIVE AND OBJECTIVE BOX
Patient is a 72y old  Male who presents with a chief complaint of LOC (23 Nov 2017 18:06)      SUBJECTIVE / OVERNIGHT EVENTS: Comfortable without new complaints.   Review of Systems  chest pain no  palpitations no  sob no  nausea no  headache no    MEDICATIONS  (STANDING):  amLODIPine   Tablet 5 milliGRAM(s) Oral daily  aspirin enteric coated 81 milliGRAM(s) Oral daily  atorvastatin 80 milliGRAM(s) Oral at bedtime  clopidogrel Tablet 75 milliGRAM(s) Oral daily  gabapentin   Solution 300 milliGRAM(s) Oral three times a day  heparin  Injectable 5000 Unit(s) SubCutaneous every 8 hours  levETIRAcetam  Solution 500 milliGRAM(s) Oral two times a day  methadone   Solution 2.5 milliGRAM(s) Oral <User Schedule>  metoprolol     tartrate 12.5 milliGRAM(s) Oral two times a day  pantoprazole   Suspension 40 milliGRAM(s) Oral daily  QUEtiapine 25 milliGRAM(s) Oral daily  senna 2 Tablet(s) Oral at bedtime    MEDICATIONS  (PRN):  HYDROmorphone  Injectable 0.5 milliGRAM(s) IV Push every 2 hours PRN Breakthrough pain  naloxone Injectable 0.1 milliGRAM(s) IV Push every 3 minutes PRN sedation or respiratory depression due to opoiods  ondansetron Injectable 8 milliGRAM(s) IV Push every 8 hours PRN Nausea and/or Vomiting  polyethylene glycol 3350 17 Gram(s) Oral daily PRN Constipation          PHYSICAL EXAM:  GENERAL: NAD, cachectic   HEAD:  Atraumatic, Normocephalic  EYES: EOMI, PERRLA, conjunctiva and sclera clear  NECK: Supple, No JVD  CHEST/LUNG: Clear to auscultation bilaterally; No wheeze  HEART: Regular rate and rhythm; No murmurs, rubs, or gallops  ABDOMEN: Soft, Nontender, Nondistended; Bowel sounds present PEG in place.  EXTREMITIES:  2+ Peripheral Pulses, No clubbing, cyanosis, or edema  PSYCH: AAOx3  NEUROLOGY: non-focal  SKIN: No rashes or lesions    LABS:                        13.4   5.2   )-----------( 179      ( 14 Dec 2017 11:50 )             39.8     12-14    142  |  103  |  18  ----------------------------<  96  4.5   |  28  |  0.89    Ca    9.7      14 Dec 2017 11:49                RADIOLOGY & ADDITIONAL TESTS:    Imaging Personally Reviewed:    Consultant(s) Notes Reviewed:      Care Discussed with Consultants/Other Providers:

## 2017-12-15 NOTE — PROGRESS NOTE ADULT - ASSESSMENT
Path showing GIST tumor.  Problem in the past and ongoing is difficulty with compliance and difficulty clarifying any symptoms he is having whether from his disease, side effects of medication, or unrelated to either. There are several highly effective treatments available for GIST tumors, all oral agents and all with  significant potential side effects. He also has prostate cancer, not clearly metastatic but  could be causing urinary symptoms.  Discussed with urology, would start Lupron.   After reviewing  literature, as he had been off Gleevec for several months, the relapse in the pelvis could be because of this. Under these circumstances it would be recommended to restart Gleevec.  Optimal tx at these point would be to restart Gleevec and give Lupron for prostate cancer.  Need to clarify with the family what they  want and how he would be followed. Unable to reach family yesterday.   Keeping his agitation/Pain controlled remains problematic.

## 2017-12-15 NOTE — CONSULT NOTE ADULT - PROBLEM SELECTOR RECOMMENDATION 2
Ongoing discussion between Onc and the patient's family. Will assist in GOC if this discussions are complicated. Ongoing discussion between Onc and the patient's family. Will assist in GOC if this discussions are complicated.  Once, at this time, there is not a HCP form in the chart, the patient's surrogate under the [facility] is his wife. I explained the patient's niece that until there is a HCP form available in the chart that all decisions for the patient's care should go through the patient's wife unless his wife indicates the decisions maker to be the patient's nice or until a HCP form becomes available. The patient's niece indicated she is communicating with the patient's wife and that she agrees with the current plan of care. His niece also indicated she has been the patient's caregiver and a communicator for the family once the wife has limited English proficiency. I will advise the primary team and Oncology to be sure the patient's wife is include in the patient's decisions making process unless she states otherwise or unless a HCP form becomes available.   I will try to reach the patient's wife on Monday.

## 2017-12-15 NOTE — CONSULT NOTE ADULT - CONSULT REQUESTED DATE/TIME
15-Dec-2017 13:49
27-Nov-2017 17:58
03-Dec-2017 01:27
23-Nov-2017 14:55
24-Nov-2017 14:53
25-Nov-2017 12:35
29-Nov-2017 12:57
28-Nov-2017

## 2017-12-15 NOTE — CONSULT NOTE ADULT - PROBLEM SELECTOR RECOMMENDATION 9
ran out of Gleevec - will hold currently  Patient has not seen  as outpatient since 3/17  Needs Ct abdomen/pelvis to assess status of disease and decision re. restarting gleevec - hold currently  will await to see if neuro status back to baseline
would suggest the use of Actiq 200 mcg every 3 hours as needed for pain control.  Would increase as tolerated.  This can be translated to an outpatient regimen in light of the diagnosis of cancer.
Likely 2/2 to CA vs Laxatives or both  Methadone 2.5 mg po at 6 am and 22:00   Dilaudid 0.5 mg IV q 2 PRN   Monitoring and holding for sedation/respiratory depression.   Bowel regimen adjusted as below   Narcan PRN

## 2017-12-15 NOTE — CONSULT NOTE ADULT - PROBLEM SELECTOR RECOMMENDATION 3
check PSA  told wife to have niece call me with details of outpatient injection - ? lupron with urology
DC Miralax ATC and continue PRN.   Continue Senna and hold if loose stools.

## 2017-12-15 NOTE — CONSULT NOTE ADULT - PROBLEM SELECTOR RECOMMENDATION 4
on PEG feeds  GI input noted
As per the patient's niece indicated the patient's wife has expressed in the past the desire for the patient to be full code.   Please see information about surrogate decision maker as above.

## 2017-12-16 DIAGNOSIS — Z51.5 ENCOUNTER FOR PALLIATIVE CARE: ICD-10-CM

## 2017-12-16 PROCEDURE — 99233 SBSQ HOSP IP/OBS HIGH 50: CPT

## 2017-12-16 RX ORDER — METHADONE HYDROCHLORIDE 40 MG/1
2.5 TABLET ORAL
Qty: 0 | Refills: 0 | Status: DISCONTINUED | OUTPATIENT
Start: 2017-12-16 | End: 2017-12-17

## 2017-12-16 RX ADMIN — HYDROMORPHONE HYDROCHLORIDE 0.5 MILLIGRAM(S): 2 INJECTION INTRAMUSCULAR; INTRAVENOUS; SUBCUTANEOUS at 22:43

## 2017-12-16 RX ADMIN — HYDROMORPHONE HYDROCHLORIDE 0.5 MILLIGRAM(S): 2 INJECTION INTRAMUSCULAR; INTRAVENOUS; SUBCUTANEOUS at 23:00

## 2017-12-16 RX ADMIN — HYDROMORPHONE HYDROCHLORIDE 0.5 MILLIGRAM(S): 2 INJECTION INTRAMUSCULAR; INTRAVENOUS; SUBCUTANEOUS at 09:07

## 2017-12-16 RX ADMIN — SENNA PLUS 2 TABLET(S): 8.6 TABLET ORAL at 21:43

## 2017-12-16 RX ADMIN — METHADONE HYDROCHLORIDE 2.5 MILLIGRAM(S): 40 TABLET ORAL at 06:11

## 2017-12-16 RX ADMIN — CLOPIDOGREL BISULFATE 75 MILLIGRAM(S): 75 TABLET, FILM COATED ORAL at 11:07

## 2017-12-16 RX ADMIN — HYDROMORPHONE HYDROCHLORIDE 0.5 MILLIGRAM(S): 2 INJECTION INTRAMUSCULAR; INTRAVENOUS; SUBCUTANEOUS at 16:13

## 2017-12-16 RX ADMIN — GABAPENTIN 300 MILLIGRAM(S): 400 CAPSULE ORAL at 06:05

## 2017-12-16 RX ADMIN — Medication 12.5 MILLIGRAM(S): at 06:04

## 2017-12-16 RX ADMIN — HEPARIN SODIUM 5000 UNIT(S): 5000 INJECTION INTRAVENOUS; SUBCUTANEOUS at 14:24

## 2017-12-16 RX ADMIN — METHADONE HYDROCHLORIDE 2.5 MILLIGRAM(S): 40 TABLET ORAL at 13:17

## 2017-12-16 RX ADMIN — METHADONE HYDROCHLORIDE 2.5 MILLIGRAM(S): 40 TABLET ORAL at 21:44

## 2017-12-16 RX ADMIN — HYDROMORPHONE HYDROCHLORIDE 0.5 MILLIGRAM(S): 2 INJECTION INTRAMUSCULAR; INTRAVENOUS; SUBCUTANEOUS at 08:56

## 2017-12-16 RX ADMIN — HYDROMORPHONE HYDROCHLORIDE 0.5 MILLIGRAM(S): 2 INJECTION INTRAMUSCULAR; INTRAVENOUS; SUBCUTANEOUS at 06:04

## 2017-12-16 RX ADMIN — GABAPENTIN 300 MILLIGRAM(S): 400 CAPSULE ORAL at 14:24

## 2017-12-16 RX ADMIN — HYDROMORPHONE HYDROCHLORIDE 0.5 MILLIGRAM(S): 2 INJECTION INTRAMUSCULAR; INTRAVENOUS; SUBCUTANEOUS at 16:46

## 2017-12-16 RX ADMIN — LEVETIRACETAM 500 MILLIGRAM(S): 250 TABLET, FILM COATED ORAL at 16:12

## 2017-12-16 RX ADMIN — AMLODIPINE BESYLATE 5 MILLIGRAM(S): 2.5 TABLET ORAL at 06:05

## 2017-12-16 RX ADMIN — HYDROMORPHONE HYDROCHLORIDE 0.5 MILLIGRAM(S): 2 INJECTION INTRAMUSCULAR; INTRAVENOUS; SUBCUTANEOUS at 14:25

## 2017-12-16 RX ADMIN — HYDROMORPHONE HYDROCHLORIDE 0.5 MILLIGRAM(S): 2 INJECTION INTRAMUSCULAR; INTRAVENOUS; SUBCUTANEOUS at 03:54

## 2017-12-16 RX ADMIN — ATORVASTATIN CALCIUM 80 MILLIGRAM(S): 80 TABLET, FILM COATED ORAL at 21:43

## 2017-12-16 RX ADMIN — HEPARIN SODIUM 5000 UNIT(S): 5000 INJECTION INTRAVENOUS; SUBCUTANEOUS at 06:06

## 2017-12-16 RX ADMIN — Medication 81 MILLIGRAM(S): at 11:07

## 2017-12-16 RX ADMIN — LEVETIRACETAM 500 MILLIGRAM(S): 250 TABLET, FILM COATED ORAL at 06:05

## 2017-12-16 RX ADMIN — HYDROMORPHONE HYDROCHLORIDE 0.5 MILLIGRAM(S): 2 INJECTION INTRAMUSCULAR; INTRAVENOUS; SUBCUTANEOUS at 13:25

## 2017-12-16 RX ADMIN — GABAPENTIN 300 MILLIGRAM(S): 400 CAPSULE ORAL at 21:43

## 2017-12-16 RX ADMIN — PANTOPRAZOLE SODIUM 40 MILLIGRAM(S): 20 TABLET, DELAYED RELEASE ORAL at 11:07

## 2017-12-16 RX ADMIN — HYDROMORPHONE HYDROCHLORIDE 0.5 MILLIGRAM(S): 2 INJECTION INTRAMUSCULAR; INTRAVENOUS; SUBCUTANEOUS at 06:11

## 2017-12-16 RX ADMIN — QUETIAPINE FUMARATE 25 MILLIGRAM(S): 200 TABLET, FILM COATED ORAL at 21:43

## 2017-12-16 RX ADMIN — HEPARIN SODIUM 5000 UNIT(S): 5000 INJECTION INTRAVENOUS; SUBCUTANEOUS at 21:43

## 2017-12-16 RX ADMIN — Medication 12.5 MILLIGRAM(S): at 16:13

## 2017-12-16 RX ADMIN — HYDROMORPHONE HYDROCHLORIDE 0.5 MILLIGRAM(S): 2 INJECTION INTRAMUSCULAR; INTRAVENOUS; SUBCUTANEOUS at 04:20

## 2017-12-16 NOTE — PROGRESS NOTE ADULT - SUBJECTIVE AND OBJECTIVE BOX
HPI:  This is a 72 year old male w/ h/o stroke with residual right sided weakness, aphasia, s/p PEG tube, Hypertension, HLD, prostate and stomach cancer on oral chemo who presented to ED for syncopal episode and AMS which as per EMR appears to be related to seizures. He was found to have a smith rectal mass with biopsy showing gastrointestinal stromal tumor for which onc is recommending to restart Gleevec. Furthermore they are recommending to continue Lupron for prostate CA. Onc is still to talk to the patient's decision maker in order to define GOC. While in the hospital the patient has had agitation and pain that are the reason why this consult is called. Today, the patient is still c/o pain over his lower abdomen.  His PCA and NP indicated the patient is still having severe pain and that this morning he was crying in pain.  He is s/p Dilaudid and in mild to moderate pain based on PAIN AD scale at this time.       PERTINENT PMH REVIEWED:  [x ] YES [ ] NO           SOCIAL HISTORY:   Significant other/partner:  Wife             Children:  x 1                  Caodaism/Spirituality: Congregational   Substance hx:  [ ] YES   [x] NO                   Tobacco hx:  [ ] YES  [ ] NO                       Alcohol hx: [ ] YES  [ ] NO         Home Opioid hx:  [ ] YES  [x ] NO   Living Situation: [x ] Home  [ ] Long term care  [ ] Rehab [ ] Other  As per : "Patient currently lives  w/ his spouse and son in an apartment  on the 2nd floor w/ 25 steps to enter. Prior to admission patient ambulated w/  a cane but he has a walker also for ambulation and receives assistance for adls  from [facility] [phone] 9 hours x 7  days of aide service."  FAMILY HISTORY:  No pertinent family history in first degree relatives    [ ] Family history non-contributory     BASELINE (I)ADLs (prior to admission):  [place]: [ ] total  [x] moderate [ ] dependent    ADVANCE DIRECTIVES:    DNR [ ] YES [x ] NO                            [ ] Completed  MOLST  [ ] YES [x ] NO                      [ ] Completed  Health Care Proxy [x ] YES  [ ] NO   [ ] Completed [phone] Melisa Bhakta  Living Will  [ ] YES [x ] NO             [x] Surrogate  [ ] HCP  [ ] Guardian:    Wife (Savanah Craig); however, his niece, Melisa Bhakta, claims to be the patient's HCP and she is supposed to bring the form tho the hospital                                           Phone#: [phone]  Emergency contact: [phone] Melisa Bhakta  Allergies    No Known Allergies    Intolerances    Home meds for symptomatic Rx.   · 	acetaminophen-oxycodone 325 mg-5 mg oral tablet: 2 tab(s) orally every 6 hours, As needed, Moderate Pain (4 - 6), Last Dose Taken:    · 	gabapentin 300 mg oral capsule: 1 cap(s) orally 3 times a day, Last Dose Taken:     · 	QUEtiapine 25 mg oral tablet: 1 tab(s) orally once a day, Last Dose Taken:   · 	polyethylene glycol 3350 oral powder for reconstitution: 17 gram(s) orally once a day, Last Dose Taken:    · 	senna oral tablet: 2 tab(s) orally once a day (at bedtime), Last Dose Taken:          MEDICATIONS  (STANDING):  amLODIPine   Tablet 5 milliGRAM(s) Oral daily  aspirin enteric coated 81 milliGRAM(s) Oral daily  atorvastatin 80 milliGRAM(s) Oral at bedtime  clopidogrel Tablet 75 milliGRAM(s) Oral daily  gabapentin   Solution 300 milliGRAM(s) Oral three times a day  heparin  Injectable 5000 Unit(s) SubCutaneous every 8 hours  levETIRAcetam  Solution 500 milliGRAM(s) Oral two times a day  methadone   Solution 2.5 milliGRAM(s) Oral <User Schedule>  metoprolol     tartrate 12.5 milliGRAM(s) Oral two times a day  pantoprazole   Suspension 40 milliGRAM(s) Oral daily  QUEtiapine 25 milliGRAM(s) Oral daily  senna 2 Tablet(s) Oral at bedtime    MEDICATIONS  (PRN):  HYDROmorphone  Injectable 0.5 milliGRAM(s) IV Push every 2 hours PRN Breakthrough pain  naloxone Injectable 0.1 milliGRAM(s) IV Push every 3 minutes PRN sedation or respiratory depression due to opoiods  ondansetron Injectable 8 milliGRAM(s) IV Push every 8 hours PRN Nausea and/or Vomiting  polyethylene glycol 3350 17 Gram(s) Oral daily PRN Constipation        PRESENT SYMPTOMS:  Source: [x] Patient   [x ] Family   [x ] Team (nurse and NP taking care of the patient)    Pain:                        [ ] No [ x] Yes             [ ] Mild [x ] Moderate [ ] Severe  PAIN AD 4    Patient with aphasia and not able to indicated the characteristics of his pain. However, he was able to indicate his pain was over his genitalia and lower abdomen       Dyspnea:                [x ] No [ ] Yes             [ ] Mild [ ] Moderate [ ] Severe    Anxiety:                  [x ] No [ ] Yes             [ ] Mild [ ] Moderate [ ] Severe    Fatigue:                  [ ] No [ ] Yes             [ ] Mild [ ] Moderate [ ] Severe    Nausea:                  [ ] No [ ] Yes             [ ] Mild [ ] Moderate [ ] Severe    Loss of appetite:   [ ] No [ ] Yes             [ ] Mild [ ] Moderate [ ] Severe    Constipation:        [ ] No [ ] Yes             [ ] Mild [ ] Moderate [ ] Severe    Other Symptoms:  [ ] All other review of systems negative   [x ] Unable to obtain due to aphasia     Karnofsky Performance Score/Palliative Performance Status Version 2:  40       %    PHYSICAL EXAM:  Vital Signs Last 24 Hrs  T(C): 36.5 (16 Dec 2017 03:42), Max: 37 (15 Dec 2017 20:33)  T(F): 97.7 (16 Dec 2017 03:42), Max: 98.6 (15 Dec 2017 20:33)  HR: 80 (16 Dec 2017 03:42) (68 - 80)  BP: 138/78 (16 Dec 2017 03:42) (138/78 - 145/83)  BP(mean): --  RR: 18 (16 Dec 2017 03:42) (18 - 18)  SpO2: 93% (16 Dec 2017 03:42) (93% - 95%)      General:  [  x] Ill appearing elderly male. Cachectic     HEENT:  [ x] Normal   [ ] Dry mouth   [ ] ET Tube    [ ] Trach  [ ] Oral lesions    Lungs:   [x ] Clear [ ] Tachypnea  [ ] Audible excessive secretions   [ ] Rhonchi        [ ] Right [ ] Left [ ] Bilateral  [ ] Crackles        [ ] Right [ ] Left [ ] Bilateral  [ ] Wheezing     [ ] Right [ ] Left [ ] Bilateral    Cardiovascular:  S1, S2. No S3. No murmurs     Abdomen: [ ] Soft  [ ] Distended   [ ] +BS  [ ] Non tender [x ] Tender over pelvic and mid-abdominal region  [ ]PEG   [ ]OGT/ NGT   Last BM:  12/16     Genitourinary: [ ] Normal [ x] Incontinent   [ ] Oliguria/Anuria   [ ] Myers    Musculoskeletal:  [ ] Normal   [x ] Weakness  [ ] Bedbound/Wheelchair bound [ ] Edema [x] evident decreased muscle mass     Neurological: [ ] No focal deficits  [x ] Cognitive impairment  [ ] Dysphagia [ ] Dysarthria [ ] Paresis [x ] Other; Aphasia     Skin: [x ] Normal   [ ] Pressure ulcer(s)                  [ ] Rash    LABS:                                           13.4   5.2   )-----------( 179      ( 14 Dec 2017 11:50 )             39.8   12-14    142  |  103  |  18  ----------------------------<  96  4.5   |  28  |  0.89    Ca    9.7      14 Dec 2017 11:49        Shock: [ ] Septic [ ] Cardiogenic [ ] Neurologic [ ] Hypovolemic  Vasopressors x   Inotrophs x     Protein Calorie Malnutrition: [ ] Mild [ ] Moderate [ ] Severe    Oral Intake: [ ] Unable/mouth care only [ ] Minimal [ ] Moderate [ ] Full Capability  Diet: [ ] NPO [ ] Tube feeds [ ] TPN [ ] Other     RADIOLOGY & ADDITIONAL STUDIES:  < from: CT Abdomen and Pelvis w/ IV Cont (11.26.17 @ 21:15) >  EXAM:  CT ABDOMEN AND PELVIS IC                            PROCEDURE DATE:  11/26/2017            INTERPRETATION:  CLINICAL INFORMATION: Abdominal pain. Gastric GIST   status post resection. Prostate cancer.IMPRESSION:     New large soft tissue mass in the right pelvis concerning for peritoneal   disease.    No acute pathology.    A 4.4 cm suprarenal abdominal aortic aneurysm without change.    < end of copied text >  < from: CT Brain Stroke Protocol (11.23.17 @ 15:12) >  IMPRESSION:    Head CT: No intracranial hemorrhage or displaced calvarial fracture.   Chronic infarcts and microvascular ischemic changes as described. If   symptoms persist, an MRI of the brain can be obtained for further   evaluation.    Cervical spine CT: No acute displaced fracture or traumatic malalignment.   Degenerative changes as described.      < end of copied text >    REFERRALS:   [ ] Chaplaincy  [ ] Hospice  [ ] Child Life  [ ] Social Work  [ ] Case management [ ] Holistic Therapy     Tyler Veras MD [phone]  Assessment and Plan:

## 2017-12-16 NOTE — PROGRESS NOTE ADULT - SUBJECTIVE AND OBJECTIVE BOX
Patient is a 72y old  Male who presents with a chief complaint of LOC (23 Nov 2017 18:06)      SUBJECTIVE / OVERNIGHT EVENTS: Comfortable without new complaints.   Review of Systems  chest pain no  palpitations no  sob no  nausea no  headache no    MEDICATIONS  (STANDING):  amLODIPine   Tablet 5 milliGRAM(s) Oral daily  aspirin enteric coated 81 milliGRAM(s) Oral daily  atorvastatin 80 milliGRAM(s) Oral at bedtime  clopidogrel Tablet 75 milliGRAM(s) Oral daily  gabapentin   Solution 300 milliGRAM(s) Oral three times a day  heparin  Injectable 5000 Unit(s) SubCutaneous every 8 hours  levETIRAcetam  Solution 500 milliGRAM(s) Oral two times a day  methadone   Solution 2.5 milliGRAM(s) Oral <User Schedule>  metoprolol     tartrate 12.5 milliGRAM(s) Oral two times a day  pantoprazole   Suspension 40 milliGRAM(s) Oral daily  QUEtiapine 25 milliGRAM(s) Oral daily  senna 2 Tablet(s) Oral at bedtime    MEDICATIONS  (PRN):  HYDROmorphone  Injectable 0.5 milliGRAM(s) IV Push every 2 hours PRN Breakthrough pain  naloxone Injectable 0.1 milliGRAM(s) IV Push every 3 minutes PRN sedation or respiratory depression due to opoiods  ondansetron Injectable 8 milliGRAM(s) IV Push every 8 hours PRN Nausea and/or Vomiting  polyethylene glycol 3350 17 Gram(s) Oral daily PRN Constipation          PHYSICAL EXAM:  GENERAL: NAD, cachectic   HEAD:  Atraumatic, Normocephalic  EYES: EOMI, PERRLA, conjunctiva and sclera clear  NECK: Supple, No JVD  CHEST/LUNG: Clear to auscultation bilaterally; No wheeze  HEART: Regular rate and rhythm; No murmurs, rubs, or gallops  ABDOMEN: Soft, Nontender, Nondistended; Bowel sounds present PEG in place.  EXTREMITIES:  2+ Peripheral Pulses, No clubbing, cyanosis, or edema  PSYCH: AAOx3  NEUROLOGY: non-focal  SKIN: No rashes or lesions    LABS:                    RADIOLOGY & ADDITIONAL TESTS:    Imaging Personally Reviewed:    Consultant(s) Notes Reviewed:      Care Discussed with Consultants/Other Providers:

## 2017-12-16 NOTE — PROGRESS NOTE ADULT - ASSESSMENT
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  Mental status change, Possible seizure- continue Keppra, Neurology follow noted.  Gastric cancer metastatic- Oncology follow re chemoRx.Gleevac on hold..  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 Restarted ASA/Plavix  Prostate cancer- Rx as per Oncology Urology evaluation noted To start Lupron.  s/p PEG replacement. Continue Peg feeds.  Pain control/ Pain management evaluation noted. Palliative evaluation noted.  HTN control  CAD stable.  Depression- continue Rx  Functional quadriplegia -supportive care  PT  DCP in progress. Await family decision re further RX vs  hospice care.  Jose Medina MD pager [phone]

## 2017-12-16 NOTE — PROGRESS NOTE ADULT - ASSESSMENT
Path showing GIST tumor.  Problem in the past and ongoing is difficulty with compliance and difficulty clarifying any symptoms he is having whether from his disease, side effects of medication, or unrelated to either. There are several highly effective treatments available for GIST tumors, all oral agents and all with  significant potential side effects. He also has prostate cancer, not clearly metastatic but  could be causing urinary symptoms.  Discussed with urology, would start Lupron.   After reviewing  literature, as he had been off Gleevec for several months, the relapse in the pelvis could be because of this. Under these circumstances it would be recommended to restart Gleevec.  Optimal tx at these point would be to restart Gleevec and give Lupron for prostate cancer.    Palliative care input appreciated. Seems more comfortable.    Discussed at length with niece. She states that he had an injection for his prostate cancer  2-3 months ago. She is not sure who or where this was. She wishes to discuss the situation with her family over the weekend and give their decision to Dr. Medina on Monday. They have not been able to care for him at home because of his frequent episodes agitation and pain..

## 2017-12-16 NOTE — PROGRESS NOTE ADULT - SUBJECTIVE AND OBJECTIVE BOX
Pt is seen and examined  pt is awake and lying in bed/  pt seems comfortable and remains non-verbal.  Continues to receive IV analgesics, on Methadone ATC, Neurotin started.        MEDICATIONS  (STANDING):  amLODIPine   Tablet 5 milliGRAM(s) Oral daily  aspirin enteric coated 81 milliGRAM(s) Oral daily  atorvastatin 80 milliGRAM(s) Oral at bedtime  clopidogrel Tablet 75 milliGRAM(s) Oral daily  gabapentin   Solution 300 milliGRAM(s) Oral three times a day  heparin  Injectable 5000 Unit(s) SubCutaneous every 8 hours  levETIRAcetam  Solution 500 milliGRAM(s) Oral two times a day  methadone   Solution 2.5 milliGRAM(s) Oral <User Schedule>  metoprolol     tartrate 12.5 milliGRAM(s) Oral two times a day  pantoprazole   Suspension 40 milliGRAM(s) Oral daily  QUEtiapine 25 milliGRAM(s) Oral daily  senna 2 Tablet(s) Oral at bedtime    MEDICATIONS  (PRN):  HYDROmorphone  Injectable 0.5 milliGRAM(s) IV Push every 2 hours PRN Breakthrough pain  naloxone Injectable 0.1 milliGRAM(s) IV Push every 3 minutes PRN sedation or respiratory depression due to opoiods  ondansetron Injectable 8 milliGRAM(s) IV Push every 8 hours PRN Nausea and/or Vomiting  polyethylene glycol 3350 17 Gram(s) Oral daily PRN Constipation    Allergies  No Known Allergies  Intolerances  Vital Signs Last 24 Hrs  T(C): 36.5 (16 Dec 2017 03:42), Max: 37 (15 Dec 2017 20:33)  T(F): 97.7 (16 Dec 2017 03:42), Max: 98.6 (15 Dec 2017 20:33)  HR: 80 (16 Dec 2017 03:42) (68 - 80)  BP: 138/78 (16 Dec 2017 03:42) (138/78 - 145/83)  BP(mean): --  RR: 18 (16 Dec 2017 03:42) (18 - 18)  SpO2: 93% (16 Dec 2017 03:42) (93% - 95%)    PHYSICAL EXAM  General: adult in NAD  HEENT:  anicteric sclera, pink conjunctiva  Neck: supple  CV: normal S1/S2 with no murmur rubs or gallops  Lungs: positive air movement b/l ant lungs,clear to auscultation, no wheezes, no rales  Abdomen: soft non-tender non-distended, no hepatosplenomegaly  Ext: no clubbing cyanosis or edema  Skin: no rashes and no petechiae  Neuro: alert.  LABS:

## 2017-12-17 DIAGNOSIS — K59.09 OTHER CONSTIPATION: ICD-10-CM

## 2017-12-17 LAB
ANION GAP SERPL CALC-SCNC: 14 MMOL/L — SIGNIFICANT CHANGE UP (ref 5–17)
BUN SERPL-MCNC: 24 MG/DL — HIGH (ref 7–23)
CALCIUM SERPL-MCNC: 9.7 MG/DL — SIGNIFICANT CHANGE UP (ref 8.4–10.5)
CHLORIDE SERPL-SCNC: 101 MMOL/L — SIGNIFICANT CHANGE UP (ref 96–108)
CO2 SERPL-SCNC: 27 MMOL/L — SIGNIFICANT CHANGE UP (ref 22–31)
CREAT SERPL-MCNC: 1.04 MG/DL — SIGNIFICANT CHANGE UP (ref 0.5–1.3)
GLUCOSE SERPL-MCNC: 112 MG/DL — HIGH (ref 70–99)
HCT VFR BLD CALC: 33.6 % — LOW (ref 39–50)
HGB BLD-MCNC: 11.1 G/DL — LOW (ref 13–17)
MCHC RBC-ENTMCNC: 33 GM/DL — SIGNIFICANT CHANGE UP (ref 32–36)
MCHC RBC-ENTMCNC: 34.5 PG — HIGH (ref 27–34)
MCV RBC AUTO: 104.3 FL — HIGH (ref 80–100)
PLATELET # BLD AUTO: 166 K/UL — SIGNIFICANT CHANGE UP (ref 150–400)
POTASSIUM SERPL-MCNC: 4.6 MMOL/L — SIGNIFICANT CHANGE UP (ref 3.5–5.3)
POTASSIUM SERPL-SCNC: 4.6 MMOL/L — SIGNIFICANT CHANGE UP (ref 3.5–5.3)
RBC # BLD: 3.22 M/UL — LOW (ref 4.2–5.8)
RBC # FLD: 12.9 % — SIGNIFICANT CHANGE UP (ref 10.3–14.5)
SODIUM SERPL-SCNC: 142 MMOL/L — SIGNIFICANT CHANGE UP (ref 135–145)
WBC # BLD: 5.51 K/UL — SIGNIFICANT CHANGE UP (ref 3.8–10.5)
WBC # FLD AUTO: 5.51 K/UL — SIGNIFICANT CHANGE UP (ref 3.8–10.5)

## 2017-12-17 PROCEDURE — 99233 SBSQ HOSP IP/OBS HIGH 50: CPT

## 2017-12-17 PROCEDURE — 74000: CPT | Mod: 26

## 2017-12-17 RX ORDER — HYDROMORPHONE HYDROCHLORIDE 2 MG/ML
1 INJECTION INTRAMUSCULAR; INTRAVENOUS; SUBCUTANEOUS
Qty: 0 | Refills: 0 | Status: DISCONTINUED | OUTPATIENT
Start: 2017-12-17 | End: 2017-12-18

## 2017-12-17 RX ORDER — METHADONE HYDROCHLORIDE 40 MG/1
2.5 TABLET ORAL
Qty: 0 | Refills: 0 | Status: DISCONTINUED | OUTPATIENT
Start: 2017-12-17 | End: 2017-12-17

## 2017-12-17 RX ORDER — POLYETHYLENE GLYCOL 3350 17 G/17G
17 POWDER, FOR SOLUTION ORAL DAILY
Qty: 0 | Refills: 0 | Status: DISCONTINUED | OUTPATIENT
Start: 2017-12-17 | End: 2017-12-19

## 2017-12-17 RX ORDER — SENNA PLUS 8.6 MG/1
15 TABLET ORAL AT BEDTIME
Qty: 0 | Refills: 0 | Status: DISCONTINUED | OUTPATIENT
Start: 2017-12-17 | End: 2017-12-19

## 2017-12-17 RX ORDER — SENNA PLUS 8.6 MG/1
15 TABLET ORAL AT BEDTIME
Qty: 0 | Refills: 0 | Status: DISCONTINUED | OUTPATIENT
Start: 2017-12-17 | End: 2017-12-17

## 2017-12-17 RX ORDER — LACTULOSE 10 G/15ML
15 SOLUTION ORAL DAILY
Qty: 0 | Refills: 0 | Status: DISCONTINUED | OUTPATIENT
Start: 2017-12-17 | End: 2017-12-19

## 2017-12-17 RX ORDER — HYDROMORPHONE HYDROCHLORIDE 2 MG/ML
0.5 INJECTION INTRAMUSCULAR; INTRAVENOUS; SUBCUTANEOUS EVERY 4 HOURS
Qty: 0 | Refills: 0 | Status: DISCONTINUED | OUTPATIENT
Start: 2017-12-17 | End: 2017-12-19

## 2017-12-17 RX ORDER — HYDROMORPHONE HYDROCHLORIDE 2 MG/ML
0.5 INJECTION INTRAMUSCULAR; INTRAVENOUS; SUBCUTANEOUS
Qty: 0 | Refills: 0 | Status: DISCONTINUED | OUTPATIENT
Start: 2017-12-17 | End: 2017-12-17

## 2017-12-17 RX ADMIN — HEPARIN SODIUM 5000 UNIT(S): 5000 INJECTION INTRAVENOUS; SUBCUTANEOUS at 06:05

## 2017-12-17 RX ADMIN — QUETIAPINE FUMARATE 25 MILLIGRAM(S): 200 TABLET, FILM COATED ORAL at 22:45

## 2017-12-17 RX ADMIN — HYDROMORPHONE HYDROCHLORIDE 0.5 MILLIGRAM(S): 2 INJECTION INTRAMUSCULAR; INTRAVENOUS; SUBCUTANEOUS at 08:34

## 2017-12-17 RX ADMIN — HYDROMORPHONE HYDROCHLORIDE 0.5 MILLIGRAM(S): 2 INJECTION INTRAMUSCULAR; INTRAVENOUS; SUBCUTANEOUS at 18:13

## 2017-12-17 RX ADMIN — GABAPENTIN 300 MILLIGRAM(S): 400 CAPSULE ORAL at 22:45

## 2017-12-17 RX ADMIN — GABAPENTIN 300 MILLIGRAM(S): 400 CAPSULE ORAL at 18:00

## 2017-12-17 RX ADMIN — HYDROMORPHONE HYDROCHLORIDE 0.5 MILLIGRAM(S): 2 INJECTION INTRAMUSCULAR; INTRAVENOUS; SUBCUTANEOUS at 14:46

## 2017-12-17 RX ADMIN — HYDROMORPHONE HYDROCHLORIDE 0.5 MILLIGRAM(S): 2 INJECTION INTRAMUSCULAR; INTRAVENOUS; SUBCUTANEOUS at 13:53

## 2017-12-17 RX ADMIN — HEPARIN SODIUM 5000 UNIT(S): 5000 INJECTION INTRAVENOUS; SUBCUTANEOUS at 22:45

## 2017-12-17 RX ADMIN — HYDROMORPHONE HYDROCHLORIDE 0.5 MILLIGRAM(S): 2 INJECTION INTRAMUSCULAR; INTRAVENOUS; SUBCUTANEOUS at 11:39

## 2017-12-17 RX ADMIN — Medication 81 MILLIGRAM(S): at 11:41

## 2017-12-17 RX ADMIN — Medication 0.5 MILLIGRAM(S): at 20:54

## 2017-12-17 RX ADMIN — ATORVASTATIN CALCIUM 80 MILLIGRAM(S): 80 TABLET, FILM COATED ORAL at 22:45

## 2017-12-17 RX ADMIN — HYDROMORPHONE HYDROCHLORIDE 0.5 MILLIGRAM(S): 2 INJECTION INTRAMUSCULAR; INTRAVENOUS; SUBCUTANEOUS at 10:30

## 2017-12-17 RX ADMIN — HYDROMORPHONE HYDROCHLORIDE 0.5 MILLIGRAM(S): 2 INJECTION INTRAMUSCULAR; INTRAVENOUS; SUBCUTANEOUS at 12:00

## 2017-12-17 RX ADMIN — HYDROMORPHONE HYDROCHLORIDE 1 MILLIGRAM(S): 2 INJECTION INTRAMUSCULAR; INTRAVENOUS; SUBCUTANEOUS at 15:31

## 2017-12-17 RX ADMIN — SENNA PLUS 15 MILLILITER(S): 8.6 TABLET ORAL at 23:37

## 2017-12-17 RX ADMIN — AMLODIPINE BESYLATE 5 MILLIGRAM(S): 2.5 TABLET ORAL at 06:05

## 2017-12-17 RX ADMIN — Medication 12.5 MILLIGRAM(S): at 20:55

## 2017-12-17 RX ADMIN — Medication 12.5 MILLIGRAM(S): at 06:05

## 2017-12-17 RX ADMIN — HYDROMORPHONE HYDROCHLORIDE 0.5 MILLIGRAM(S): 2 INJECTION INTRAMUSCULAR; INTRAVENOUS; SUBCUTANEOUS at 09:00

## 2017-12-17 RX ADMIN — LEVETIRACETAM 500 MILLIGRAM(S): 250 TABLET, FILM COATED ORAL at 20:54

## 2017-12-17 RX ADMIN — HYDROMORPHONE HYDROCHLORIDE 1 MILLIGRAM(S): 2 INJECTION INTRAMUSCULAR; INTRAVENOUS; SUBCUTANEOUS at 15:46

## 2017-12-17 RX ADMIN — PANTOPRAZOLE SODIUM 40 MILLIGRAM(S): 20 TABLET, DELAYED RELEASE ORAL at 11:41

## 2017-12-17 RX ADMIN — HYDROMORPHONE HYDROCHLORIDE 1 MILLIGRAM(S): 2 INJECTION INTRAMUSCULAR; INTRAVENOUS; SUBCUTANEOUS at 21:25

## 2017-12-17 RX ADMIN — HYDROMORPHONE HYDROCHLORIDE 0.5 MILLIGRAM(S): 2 INJECTION INTRAMUSCULAR; INTRAVENOUS; SUBCUTANEOUS at 10:11

## 2017-12-17 RX ADMIN — Medication 0.5 MILLIGRAM(S): at 16:18

## 2017-12-17 RX ADMIN — LEVETIRACETAM 500 MILLIGRAM(S): 250 TABLET, FILM COATED ORAL at 06:06

## 2017-12-17 RX ADMIN — POLYETHYLENE GLYCOL 3350 17 GRAM(S): 17 POWDER, FOR SOLUTION ORAL at 11:41

## 2017-12-17 RX ADMIN — GABAPENTIN 300 MILLIGRAM(S): 400 CAPSULE ORAL at 06:05

## 2017-12-17 RX ADMIN — HEPARIN SODIUM 5000 UNIT(S): 5000 INJECTION INTRAVENOUS; SUBCUTANEOUS at 13:53

## 2017-12-17 RX ADMIN — HYDROMORPHONE HYDROCHLORIDE 1 MILLIGRAM(S): 2 INJECTION INTRAMUSCULAR; INTRAVENOUS; SUBCUTANEOUS at 21:10

## 2017-12-17 RX ADMIN — CLOPIDOGREL BISULFATE 75 MILLIGRAM(S): 75 TABLET, FILM COATED ORAL at 11:41

## 2017-12-17 RX ADMIN — METHADONE HYDROCHLORIDE 2.5 MILLIGRAM(S): 40 TABLET ORAL at 06:05

## 2017-12-17 NOTE — PROGRESS NOTE ADULT - SUBJECTIVE AND OBJECTIVE BOX
Pt is seen and examined  pt is awake and lying in bed/  Seems in distress, having pain          MEDICATIONS  (STANDING):  amLODIPine   Tablet 5 milliGRAM(s) Oral daily  aspirin enteric coated 81 milliGRAM(s) Oral daily  atorvastatin 80 milliGRAM(s) Oral at bedtime  clopidogrel Tablet 75 milliGRAM(s) Oral daily  gabapentin   Solution 300 milliGRAM(s) Oral three times a day  heparin  Injectable 5000 Unit(s) SubCutaneous every 8 hours  HYDROmorphone  Injectable 0.5 milliGRAM(s) IV Push <User Schedule>  levETIRAcetam  Solution 500 milliGRAM(s) Oral two times a day  methadone   Solution 2.5 milliGRAM(s) Oral <User Schedule>  metoprolol     tartrate 12.5 milliGRAM(s) Oral two times a day  pantoprazole   Suspension 40 milliGRAM(s) Oral daily  polyethylene glycol 3350 17 Gram(s) Oral daily  QUEtiapine 25 milliGRAM(s) Oral daily  senna Syrup 15 milliLiter(s) Oral at bedtime    MEDICATIONS  (PRN):  HYDROmorphone  Injectable 0.5 milliGRAM(s) IV Push every 2 hours PRN Breakthrough pain  lactulose Syrup 15 Gram(s) Oral daily PRN Constipation  naloxone Injectable 0.1 milliGRAM(s) IV Push every 3 minutes PRN sedation or respiratory depression due to opoiods  ondansetron Injectable 8 milliGRAM(s) IV Push every 8 hours PRN Nausea and/or Vomiting      Allergies    No Known Allergies    Intolerances        Vital Signs Last 24 Hrs  T(C): 36.5 (17 Dec 2017 04:20), Max: 37.1 (16 Dec 2017 14:56)  T(F): 97.7 (17 Dec 2017 04:20), Max: 98.8 (16 Dec 2017 14:56)  HR: 79 (17 Dec 2017 04:20) (69 - 79)  BP: 130/68 (17 Dec 2017 04:20) (126/78 - 150/78)  BP(mean): --  RR: 18 (17 Dec 2017 04:20) (18 - 18)  SpO2: 96% (17 Dec 2017 04:20) (94% - 96%)    PHYSICAL EXAM  General: adult in NAD  HEENT: clear oropharynx, anicteric sclera, pink conjunctiva  Neck: supple  CV: normal S1/S2 with no murmur rubs or gallops  Lungs: positive air movement b/l ant lungs,clear to auscultation, no wheezes, no rales  Abdomen: lower abdominal tenderness.  Ext: no clubbing cyanosis or edema  Skin: no rashes and no petechiae  Neuro: alert and does not verbalized or cooperate for neuro exam.  LABS:                                                BLOOD SMEAR INTERPRETATION:       RADIOLOGY & ADDITIONAL STUDIES:

## 2017-12-17 NOTE — PROGRESS NOTE ADULT - ASSESSMENT
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  Mental status change, Possible seizure- continue Keppra, Neurology follow noted.  Gastric cancer metastatic- Oncology follow re chemoRx.Gleevac on hold..  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 Restarted ASA/Plavix  Prostate cancer- Rx as per Oncology Urology evaluation noted To start Lupron.  s/p PEG replacement. Continue Peg feeds.  Pain control/ Pain management evaluation noted. Palliative follow.  HTN control  CAD stable.  Depression- continue Rx  Functional quadriplegia -supportive care  PT  Await family decision re further RX vs  hospice care.  Jose Medina MD pager [phone]

## 2017-12-17 NOTE — PROGRESS NOTE ADULT - SUBJECTIVE AND OBJECTIVE BOX
HPI:  This is a 72 year old male w/ h/o stroke with residual right sided weakness, aphasia, s/p PEG tube, Hypertension, HLD, prostate and stomach cancer on oral chemo who presented to ED for syncopal episode and AMS which as per EMR appears to be related to seizures. He was found to have a smith rectal mass with biopsy showing gastrointestinal stromal tumor for which onc is recommending to restart Gleevec. Furthermore they are recommending to continue Lupron for prostate CA. Onc is still to talk to the patient's decision maker in order to define GOC. While in the hospital the patient has had agitation and pain that are the reason why this consult is called. Today, the patient is still c/o pain over his lower abdomen.  Today, the patient appeared in severe pain. It is difficult for him to describe the characteristics of his pain due to aphasia; however, he is able to indicate his pain is located over his lower abdomen. As per his PCA, the pain appears to be related to constipation once the pain appears to be increased when the patient has a BM which have been particularly hard.       PERTINENT PMH REVIEWED:  [x ] YES [ ] NO           SOCIAL HISTORY:   Significant other/partner:  Wife             Children:  x 1                  Latter day/Spirituality: Pentecostal   Substance hx:  [ ] YES   [x] NO                   Tobacco hx:  [ ] YES  [ ] NO                       Alcohol hx: [ ] YES  [ ] NO         Home Opioid hx:  [ ] YES  [x ] NO   Living Situation: [x ] Home  [ ] Long term care  [ ] Rehab [ ] Other  As per : "Patient currently lives  w/ his spouse and son in an apartment  on the 2nd floor w/ 25 steps to enter. Prior to admission patient ambulated w/  a cane but he has a walker also for ambulation and receives assistance for adls  from [facility] [phone] [facility] 9 hours x 7  days of aide service."  FAMILY HISTORY:  No pertinent family history in first degree relatives    [ ] Family history non-contributory     BASELINE (I)ADLs (prior to admission):  [place]: [ ] total  [x] moderate [ ] dependent    ADVANCE DIRECTIVES:    DNR [ ] YES [x ] NO                            [ ] Completed  MOLST  [ ] YES [x ] NO                      [ ] Completed  Health Care Proxy [x ] YES  [ ] NO   [ ] Completed [phone] Melisa Bhakta  Living Will  [ ] YES [x ] NO             [x] Surrogate  [ ] HCP  [ ] Guardian:    Wife (Savanah Craig); however, she is giving her niece, Melisa Bhakta, power to discuss with providers about the patient's medical issues as well as for making healthcare related decisions for the patient. Melisa also indicated she is the patient's HCP and she is supposed to bring the form tho the hospital                                           Phone#: [phone]  Emergency contact: [phone] Melisa Bhakta  Allergies    No Known Allergies    Intolerances    Home meds for symptomatic Rx.   · 	acetaminophen-oxycodone 325 mg-5 mg oral tablet: 2 tab(s) orally every 6 hours, As needed, Moderate Pain (4 - 6), Last Dose Taken:    · 	gabapentin 300 mg oral capsule: 1 cap(s) orally 3 times a day, Last Dose Taken:     · 	QUEtiapine 25 mg oral tablet: 1 tab(s) orally once a day, Last Dose Taken:   · 	polyethylene glycol 3350 oral powder for reconstitution: 17 gram(s) orally once a day, Last Dose Taken:    · 	senna oral tablet: 2 tab(s) orally once a day (at bedtime), Last Dose Taken:          MEDICATIONS  (STANDING):  amLODIPine   Tablet 5 milliGRAM(s) Oral daily  aspirin enteric coated 81 milliGRAM(s) Oral daily  atorvastatin 80 milliGRAM(s) Oral at bedtime  clopidogrel Tablet 75 milliGRAM(s) Oral daily  gabapentin   Solution 300 milliGRAM(s) Oral three times a day  heparin  Injectable 5000 Unit(s) SubCutaneous every 8 hours  HYDROmorphone  Injectable 0.5 milliGRAM(s) IV Push <User Schedule>  levETIRAcetam  Solution 500 milliGRAM(s) Oral two times a day  methadone   Solution 2.5 milliGRAM(s) Oral <User Schedule>  metoprolol     tartrate 12.5 milliGRAM(s) Oral two times a day  pantoprazole   Suspension 40 milliGRAM(s) Oral daily  polyethylene glycol 3350 17 Gram(s) Oral daily  QUEtiapine 25 milliGRAM(s) Oral daily  senna Syrup 15 milliLiter(s) Oral at bedtime    MEDICATIONS  (PRN):  HYDROmorphone  Injectable 0.5 milliGRAM(s) IV Push every 2 hours PRN Breakthrough pain  lactulose Syrup 15 Gram(s) Oral daily PRN Constipation  naloxone Injectable 0.1 milliGRAM(s) IV Push every 3 minutes PRN sedation or respiratory depression due to opoiods  ondansetron Injectable 8 milliGRAM(s) IV Push every 8 hours PRN Nausea and/or Vomiting          PRESENT SYMPTOMS:  Source: [x] Patient   [x ] Family   [x ] Team (nurse and NP taking care of the patient)    Pain:                        [ ] No [ x] Yes             [ ] Mild [x ] Moderate [ ] Severe  PAIN AD 7    Patient with aphasia and not able to indicated the characteristics of his pain. However, he was able to indicate his pain was over his genitalia and lower abdomen       Dyspnea:                [x ] No [ ] Yes             [ ] Mild [ ] Moderate [ ] Severe    Anxiety:                  [x ] No [ ] Yes             [ ] Mild [ ] Moderate [ ] Severe    Fatigue:                  [ ] No [ ] Yes             [ ] Mild [ ] Moderate [ ] Severe    Nausea:                  [ ] No [ ] Yes             [ ] Mild [ ] Moderate [ ] Severe    Loss of appetite:   [ ] No [ ] Yes             [ ] Mild [ ] Moderate [ ] Severe    Constipation:        [ ] No [ ] Yes             [ ] Mild [ ] Moderate [ ] Severe    Other Symptoms:  [ ] All other review of systems negative   [x ] Unable to obtain due to aphasia     Karnofsky Performance Score/Palliative Performance Status Version 2:  40       %    PHYSICAL EXAM:  Vital Signs Last 24 Hrs  T(C): 36.5 (17 Dec 2017 04:20), Max: 37.1 (16 Dec 2017 14:56)  T(F): 97.7 (17 Dec 2017 04:20), Max: 98.8 (16 Dec 2017 14:56)  HR: 79 (17 Dec 2017 04:20) (69 - 79)  BP: 130/68 (17 Dec 2017 04:20) (126/78 - 150/78)  BP(mean): --  RR: 18 (17 Dec 2017 04:20) (18 - 18)  SpO2: 96% (17 Dec 2017 04:20) (94% - 96%)    General:  [  x] Ill appearing elderly male. Cachectic. Acute distress due to severe pain      HEENT:  [ x] Normal   [ ] Dry mouth   [ ] ET Tube    [ ] Trach  [ ] Oral lesions    Lungs:   [x ] Clear [ ] Tachypnea  [ ] Audible excessive secretions   [ ] Rhonchi        [ ] Right [ ] Left [ ] Bilateral  [ ] Crackles        [ ] Right [ ] Left [ ] Bilateral  [ ] Wheezing     [ ] Right [ ] Left [ ] Bilateral    Cardiovascular:  S1, S2. No S3. No murmurs     Abdomen: [x ] Soft  [ ] Distended   [x ] +BS x 4  [ ] Non tender [x ] Tender over pelvic and mid-abdominal region  [x ]PEG   [ ]OGT/ NGT   Last BM:  12/16   Genitourinary: [ ] Normal [ x] Incontinent   [ ] Oliguria/Anuria   [ ] Myers    Musculoskeletal:  [ ] Normal   [x ] Weakness  [ ] Bedbound/Wheelchair bound [ ] Edema [x] evident decreased muscle mass     Neurological: [ ] No focal deficits  [x ] Cognitive impairment  [ ] Dysphagia [ ] Dysarthria [ ] Paresis [x ] Other; Aphasia     Skin: [x ] Normal   [ ] Pressure ulcer(s)                  [ ] Rash    LABS:             Reviewed                     Shock: [ ] Septic [ ] Cardiogenic [ ] Neurologic [ ] Hypovolemic  Vasopressors x   Inotrophs x     Protein Calorie Malnutrition: [ ] Mild [ ] Moderate [ ] Severe    Oral Intake: [ ] Unable/mouth care only [ ] Minimal [ ] Moderate [ ] Full Capability  Diet: [ ] NPO [ ] Tube feeds [ ] TPN [ ] Other     RADIOLOGY & ADDITIONAL STUDIES:  < from: CT Abdomen and Pelvis w/ IV Cont (11.26.17 @ 21:15) >  EXAM:  CT ABDOMEN AND PELVIS IC                            PROCEDURE DATE:  11/26/2017            INTERPRETATION:  CLINICAL INFORMATION: Abdominal pain. Gastric GIST   status post resection. Prostate cancer.IMPRESSION:     New large soft tissue mass in the right pelvis concerning for peritoneal   disease.    No acute pathology.    A 4.4 cm suprarenal abdominal aortic aneurysm without change.    < end of copied text >  < from: CT Brain Stroke Protocol (11.23.17 @ 15:12) >  IMPRESSION:    Head CT: No intracranial hemorrhage or displaced calvarial fracture.   Chronic infarcts and microvascular ischemic changes as described. If   symptoms persist, an MRI of the brain can be obtained for further   evaluation.    Cervical spine CT: No acute displaced fracture or traumatic malalignment.   Degenerative changes as described.      < end of copied text >    REFERRALS:   [ ] Chaplaincy  [ ] Hospice  [ ] Child Life  [ ] Social Work  [ ] Case management [ ] Holistic Therapy     Tyler Veras MD [phone]  Assessment and Plan:

## 2017-12-17 NOTE — PROGRESS NOTE ADULT - ASSESSMENT
Path showing GIST tumor.  Problem in the past and ongoing is difficulty with compliance and difficulty clarifying any symptoms he is having whether from his disease, side effects of medication, or unrelated to either. There are several highly effective treatments available for GIST tumors, all oral agents and all with  significant potential side effects. He also has prostate cancer, not clearly metastatic but  could be causing urinary symptoms.  Discussed with urology, would start Lupron.   After reviewing  literature, as he had been off Gleevec for several months, the relapse in the pelvis could be because of this. Under these circumstances it would be recommended to restart Gleevec.  Optimal tx at these point would be to restart Gleevec and give Lupron for prostate cancer.    Palliative care input appreciated. Appears in distress and uncomfortable at the time of  exam. palliative suspects constipation  contributing.  Bowel regimen started.    Discussed at length with niece. She states that he had an injection for his prostate cancer  2-3 months ago. She is not sure who or where this was. She wishes to discuss the situation with her family over the weekend and give their decision to Dr. Medina on Monday. They have not been able to care for him at home because of his frequent episodes agitation and pain..

## 2017-12-17 NOTE — PROGRESS NOTE ADULT - SUBJECTIVE AND OBJECTIVE BOX
Patient is a 72y old  Male who presents with a chief complaint of LOC (23 Nov 2017 18:06)      SUBJECTIVE / OVERNIGHT EVENTS: c/o abdominal pain.  Review of Systems  chest pain no  palpitations no  sob no  nausea no  headache no    MEDICATIONS  (STANDING):  amLODIPine   Tablet 5 milliGRAM(s) Oral daily  aspirin enteric coated 81 milliGRAM(s) Oral daily  atorvastatin 80 milliGRAM(s) Oral at bedtime  clopidogrel Tablet 75 milliGRAM(s) Oral daily  gabapentin   Solution 300 milliGRAM(s) Oral three times a day  heparin  Injectable 5000 Unit(s) SubCutaneous every 8 hours  HYDROmorphone  Injectable 0.5 milliGRAM(s) IV Push every 4 hours  levETIRAcetam  Solution 500 milliGRAM(s) Oral two times a day  metoprolol     tartrate 12.5 milliGRAM(s) Oral two times a day  pantoprazole   Suspension 40 milliGRAM(s) Oral daily  polyethylene glycol 3350 17 Gram(s) Oral daily  QUEtiapine 25 milliGRAM(s) Oral daily  senna Syrup 15 milliLiter(s) Oral at bedtime    MEDICATIONS  (PRN):  HYDROmorphone  Injectable 1 milliGRAM(s) IV Push every 2 hours PRN Breakthrough pain  lactulose Syrup 15 Gram(s) Oral daily PRN Constipation  naloxone Injectable 0.1 milliGRAM(s) IV Push every 3 minutes PRN sedation or respiratory depression due to opoiods  ondansetron Injectable 8 milliGRAM(s) IV Push every 8 hours PRN Nausea and/or Vomiting          PHYSICAL EXAM:  GENERAL: NAD, well-developed  HEAD:  Atraumatic, Normocephalic  EYES: EOMI, PERRLA, conjunctiva and sclera clear  NECK: Supple, No JVD  CHEST/LUNG: Clear to auscultation bilaterally; No wheeze  HEART: Regular rate and rhythm; No murmurs, rubs, or gallops  ABDOMEN: Soft, Nontender, Nondistended; Bowel sounds present  EXTREMITIES:  2+ Peripheral Pulses, No clubbing, cyanosis, or edema  PSYCH: AAOx3  NEUROLOGY: non-focal  SKIN: No rashes or lesions    LABS:                        11.1   5.51  )-----------( 166      ( 17 Dec 2017 11:47 )             33.6     12-17    142  |  101  |  24<H>  ----------------------------<  112<H>  4.6   |  27  |  1.04    Ca    9.7      17 Dec 2017 11:47                RADIOLOGY & ADDITIONAL TESTS:    Imaging Personally Reviewed:    Consultant(s) Notes Reviewed:      Care Discussed with Consultants/Other Providers:

## 2017-12-18 LAB
ANION GAP SERPL CALC-SCNC: 11 MMOL/L — SIGNIFICANT CHANGE UP (ref 5–17)
BUN SERPL-MCNC: 25 MG/DL — HIGH (ref 7–23)
CALCIUM SERPL-MCNC: 9.6 MG/DL — SIGNIFICANT CHANGE UP (ref 8.4–10.5)
CHLORIDE SERPL-SCNC: 102 MMOL/L — SIGNIFICANT CHANGE UP (ref 96–108)
CO2 SERPL-SCNC: 29 MMOL/L — SIGNIFICANT CHANGE UP (ref 22–31)
CREAT SERPL-MCNC: 1.02 MG/DL — SIGNIFICANT CHANGE UP (ref 0.5–1.3)
GLUCOSE SERPL-MCNC: 94 MG/DL — SIGNIFICANT CHANGE UP (ref 70–99)
HCT VFR BLD CALC: 31.5 % — LOW (ref 39–50)
HGB BLD-MCNC: 10.6 G/DL — LOW (ref 13–17)
MCHC RBC-ENTMCNC: 33.7 GM/DL — SIGNIFICANT CHANGE UP (ref 32–36)
MCHC RBC-ENTMCNC: 34.8 PG — HIGH (ref 27–34)
MCV RBC AUTO: 103.3 FL — HIGH (ref 80–100)
PLATELET # BLD AUTO: 159 K/UL — SIGNIFICANT CHANGE UP (ref 150–400)
POTASSIUM SERPL-MCNC: 5.3 MMOL/L — SIGNIFICANT CHANGE UP (ref 3.5–5.3)
POTASSIUM SERPL-SCNC: 5.3 MMOL/L — SIGNIFICANT CHANGE UP (ref 3.5–5.3)
RBC # BLD: 3.05 M/UL — LOW (ref 4.2–5.8)
RBC # FLD: 12.9 % — SIGNIFICANT CHANGE UP (ref 10.3–14.5)
SODIUM SERPL-SCNC: 142 MMOL/L — SIGNIFICANT CHANGE UP (ref 135–145)
WBC # BLD: 5.67 K/UL — SIGNIFICANT CHANGE UP (ref 3.8–10.5)
WBC # FLD AUTO: 5.67 K/UL — SIGNIFICANT CHANGE UP (ref 3.8–10.5)

## 2017-12-18 PROCEDURE — 99233 SBSQ HOSP IP/OBS HIGH 50: CPT | Mod: GC

## 2017-12-18 RX ORDER — HYDROMORPHONE HYDROCHLORIDE 2 MG/ML
1 INJECTION INTRAMUSCULAR; INTRAVENOUS; SUBCUTANEOUS
Qty: 0 | Refills: 0 | Status: DISCONTINUED | OUTPATIENT
Start: 2017-12-18 | End: 2017-12-19

## 2017-12-18 RX ADMIN — QUETIAPINE FUMARATE 25 MILLIGRAM(S): 200 TABLET, FILM COATED ORAL at 22:27

## 2017-12-18 RX ADMIN — Medication 12.5 MILLIGRAM(S): at 17:59

## 2017-12-18 RX ADMIN — AMLODIPINE BESYLATE 5 MILLIGRAM(S): 2.5 TABLET ORAL at 05:33

## 2017-12-18 RX ADMIN — Medication 0.5 MILLIGRAM(S): at 16:09

## 2017-12-18 RX ADMIN — HEPARIN SODIUM 5000 UNIT(S): 5000 INJECTION INTRAVENOUS; SUBCUTANEOUS at 13:44

## 2017-12-18 RX ADMIN — ATORVASTATIN CALCIUM 80 MILLIGRAM(S): 80 TABLET, FILM COATED ORAL at 22:28

## 2017-12-18 RX ADMIN — LEVETIRACETAM 500 MILLIGRAM(S): 250 TABLET, FILM COATED ORAL at 05:33

## 2017-12-18 RX ADMIN — HYDROMORPHONE HYDROCHLORIDE 0.5 MILLIGRAM(S): 2 INJECTION INTRAMUSCULAR; INTRAVENOUS; SUBCUTANEOUS at 09:03

## 2017-12-18 RX ADMIN — Medication 0.5 MILLIGRAM(S): at 05:08

## 2017-12-18 RX ADMIN — Medication 0.5 MILLIGRAM(S): at 21:31

## 2017-12-18 RX ADMIN — POLYETHYLENE GLYCOL 3350 17 GRAM(S): 17 POWDER, FOR SOLUTION ORAL at 11:53

## 2017-12-18 RX ADMIN — Medication 0.5 MILLIGRAM(S): at 08:27

## 2017-12-18 RX ADMIN — LEVETIRACETAM 500 MILLIGRAM(S): 250 TABLET, FILM COATED ORAL at 17:59

## 2017-12-18 RX ADMIN — Medication 81 MILLIGRAM(S): at 11:53

## 2017-12-18 RX ADMIN — GABAPENTIN 300 MILLIGRAM(S): 400 CAPSULE ORAL at 05:33

## 2017-12-18 RX ADMIN — HYDROMORPHONE HYDROCHLORIDE 0.5 MILLIGRAM(S): 2 INJECTION INTRAMUSCULAR; INTRAVENOUS; SUBCUTANEOUS at 21:30

## 2017-12-18 RX ADMIN — PANTOPRAZOLE SODIUM 40 MILLIGRAM(S): 20 TABLET, DELAYED RELEASE ORAL at 11:53

## 2017-12-18 RX ADMIN — HYDROMORPHONE HYDROCHLORIDE 0.5 MILLIGRAM(S): 2 INJECTION INTRAMUSCULAR; INTRAVENOUS; SUBCUTANEOUS at 13:42

## 2017-12-18 RX ADMIN — CLOPIDOGREL BISULFATE 75 MILLIGRAM(S): 75 TABLET, FILM COATED ORAL at 11:53

## 2017-12-18 RX ADMIN — HYDROMORPHONE HYDROCHLORIDE 0.5 MILLIGRAM(S): 2 INJECTION INTRAMUSCULAR; INTRAVENOUS; SUBCUTANEOUS at 02:18

## 2017-12-18 RX ADMIN — HYDROMORPHONE HYDROCHLORIDE 1 MILLIGRAM(S): 2 INJECTION INTRAMUSCULAR; INTRAVENOUS; SUBCUTANEOUS at 16:09

## 2017-12-18 RX ADMIN — HYDROMORPHONE HYDROCHLORIDE 1 MILLIGRAM(S): 2 INJECTION INTRAMUSCULAR; INTRAVENOUS; SUBCUTANEOUS at 09:09

## 2017-12-18 RX ADMIN — HYDROMORPHONE HYDROCHLORIDE 0.5 MILLIGRAM(S): 2 INJECTION INTRAMUSCULAR; INTRAVENOUS; SUBCUTANEOUS at 05:08

## 2017-12-18 RX ADMIN — HYDROMORPHONE HYDROCHLORIDE 1 MILLIGRAM(S): 2 INJECTION INTRAMUSCULAR; INTRAVENOUS; SUBCUTANEOUS at 08:27

## 2017-12-18 RX ADMIN — Medication 0.5 MILLIGRAM(S): at 13:42

## 2017-12-18 RX ADMIN — GABAPENTIN 300 MILLIGRAM(S): 400 CAPSULE ORAL at 22:27

## 2017-12-18 RX ADMIN — Medication 12.5 MILLIGRAM(S): at 05:33

## 2017-12-18 RX ADMIN — GABAPENTIN 300 MILLIGRAM(S): 400 CAPSULE ORAL at 15:44

## 2017-12-18 RX ADMIN — HEPARIN SODIUM 5000 UNIT(S): 5000 INJECTION INTRAVENOUS; SUBCUTANEOUS at 05:33

## 2017-12-18 RX ADMIN — HEPARIN SODIUM 5000 UNIT(S): 5000 INJECTION INTRAVENOUS; SUBCUTANEOUS at 22:27

## 2017-12-18 NOTE — PROGRESS NOTE ADULT - ASSESSMENT
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  seizure- continue Keppra,  Gastric cancer metastatic- Oncology follow re chemoRx. Gleevac on hold..  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 Restarted ASA/Plavix  Prostate cancer- Rx as per Oncology Urology evaluation noted To start Lupron.  s/p PEG replacement. Continue Peg feeds.  Pain control/ Pain management evaluation noted.  HTN control  CAD stable.  Depression- continue Rx  Functional quadriplegia -supportive care  PT  Await family decision re further RX vs  hospice care.  PCU care.  Jose Medina MD pager [phone]

## 2017-12-18 NOTE — PROGRESS NOTE ADULT - SUBJECTIVE AND OBJECTIVE BOX
Patient is a 72y old  Male who presents with a chief complaint of LOC (23 Nov 2017 18:06)      SUBJECTIVE / OVERNIGHT EVENTS: No new complaints.   Review of Systems  unobtainable     MEDICATIONS  (STANDING):  amLODIPine   Tablet 5 milliGRAM(s) Oral daily  aspirin enteric coated 81 milliGRAM(s) Oral daily  atorvastatin 80 milliGRAM(s) Oral at bedtime  clopidogrel Tablet 75 milliGRAM(s) Oral daily  gabapentin   Solution 300 milliGRAM(s) Oral three times a day  heparin  Injectable 5000 Unit(s) SubCutaneous every 8 hours  HYDROmorphone  Injectable 0.5 milliGRAM(s) IV Push every 4 hours  levETIRAcetam  Solution 500 milliGRAM(s) Oral two times a day  metoprolol     tartrate 12.5 milliGRAM(s) Oral two times a day  pantoprazole   Suspension 40 milliGRAM(s) Oral daily  polyethylene glycol 3350 17 Gram(s) Oral daily  QUEtiapine 25 milliGRAM(s) Oral daily  senna Syrup 15 milliLiter(s) Oral at bedtime    MEDICATIONS  (PRN):  HYDROmorphone  Injectable 1 milliGRAM(s) IV Push every 1 hour PRN Pain  HYDROmorphone  Injectable 1 milliGRAM(s) IV Push every 1 hour PRN Dyspnea  lactulose Syrup 15 Gram(s) Oral daily PRN Constipation  LORazepam   Injectable 0.5 milliGRAM(s) IV Push every 1 hour PRN Agitation  naloxone Injectable 0.1 milliGRAM(s) IV Push every 3 minutes PRN sedation or respiratory depression due to opoiods  ondansetron Injectable 8 milliGRAM(s) IV Push every 8 hours PRN Nausea and/or Vomiting          PHYSICAL EXAM:  GENERAL: NAD, well-developed  HEAD:  Atraumatic, Normocephalic  EYES: EOMI, PERRLA, conjunctiva and sclera clear  NECK: Supple, No JVD  CHEST/LUNG: Clear to auscultation bilaterally; No wheeze  HEART: Regular rate and rhythm; No murmurs, rubs, or gallops  ABDOMEN: Soft, Nontender, Nondistended; Bowel sounds present  EXTREMITIES:  2+ Peripheral Pulses, No clubbing, cyanosis, or edema  PSYCH: AAOx3  NEUROLOGY: non-focal  SKIN: No rashes or lesions    LABS:                        10.6   5.67  )-----------( 159      ( 18 Dec 2017 07:12 )             31.5     12-18    142  |  102  |  25<H>  ----------------------------<  94  5.3   |  29  |  1.02    Ca    9.6      18 Dec 2017 07:12                RADIOLOGY & ADDITIONAL TESTS:    Imaging Personally Reviewed:    Consultant(s) Notes Reviewed:      Care Discussed with Consultants/Other Providers:

## 2017-12-18 NOTE — CHART NOTE - NSCHARTNOTEFT_GEN_A_CORE
Per chart, Pt admitted for syncopal episode. Pt with history of Stroke with right sided weakness, aphasia, PEG a/w prostate and stomach cancer on oral chemo. Per chart, Pt code stroke, likely seizure like activity. Pt s/p PEG tube exchange on 11/26. Pt now with pelvic mass concerning for new gastrointestinal stromal tumor vs metastatic prostate cancer.  Pt S/p rectal EUS Bx-pathology consistent with gastrointestinal stromal tumor for which oral chemotherapy is recommended. Pt now transferred to PCU for symptom management, ? plan for family meeting this week to reach decision regarding GOC treatment vs hospice services.     Source: Patient [ ]    Family [ ]     other [x ] Pt non-verbal, spoke with RN     Diet : jevity 1.2 Preston @ 65 mL/h x 24 h ( 1530 mL total volume, 1872kcals and 85gm protein; 34.6kcalskg and 1.6gm pro/kg based on dosing Wt: 54kg.)      Patient reports [ ] nausea  [ ] vomiting [ ] diarrhea [ ] constipation  [ ]chewing problems [ ] swallowing issues  [ ] other: Per RN, pt with no signs of nausea, last BM noted today.        Enteral /Parenteral Nutrition: Per RN, pt tolerating tube feeding well at current rate      Bed weight trends: 119 lbs (11/23), 117.2 lbs (12/16), weight slightly decreased addressed by increase in tube feeding rate  % Weight Change    Pertinent Medications: MEDICATIONS  (STANDING):  amLODIPine   Tablet 5 milliGRAM(s) Oral daily  aspirin enteric coated 81 milliGRAM(s) Oral daily  atorvastatin 80 milliGRAM(s) Oral at bedtime  clopidogrel Tablet 75 milliGRAM(s) Oral daily  gabapentin   Solution 300 milliGRAM(s) Oral three times a day  heparin  Injectable 5000 Unit(s) SubCutaneous every 8 hours  HYDROmorphone  Injectable 0.5 milliGRAM(s) IV Push every 4 hours  levETIRAcetam  Solution 500 milliGRAM(s) Oral two times a day  metoprolol     tartrate 12.5 milliGRAM(s) Oral two times a day  pantoprazole   Suspension 40 milliGRAM(s) Oral daily  polyethylene glycol 3350 17 Gram(s) Oral daily  QUEtiapine 25 milliGRAM(s) Oral daily  senna Syrup 15 milliLiter(s) Oral at bedtime    MEDICATIONS  (PRN):  HYDROmorphone  Injectable 1 milliGRAM(s) IV Push every 1 hour PRN Pain  HYDROmorphone  Injectable 1 milliGRAM(s) IV Push every 1 hour PRN Dyspnea  lactulose Syrup 15 Gram(s) Oral daily PRN Constipation  LORazepam   Injectable 0.5 milliGRAM(s) IV Push every 1 hour PRN Agitation  naloxone Injectable 0.1 milliGRAM(s) IV Push every 3 minutes PRN sedation or respiratory depression due to opoiods  ondansetron Injectable 8 milliGRAM(s) IV Push every 8 hours PRN Nausea and/or Vomiting    Pertinent Labs:  12-18 Na142 mmol/L Glu 94 mg/dL K+ 5.3 mmol/L Cr  1.02 mg/dL BUN 25 mg/dL<H> Phos n/a   Alb n/a   PAB n/a         Skin: No edema, skin noted with stage 2 sacral pressure injury     Estimated Needs:   [x ] no change since previous assessment  [ ] recalculated:       Previous Nutrition Diagnosis:      [ x] Malnutrition (severe)         Nutrition Diagnosis is [x] ongoing addressed with tube feeding         New Nutrition Diagnosis: [x ] not applicable       Interventions:     Recommend    [ ] Change Diet To:    [ ] Nutrition Supplement    [ ] Nutrition Support    [ ] Other:     1. Continue jevity 1.2 Preston @ 65 mL/h x 24 h as ordered to provide 1530 mL total volume, 1872kcals and 85gm protein; 34.6kcalskg and 1.6gm pro/kg based on dosing Wt: 54kg.  2. If not otherwise contraindicated, may consider adding vitamin C and Rich 2 packets daily to further aide in wound healing       Monitoring and Evaluation:     [ x] PO intake [x ] Tolerance to diet prescription [ x] weights [ x] follow up per protocol    [ ] other:

## 2017-12-18 NOTE — PROGRESS NOTE ADULT - ASSESSMENT
This is a 72 year old male w/ h/o stroke with residual right sided weakness, aphasia, s/p PEG tube, Hypertension, HLD, prostate and stomach cancer on oral chemo who presented to ED for syncopal episode and AMS which as per EMR appears to be related to seizures. He was found to have a smith rectal mass with biopsy showing gastrointestinal stromal tumor for which onc is recommending to restart Gleevec. Furthermore they are recommending to continue Lupron for prostate CA. Onc is still to talk to the patient's decision maker in order to define GOC. While in the hospital the patient has had agitation and pain being managed in PCU.

## 2017-12-19 VITALS
OXYGEN SATURATION: 96 % | DIASTOLIC BLOOD PRESSURE: 97 MMHG | SYSTOLIC BLOOD PRESSURE: 156 MMHG | TEMPERATURE: 98 F | RESPIRATION RATE: 18 BRPM | HEART RATE: 99 BPM

## 2017-12-19 PROCEDURE — 86850 RBC ANTIBODY SCREEN: CPT

## 2017-12-19 PROCEDURE — 82962 GLUCOSE BLOOD TEST: CPT

## 2017-12-19 PROCEDURE — 99233 SBSQ HOSP IP/OBS HIGH 50: CPT | Mod: GC

## 2017-12-19 PROCEDURE — 84480 ASSAY TRIIODOTHYRONINE (T3): CPT

## 2017-12-19 PROCEDURE — 84484 ASSAY OF TROPONIN QUANT: CPT

## 2017-12-19 PROCEDURE — 72125 CT NECK SPINE W/O DYE: CPT

## 2017-12-19 PROCEDURE — 80061 LIPID PANEL: CPT

## 2017-12-19 PROCEDURE — 87186 SC STD MICRODIL/AGAR DIL: CPT

## 2017-12-19 PROCEDURE — 85730 THROMBOPLASTIN TIME PARTIAL: CPT

## 2017-12-19 PROCEDURE — 83735 ASSAY OF MAGNESIUM: CPT

## 2017-12-19 PROCEDURE — 88341 IMHCHEM/IMCYTCHM EA ADD ANTB: CPT

## 2017-12-19 PROCEDURE — G0103: CPT

## 2017-12-19 PROCEDURE — 74177 CT ABD & PELVIS W/CONTRAST: CPT

## 2017-12-19 PROCEDURE — 80048 BASIC METABOLIC PNL TOTAL CA: CPT

## 2017-12-19 PROCEDURE — 81001 URINALYSIS AUTO W/SCOPE: CPT

## 2017-12-19 PROCEDURE — 96375 TX/PRO/DX INJ NEW DRUG ADDON: CPT

## 2017-12-19 PROCEDURE — 84403 ASSAY OF TOTAL TESTOSTERONE: CPT

## 2017-12-19 PROCEDURE — 82553 CREATINE MB FRACTION: CPT

## 2017-12-19 PROCEDURE — 93005 ELECTROCARDIOGRAM TRACING: CPT | Mod: 76

## 2017-12-19 PROCEDURE — 99285 EMERGENCY DEPT VISIT HI MDM: CPT | Mod: 25

## 2017-12-19 PROCEDURE — 93306 TTE W/DOPPLER COMPLETE: CPT

## 2017-12-19 PROCEDURE — 80053 COMPREHEN METABOLIC PANEL: CPT

## 2017-12-19 PROCEDURE — 88360 TUMOR IMMUNOHISTOCHEM/MANUAL: CPT

## 2017-12-19 PROCEDURE — 87086 URINE CULTURE/COLONY COUNT: CPT

## 2017-12-19 PROCEDURE — 97161 PT EVAL LOW COMPLEX 20 MIN: CPT

## 2017-12-19 PROCEDURE — 88173 CYTOPATH EVAL FNA REPORT: CPT

## 2017-12-19 PROCEDURE — 70551 MRI BRAIN STEM W/O DYE: CPT

## 2017-12-19 PROCEDURE — 84100 ASSAY OF PHOSPHORUS: CPT

## 2017-12-19 PROCEDURE — 95819 EEG AWAKE AND ASLEEP: CPT

## 2017-12-19 PROCEDURE — 85027 COMPLETE CBC AUTOMATED: CPT

## 2017-12-19 PROCEDURE — 96374 THER/PROPH/DIAG INJ IV PUSH: CPT

## 2017-12-19 PROCEDURE — 82550 ASSAY OF CK (CPK): CPT

## 2017-12-19 PROCEDURE — 86900 BLOOD TYPING SEROLOGIC ABO: CPT

## 2017-12-19 PROCEDURE — 86901 BLOOD TYPING SEROLOGIC RH(D): CPT

## 2017-12-19 PROCEDURE — 84443 ASSAY THYROID STIM HORMONE: CPT

## 2017-12-19 PROCEDURE — 85610 PROTHROMBIN TIME: CPT

## 2017-12-19 PROCEDURE — 88342 IMHCHEM/IMCYTCHM 1ST ANTB: CPT

## 2017-12-19 PROCEDURE — 70450 CT HEAD/BRAIN W/O DYE: CPT

## 2017-12-19 PROCEDURE — 83615 LACTATE (LD) (LDH) ENZYME: CPT

## 2017-12-19 PROCEDURE — 88305 TISSUE EXAM BY PATHOLOGIST: CPT

## 2017-12-19 PROCEDURE — 97116 GAIT TRAINING THERAPY: CPT

## 2017-12-19 PROCEDURE — 71045 X-RAY EXAM CHEST 1 VIEW: CPT

## 2017-12-19 PROCEDURE — 74018 RADEX ABDOMEN 1 VIEW: CPT

## 2017-12-19 PROCEDURE — 84436 ASSAY OF TOTAL THYROXINE: CPT

## 2017-12-19 PROCEDURE — 97530 THERAPEUTIC ACTIVITIES: CPT

## 2017-12-19 RX ORDER — HYDROMORPHONE HYDROCHLORIDE 2 MG/ML
2 INJECTION INTRAMUSCULAR; INTRAVENOUS; SUBCUTANEOUS
Qty: 0 | Refills: 0 | Status: DISCONTINUED | OUTPATIENT
Start: 2017-12-19 | End: 2017-12-19

## 2017-12-19 RX ORDER — HYDROMORPHONE HYDROCHLORIDE 2 MG/ML
0.5 INJECTION INTRAMUSCULAR; INTRAVENOUS; SUBCUTANEOUS
Qty: 100 | Refills: 0 | Status: DISCONTINUED | OUTPATIENT
Start: 2017-12-19 | End: 2017-12-19

## 2017-12-19 RX ORDER — HYDROMORPHONE HYDROCHLORIDE 2 MG/ML
1 INJECTION INTRAMUSCULAR; INTRAVENOUS; SUBCUTANEOUS ONCE
Qty: 0 | Refills: 0 | Status: DISCONTINUED | OUTPATIENT
Start: 2017-12-19 | End: 2017-12-19

## 2017-12-19 RX ORDER — HYDROMORPHONE HYDROCHLORIDE 2 MG/ML
1 INJECTION INTRAMUSCULAR; INTRAVENOUS; SUBCUTANEOUS
Qty: 100 | Refills: 0 | Status: DISCONTINUED | OUTPATIENT
Start: 2017-12-19 | End: 2017-12-19

## 2017-12-19 RX ORDER — SODIUM CHLORIDE 9 MG/ML
1000 INJECTION INTRAMUSCULAR; INTRAVENOUS; SUBCUTANEOUS
Qty: 0 | Refills: 0 | Status: DISCONTINUED | OUTPATIENT
Start: 2017-12-19 | End: 2017-12-19

## 2017-12-19 RX ADMIN — Medication 0.5 MILLIGRAM(S): at 13:57

## 2017-12-19 RX ADMIN — POLYETHYLENE GLYCOL 3350 17 GRAM(S): 17 POWDER, FOR SOLUTION ORAL at 13:00

## 2017-12-19 RX ADMIN — HYDROMORPHONE HYDROCHLORIDE 2 MILLIGRAM(S): 2 INJECTION INTRAMUSCULAR; INTRAVENOUS; SUBCUTANEOUS at 16:46

## 2017-12-19 RX ADMIN — GABAPENTIN 300 MILLIGRAM(S): 400 CAPSULE ORAL at 05:11

## 2017-12-19 RX ADMIN — GABAPENTIN 300 MILLIGRAM(S): 400 CAPSULE ORAL at 13:02

## 2017-12-19 RX ADMIN — LEVETIRACETAM 500 MILLIGRAM(S): 250 TABLET, FILM COATED ORAL at 05:11

## 2017-12-19 RX ADMIN — HYDROMORPHONE HYDROCHLORIDE 1 MILLIGRAM(S): 2 INJECTION INTRAMUSCULAR; INTRAVENOUS; SUBCUTANEOUS at 11:26

## 2017-12-19 RX ADMIN — HYDROMORPHONE HYDROCHLORIDE 1 MILLIGRAM(S): 2 INJECTION INTRAMUSCULAR; INTRAVENOUS; SUBCUTANEOUS at 14:15

## 2017-12-19 RX ADMIN — HYDROMORPHONE HYDROCHLORIDE 1 MILLIGRAM(S): 2 INJECTION INTRAMUSCULAR; INTRAVENOUS; SUBCUTANEOUS at 11:11

## 2017-12-19 RX ADMIN — HEPARIN SODIUM 5000 UNIT(S): 5000 INJECTION INTRAVENOUS; SUBCUTANEOUS at 05:11

## 2017-12-19 RX ADMIN — HYDROMORPHONE HYDROCHLORIDE 2 MILLIGRAM(S): 2 INJECTION INTRAMUSCULAR; INTRAVENOUS; SUBCUTANEOUS at 17:01

## 2017-12-19 RX ADMIN — HYDROMORPHONE HYDROCHLORIDE 0.5 MILLIGRAM(S): 2 INJECTION INTRAMUSCULAR; INTRAVENOUS; SUBCUTANEOUS at 05:12

## 2017-12-19 RX ADMIN — HYDROMORPHONE HYDROCHLORIDE 1 MILLIGRAM(S): 2 INJECTION INTRAMUSCULAR; INTRAVENOUS; SUBCUTANEOUS at 14:30

## 2017-12-19 RX ADMIN — Medication 0.5 MILLIGRAM(S): at 17:43

## 2017-12-19 RX ADMIN — HYDROMORPHONE HYDROCHLORIDE 0.5 MILLIGRAM(S): 2 INJECTION INTRAMUSCULAR; INTRAVENOUS; SUBCUTANEOUS at 10:37

## 2017-12-19 RX ADMIN — Medication 0.5 MILLIGRAM(S): at 10:59

## 2017-12-19 RX ADMIN — HYDROMORPHONE HYDROCHLORIDE 0.5 MG/HR: 2 INJECTION INTRAMUSCULAR; INTRAVENOUS; SUBCUTANEOUS at 11:11

## 2017-12-19 RX ADMIN — HYDROMORPHONE HYDROCHLORIDE 1 MG/HR: 2 INJECTION INTRAMUSCULAR; INTRAVENOUS; SUBCUTANEOUS at 16:46

## 2017-12-19 RX ADMIN — Medication 0.5 MILLIGRAM(S): at 08:26

## 2017-12-19 RX ADMIN — SODIUM CHLORIDE 10 MILLILITER(S): 9 INJECTION INTRAMUSCULAR; INTRAVENOUS; SUBCUTANEOUS at 17:43

## 2017-12-19 RX ADMIN — AMLODIPINE BESYLATE 5 MILLIGRAM(S): 2.5 TABLET ORAL at 05:11

## 2017-12-19 RX ADMIN — HYDROMORPHONE HYDROCHLORIDE 1 MILLIGRAM(S): 2 INJECTION INTRAMUSCULAR; INTRAVENOUS; SUBCUTANEOUS at 08:43

## 2017-12-19 RX ADMIN — LEVETIRACETAM 500 MILLIGRAM(S): 250 TABLET, FILM COATED ORAL at 17:43

## 2017-12-19 RX ADMIN — Medication 0.5 MILLIGRAM(S): at 03:10

## 2017-12-19 RX ADMIN — Medication 12.5 MILLIGRAM(S): at 05:11

## 2017-12-19 RX ADMIN — HYDROMORPHONE HYDROCHLORIDE 1 MILLIGRAM(S): 2 INJECTION INTRAMUSCULAR; INTRAVENOUS; SUBCUTANEOUS at 08:58

## 2017-12-19 NOTE — PROGRESS NOTE ADULT - PROBLEM SELECTOR PLAN 3
? seizure, syncopy, failure to thrive causing weakness  Neurology w/u in progress.
On Senna to 15 ml hs. Will also add Miralax qd and Lactulose PRN.
await final recommendation from urology
await final recommendation from urology  Check testosterone level
await final recommendation from urology  Testosterone level elevated. ie. has not started treatment for prostate cancer
s/p CVA with residual right sided upper and lower extremity weakness, aphasia.  - CT head negative for acute findings  - c/w Asa, plavix, lipitor
s/p CVA with residual right sided upper and lower extremity weakness, aphasia.  - CT head negative for acute findings  - c/w Asa, plavix, lipitor
? seizure, syncopy, failure to thrive causing weakness  Neurology w/u in progress.
Improving.
Now with hard stools.   Will increase Senna to 15 ml hs. Will also add Miralax qd and Lactulose PRN.   There is not colace solution available in the hospital.
await final recommendation from urology  Testosterone level elevated. ie. has not started treatment for prostate cancer  Would favor starting an LHRH agonist such as Lupron.
On Senna to 15 ml hs. Will also add Miralax qd and Lactulose PRN.

## 2017-12-19 NOTE — PROVIDER CONTACT NOTE (OTHER) - ACTION/TREATMENT ORDERED:
follow up x 1 hr
Lopressor 12.5 mg given via peg tube. Mg & Phos drawn, results WDL. Patient comfortable. Will continue to monitor.
NP aware. Hold feeds and continue to monitor.
NP notified, will add mag & phos to AM labs. Will follow up & continue to monitor pt.
See patient expiration note
fentanyl lozenge administered for pain

## 2017-12-19 NOTE — PROGRESS NOTE ADULT - ASSESSMENT
This is a 72 year old male w/ h/o stroke with residual right sided weakness, aphasia, s/p PEG tube, Hypertension, HLD, prostate and stomach cancer on oral chemo who presented to ED for syncopal episode and AMS which as per EMR appears to be related to seizures. He was found to have a smith rectal mass with biopsy showing gastrointestinal stromal tumor for which onc is recommending to restart Gleevec. Furthermore they are recommending to continue Lupron for prostate CA. Family's main goal is for comfort care, will not pursue chemotherapy. While in the hospital the patient has had agitation and pain being managed in PCU. Patient is DNR/DNI.

## 2017-12-19 NOTE — DISCHARGE NOTE FOR THE EXPIRED PATIENT - HOSPITAL COURSE
This is a 72 year old male w/ h/o stroke with residual right sided weakness, aphasia, s/p PEG tube (initially placed on 2016), Hypertension, HLD, prostate and stomach cancer on oral chemo who presented to ED for syncopal episode. As per wife, the patient was last seen normal at about 8AM. At that time he did not want to get out of bed this morning, so he was left there in bed. Several hours later his wife attempted to help get the patient out of bed so he could go top the bathroom, and when she did so, his legs buckled and he began to shake his right upper extremity. During episode, eyes rolled back. Was less responsive then his normal self for about 15-20 minutes, taking longer to respond to questions. Denied tongue biting. Unknown if urinary incontinence as patient wears diapers.  Code stroke called on arrival, NIHSS 10, MRS 4, tPA not administered as patient out of window. (2017 18:06).   Initially presented to ED for LOC likely 2/2 seizure. Patient was lost to follow-up with onc for management of his malignancies, they were subsequently consulted upon his admission and obtained a CTAP which revealed partial gastrectomy, previously noted soft tissue mass at the gastrectomy margin not well demonstrated on this study. No evidence of bowel obstruction. A new soft tissue mass in the right pelvis (2:100) measures 4.5 x 3.2 cm. This abuts the right aspect of the rectum and is concerning for peritoneal disease. Bx done consistent with GI stromal tumor. Heme/Onc following considering restarting Gleevec (off for the past 2 months prior to admission).     Family meeting today (see GOC document) the conclusion is that the objective of the patient's treatment will be for comfort care. What is most important is for patient not to be in pain or suffer. Wife, son and niece in agreement not to continue with chemotherapy. Patient is now DNR/DNI.  Pt encephalopathic, agitated, functional quadriplegia with pain. Pt  on Dec 19,2017.

## 2017-12-19 NOTE — PROGRESS NOTE ADULT - SUBJECTIVE AND OBJECTIVE BOX
Patient is a 72y old  Male who presents with a chief complaint of LOC (23 Nov 2017 18:06)      SUBJECTIVE / OVERNIGHT EVENTS: lethargic  Review of Systems  unobtainable     MEDICATIONS  (STANDING):  gabapentin   Solution 300 milliGRAM(s) Oral three times a day  HYDROmorphone  Injectable 1 milliGRAM(s) IV Push once  HYDROmorphone Infusion 1 mG/Hr (1 mL/Hr) IV Continuous <Continuous>  levETIRAcetam  Solution 500 milliGRAM(s) Oral two times a day  LORazepam   Injectable 0.5 milliGRAM(s) IV Push every 6 hours  polyethylene glycol 3350 17 Gram(s) Oral daily  QUEtiapine 25 milliGRAM(s) Oral daily  senna Syrup 15 milliLiter(s) Oral at bedtime  sodium chloride 0.9%. 1000 milliLiter(s) (10 mL/Hr) IV Continuous <Continuous>    MEDICATIONS  (PRN):  HYDROmorphone  Injectable 2 milliGRAM(s) IV Push every 1 hour PRN Pain  HYDROmorphone  Injectable 2 milliGRAM(s) IV Push every 1 hour PRN Dyspnea  lactulose Syrup 15 Gram(s) Oral daily PRN Constipation  LORazepam   Injectable 0.5 milliGRAM(s) IV Push every 1 hour PRN Agitation  ondansetron Injectable 8 milliGRAM(s) IV Push every 8 hours PRN Nausea and/or Vomiting          PHYSICAL EXAM:  GENERAL: NAD, cachectic   HEAD:  Atraumatic, Normocephalic  EYES: EOMI, PERRLA, conjunctiva and sclera clear  NECK: Supple, No JVD  CHEST/LUNG: Clear to auscultation bilaterally; No wheeze  HEART: Regular rate and rhythm; No murmurs, rubs, or gallops  ABDOMEN: Soft, Nontender, Nondistended; Bowel sounds present PEG  EXTREMITIES:  2+ Peripheral Pulses, No clubbing, cyanosis, or edema  NEUROLOGY: s/p old CVA  SKIN: No rashes or lesions    LABS:                        10.6   5.67  )-----------( 159      ( 18 Dec 2017 07:12 )             31.5     12-18    142  |  102  |  25<H>  ----------------------------<  94  5.3   |  29  |  1.02    Ca    9.6      18 Dec 2017 07:12                RADIOLOGY & ADDITIONAL TESTS:    Imaging Personally Reviewed:    Consultant(s) Notes Reviewed:      Care Discussed with Consultants/Other Providers:

## 2017-12-19 NOTE — PROGRESS NOTE ADULT - PROBLEM SELECTOR PLAN 1
- cont to hold gleevec
- cont to hold gleevec  - cont supportive care with PEG feedings
- cont to hold gleevec  - cont supportive care with PEG feedings.   Family deciding on  possible hospice care.
Methadone discontinued, pain not well controlled. Will start dilaudid drip at 0.5mg/hr with Dilaudid 1mg q 1 PRN rescues. Continue Gabapentin 300mg TID. Main goal is aggressive symptom management.
Not seen since 6/2017, admission then for failure to thrive, ? whether toxicity from Gleevec toxicity was contributing at the time. There was no clear ecomendatin to continue Gleevec. Need to clarify if he was still taking Gleevec.   Await reading of CT  abdomen done yesterday.
Not seen since 6/2017, admission then for failure to thrive, ? whether toxicity from Gleevec toxicity was contributing at the time. There was no clear recommmnedation to continue Gleevec. Discussed with family, stopped Gleevec 2 months ago at the time he had an injection for prostate cancer at the recommendation of the urologist. Need to clarify this.  Present scan does not show progression of disease at the primary site but  a new pelvic, perirectal mass.   GI consult  Discuss with GI and IR for optimal approach for biopsy.  GI considering EUS/transrectal biopsy
Pt presented with AMS and syncopal episode a/w right upper extremity twitching. Now back to baseline as per family. s/p code stroke. Seen by neuro. Symptoms likely 2/2 seizure. CT head no acute abnormalities. Afebrile, no signs/symptoms of infection or metabolic derangements.   - Neuro rec's appreciated. Started on keppra 500mg BID  - check vEEG
Pt presented with AMS and syncopal episode a/w right upper extremity twitching. Now back to baseline as per family. s/p code stroke. Seen by neuro. Symptoms likely 2/2 seizure. CT head no acute abnormalities. Afebrile, no signs/symptoms of infection or metabolic derangements.   - Neuro rec's appreciated. Started on keppra 500mg BID  - check vEEG
- cont to hold gleevec  - cont supportive care with PEG feedings.   Family deciding on  possible continuing treatment versus hospice care.Palliative followup, pain management.
- cont to hold gleevec  - cont supportive care with PEG feedings.   Family deciding on  possible hospice care.
Not seen since 6/2017, admission then for failure to thrive, ? whether toxicity from Gleevec toxicity was contributing at the time. There was no clear recommmnedation to continue Gleevec. Discussed with family, stopped Gleevec 2 months ago at the time he had an injection for prostate cancer at the recommendation of the urologist. Need to clarify this.  Present scan does not show progression of disease at the primary site but  a new pelvic, perirectal mass.   GI consult  Discuss with GI and IR for optimal approach for biopsy.
Not seen since 6/2017, admission then for failure to thrive, ? whether toxicity from Gleevec toxicity was contributing at the time. There was no clear recommmnedation to continue Gleevec. Discussed with family, stopped Gleevec 2 months ago at the time he had an injection for prostate cancer at the recommendation of the urologist. Need to clarify this.  Present scan does not show progression of disease at the primary site but  a new pelvic, perirectal mass.   GI consult  GI planing EUS/transrectal biopsy once off ASA/Plavix
Not seen since 6/2017, admission then for failure to thrive, ? whether toxicity from Gleevec toxicity was contributing at the time. There was no clear recommmnedation to continue Gleevec. Discussed with family, stopped Gleevec 2 months ago at the time he had an injection for prostate cancer at the recommendation of the urologist. Need to clarify this.  Present scan does not show progression of disease at the primary site but  a new pelvic, perirectal mass.   This will need to be biopsied if family wish to pursue further antineoplastic treatment.
Will continue Methadone to 2.5 mg q 8 ATC (Started on 12/15 and increased by 2.5 mg yesterday). If there is not sedation will likely increase tomorrow based on 24 h opioid use.   Will continue Dilaudid 0.5 mg q 2 PRN and will add Dilaudid 0.5 mg IV q 8 ATC (0200, 1000, 1800)  Monitoring for sedation/respiratory depression   Naloxone PRN   Gabapentin  I discussed with the wife about PCU services and she wanted me to discuss it with Melisa. I called Melisa but she did not answer. I left a message for her to call me back.
Will increase Methadone to 2.5 mg q 8 ATC   Will continue Dilaudid 0.5 mg q 2 PRN   Monitoring for sedation/respiratory depression   Naloxone PRN   Gabapentin
- cont to hold gleevec
Methadone discontinued on dilaudid 0.5mg Q4hrs atc and Dilaudid 1mg q 1 PRN.   Monitoring for sedation/respiratory depression as pt is full code. Naloxone PRN   Gabapentin 300mg TID.  I discussed with the wife about PCU services and she wanted me to discuss it with Melisa. I called Melisa but she did not answer. I left a message for her to call me back.

## 2017-12-19 NOTE — PROGRESS NOTE ADULT - SUBJECTIVE AND OBJECTIVE BOX
Geriatric and Palliative Care Unit Progress Note [x ] Hospice Progress Note [ ]     HPI:  This is a 72 year old male w/ h/o stroke with residual right sided weakness, aphasia, s/p PEG tube (initially placed on 4/2016), Hypertension, HLD, prostate and stomach cancer on oral chemo who presented to ED for syncopal episode. As per wife, the patient was last seen normal at about 8AM. At that time he did not want to get out of bed this morning, so he was left there in bed. Several hours later his wife attempted to help get the patient out of bed so he could go top the bathroom, and when she did so, his legs buckled and he began to shake his right upper extremity. During episode, eyes rolled back. Was less responsive then his normal self for about 15-20 minutes, taking longer to respond to questions. Denied tongue biting. Unknown if urinary incontinence as patient wears diapers.  Code stroke called on arrival, NIHSS 10, MRS 4, tPA not administered as patient out of window. (23 Nov 2017 18:06).   Initially presented to ED for LOC likely 2/2 seizure. Patient was lost to follow-up with onc for management of his malignancies, they were subsequently consulted upon his admission and obtained a CTAP which revealed partial gastrectomy, previously noted soft tissue mass at the gastrectomy margin not well demonstrated on this study. No evidence of bowel obstruction. A new soft tissue mass in the right pelvis (2:100) measures 4.5 x 3.2 cm. This abuts the right aspect of the rectum and is concerning for peritoneal disease. Bx done consistent with GI stromal tumor. Heme/Onc following considering restarting Gleevec (off for the past 2 months prior to admission).     Family meeting today (see GOC document) the conclusion is that the objective of the patient's treatment will be for comfort care. What is most important is for patient not to be in pain or suffer. Wife, son and niece in agreement not to continue with chemotherapy. Patient is now DNR/DNI.     Indication for Palliative Care Unit Services: Symptom management - agitation and pain.    ADVANCE DIRECTIVES:    DNR [x]  MOLST  [ ] YES [x ] NO                      [ ] Completed  Health Care Proxy [ ] YES  [ x] NO   [x ] Surrogate - (wife) Savanah Craig   Per prior documentation niece Melisa Bhakta is the HCP --> no documentation provided #[phone]  Per niece HCP was signed in April when Mr. Craig had his stoke. No documentation of it seen in EMR.  Living Will  [ ] YES [x ] NO             Allergies    No Known Allergies    Intolerances      MEDICATIONS  (STANDING):  gabapentin   Solution 300 milliGRAM(s) Oral three times a day  HYDROmorphone  Injectable 1 milliGRAM(s) IV Push once  HYDROmorphone Infusion 0.5 mG/Hr (0.5 mL/Hr) IV Continuous <Continuous>  levETIRAcetam  Solution 500 milliGRAM(s) Oral two times a day  polyethylene glycol 3350 17 Gram(s) Oral daily  QUEtiapine 25 milliGRAM(s) Oral daily  senna Syrup 15 milliLiter(s) Oral at bedtime    MEDICATIONS  (PRN):  HYDROmorphone  Injectable 1 milliGRAM(s) IV Push every 1 hour PRN Pain  HYDROmorphone  Injectable 1 milliGRAM(s) IV Push every 1 hour PRN Dyspnea  lactulose Syrup 15 Gram(s) Oral daily PRN Constipation  LORazepam   Injectable 0.5 milliGRAM(s) IV Push every 1 hour PRN Agitation  ondansetron Injectable 8 milliGRAM(s) IV Push every 8 hours PRN Nausea and/or Vomiting    PRESENT SYMPTOMS:  Source: [x ] Patient   [ ] Family   [x ] Team     Pain:                        [x ] No [ ] Yes             [ ] Mild [ ] Moderate [ ] Severe    Onset -  Location -  Duration -  Character -  Alleviating/Aggravating -  Radiation -  Timing -      Dyspnea:                [x ] No [ ] Yes             [ ] Mild [ ] Moderate [ ] Severe    Anxiety:                  [ ] No [x ] Yes             [ ] Mild [x ] Moderate [ ] Severe    Fatigue:                  [ x] No [ ] Yes             [ ] Mild [ ] Moderate [ ] Severe    Nausea:                  [x ] No [ ] Yes             [ ] Mild [ ] Moderate [ ] Severe    Loss of appetite:   x[ ] No [ ] Yes             [ ] Mild [ ] Moderate [ ] Severe    Constipation:        [ x] No [ ] Yes             [ ] Mild [ ] Moderate [ ] Severe    Other Symptoms:  [ ] All other review of systems negative   [x ] Unable to obtain due to poor mentation     Karnofsky Performance Score/Palliative Performance Status Version 2:   20 %    PHYSICAL EXAM:  Vital Signs Last 24 Hrs  T(C): 36.7 (19 Dec 2017 08:25), Max: 37.7 (18 Dec 2017 16:15)  T(F): 98 (19 Dec 2017 08:25), Max: 99.8 (18 Dec 2017 16:15)  HR: 99 (19 Dec 2017 08:25) (76 - 99)  BP: 156/97 (19 Dec 2017 08:25) (126/70 - 158/84)  BP(mean): --  RR: 18 (19 Dec 2017 08:25) (18 - 18)  SpO2: 96% (19 Dec 2017 08:25) (96% - 97%) I&O's Summary    18 Dec 2017 07:01  -  19 Dec 2017 07:00  --------------------------------------------------------  IN: 0 mL / OUT: 825 mL / NET: -825 mL    General:  [ ] Alert  [ ] Oriented x      [ x] Lethargic  [ x] Agitated   [ ] Cachexia   [ ] Unarousable  [ ] Verbal  [x ] Non-Verbal    HEENT:  [ ] Normal   [ x] Dry mouth   [ ] ET Tube    [ ] Trach  [ ] Oral lesions    Lungs:   [x ] Clear [ ] Tachypnea  [ ] Audible excessive secretions   [ ] Rhonchi        [ ] Right [ ] Left [ ] Bilateral  [ ] Crackles        [ ] Right [ ] Left [ ] Bilateral  [ ] Wheezing     [ ] Right [ ] Left [ ] Bilateral  [ ] Respiratory failure [ ] Acute [ ] Chronic [ ] Hypoxemic [ ] Hypercarbic [ ] Other    Cardiovascular:   [ x] Regular [ ] Irregular [ ] Tachycardia   [ ] Bradycardia  [ ] Murmur [ ] Other  [ ] Shock [ ] Septic [ ] Hypovolemic [ ] Neurogenic [ ] Cardiogenic   [ ] Vasopressors [ ] Inotrophs    Abdomen:   [x ] Soft  [ ] Distended   [x ] +BS  [ ] Non tender [ ] Tender  [ ]PEG   [ ]OGT/ NGT   Last BM:     [ ] Other    Genitourinary:  [x ] Normal [ ] Incontinent   [ ] Oliguria/Anuria   [ ] Myers  [ ] Other       Musculoskeletal:    [x ] Normal   [ ] Weakness  [ ] Edema  [ ] Bedbound/Wheelchair bound [ ]  Ambulatory [ ] with [ ] without assistance [ ] Functional quadriplegia    Neurological: [ ] No focal deficits  [x ] Cognitive impairment  [ ] Dysphagia [ ] Dysarthria [ ] Paresis [ ] Other [x]Aphasia  [ ] Brain compression  [ ] Cerebral edema [ ] Encephalopathy    Skin: [ x] Normal   [ ] Ulcer(s) type [ ] Diabetic [ ] Pressure [ ] Other   Stage        POA [ ]  Yes [ ]  No       [ ] Rash    LABS:                        10.6   5.67  )-----------( 159      ( 18 Dec 2017 07:12 )             31.5     12-18    142  |  102  |  25<H>  ----------------------------<  94  5.3   |  29  |  1.02    Ca    9.6      18 Dec 2017 07:12      Protein Calorie Malnutrition: [ ] Mild [x ] Moderate [ ] Severe    Oral Intake: [ x] Unable/mouth care only [ ] Minimal [ ] Moderate [ ] Full Capability  Diet: [ ] NPO [x ] Tube feeds [ ] TPN [ ] Other     RADIOLOGY & ADDITIONAL STUDIES: reviewed.    REFERRALS:   [ ] Chaplaincy  [ ] Hospice Care  [ ] Child Life  [ ] Social Work  [ ] Case management [ ] Nutrition [ ] Holistic Therapy [ ] Wound Care [ ]Physical Therapy [ ] Other [ ]See goals of care note in [place]

## 2017-12-19 NOTE — GOALS OF CARE CONVERSATION - PERSONAL ADVANCE DIRECTIVE - CONVERSATION DETAILS
Family meeting with patient's surrogate Savanah Craig (wife), son and niece about what is the goal of therapy for patient. Family appears to have good insight about the recurrence of the cancer, also aware that patient is weak and debilitated and believe that at this point the chemotherapy will be too harmful for him. They want to focus in his comfort, treat any distressing symptoms. They also would like to allow a natural passing, would not like for patient to have CPR/intubated and dependent on a ventilator. Valenica Penaha asked about hospice services, family was informed that hospice services are available. For now our main goal is to manage patient's agitation and pain. Will plan for future arrangements once patient's symptoms are well controlled controlled. PEG feeds were discussed that if patient does not tolerate them well we might need to discontinue, family in agreement. Asked for Micronesian speaking .

## 2017-12-19 NOTE — PROVIDER CONTACT NOTE (OTHER) - BACKGROUND
Patient has DNR order
Pt admitted for altered mental status
admit for ams, h/o 12 beats wc and 5 beats wc this admission.
pt admitted for AMS
pt. was admitted due to mental status change and for PEG replacement. pt. also has gastric cancer metastatic.
admit for ams, h/o prostrate hypertrophy

## 2017-12-19 NOTE — PROGRESS NOTE ADULT - PROVIDER SPECIALTY LIST ADULT
Cardiology
Gastroenterology
Gastroenterology
Heme/Onc
Internal Medicine
Neurology
Neurology
Palliative Care
Urology
Heme/Onc
Internal Medicine
Heme/Onc
Internal Medicine
Palliative Care
Internal Medicine

## 2017-12-19 NOTE — PROGRESS NOTE ADULT - PROBLEM SELECTOR PROBLEM 2
Malignant gastrointestinal stromal tumor (GIST) of stomach
Prostate cancer
Rectal mass
Seizure
Seizure
Malignant gastrointestinal stromal tumor (GIST) of stomach
Malignant gastrointestinal stromal tumor (GIST) of stomach
Prostate cancer
Rectal mass
Malignant gastrointestinal stromal tumor (GIST) of stomach
Rectal mass

## 2017-12-19 NOTE — PROVIDER CONTACT NOTE (OTHER) - RECOMMENDATIONS
rescan pt x 1 hr, if no void, may straight cath
Draw Mg/ phos labs, administer lopressor 12.5
Hold feeds?
Patient pronounced dead at 20:05 ace Bhakta notified
add mag & phos to  labs
medicate for pain, mag and phos in am

## 2017-12-19 NOTE — PROGRESS NOTE ADULT - PROBLEM SELECTOR PLAN 2
- await EUS for biopsy of rectal thickening -- coags and CBC acceptable  Planned for today.
- await EUS for biopsy
- await EUS for biopsy of rectal thickening
- await EUS for biopsy of rectal thickening --
- await EUS for biopsy of rectal thickening -- as per GI note -- to be done tomorrow morning
- await EUS for biopsy of rectal thickening -- coags and CBC acceptable
- await EUS results of mass to guide further therapy
- await biopsy results
- await biopsy results
- await final biopsy results  Discussed with pathology - awaiting immuno stains   Preliminary - " most likely GIST tumor, not prostate"
Clarify if he is being followed by urology and if he has received any treatment.  Urology  f/u  PSA f/u.
Recurrence after stopping Gleevec. New found rectal mass confirmed by biopsy, recurrent GIST. Family would not like to purse with chemotherapy, main goal if comfort.
Syncopal episode likely 2/2 seizure.   - Seen by neuro, started on keppra 500mg BID  - check vEEG
Syncopal episode likely 2/2 seizure.   - Seen by neuro, started on keppra 500mg BID  - check vEEG
Urology  f/u noted  Endocrine therapy being considered
Clarify if he is being followed by urology and if he has received any treatment.  Urology  f/u  PSA f/u.
Clarify if he is being followed by urology and if he has received any treatment.  Urology  f/u  PSA f/u.
Ongoing discussion between Onc and the patient's family about GOC
Ongoing discussion between Onc and the patient's family about GOC
Urology  f/u noted  Endocrine therapy being considered
path consistent with GIST tumor - see above discussion.
path consistent with GIST tumor - see above discussion.  Discussed with pathology - awaiting immuno stains
Ongoing discussion between Onc and the patient's family about GOC. Family meeting with wife, son and niece at 10 AM tomorrow (12/19)

## 2017-12-19 NOTE — PROGRESS NOTE ADULT - PROBLEM SELECTOR PROBLEM 4
Depression
Depression
Palliative care encounter

## 2017-12-19 NOTE — PROVIDER CONTACT NOTE (OTHER) - SITUATION
Patient without spontaneous respirations or pulse
Pt had 12 beats wide complex on tele.
pt had 5 beats WCT at 11pm
pt noted to have 13 beats wc on tele
pt. is currently on PEG tube feedings infusing at 65ml/hr. The residual is over a 100ml of feedings mixed with gastric fluid.
pt has not voided since 1500 on 12/8/17, bladder scan indicates 375ml.

## 2017-12-19 NOTE — PROVIDER CONTACT NOTE (OTHER) - REASON
PEG tube residual
patient without respirations or heart rate
pt 12 beats wide complex on tele
pt had 13 beats wct
pt had 5 beats WCT
pt has not voided since 1500, bladder scan 375ml

## 2017-12-19 NOTE — PROVIDER CONTACT NOTE (OTHER) - ASSESSMENT
/85, P 68, restless and c/o pain
No response to external stimuli, No Spontaneous respirations, No apical heart rate, Negative papillary response to stimuli
pt A&Ox2, vss, no c/o chest pain, SOB or discomfort. Pt had 5 beats WCT, first episode, at 11pm ( change of shift for tele tech), tech just made RN aware of arrythmia
pt vss, no nonverbal indicators of pain, SOB, palpitations. Pt resting comfortably.
pt. a&ox2, no change in mental status.  vss c/o of pain.
denies urge to void, bladder not distended, denied any pain while palpating bladder.

## 2017-12-19 NOTE — PROGRESS NOTE ADULT - ATTENDING COMMENTS
Patient seen and examined at bedside in the presence of the neurology residents. No new neurologic events. Episode which resulted in AMS described last week remains unclear. Was started on Keppra in event this was seizure, however examiner remains uncertain as to whether this was truly a seizure.     Recommended MRI and EEG    If unremarkable workup would taper Keppra off as would only contribute to delirium.     Discussed with neurology residents.
Pt seen with fellow.  Agree with above.  GOC discussion had- see document.  Comfort care as per family
Pt seen with fellow.  Agree with above.  FM 12/19

## 2017-12-19 NOTE — PROGRESS NOTE ADULT - PROBLEM SELECTOR PLAN 4
Pt is DNR/DNI. Main goal is comfort care, see GOC document.
c/w Seroquel
c/w Seroquel
Unless there is a documented HCP form or unless the patient's wife approves the patient's niece as his surrogate, his wife should be the main decision maker.   Full code at this time.
d/w the patient's wife that indicated she has been informed about the patient's current illness; however, that she did not know about new disease modifying options. However, that she is ok with her niece, Melisa, discussing with providers about the patient's medical issues as well as for making healthcare related decisions for the him. His wife has made such decision mainly because she does not speak English and also because she has a demanding job. Still I will try to discuss with Melisa to see if we can meet with the patient's wife and Onc for a FM next week in order to inform the wife about the patient's current illness, treatment options, and probably to discuss codes status.    Full code at this time.
Pt is full code. Planned family meeting on 12/19 for further discussion of goals of care.

## 2017-12-19 NOTE — PROGRESS NOTE ADULT - ASSESSMENT
This is a 72 year old male with h/o CVA with R sided residual weakness, HTN, HLD, s/p PEG tube, prostate and stomach cancer  Gastric cancer metastatic, Prostate cancer- comfort care.  Pain control.  Palliative team care.  PCU care.  Jose Medina MD pager [phone]

## 2017-12-19 NOTE — PROGRESS NOTE ADULT - PROBLEM SELECTOR PROBLEM 1
Altered mental status
Altered mental status
Malignant gastrointestinal stromal tumor (GIST) of stomach
Pain
Malignant gastrointestinal stromal tumor (GIST) of stomach
Pain
Pain
Prostate cancer
Malignant gastrointestinal stromal tumor (GIST) of stomach
Pain

## 2017-12-19 NOTE — PROVIDER CONTACT NOTE (OTHER) - DATE AND TIME:
02-Dec-2017 21:30
07-Dec-2017 23:05
16-Dec-2017 21:55
19-Dec-2017 20:03
25-Nov-2017 05:09
09-Dec-2017 03:49

## 2018-08-23 NOTE — H&P ADULT. - PSH
Gastric ulcer with hemorrhage, perforation, and obstruction, acute Gastric tumor    Gastric ulcer with hemorrhage, perforation, and obstruction, acute Alar Island Pedicle Flap Text: The defect edges were debeveled with a #15 scalpel blade.  Given the location of the defect, shape of the defect and the proximity to the alar rim an island pedicle advancement flap was deemed most appropriate.  Using a sterile surgical marker, an appropriate advancement flap was drawn incorporating the defect, outlining the appropriate donor tissue and placing the expected incisions within the nasal ala running parallel to the alar rim. The area thus outlined was incised with a #15 scalpel blade.  The skin margins were undermined minimally to an appropriate distance in all directions around the primary defect and laterally outward around the island pedicle utilizing iris scissors.  There was minimal undermining beneath the pedicle flap.

## 2018-09-18 NOTE — CONSULT NOTE ADULT - PROBLEM SELECTOR PROBLEM 2
Internal Medicine PGY-1 Resident History & Physical      PCP: Pedro Harp, APRN - CNP    Date of Admission: 9/18/2018    Date of Service: Pt seen/examined on 9/18/18 and Admitted to Inpatient with expected LOS greater than two midnights due to medical therapy. Chief Complaint:  Fever and Chills; not feeling well       History Of Present Illness:  Patient is a 57-year-old male with a past medical history of IV heroin abuse (last use 2-weeks ago), MSSA bacteremia and Hepatitis C who [facility] on 9/18/18 complaining of subjective fevers and chills. Patient reports that he was feeling well up until yesterday when he began to experience fever and chills. Patient states that he felt similar to the way he did when he was bacteremic and decided to come into the hospital.     In the ED, the patient was afebrile but had a leukocytosis of 23.4. ESR was elevated at 19. A chest x-ray was done which showed no acute cardiopulmonary findings. Admitted to internal medicine due to suspicion of recurrent Staph aureus bacteremia. Past Medical History:          Diagnosis Date    Bacteremia 09/04/2018    staph aureus    Hepatitis C 6/4/16    Antibody +    IV drug abuse 08/2018       Past Surgical History:      No past surgical history on file. Medications Prior to Admission:      Prior to Admission medications    Medication Sig Start Date End Date Taking? Authorizing Provider   Naltrexone (VIVITROL IM) Inject into the muscle every 30 days     Historical Provider, MD       Allergies:  Patient has no known allergies. Social History:      The patient currently lives in a drug rehabilitation facility. TOBACCO:   reports that he has quit smoking. His smoking use included E-Cigarettes. He smoked 1.00 pack per day. He has never used smokeless tobacco.  ETOH:   reports that he does not drink alcohol. Family History:      Reviewed in detail and negative for DM, CAD, Cancer, CVA.  Positive as
Prostate cancer
Seizure
Malignant gastrointestinal stromal tumor (GIST) of stomach

## 2019-06-23 NOTE — PROGRESS NOTE ADULT - SUBJECTIVE AND OBJECTIVE BOX
Pt is seen and examined  pt is awake and lying in bed  In distress secondary to abdominal pain at the time of  exam  Unable to communicate to explain where the pain is coming from.  Resolved in a few minutes after given a fentanyl losange    MEDICATIONS  (STANDING):  amLODIPine   Tablet 5 milliGRAM(s) Oral daily  aspirin  chewable 81 milliGRAM(s) Oral daily  atorvastatin 80 milliGRAM(s) Oral at bedtime  clopidogrel Tablet 75 milliGRAM(s) Oral daily  gabapentin 300 milliGRAM(s) Oral three times a day  heparin  Injectable 5000 Unit(s) SubCutaneous every 8 hours  levETIRAcetam  Solution 500 milliGRAM(s) Oral two times a day  pantoprazole   Suspension 40 milliGRAM(s) Oral daily  polyethylene glycol 3350 17 Gram(s) Oral daily  QUEtiapine 25 milliGRAM(s) Oral daily  senna 2 Tablet(s) Oral at bedtime  sodium chloride 0.45%. 1000 milliLiter(s) (75 mL/Hr) IV Continuous <Continuous>    MEDICATIONS  (PRN):  fentaNYL     Lozenge 200 MICROGram(s) Transmucosal every 3 hours PRN severe pain      Allergies    No Known Allergies    Intolerances        Vital Signs Last 24 Hrs  T(C): 36.7 (28 Nov 2017 11:14), Max: 36.7 (28 Nov 2017 11:14)  T(F): 98.1 (28 Nov 2017 11:14), Max: 98.1 (28 Nov 2017 11:14)  HR: 66 (28 Nov 2017 11:14) (66 - 68)  BP: 143/91 (28 Nov 2017 11:14) (136/83 - 178/87)  BP(mean): --  RR: 17 (28 Nov 2017 11:14) (17 - 18)  SpO2: 97% (28 Nov 2017 11:14) (97% - 99%)    PHYSICAL EXAM  General: adult in distress from abdominal pain  HEENT: clear oropharynx, anicteric sclera, pink conjunctiva  Neck: supple  CV: normal S1/S2 with no murmur rubs or gallops  Lungs: positive air movement b/l ant lungs, clear to auscultation, no wheezes, no rales  Abdomen: soft non-tender non-distended, no hepatosplenomegaly  Ext: no clubbing cyanosis or edema  Skin: no rashes and no petechiae  Neuro: alert and unable to verbalize, no focal deficit  LABS:                          13.0   4.8   )-----------( 163      ( 28 Nov 2017 11:01 )             39.5         Mean Cell Volume : 106.0 fl  Mean Cell Hemoglobin : 34.7 pg  Mean Cell Hemoglobin Concentration : 32.8 gm/dL  Auto Neutrophil # : x  Auto Lymphocyte # : x  Auto Monocyte # : x  Auto Eosinophil # : x  Auto Basophil # : x  Auto Neutrophil % : x  Auto Lymphocyte % : x  Auto Monocyte % : x  Auto Eosinophil % : x  Auto Basophil % : x      11-28    141  |  102  |  20  ----------------------------<  115<H>  4.9   |  29  |  1.02    Ca    9.2      28 Nov 2017 11:00    Lactate Dehydrogenase, Serum: 327 U/L (11-26 @ 08:39)    RADIOLOGY & ADDITIONAL STUDIES:    < from: CT Abdomen and Pelvis w/ IV Cont (11.26.17 @ 21:15) >  EXAM:  CT ABDOMEN AND PELVIS IC                            PROCEDURE DATE:  11/26/2017            INTERPRETATION:  CLINICAL INFORMATION: Abdominal pain. Gastric GIST   status post resection. Prostate cancer.    COMPARISON: CT abdomen pelvis dated June 7, 2017    PROCEDURE:   CT of the Abdomen and Pelvis was performed with intravenous contrast.   Intravenous contrast: 90 ml Omnipaque 350. 10 ml discarded.  Oral contrast: None.  Sagittal and coronal reformats were performed.    FINDINGS:    LOWERCHEST: Cardiomegaly. Coronary calcification.    LIVER: Small hypodense hepatic foci, stable from prior imaging.  BILE DUCTS: Mild intra and extrahepatic biliary ductal dilatation,   unchanged.  GALLBLADDER: Within normal limits.  SPLEEN: Within normal limits.  PANCREAS: Within normal limits.  ADRENALS: Within normal limits.  KIDNEYS/URETERS: Bilateral renal cysts and subcentimeter hyperdense foci,   too small to characterize, unchanged.     BLADDER: Within normal limits.  REPRODUCTIVE ORGANS: Theprostate is markedly enlarged.    BOWEL: Partial gastrectomy. Previously noted soft tissue mass at the   gastrectomy margin is not well demonstrated on this study. No bowel obstruction. A new soft tissue mass in the right pelvis (2:100) measures   4.5 x 3.2 cm. This abuts the right aspect of the rectum and is concerning   for peritoneal disease.  PERITONEUM: Small volume ascites.  VESSELS:  A 4.4 cm suprarenal abdominal aortic aneurysm, stable  RETROPERITONEUM: No lymphadenopathy.    ABDOMINAL WALL: Within normal limits.  BONES: Within normal limits.    IMPRESSION:     New large soft tissue mass in the right pelvis concerning for peritoneal   disease.    No acute pathology.    A 4.4 cm suprarenal abdominal aortic aneurysm without change.            < end of copied text > No

## 2020-01-01 NOTE — DIETITIAN INITIAL EVALUATION ADULT. - PROBLEM SELECTOR PLAN 3
[facility] Discharge Summary    Abilio Ragsdale is a male infant born on 2022 at 10:10 AM. He weighed 3.855 kg and measured 21 in length. His head circumference was 36 cm at birth. Apgars were 9 and 9. He has been doing well and feeding well. Maternal Data:     Delivery Type: Vaginal, Spontaneous   Delivery Resuscitation:   Number of Vessels:    Cord Events:   Meconium Stained:      Information for the patient's mother:  Hailey Seals [[id number]]   Gestational Age: 41w0d   Prenatal Labs:  Lab Results   Component Value Date/Time    ABO/Rh(D) O NEGATIVE 2020 04:56 AM    HBsAg, External Negative 2020    HIV, External Non reactive 2020    Rubella, External Immune 2020    RPR, External Non reactive 2020    GrBStrep, External Negative 2020    ABO,Rh O Negative 2020           Nursery Course:  Immunization History   Administered Date(s) Administered    Hep B, Adol/Ped 2020     [facility] Hearing Screen  Hearing Screen: Yes  Left Ear: Pass  Right Ear: Pass  Repeat Hearing Screen Needed: No    Discharge Exam:   Pulse 130, temperature 98.4 °F (36.9 °C), resp. rate 54, height 0.533 m, weight 3.66 kg, head circumference 36 cm. General: healthy-appearing, vigorous infant. Strong cry.   Head: sutures lines are open,fontanelles soft, flat and open  Eyes: sclerae white, pupils equal and reactive, red reflex normal bilaterally  Ears: well-positioned, well-formed pinnae  Nose: clear, normal mucosa  Mouth: Normal tongue, palate intact,  Neck: normal structure  Chest: lungs clear to auscultation, unlabored breathing, no clavicular crepitus  Heart: RRR, S1 S2, no murmurs  Abd: Soft, non-tender, no masses, no HSM, nondistended, umbilical stump clean and dry  Pulses: strong equal femoral pulses, brisk capillary refill  Hips: Negative Lamas, Ortolani, gluteal creases equal  : Normal genitalia, descended testes  Extremities: well-perfused, warm and dry  Neuro: easily aroused  Good symmetric tone and strength  Positive root and suck. Symmetric normal reflexes  Skin: warm and pink      Intake and Output:  No intake/output data recorded. Patient Vitals for the past 24 hrs:   Urine Occurrence(s)   09/15/20 0640 1   09/15/20 0030 1   20 2245 1   20 1710 1     Patient Vitals for the past 24 hrs:   Stool Occurrence(s)   09/15/20 0640 1   09/15/20 0230 1   09/15/20 0030 1   20 2245 1   20 2130 1   20 0945 1         Labs:    Recent Results (from the past 96 hour(s))   CORD BLOOD EVALUATION    Collection Time: 20 10:27 AM   Result Value Ref Range    ABO/Rh(D) A POSITIVE     KEYANNA IgG POS     Bilirubin if KEYANNA pos: IF DIRECT BARBI POSITIVE, BILIRUBIN TO FOLLOW    BILIRUBIN,CRD BLD    Collection Time: 20 10:27 AM   Result Value Ref Range    Bilirubin, cord bld 2.1 (H) 1.0 - 1.9 MG/DL   BILIRUBIN, FRACTIONATED    Collection Time: 20  6:14 PM   Result Value Ref Range    Bilirubin, total 4.4 <5.1 MG/DL    Bilirubin, direct 0.2 0.0 - 0.2 MG/DL    Bilirubin, indirect 4.2 0.0 - 6.0 MG/DL   BILIRUBIN, TOTAL    Collection Time: 20 11:20 PM   Result Value Ref Range    Bilirubin, total 5.3 (H) <5.1 MG/DL   BILIRUBIN, TOTAL    Collection Time: 20  5:16 AM   Result Value Ref Range    Bilirubin, total 6.8 <7.2 MG/DL   BILIRUBIN, FRACTIONATED    Collection Time: 09/15/20  1:24 AM   Result Value Ref Range    Bilirubin, total 9.2 (H) <7.2 MG/DL    Bilirubin, direct 0.3 (H) 0.0 - 0.2 MG/DL    Bilirubin, indirect 8.9 0.0 - 12.0 MG/DL       Feeding method:    Feeding Method Used: Breast feeding    Assessment:     Active Problems:    Single liveborn, born in hospital, delivered by vaginal delivery (2020)      ABO incompatibility affecting  (2020)         Plan:     Continue routine care. Discharge 2020. Follow-up:  Parents to make appointment with office in 3 days.   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  - CT head negative for acute findings  - c/w Asa, plavix, lipitor

## 2020-01-17 NOTE — ED PROVIDER NOTE - CPE EDP RESP NORM
Pt is a 65 y/o M pt with a PMHx of DM, ETOH dependence with last drink yesterday who presents to the ED with complaints of vomiting for several days. Notes he is unable to keep anything down but has been drinking alcohol everyday, last drink was yesterday. NKDA. Pt unable to provide much history stating that his nausea is very bad. Pt persistently vomiting in ED. In ED Pt vitals were Vital Signs Last 24 Hrs  T(C): 37, HR: 95, BP: 147/77, RR: 18, SpO2: 99%  Pt was in significant distress due to vomiting and nausea, abd pain. Labs significant for Lac 5.8, transaminitis, Anion gap of 24 with bicarb 18, moderate acetone. ABG with normal PH. Pt is being admitted for alcoholic intoxication, Dehydration with ketonemia and intractable vomiting. normal...

## 2020-02-08 NOTE — PROGRESS NOTE ADULT - PROBLEM SELECTOR PROBLEM 3
Other constipation
Prostate cancer
Seizure
Seizure
Stroke
Stroke
Loose bowel movement
Other constipation
Prostate cancer
Seizure
Other constipation
yes/home w/ family

## 2020-04-09 NOTE — ED ADULT NURSE NOTE - FINAL NURSING ELECTRONIC SIGNATURE
VIDEO VISIT PROGRESS NOTE  Patient Location: Home    Behavioral Problem and Anxiety      HISTORY OF PRESENT ILLNESS     Nick Ocampo is a pleasant 34 year old male who is requesting a Video Visit who had concerns including Behavioral Problem and Anxiety.    Follow-up.  In regards to his ADD his Adderall dose was decreased to 20 mg 3 times a day as insurance was not covering his previous dose of 30 mg 3 times a day.  He is having a very hard time with concentration and energy levels.  He reports that he is not being able to complete tasks on time at work and it falling behind.  In regard to patient's anxiety/depression, they report they are doing great on current medications. They report no panic attacks or insomnia. Mood is good per patient. No SI/HI.      REVIEW OF SYSTEMS  All other pertinent systems are reviewed and are negative except as documented above.     HISTORIES  I have personally reviewed and updated the following electronic medical record sections: Current medications, Allergies, Problem list, Past Medical History, Past Surgical History, Social History and Family History      MEDICATIONS  The medication list in the medical record as well as updates to the medication list submitted by the patient with this V-Visit were reviewed and updated today.       PHYSICAL EXAM     Vital Signs Per Patient: There were no vitals taken for this visit.    Constitutional: well-nourished, well-developed, well-appearing  Ears, nose, mouth, throat: normocephalic, atraumatic, external ears normal by inspection  Eyes: no proptosis, extra-ocular eye movement intact, normal sclerae, conjunctivae not injected  Neck: No visible goiter, range of motion of neck appears normal  Respiratory: No increased respiratory effort  Skin: no visible rash, no foot ulcers, no varicose veins  Psychiatric: non-anxious, normal affect      ASSESSMENT AND PLAN   Nick was seen today for behavioral problem and anxiety.    Diagnoses and all  orders for this visit:    Attention deficit hyperactivity disorder (ADHD), predominantly inattentive type    Anxiety and depression      At this time prior authorizations were denied by his insurance for Adderall increased.  Were going to continue with the 20 mg 3 times a day for 1 month and see how he does.  If no improvement will consider starting on extended release and seeing how he does.  In regards to anxiety he is doing well Zoloft will continue current meds    Follow Up: 3 months  No follow-ups on file.    Upcoming Appointments Already Scheduled  No future appointments.    Destiney Ratliff MD Family Medicine    This visit was performed via live interactive two-way video.    Clinician Location: [facility]note 3[facility] [place]    ____________________________________________          28-Apr-2017 16:11

## 2020-08-06 NOTE — PATIENT PROFILE ADULT. - PRO ARRIVE FROM
Left message regarding scheduling surgery with Dr. Audrey Waller. Call back number provided, [phone].       Shala Hernandez   Perioperative Coordinator  Department of Otolaryngology    Office: [phone]     
Patients wife called back regarding scheduling.   Per Pushpa Lopez, patient needs to wait 3 months from last botox injection.     Surgeon: Dr. Audrey Waller   Date of Surgery: 10/22/2020  Location of surgery: [facility]  Pre-Op H&P: Patients wife   Post-Op Appt Date: 1 week RN visit    Imaging needed:  No  Discussed COVID-19 testing:  Yes  Pre-cert/Authorization completed:  Yes - no PA needed per Ingrid   Packet sent out: Yes 08/06/20    Shala Hernandez  08/06/20 9:58 AM  P: [phone]    
home

## 2020-12-08 NOTE — DISCHARGE NOTE ADULT - NSCORESITESY/N_GEN_A_CORE_RD
REASON FOR VISIT:  Post Operative Follow Up  Name of Procedure:1. RELEASE OF ACROSYNDACTYLY RIGHT HAND MIDDLE AND RING FINGER  2. AMPUTATION OF RIGHT HAND RING FINGER  3. LOCAL FLAP RECONSTRUCTION  4. DISTAL AMPUTATION OF RIGHT FOOT SECOND TOE AND NAIL BED  Date of Procedure: 11/23/2020     HISTORY OF PRESENT ILLNESS:  Genaro Wang is a 27 year old male here for follow up after surgery. They have done well since surgery and have no concerns of complaints at this time, here for routine follow up.     ALLERGIES:  ALLERGIES:  No Known Allergies    CURRENT MEDICATIONS:   No current outpatient medications on file.     No current facility-administered medications for this visit.          PHYSICAL EXAM:  Skin:  Incisions are all pink and healthy.  The flap site on the hand has some superficial sloughing of skin but tissue underneath is pink and healthy.  Constitutional: Alert, no acute distress.  Respiratory: Non-labored breathing. Symmetrical chest wall expansion.  Neurologic: Appropriate behavior. Cranial nerves 2-12 grossly intact.  Psychological: Appropriate mood and behavior.      IMPRESSION AND PLAN:    We discussed importance of scar massage, recommend 5 minutes at least once a day.  We recommend Aquaphor, Eucerine Cream, or Mederma.   Follow up: 1 month  No primary diagnosis found.    No orders of the defined types were placed in this encounter.      Maliha Connelly PA-C    12/8/2020    
No

## 2021-02-19 NOTE — ED ADULT NURSE NOTE - TOBACCO USE
Head, normocephalic, atraumatic, Face, Face within normal limits, Ears, External ears within normal limits, Nose/Nasopharynx, External nose  normal appearance, nares patent, no nasal discharge, Mouth and Throat, Oral cavity appearance normal, Breath odor normal, Lips, Appearance normal Unknown if ever smoked

## 2021-02-23 NOTE — PATIENT PROFILE ADULT. - FALLEN IN THE PAST
Is This A New Presentation, Or A Follow-Up?: Skin Lesions
How Severe Is Your Skin Lesion?: mild
Has Your Skin Lesion Been Treated?: not been treated
no

## 2021-02-27 NOTE — CHART NOTE - NSCHARTNOTESELECT_GEN_ALL_CORE
Addended by: MARCIANO AYALA on: 2/27/2021 10:14 AM     Modules accepted: Orders     Medicine NP/Event Note

## 2021-03-17 NOTE — PATIENT PROFILE ADULT. - PROVIDER NOTIFICATION
Labs were ordered as noted.  Patient will follow up sometime in the next 1-2 months first or a lab appointment only and then for his annual exam.   Information could not be obtained

## 2021-04-08 NOTE — H&P ADULT. - PROBLEM/PLAN-2
PAULA ELMORE  55y Female    CHIEF COMPLAINT:    Patient is a 55y old  Female who presents with a chief complaint of Shortness of breath (08 Apr 2021 08:56)      INTERVAL HPI/OVERNIGHT EVENTS:    Patient seen and examined. No acute events overnight. Decreasing supplemental o2 reqmts    ROS: All other systems are negative.    Vital Signs:    T(F): 98.4 (04-08-21 @ 08:00), Max: 98.6 (04-07-21 @ 12:00)  HR: 90 (04-08-21 @ 08:00) (79 - 107)  BP: 107/54 (04-08-21 @ 08:00) (107/54 - 152/67)  RR: 24 (04-08-21 @ 08:00) (20 - 24)  SpO2: 97% (04-08-21 @ 08:00) (95% - 99%)    POCT Blood Glucose.: 148 mg/dL (07 Apr 2021 20:44)  POCT Blood Glucose.: 172 mg/dL (07 Apr 2021 17:09)  POCT Blood Glucose.: 150 mg/dL (07 Apr 2021 12:07)    PHYSICAL EXAM:    GENERAL:  NAD  SKIN: No rashes or lesions  HEENT: Atraumatic. Normocephalic.  NECK: Supple, No JVD.   PULMONARY: CTA B/L. No wheezing.   CVS: Normal S1, S2. Rate and Rhythm are regular  ABDOMEN/GI: Soft, Nontender, Nondistended  MSK:  No edema B/L LE. No clubbing or cyanosis  NEUROLOGIC: moves all extremities  PSYCH: Alert & oriented x 3, normal affect    Consultant(s) Notes Reviewed:  [x ] YES  [ ] NO  Care Discussed with Consultants/Other Providers [ x] YES  [ ] NO    LABS:                        13.0   10.66 )-----------( 350      ( 08 Apr 2021 08:04 )             42.6     141  |  100  |  27<H>  ----------------------------<  126<H>  3.4<L>   |  27  |  0.8    Ca    9.7      08 Apr 2021 08:04    TPro  7.3  /  Alb  4.0  /  TBili  0.2  /  DBili  x   /  AST  23  /  ALT  19  /  AlkPhos  79  04-08    Serum Pro-Brain Natriuretic Peptide: 61 pg/mL (04-02-21 @ 09:17)    RADIOLOGY & ADDITIONAL TESTS:  Imaging or report Personally Reviewed:  [x] YES  [ ] NO  EKG reviewed: [x] YES  [ ] NO    Medications:  Standing  chlorhexidine 4% Liquid 1 Application(s) Topical daily  dexAMETHasone  Injectable 6 milliGRAM(s) IV Push daily  dextrose 40% Gel 15 Gram(s) Oral once  dextrose 5%. 1000 milliLiter(s) IV Continuous <Continuous>  enoxaparin Injectable 100 milliGRAM(s) SubCutaneous two times a day  insulin glargine Injectable (LANTUS) 8 Unit(s) SubCutaneous at bedtime  insulin lispro (ADMELOG) corrective regimen sliding scale   SubCutaneous three times a day before meals  metoprolol tartrate 25 milliGRAM(s) Oral two times a day  pantoprazole    Tablet 40 milliGRAM(s) Oral before breakfast  potassium chloride   Powder 40 milliEquivalent(s) Oral every 2 hours  senna 2 Tablet(s) Oral at bedtime    PRN Meds  acetaminophen   Tablet .. 650 milliGRAM(s) Oral every 6 hours PRN  guaifenesin/dextromethorphan  Syrup 10 milliLiter(s) Oral every 6 hours PRN  polyethylene glycol 3350 17 Gram(s) Oral daily PRN             DISPLAY PLAN FREE TEXT

## 2021-04-20 NOTE — ED PROVIDER NOTE - LOCATION
abdomen Birth Control Pills Pregnancy And Lactation Text: This medication should be avoided if pregnant and for the first 30 days post-partum.

## 2021-08-21 NOTE — H&P ADULT. - HISTORY OF PRESENT ILLNESS
71 yo male with PMHx of HTN, HLD, CVA with residual dysphagia s/p PEG, Gastric tumor p/w diffuse abdominal pain and worsening confusion.  The patient's family reports that he had an endoscopy one week ago with biopsy.  Since then he has been c/o diffuse body pain, worse in the RUQ of the abdomen, associated with subjective fevers and cough.  The patient's family reports that he has also been increasingly confused, described as "not making sense." Denies CP, SOB, NVD, dysuria.  The patient's family also reports that he is still tolerating liquids PO, but has had some coughing/choking with liquids recently. no

## 2021-09-02 NOTE — ED ADULT TRIAGE NOTE - MODE OF ARRIVAL
Thania Miranda  : 1969  Primary: [facility]  Secondary:  [facility], [facility], [address]  [phone]   [phone]        CANCELLATION NOTE    Ms. Torito Gannon was a >24 hour cancellation for today's appointment.      # Recent No Shows/Same-Day Cancellations: 1    2021  Tien Malhotra DPT    Visit Approval Visit # Therapist initials Date A NS / Cx < 24 hr >24 hr Cx Comments   30 visits total 1  2021 [x]  [] [] Initial evaluation    2  2021 [x] [] []     3 PDE - [x] [] []     4 PDE 2021 [x] [] []     5  2021 [x] [] []     6  2021 [x] [] []     7 PDE - [x] [] []       2021 [] [] [x]     8  2021 [x] [] [] Progress note    9 PDE 2021 [x] [] [] 1      2021 [] [x] []     10  2021 [x] [] []       2021 [] [] [x]        [] [] []        [] [] []        [] [] []        [] [] []        [] [] [] EMS

## 2021-11-13 NOTE — ED PROVIDER NOTE - NS ED MD DISPO DIVISION
Elbow is reduced, ortho to start splinting      Abhijit Alvarez RN  11/13/21 5024 [facility]

## 2022-01-02 NOTE — ED ADULT NURSE REASSESSMENT NOTE - NS ED NURSE REASSESS COMMENT FT1
HOSPITALIST CRITICAL CARE PROGRESS NOTE  [facility] HOSPITALIST SERVICE    Patient: Laura Henderson   YOB: 1955   MR Number: [id number]       Today's Date: 1/1/2022   Date of Admission: 12/29/2021   Attending Physician: Pia Luu MD     Code Status:  Full Resuscitation    Hospital Day: 4    Primary Care Provider: No Pcp  Consulting Physician(s):   Transfusion:  None   Procedures:  None    Active Issues and Reason for Visit:  Principal Problem:    Acute respiratory failure with hypoxia (CMS/[facility])  Active Problems:    Esophageal reflux    Pure hypercholesterolemia    Schizoaffective disorder, bipolar type (CMS/HCC)    Diabetes mellitus type 2 in nonobese (CMS/[facility])    HTN (hypertension)    Community acquired pneumonia    COVID-19 virus infection      Assessment and Plan:  Problem list as noted above.  · Hypoxic respiratory failure secondary to severe COVID-19 viral pneumonia.  Continue dexamethasone to 20 mg daily increased on 12/30/21and continue remdesivir.  Started baricitinib on 12/31/2021.  Inflammatory markers improving.  · COVID-19 viral infection.  Treat empirically with vitamin C, vitamin-D and zinc.  Cardiovascular prophylaxis with aspirin.  She has 24 allergies which include statins will address this with her tomorrow to see if she will allow me to start.  Enoxaparin for DVT prophylaxis.    · Presumed community-acquired pneumonia.  No clear consolidation was shown on CT angio of her chest.  Discussed with ID continue ceftriaxone and azithromycin for 5 days.  Inflammatory markers improving.  · Type 2 diabetes and not obese.  A1c this admission is 14.3% .  She is very insulin resistant based on IV insulin needs .  Continue IV insulin.  Decrease Lantus from 40 units nightly to 20 units nightly and Humalog from 16 units t.i.d. a.c. to 8 units.  Continue to unit correctional scale.  Follow closely.   · Hypotension.  Blood pressure still soft.  Treat with IV fluid hold prior to admission  Medicated for pain as ordered. medications.  · Regarding her chronic medical problems as documented above, these conditions we monitored closely while she is in the hospital should be maintained on her prior to admission medications as clinically indicated.    Disposition:  tbd    All orders have been entered electronically through [id number].  Patient seen during multidisciplinary rounds with RN and .  Care plan communicated with patient and staff.  Care plan communicated with family.    Subjective:  Laura Henderson is a 66 year old female who is being seen in follow up for Acute respiratory failure with hypoxia (CMS/[facility]).  Patient was admitted on 12/29/2021 with acute hypoxic respiratory failure secondary to COVID-19 viral pneumonia presumed bacterial pneumonia and severe hyperglycemia.  Patient's symptoms began on 12/24/2021.  She has not been vaccinated.      Hospital day 12/30/2021:  This treated with cefepime and vancomycin as well as remdesivir and dexamethasone.  Her blood glucose readings are over 500 now later in the morning she becomes hypotensive and hypoxic with movement and almost passes out with physical therapy she has moved to 15 L per minute oxygen via high-flow nasal cannula.  At the time of my initial visit she was only on 4 L. she does have a purulent productive cough.    Hospital day 12/31/2021: At the time of my interview patient is being transition from 15 L to Airvo due to persistent desaturation.  She is currently at 95% FiO2 and 40 L and saturating 90-92%.  She is also on IV insulin and having persistent hyperglycemia and the 200s and she is on algorithm 5 at 7.5 units per hour.    Hospital day 01/01/2022:  This morning she had a coughing fit that ended up with vomiting and phone and snot coming out of her nose.  She felt it was her breathing after that.  She has been maintained on 95% FiO2 and 50 liters/minute with Airvo and she is tachypneic.  IV insulin was discontinued last night 11:00 p.m. and her blood  sugars have been in the  range.  She was able to eat a little breakfast.    Past Medical/Surgical/Social/Family Histories reviewed with patient as recorded in the admit H&P (dated 12/29/2021).  Meds and Allergies reviewed with patient as recorded in EPIC.    Scheduled meds and infusions:  • insulin glargine  20 Units Subcutaneous Nightly   • insulin lispro  8 Units Subcutaneous TID AC   • insulin lispro   Subcutaneous Nightly   • insulin lispro   Subcutaneous TID WC   • guaiFENesin  600 mg Oral 2 times per day   • pantoprazole  40 mg Intravenous Daily   • baricitinib  4 mg Oral Daily   • valACYclovir  500 mg Oral 2 times per day   • dexamethasone  20 mg Intravenous Daily   • cefTRIAXone (ROCEPHIN) IV  2,000 mg Intravenous Daily   • azithromycin  500 mg Intravenous Daily   • ascorbic acid  500 mg Oral Daily   • cholecalciferol  25 mcg Oral Daily   • zinc sulfate  220 mg Oral Daily with breakfast   • sodium chloride (PF)  2 mL Intracatheter 2 times per day   • QUEtiapine  150 mg Oral Nightly   • aspirin  81 mg Oral Daily   • enoxaparin  40 mg Subcutaneous Daily   • Potassium Standard Replacement Protocol   Does not apply See Admin Instructions   • Phosphorus Standard Replacement Protocol   Does not apply See Admin Instructions   • Magnesium Standard Replacement Protocol   Does not apply See Admin Instructions   • remdesivir  100 mg Intravenous Daily at noon     • sodium chloride 0.9% infusion 100 mL/hr at 01/01/22 9546       REVIEW OF SYSTEMS:  The patient denies fevers, chills, night sweats, changes in weight, chest pain, shortness of breath, palpitations, orthostatic symptoms, nausea, vomiting, diarrhea, constipation, blood in the stool or urine, myalgias, arthralgias, anxiety, depression, polyuria, polydipsia, unusual rashes or unusual headaches except as already described in the HPI (History of Present Illness).     OBJECTIVE:  Vital 24 Hour Range Most Recent Value   Temperature Temp  Min: 97 °F (36.1 °C)   Max: 98.2 °F (36.8 °C) 98 °F (36.7 °C)   Pulse Pulse  Min: 64  Max: 79 72   Respiratory Resp  Min: 19  Max: 24 (!) 24   Blood Pressure BP  Min: 144/70  Max: 176/79 (!) 176/79   Pulse Oximetry SpO2  Min: 91 %  Max: 97 %    O2 O2 Flow Rate (L/min)  Av.7 L/min  Min: 40 L/min   Min taken time: 21  Max: 55 L/min   Max taken time: 22      Vital Most Recent Value First Value   Weight 63.9 kg (140 lb 14 oz) (22 0359) Weight: 64.2 kg (141 lb 8.6 oz) (21 150)   BMI Body mass index is 25.36 kg/m². BMI (Calculated): 26.57 (21 1504)       Intake/Output Summary (Last 24 hours) at 2022 1804  Last data filed at 2022 1700  Gross per 24 hour   Intake 7989.1 ml   Output 1250 ml   Net 6739.1 ml     Current diet:  Consistent Carb Moderate (45-75 Gm/meal) Diet  3 Times/day W Meals; Ensure Clear Berry/clear Liquid Supplement, Berry Oral Nutrition Supplement    Physical Exam:  General - awake alert follows commands and answers questions appropriately hoarse voice  Cor - regular tachycardic no murmurs gallops or rubs  Pulmonary - clear to auscultation bilaterally without accessory muscle use  Abdomen - regular bowel sounds no guarding or rebound  Extremities  -  warm without clubbing or cyanosis.  no edema.  Skin - no rashes or lesions.  Warm and dry.   No decubitus ulcers.    Ancillary Studies including laboratory results are reviewed as recorded in EPIC 2022 6:04 PM.  A subset of labs and results are listed below:  lab last 24 hrs;   Recent Results (from the past 24 hour(s))   GLUCOSE, BEDSIDE - POINT OF CARE    Collection Time: 21  7:08 PM   Result Value Ref Range    GLUCOSE, BEDSIDE - POINT OF CARE 204 (H) 70 - 99 mg/dL   GLUCOSE, BEDSIDE - POINT OF CARE    Collection Time: 21  8:07 PM   Result Value Ref Range    GLUCOSE, BEDSIDE - POINT OF CARE 169 (H) 70 - 99 mg/dL   GLUCOSE, BEDSIDE - POINT OF CARE    Collection Time: 21  9:15 PM   Result Value Ref Range     GLUCOSE, BEDSIDE - POINT OF CARE 176 (H) 70 - 99 mg/dL   GLUCOSE, BEDSIDE - POINT OF CARE    Collection Time: 12/31/21 10:15 PM   Result Value Ref Range    GLUCOSE, BEDSIDE - POINT OF CARE 145 (H) 70 - 99 mg/dL   Potassium    Collection Time: 12/31/21 10:27 PM   Result Value Ref Range    Potassium 4.2 3.4 - 5.1 mmol/L   GLUCOSE, BEDSIDE - POINT OF CARE    Collection Time: 12/31/21 11:04 PM   Result Value Ref Range    GLUCOSE, BEDSIDE - POINT OF CARE 134 (H) 70 - 99 mg/dL   GLUCOSE, BEDSIDE - POINT OF CARE    Collection Time: 01/01/22 12:10 AM   Result Value Ref Range    GLUCOSE, BEDSIDE - POINT OF CARE 98 70 - 99 mg/dL   GLUCOSE, BEDSIDE - POINT OF CARE    Collection Time: 01/01/22 12:54 AM   Result Value Ref Range    GLUCOSE, BEDSIDE - POINT OF CARE 93 70 - 99 mg/dL   GLUCOSE, BEDSIDE - POINT OF CARE    Collection Time: 01/01/22  1:58 AM   Result Value Ref Range    GLUCOSE, BEDSIDE - POINT OF CARE 77 70 - 99 mg/dL   GLUCOSE, BEDSIDE - POINT OF CARE    Collection Time: 01/01/22  3:01 AM   Result Value Ref Range    GLUCOSE, BEDSIDE - POINT OF CARE 69 (L) 70 - 99 mg/dL   GLUCOSE, BEDSIDE - POINT OF CARE    Collection Time: 01/01/22  3:58 AM   Result Value Ref Range    GLUCOSE, BEDSIDE - POINT OF CARE 132 (H) 70 - 99 mg/dL   GLUCOSE, BEDSIDE - POINT OF CARE    Collection Time: 01/01/22  5:23 AM   Result Value Ref Range    GLUCOSE, BEDSIDE - POINT OF CARE 106 (H) 70 - 99 mg/dL   Ferritin    Collection Time: 01/01/22  6:43 AM   Result Value Ref Range    Ferritin 624 (H) 8 - 252 ng/mL   D Dimer, Quantitative    Collection Time: 01/01/22  6:43 AM   Result Value Ref Range    D Dimer, Quantitative 1.69 (H) <0.57 mg/L (FEU)   Comprehensive Metabolic Panel    Collection Time: 01/01/22  6:43 AM   Result Value Ref Range    Fasting Status      Sodium 140 135 - 145 mmol/L    Potassium 4.3 3.4 - 5.1 mmol/L    Chloride 109 (H) 98 - 107 mmol/L    Carbon Dioxide 24 21 - 32 mmol/L    Anion Gap 11 10 - 20 mmol/L    Glucose 80 70 - 99  mg/dL    BUN 29 (H) 6 - 20 mg/dL    Creatinine 0.49 (L) 0.51 - 0.95 mg/dL    Glomerular Filtration Rate >90 >=60    BUN/ Creatinine Ratio 59 (H) 7 - 25    Calcium 8.2 (L) 8.4 - 10.2 mg/dL    Bilirubin, Total 0.1 (L) 0.2 - 1.0 mg/dL    GOT/AST 97 (H) <=37 Units/L    GPT/ALT 73 (H) <64 Units/L    Alkaline Phosphatase 91 45 - 117 Units/L    Albumin 1.8 (L) 3.6 - 5.1 g/dL    Protein, Total 5.9 (L) 6.4 - 8.2 g/dL    Globulin 4.1 (H) 2.0 - 4.0 g/dL    A/G Ratio 0.4 (L) 1.0 - 2.4   Procalcitonin    Collection Time: 01/01/22  6:43 AM   Result Value Ref Range    Procalcitonin 0.43 (H) <=0.09 ng/mL   CBC with Automated Differential (performable only)    Collection Time: 01/01/22  6:43 AM   Result Value Ref Range    WBC 28.7 (H) 4.2 - 11.0 K/mcL    RBC 4.06 4.00 - 5.20 mil/mcL    HGB 11.9 (L) 12.0 - 15.5 g/dL    HCT 33.4 (L) 36.0 - 46.5 %    MCV 82.3 78.0 - 100.0 fl    MCH 29.3 26.0 - 34.0 pg    MCHC 35.6 32.0 - 36.5 g/dL    RDW-CV 12.2 11.0 - 15.0 %    RDW-SD 36.8 (L) 39.0 - 50.0 fL     140 - 450 K/mcL    NRBC 0 <=0 /100 WBC    Neutrophil, Percent 86 %    Lymphocytes, Percent 7 %    Mono, Percent 4 %    Eosinophils, Percent 0 %    Basophils, Percent 0 %    Immature Granulocytes 3 %    Absolute Neutrophils 24.6 (H) 1.8 - 7.7 K/mcL    Absolute Lymphocytes 2.1 1.0 - 4.0 K/mcL    Absolute Monocytes 1.2 (H) 0.3 - 0.9 K/mcL    Absolute Eosinophils  0.0 0.0 - 0.5 K/mcL    Absolute Basophils 0.1 0.0 - 0.3 K/mcL    Absolute Immmature Granulocytes 0.8 (H) 0.0 - 0.2 K/mcL   C Reactive Protein    Collection Time: 01/01/22  6:43 AM   Result Value Ref Range    C-Reactive Protein 7.6 (H) <=1.0 mg/dL   GLUCOSE, BEDSIDE - POINT OF CARE    Collection Time: 01/01/22  7:16 AM   Result Value Ref Range    GLUCOSE, BEDSIDE - POINT OF CARE 85 70 - 99 mg/dL   GLUCOSE, BEDSIDE - POINT OF CARE    Collection Time: 01/01/22 12:28 PM   Result Value Ref Range    GLUCOSE, BEDSIDE - POINT OF CARE 142 (H) 70 - 99 mg/dL   GLUCOSE, BEDSIDE - POINT OF  CARE    Collection Time: 01/01/22  5:18 PM   Result Value Ref Range    GLUCOSE, BEDSIDE - POINT OF CARE 220 (H) 70 - 99 mg/dL       XR Chest AP or PA    Result Date: 12/29/2021  Narrative: EXAM: XR CHEST AP OR PA INDICATION: shortness of breath COMPARISON: 1/10/2008. FINDINGS: Focal opacity in the left lower peripheral lung concerning for pneumonia. Scattered interstitial opacities in both lungs also visualized. Spinal stimulator. No pleural effusion or pneumothorax.     Impression: IMPRESSION: 1. Suspected pneumonia of the left peripheral lower lung. 2. Scattered opacities in both lungs which could represent multifocal scattered pneumonia.     CT Chest PE Imaging    Result Date: 12/29/2021  Narrative: CT ANGIOGRAM CHEST PE IMAGING- 3D HISTORY:  cp, hypoxemia,elevated dimer COMPARISON:  None. TECHNIQUE:  Multiple helical axial scans of the chest were obtained with intravenous administration of 75 mL of Isovue-350 contrast. CT angiogram (CTA) of the chest was performed with 3-D reconstruction. FINDINGS: The heart is unremarkable. Mild mediastinal lymphadenopathy is seen. Pretracheal lymph node has a short axis dimension of 8.2 mm. Slight soft tissue fullness in the aorticopulmonary window as well as subcarinal region. Subcarinal measures 9.7 mm in short axis dimension. Mild bilateral hilar adenopathy. There is good opacification of pulmonary arteries. No filling defects or obstructions are seen to indicate emboli. Mild/moderately extensive bilateral groundglass infiltrates are seen. No effusions are seen. The liver slightly enlarged. Diffuse mild disease decreased attenuation.     Impression: IMPRESSION: No pulmonary embolus. Diffuse patchy bilateral infiltrates consistent with atypical pneumonia. Mild/moderate mediastinal and hilar adenopathy. This may require follow-up.     Unresulted Labs (From admission, onward)     Start     Ordered    12/31/21 0630  C Reactive Protein  EVERY 24 HOURS,   TD       12/30/21 0366     12/31/21 0600  CBC with Automated Differential  AM DRAW,   Routine       12/30/21 1834    12/31/21 0600  Comprehensive Metabolic Panel  AM DRAW,   Routine       12/30/21 1834    12/31/21 0600  Procalcitonin  AM DRAW,   Routine       12/30/21 1834    12/30/21 1109  SPUTUM, BACTERIAL CULTURE WITH GRAM STAIN  ONE TIME,   Routine         12/30/21 1108    12/30/21 0816  Glycohemoglobin  ONE TIME,   Add-On         12/30/21 0817    12/30/21 0700  Metered blood glucose  4 TIMES DAILY BEFORE MEALS & N,   Routine       12/29/21 2214    12/30/21 0600  Ferritin  EVERY 48 HOURS,   TD       12/29/21 2214    12/30/21 0600  D Dimer, Quantitative  EVERY 48 HOURS,   TD       12/29/21 2214    12/29/21 2214  Quantiferon TB Plus  ONE TIME,   Routine         12/29/21 2214              Unresulted Procedure (From admission, onward)    None        Microbiology:  Microbiology Results  (Last 10 results in the past 7 days)    Specimen   Gram Smear   Culture Result   Status       12/30/21  1607         Adequate quality specimen. Acute inflammation with moderate/many neutrophils. Mixed bacteria with no predominant type.                Critical care time and 45 minutes    Pia Luu MD  Hospitalist  [facility]  1/1/2022 6:04 PM

## 2022-02-25 NOTE — DISCHARGE NOTE ADULT - NS MD DC FALL RISK RISK
For information on Fall & Injury Prevention, visit [email or web address]
There are no Wet Read(s) to document.

## 2022-08-29 NOTE — DIETITIAN INITIAL EVALUATION ADULT. - 25 CAL
· Maintain on aspirin Plavix, and statin  · Recent MRI brain without acute infarct  · Continue home medications [id number]

## 2022-09-19 NOTE — ED ADULT NURSE NOTE - CADM POA URETHRAL CATHETER
Problem: Discharge Planning  Goal: Discharge to home or other facility with appropriate resources  Outcome: Progressing     Problem: Safety - Adult  Goal: Free from fall injury  Outcome: Progressing     Problem: ABCDS Injury Assessment  Goal: Absence of physical injury  Outcome: Progressing     Problem: Skin/Tissue Integrity  Goal: Absence of new skin breakdown  Description: 1. Monitor for areas of redness and/or skin breakdown  2. Assess vascular access sites hourly  3. Every 4-6 hours minimum:  Change oxygen saturation probe site  4. Every 4-6 hours:  If on nasal continuous positive airway pressure, respiratory therapy assess nares and determine need for appliance change or resting period.   Outcome: Progressing     Problem: Neurosensory - Adult  Goal: Achieves stable or improved neurological status  Outcome: Progressing  Goal: Absence of seizures  Outcome: Progressing     Problem: Respiratory - Adult  Goal: Achieves optimal ventilation and oxygenation  Outcome: Progressing     Problem: Cardiovascular - Adult  Goal: Maintains optimal cardiac output and hemodynamic stability  Outcome: Progressing     Problem: Skin/Tissue Integrity - Adult  Goal: Skin integrity remains intact  Outcome: Progressing     Problem: Musculoskeletal - Adult  Goal: Return mobility to safest level of function  Outcome: Progressing     Problem: Gastrointestinal - Adult  Goal: Minimal or absence of nausea and vomiting  Outcome: Progressing     Problem: Genitourinary - Adult  Goal: Absence of urinary retention  Outcome: Progressing  Goal: Urinary catheter remains patent  Outcome: Progressing     Problem: Metabolic/Fluid and Electrolytes - Adult  Goal: Electrolytes maintained within normal limits  Outcome: Progressing     Problem: Pain  Goal: Verbalizes/displays adequate comfort level or baseline comfort level  Outcome: Progressing     Problem: Chronic Conditions and Co-morbidities  Goal: Patient's chronic conditions and co-morbidity symptoms are monitored and maintained or improved  Outcome: Progressing     Problem: Self Harm/Suicidality  Goal: Will have no self-injury during hospital stay  Description: INTERVENTIONS:  1. Q 30 MINUTES: Routine safety checks  2. Q SHIFT & PRN: Assess risk to determine if routine checks are adequate to maintain patient safety  Outcome: Progressing     Problem: Nutrition Deficit:  Goal: Optimize nutritional status  Outcome: Progressing     Problem: Behavior  Goal: Pt/Family maintain appropriate behavior and adhere to behavioral management agreement, if implemented  Description: INTERVENTIONS:  1. Assess patient/family's coping skills and  non-compliant behavior (including use of illegal substances)  2. Notify security of behavior or suspected illegal substances which indicate the need for search of the family and/or belongings  3. Encourage verbalization of thoughts and concerns in a socially appropriate manner  4. Utilize positive, consistent limit setting strategies supporting safety of patient, staff and others  5. Encourage participation in the decision making process about the behavioral management agreement  6. If a visitor's behavior poses a threat to safety call refer to organization policy.   7. Initiate consult with , Psychosocial CNS, Spiritual Care as appropriate  Outcome: Progressing No

## 2022-12-29 NOTE — PROGRESS NOTE ADULT - SUBJECTIVE AND OBJECTIVE BOX
MOLECULAR PCR Geriatric and Palliative Care Unit Progress Note [x ] Hospice Progress Note [ ]     HPI:  This is a 72 year old male w/ h/o stroke with residual right sided weakness, aphasia, s/p PEG tube (initially placed on 4/2016), Hypertension, HLD, prostate and stomach cancer on oral chemo who presented to ED for syncopal episode. As per wife, the patient was last seen normal at about 8AM. At that time he did not want to get out of bed this morning, so he was left there in bed. Several hours later his wife attempted to help get the patient out of bed so he could go top the bathroom, and when she did so, his legs buckled and he began to shake his right upper extremity. During episode, eyes rolled back. Was less responsive then his normal self for about 15-20 minutes, taking longer to respond to questions. Denied tongue biting. Unknown if urinary incontinence as patient wears diapers.  Code stroke called on arrival, NIHSS 10, MRS 4, tPA not administered as patient out of window. (23 Nov 2017 18:06).   Initially presented to ED for LOC likely 2/2 seizure. Patient was lost to follow-up with onc for management of his malignancies, they were subsequently consulted upon his admission and obtained a CTAP which revealed partial gastrectomy, previously noted soft tissue mass at the gastrectomy margin not well demonstrated on this study. No evidence of bowel obstruction. A new soft tissue mass in the right pelvis (2:100) measures 4.5 x 3.2 cm. This abuts the right aspect of the rectum and is concerning for peritoneal disease. Bx done consistent with GI stromal tumor. Heme/Onc following.    Indication for Palliative Care Unit Services:    ADVANCE DIRECTIVES:    DNR  MOLST  [ ] YES [ ] NO                      [ ] Completed  Health Care Proxy [ ] YES  [ ] NO   [ ] Completed  Living Will  [ ] YES [ ] NO             [ ] Surrogate  [ ] HCP  [ ] Guardian:                                                                  Phone#:    Allergies    No Known Allergies    Intolerances        MEDICATIONS  (STANDING):  amLODIPine   Tablet 5 milliGRAM(s) Oral daily  aspirin enteric coated 81 milliGRAM(s) Oral daily  atorvastatin 80 milliGRAM(s) Oral at bedtime  clopidogrel Tablet 75 milliGRAM(s) Oral daily  gabapentin   Solution 300 milliGRAM(s) Oral three times a day  heparin  Injectable 5000 Unit(s) SubCutaneous every 8 hours  HYDROmorphone  Injectable 0.5 milliGRAM(s) IV Push every 4 hours  levETIRAcetam  Solution 500 milliGRAM(s) Oral two times a day  metoprolol     tartrate 12.5 milliGRAM(s) Oral two times a day  pantoprazole   Suspension 40 milliGRAM(s) Oral daily  polyethylene glycol 3350 17 Gram(s) Oral daily  QUEtiapine 25 milliGRAM(s) Oral daily  senna Syrup 15 milliLiter(s) Oral at bedtime    MEDICATIONS  (PRN):  HYDROmorphone  Injectable 1 milliGRAM(s) IV Push every 1 hour PRN Pain  HYDROmorphone  Injectable 1 milliGRAM(s) IV Push every 1 hour PRN Dyspnea  lactulose Syrup 15 Gram(s) Oral daily PRN Constipation  LORazepam   Injectable 0.5 milliGRAM(s) IV Push every 1 hour PRN Agitation  naloxone Injectable 0.1 milliGRAM(s) IV Push every 3 minutes PRN sedation or respiratory depression due to opoiods  ondansetron Injectable 8 milliGRAM(s) IV Push every 8 hours PRN Nausea and/or Vomiting      PRESENT SYMPTOMS:  Source: [ ] Patient   [ ] Family   [ ] Team     Pain:                        [ ] No [ ] Yes             [ ] Mild [ ] Moderate [ ] Severe    Onset -  Location -  Duration -  Character -  Alleviating/Aggravating -  Radiation -  Timing -      Dyspnea:                [ ] No [ ] Yes             [ ] Mild [ ] Moderate [ ] Severe    Anxiety:                  [ ] No [ ] Yes             [ ] Mild [ ] Moderate [ ] Severe    Fatigue:                  [ ] No [ ] Yes             [ ] Mild [ ] Moderate [ ] Severe    Nausea:                  [ ] No [ ] Yes             [ ] Mild [ ] Moderate [ ] Severe    Loss of appetite:   [ ] No [ ] Yes             [ ] Mild [ ] Moderate [ ] Severe    Constipation:        [ ] No [ ] Yes             [ ] Mild [ ] Moderate [ ] Severe    Other Symptoms:  [ ] All other review of systems negative   [ ] Unable to obtain due to poor mentation     Karnofsky Performance Score/Palliative Performance Status Version 2:         %    PHYSICAL EXAM:  Vital Signs Last 24 Hrs  T(C): 37.5 (18 Dec 2017 08:47), Max: 37.6 (18 Dec 2017 00:32)  T(F): 99.5 (18 Dec 2017 08:47), Max: 99.7 (18 Dec 2017 00:32)  HR: 88 (18 Dec 2017 08:47) (74 - 88)  BP: 137/76 (18 Dec 2017 08:47) (124/75 - 153/82)  BP(mean): --  RR: 18 (18 Dec 2017 08:47) (18 - 24)  SpO2: 95% (18 Dec 2017 08:47) (95% - 100%) I&O's Summary    17 Dec 2017 07:01  -  18 Dec 2017 07:00  --------------------------------------------------------  IN: 1355 mL / OUT: 800 mL / NET: 555 mL        General:  [ ] Alert  [ ] Oriented x      [ ] Lethargic  [ ] Agitated   [ ] Cachexia   [ ] Unarousable  [ ] Verbal  [ ] Non-Verbal    HEENT:  [ ] Normal   [ ] Dry mouth   [ ] ET Tube    [ ] Trach  [ ] Oral lesions    Lungs:   [ ] Clear [ ] Tachypnea  [ ] Audible excessive secretions   [ ] Rhonchi        [ ] Right [ ] Left [ ] Bilateral  [ ] Crackles        [ ] Right [ ] Left [ ] Bilateral  [ ] Wheezing     [ ] Right [ ] Left [ ] Bilateral  [ ] Respiratory failure [ ] Acute [ ] Chronic [ ] Hypoxemic [ ] Hypercarbic [ ] Other    Cardiovascular:   [ ] Regular [ ] Irregular [ ] Tachycardia   [ ] Bradycardia  [ ] Murmur [ ] Other  [ ] Shock [ ] Septic [ ] Hypovolemic [ ] Neurogenic [ ] Cardiogenic   [ ] Vasopressors [ ] Inotrophs    Abdomen:   [ ] Soft  [ ] Distended   [ ] +BS  [ ] Non tender [ ] Tender  [ ]PEG   [ ]OGT/ NGT   Last BM:     [ ] Other    Genitourinary:  [ ] Normal [ ] Incontinent   [ ] Oliguria/Anuria   [ ] Myers  [ ] Other       Musculoskeletal:    [ ] Normal   [ ] Weakness  [ ] Edema  [ ] Bedbound/Wheelchair bound [ ]  Ambulatory [ ] with [ ] without assistance [ ] Functional quadriplegia    Neurological: [ ] No focal deficits  [ ] Cognitive impairment  [ ] Dysphagia [ ] Dysarthria [ ] Paresis [ ] Other   [ ] Brain compression  [ ] Cerebral edema [ ] Encephalopathy    Skin: [ ] Normal   [ ] Ulcer(s) type [ ] Diabetic [ ] Pressure [ ] Other   Stage        POA [ ]  Yes [ ]  No       [ ] Rash    LABS:                        10.6   5.67  )-----------( 159      ( 18 Dec 2017 07:12 )             31.5     12-18    142  |  102  |  25<H>  ----------------------------<  94  5.3   |  29  |  1.02    Ca    9.6      18 Dec 2017 07:12            Protein Calorie Malnutrition: [ ] Mild [ ] Moderate [ ] Severe    Oral Intake: [ ] Unable/mouth care only [ ] Minimal [ ] Moderate [ ] Full Capability  Diet: [ ] NPO [ ] Tube feeds [ ] TPN [ ] Other     RADIOLOGY & ADDITIONAL STUDIES:    REFERRALS:   [ ] Chaplaincy  [ ] Hospice Care  [ ] Child Life  [ ] Social Work  [ ] Case management [ ] Nutrition [ ] Holistic Therapy [ ] Wound Care [ ]Physical Therapy [ ] Other [ ]See goals of care note in [place] [facility] [place] [facility] Geriatric and Palliative Care Unit Progress Note [x ] Hospice Progress Note [ ]     HPI:  This is a 72 year old male w/ h/o stroke with residual right sided weakness, aphasia, s/p PEG tube (initially placed on 4/2016), Hypertension, HLD, prostate and stomach cancer on oral chemo who presented to ED for syncopal episode. As per wife, the patient was last seen normal at about 8AM. At that time he did not want to get out of bed this morning, so he was left there in bed. Several hours later his wife attempted to help get the patient out of bed so he could go top the bathroom, and when she did so, his legs buckled and he began to shake his right upper extremity. During episode, eyes rolled back. Was less responsive then his normal self for about 15-20 minutes, taking longer to respond to questions. Denied tongue biting. Unknown if urinary incontinence as patient wears diapers.  Code stroke called on arrival, NIHSS 10, MRS 4, tPA not administered as patient out of window. (23 Nov 2017 18:06).   Initially presented to ED for LOC likely 2/2 seizure. Patient was lost to follow-up with onc for management of his malignancies, they were subsequently consulted upon his admission and obtained a CTAP which revealed partial gastrectomy, previously noted soft tissue mass at the gastrectomy margin not well demonstrated on this study. No evidence of bowel obstruction. A new soft tissue mass in the right pelvis (2:100) measures 4.5 x 3.2 cm. This abuts the right aspect of the rectum and is concerning for peritoneal disease. Bx done consistent with GI stromal tumor. Heme/Onc following considering restarting Gleevec (off for the past 2 months prior to admission). Family meeting scheduled for tomorrow 12/19 at ~10AM, wife and son will be coming to further discuss objectives of treatment, goals of care.     Indication for Palliative Care Unit Services: Symptom management - agitation and pain.    ADVANCE DIRECTIVES:    Full code  MOLST  [ ] YES [x ] NO                      [ ] Completed  Health Care Proxy [ ] YES  [ x] NO   [x ] Surrogate - (wife) Savanah Craig   Per prior documentation niece Melisa Bhakta is the HCP --> no documentation provided #[phone]  Per niece HCP was signed in April when Mr. Craig had his stoke. No documentation of it seen in EMR.  Living Will  [ ] YES [x ] NO             Allergies    No Known Allergies    Intolerances      MEDICATIONS  (STANDING):  amLODIPine   Tablet 5 milliGRAM(s) Oral daily  aspirin enteric coated 81 milliGRAM(s) Oral daily  atorvastatin 80 milliGRAM(s) Oral at bedtime  clopidogrel Tablet 75 milliGRAM(s) Oral daily  gabapentin   Solution 300 milliGRAM(s) Oral three times a day  heparin  Injectable 5000 Unit(s) SubCutaneous every 8 hours  HYDROmorphone  Injectable 0.5 milliGRAM(s) IV Push every 4 hours  levETIRAcetam  Solution 500 milliGRAM(s) Oral two times a day  metoprolol     tartrate 12.5 milliGRAM(s) Oral two times a day  pantoprazole   Suspension 40 milliGRAM(s) Oral daily  polyethylene glycol 3350 17 Gram(s) Oral daily  QUEtiapine 25 milliGRAM(s) Oral daily  senna Syrup 15 milliLiter(s) Oral at bedtime    MEDICATIONS  (PRN):  HYDROmorphone  Injectable 1 milliGRAM(s) IV Push every 1 hour PRN Pain  HYDROmorphone  Injectable 1 milliGRAM(s) IV Push every 1 hour PRN Dyspnea  lactulose Syrup 15 Gram(s) Oral daily PRN Constipation  LORazepam   Injectable 0.5 milliGRAM(s) IV Push every 1 hour PRN Agitation  naloxone Injectable 0.1 milliGRAM(s) IV Push every 3 minutes PRN sedation or respiratory depression due to opoiods  ondansetron Injectable 8 milliGRAM(s) IV Push every 8 hours PRN Nausea and/or Vomiting    PRESENT SYMPTOMS:  Source: [x ] Patient   [ ] Family   [x ] Team     Pain:                        [x ] No [ ] Yes             [ ] Mild [ ] Moderate [ ] Severe    Onset -  Location -  Duration -  Character -  Alleviating/Aggravating -  Radiation -  Timing -      Dyspnea:                [x ] No [ ] Yes             [ ] Mild [ ] Moderate [ ] Severe    Anxiety:                  [ ] No [x ] Yes             [ ] Mild [x ] Moderate [ ] Severe    Fatigue:                  [ x] No [ ] Yes             [ ] Mild [ ] Moderate [ ] Severe    Nausea:                  [x ] No [ ] Yes             [ ] Mild [ ] Moderate [ ] Severe    Loss of appetite:   x[ ] No [ ] Yes             [ ] Mild [ ] Moderate [ ] Severe    Constipation:        [ x] No [ ] Yes             [ ] Mild [ ] Moderate [ ] Severe    Other Symptoms:  [ ] All other review of systems negative   [x ] Unable to obtain due to poor mentation     Karnofsky Performance Score/Palliative Performance Status Version 2:   20 %    PHYSICAL EXAM:  Vital Signs Last 24 Hrs  T(C): 37.5 (18 Dec 2017 08:47), Max: 37.6 (18 Dec 2017 00:32)  T(F): 99.5 (18 Dec 2017 08:47), Max: 99.7 (18 Dec 2017 00:32)  HR: 88 (18 Dec 2017 08:47) (74 - 88)  BP: 137/76 (18 Dec 2017 08:47) (124/75 - 153/82)  BP(mean): --  RR: 18 (18 Dec 2017 08:47) (18 - 24)  SpO2: 95% (18 Dec 2017 08:47) (95% - 100%) I&O's Summary    17 Dec 2017 07:01  -  18 Dec 2017 07:00  --------------------------------------------------------  IN: 1355 mL / OUT: 800 mL / NET: 555 mL        General:  [ ] Alert  [ ] Oriented x      [ x] Lethargic  [ x] Agitated   [ ] Cachexia   [ ] Unarousable  [ ] Verbal  [x ] Non-Verbal    HEENT:  [ ] Normal   [ x] Dry mouth   [ ] ET Tube    [ ] Trach  [ ] Oral lesions    Lungs:   [x ] Clear [ ] Tachypnea  [ ] Audible excessive secretions   [ ] Rhonchi        [ ] Right [ ] Left [ ] Bilateral  [ ] Crackles        [ ] Right [ ] Left [ ] Bilateral  [ ] Wheezing     [ ] Right [ ] Left [ ] Bilateral  [ ] Respiratory failure [ ] Acute [ ] Chronic [ ] Hypoxemic [ ] Hypercarbic [ ] Other    Cardiovascular:   [ x] Regular [ ] Irregular [ ] Tachycardia   [ ] Bradycardia  [ ] Murmur [ ] Other  [ ] Shock [ ] Septic [ ] Hypovolemic [ ] Neurogenic [ ] Cardiogenic   [ ] Vasopressors [ ] Inotrophs    Abdomen:   [x ] Soft  [ ] Distended   [x ] +BS  [ ] Non tender [ ] Tender  [ ]PEG   [ ]OGT/ NGT   Last BM:     [ ] Other    Genitourinary:  [x ] Normal [ ] Incontinent   [ ] Oliguria/Anuria   [ ] Myers  [ ] Other       Musculoskeletal:    [x ] Normal   [ ] Weakness  [ ] Edema  [ ] Bedbound/Wheelchair bound [ ]  Ambulatory [ ] with [ ] without assistance [ ] Functional quadriplegia    Neurological: [ ] No focal deficits  [x ] Cognitive impairment  [ ] Dysphagia [ ] Dysarthria [ ] Paresis [ ] Other   [ ] Brain compression  [ ] Cerebral edema [ ] Encephalopathy    Skin: [ x] Normal   [ ] Ulcer(s) type [ ] Diabetic [ ] Pressure [ ] Other   Stage        POA [ ]  Yes [ ]  No       [ ] Rash    LABS:                        10.6   5.67  )-----------( 159      ( 18 Dec 2017 07:12 )             31.5     12-18    142  |  102  |  25<H>  ----------------------------<  94  5.3   |  29  |  1.02    Ca    9.6      18 Dec 2017 07:12    Protein Calorie Malnutrition: [x ] Mild [ ] Moderate [ ] Severe    Oral Intake: [ x] Unable/mouth care only [ ] Minimal [ ] Moderate [ ] Full Capability  Diet: [ ] NPO [x ] Tube feeds [ ] TPN [ ] Other     RADIOLOGY & ADDITIONAL STUDIES: reviewed.    REFERRALS:   [ ] Chaplaincy  [ ] Hospice Care  [ ] Child Life  [ ] Social Work  [ ] Case management [ ] Nutrition [ ] Holistic Therapy [ ] Wound Care [ ]Physical Therapy [ ] Other [ ]See goals of care note in [place]

## 2023-07-08 NOTE — ED BEHAVIORAL HEALTH ASSESSMENT NOTE - NS ED BHA SUICIDALITY PRESENT CURRENT PASSIVE IDEATION
Wheeze reported by RN  Pt had asthma history and has used treatment for this before per RN   Get CXR, BNP, ABG, AM labs and try Duoneb   Sats ok on RA    0549   K replete po and follow   None known

## 2023-08-02 NOTE — PATIENT PROFILE ADULT. - FUNCTIONAL LEVEL PRIOR: AMBULATION
(1) assistive equipment Fluconazole Counseling:  Patient counseled regarding adverse effects of fluconazole including but not limited to headache, diarrhea, nausea, upset stomach, liver function test abnormalities, taste disturbance, and stomach pain.  There is a rare possibility of liver failure that can occur when taking fluconazole.  The patient understands that monitoring of LFTs and kidney function test may be required, especially at baseline. The patient verbalized understanding of the proper use and possible adverse effects of fluconazole.  All of the patient's questions and concerns were addressed.

## 2023-08-04 NOTE — ED ADULT NURSE NOTE - BOWEL SOUNDS RUQ
present negative normal/ROM intact/normal gait/strength 5/5 bilateral upper extremities/strength 5/5 bilateral lower extremities

## 2023-08-31 NOTE — H&P ADULT. - RADIOLOGY RESULTS AND INTERPRETATION
SSKI Counseling:  I discussed with the patient the risks of SSKI including but not limited to thyroid abnormalities, metallic taste, GI upset, fever, headache, acne, arthralgias, paraesthesias, lymphadenopathy, easy bleeding, arrhythmias, and allergic reaction. imaging reviewed. CXR- large gastric bubble but no air under diaphragm. Imaging from august also reviewed.

## 2023-09-04 NOTE — DIETITIAN INITIAL EVALUATION ADULT. - PROBLEM SELECTOR PLAN 2
Patients wife states their son called her and told her that the pt was laying on the floor in the garage and was unresponsive. Pt doesn't remember events leading up to the episode or after. He has an abrasion on his back on the left side, a el from his glasses on the left side of his head and an abrasion on his left arm.      Robert Chavez RN  09/04/23 8555 Syncopal episode likely 2/2 seizure.   - Seen by neuro, started on keppra 500mg BID  - check vEEG

## 2023-09-25 NOTE — ED ADULT TRIAGE NOTE - NS ED NURSE AMBULANCES
Detail Level: Generalized Detail Level: Zone Detail Level: Simple [facility] Detail Level: Detailed

## 2023-09-26 NOTE — PROGRESS NOTE ADULT - ASSESSMENT
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  Mental status change, Possible seizure- continue Keppra, Neurology follow noted. MRI brain pending   Gastric cancer metastatic- Oncology follow re chemoRx..  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 Restarted ASA/Plavix  Prostate cancer- Rx as per Oncology Urology evaluation noted To start Lupron.  s/p PEG replacement. Continue Peg feeds.  Pain control/ Pain management evaluation noted.  HTN control  CAD stable.  Depression- continue Rx  Functional quadriplegia -supportive care  PT  DCP in progress.  Jose Medina MD pager [phone] Suturegard Intro: Intraoperative tissue expansion was performed, utilizing the SUTUREGARD device, in order to reduce wound tension.

## 2023-11-20 NOTE — PHYSICAL THERAPY INITIAL EVALUATION ADULT - GAIT PATTERN USED, PT EVAL
Detail Level: Simple Instructions: This plan will send the code FBSE to the PM system.  DO NOT or CHANGE the price. Price (Do Not Change): 0.00 3-point gait

## 2024-01-01 NOTE — PROGRESS NOTE ADULT - ASSESSMENT
Begin Z-Ed today  Can use albuterol 2 puffs every 4-6 hours as needed for shortness of breath and wheezing  Can take over-the-counter Mucinex DM as needed for cough  Advised rest, increased fluids, and Tylenol and Advil as needed  Schedule a follow-up appointment with your primary care physician in 2-3 days  Return to the emergency department if you develop new or worsening symptoms, chest pain, shortness of breath, wheezing, high fevers, or if you cannot tolerate fluids by mouth     This is a 72 year old male with h/o CVA with R sided residual weakness, HTN, HLD, s/p PEG tube, prostate and stomach cancer on oral chemo through PEG tube who presented to ED for syncopal episode s/p code stroke symptoms likely secondary to seizure.  Mental status change, Possible seizure- continue Keppra, Neurology follow noted. MRI brain pending   Gastric cancer metastatic- Oncology follow.  Pelvic mass- family to decide re mass biopsy and further Rx.  Prostate cancer- Rx as per Oncology Urology evaluation called.  s/p PEG replacement. Continue Peg feeds.  Pain control/ Pain management evaluation noted.  HTN control  CAD stable.  Depression- continue Rx  Functional quadriplegia -supportive care  PT  Jose Medina MD pager [phone]

## 2024-06-20 NOTE — H&P ADULT. - PROBLEM SELECTOR PLAN 3
Quality 226: Preventive Care And Screening: Tobacco Use: Screening And Cessation Intervention: Patient screened for tobacco use and is an ex/non-smoker
Detail Level: Detailed
Quality 130: Documentation Of Current Medications In The Medical Record: Current Medications Documented
BP control

## 2024-06-22 NOTE — ED PROVIDER NOTE - CPE EDP RESP NORM
[facility]:                                                                      Primary Care Physicians    [facility] Internal Medicine    Dr. Radha Anderson MD  [facility]  [address]  [place], [place] [address]  [phone]    Heike Wiggins CNP  [facility]  [address]  [place], [place] [address]  [phone]    [facility]    Martinez Anderson MD  [facility] [address]  [place], [place] [address]  [phone]      [facility].     Bryce Vital MD  [address]  [place], [place] [address]  [phone]    Katty Cameron [facility]  [address] [place] [address]  [phone]    Pilar Menendez [facility]   [address] [place] [address]  [phone]    Mouna Gresham MD  [address]  [place], [place] [address]  [phone]     Kitty Pruitt MD  [address]  [place], [place] [address]  [phone]    Guillermina Rivera PA-C  [address]  [place], [place] [address]  [phone]    Primary Care Providers [facility]    Dr. Kishan Sandra MD  [address] [place] [address]  [phone]    Dr. Brenden Arana MD   [address] [place] [address]  [phone]    Primary Care Providers Shereen Perez MD  [address] [id number]  [phone]       [facility]    Chanel Wang MD  [address] [phone]    Charlie Ely CNP  [facility]  [address] [id number]  [phone]       Internal Med    Pilar Ricks NP  [address] [id number]  [phone]        [facility] Internal Med    Marya Hunt MD  [address] [address]  [phone]          Maria Eugenia Bolton CNP  [facility]  [address]  [place], [place] [address] [phone]  Same day and quick  care appointments  Mon.-Fri. 8 a.m.-8 p.m.  Sat. 9 a.m.-1 p.m.     normal...

## 2024-12-27 NOTE — ED ADULT TRIAGE NOTE - MEANS OF ARRIVAL
You will be contacted by the office the day before your procedure with arrival time information.     Immediately report any new sign of illness to our surgeon’s office    Our rooms are relatively small and if more than two visitors are coming with you to your procedure, we may ask that some individuals wait in the waiting room to minimize overcrowding.    Please bring a cashless form of payment if you wish to obtain medications (should they be prescribed) from the hospital outpatient pharmacy following your procedure.    If you start taking any new medications or over the counter supplements between now and your surgery/procedure date, please call us back at this number: [phone].     stretcher

## 2025-03-16 NOTE — PROVIDER CONTACT NOTE (OTHER) - BACKGROUND
Pt admitred with confusion, abdominal pain, and lethargy.
Pt admitted with abdominal pain, lethargy, and confusion.
u/a ordered already, awaiting urine sample
1/22- pt came in with lethargy, decerase PO intake, confusion, abd pain, pt is s/p gastrectomy tube placed. Pt has Gastric tumor
1/22: lethargy, confusing, abd pain s/p PEG
1/22: lethargy, confusing, abd pain, dysphagia s/p PEG  2/5: blood cultures drawn
Pt has been running fevers during day, pt was pan cultured earlier. Pt was given 2 percocet at 2000. Pt has cooling blanket ordered.
Pt was pancultured 2/9, pt has been febrile numerous times during day.
Lul

## 2025-05-14 NOTE — PROGRESS NOTE ADULT - PROBLEM SELECTOR PLAN 6
May 14, 2025     Patient: Nadia Galeano   YOB: 2005   Date of Visit: 5/14/2025       To Whom it May Concern:    Nadia Galeano was seen in my clinic on 5/14/2025.    If you have any questions or concerns, please don't hesitate to call.         Sincerely,          Tamia Wasserman PA-C        CC: No Recipients  
c/w asa, plavix, statin
c/w asa, plavix, statin

## 2025-05-29 NOTE — DISCHARGE NOTE ADULT - NS MD DC PLAN IMMU FLU REF OTH
Lab Results   Component Value Date    HGBA1C 7.3 (H) 03/04/2025       Orders:  •  Tirzepatide (Mounjaro) 5 MG/0.5ML SOAJ; Inject 5 mg under the skin once a week  •  Albumin / creatinine urine ratio; Future  •  Comprehensive metabolic panel; Future  •  Hemoglobin A1C; Future  •  Lipid Panel with Direct LDL reflex; Future   Refused

## 2025-06-13 NOTE — PHYSICAL THERAPY INITIAL EVALUATION ADULT - ADL SKILLS, REHAB EVAL
06/13/25      Tobi Beatty  [address]  [facility] [address]       To Whom it May Concern:    Tobi Beatty is a patient of mine and has been under my care from June 2024 till January 2025.  Client has a admission diagnosis of anxiety unspecified and mood disorder issues.  Client and this writer agree that the mood disorder has been resolved and the client is no longer having any kind of symptoms relating to mood.  Client has some minor symptoms of anxiety, but these are handled well and dealt with in such a way that there are no negative consequences from his behavior.    Tobi has made a lot of progress throughout his therapy.  Client has made a successful graduation from counseling services.  This writer does not see any criteria that would prevent him from attaining his 's license    Sincerely,     BETZY Fischer    [facility] [place], [address] [place] [address]  Phone: [phone]  Fax: [phone]             needs device and assist

## 2025-08-01 NOTE — PROGRESS NOTE ADULT - SUBJECTIVE AND OBJECTIVE BOX
Discharged home ambulatory.   CC: f/u for abnormal UA, UTI    Patient reports no new c/o, weak, poorly interactive, mumbles incoherently  No fevers, currently on tube feeds    REVIEW OF SYSTEMS: no fevers, no chills, no nausea, no vomiting, no abd pain, no resp symptoms  All other review of systems negative (Comprehensive ROS)    Antimicrobials Day #3    cefTRIAXone   IVPB 1Gram(s) IV Intermittent every 24 hours      Other Medications Reviewed    Vital Signs Last 24 Hrs  T(C): 36.7, Max: 36.7 (06-09 @ 09:30)  T(F): 98.1, Max: 98.1 (06-09 @ 09:30)  HR: 65 (56 - 68)  BP: 142/74 (125/77 - 159/89)  BP(mean): --  RR: 18 (18 - 18)  SpO2: 97% (95% - 98%)    PHYSICAL EXAM:  General: alert, no acute distress  Eyes:  anicteric, no conjunctival injection, no discharge  Oropharynx: no lesions or injection 	  Neck: supple, without adenopathy  Lungs: clear to auscultation  Heart: regular rate and rhythm; no murmur, rubs or gallops  Abdomen: soft, nondistended, nontender, without mass or organomegaly, PEG tube in place  Skin: no lesions  Extremities: no clubbing, cyanosis, or edema  Neurologic: weak, poorly interactive    LAB RESULTS:                                   11.8   5.0   )-----------( 185      ( 08 Jun 2017 10:10 )             34.8   06-08    139  |  102  |  13  ----------------------------<  93  3.8   |  24  |  0.99    Ca    9.0      08 Jun 2017 10:10    TPro  8.2  /  Alb  4.0  /  TBili  1.2  /  DBili  x   /  AST  18  /  ALT  9<L>  /  AlkPhos  89  06-07            MICROBIOLOGY REVIEWED:    RADIOLOGY REVIEWED:    CT:   KIDNEYS/URETERS: Bilateral renal cysts and subcentimeter hypodense   lesions which are too small to characterize. Hypodense lesions at the   upper poles bilaterally are again noted and measure higher than fluid   attenuation.  1.3 cm hypodense lesion within the right lobe of the liver which measures   slightly higher than fluid attenuation and is new since 8/23/2016 but is   also seen on more recent previous studies.    Status post partial gastrectomy with a mass at the resection site again   noted, measuring 2.1 x 2.0 cm.    Aneurysmal dilatation of the lower thoracic and upper abdominal aorta   unchanged.

## 2025-08-07 NOTE — H&P ADULT. - PROBLEM SELECTOR PROBLEM 1
C3 nurse spoke with Tianadanny Mead's daughter, Yolanda for a TCC post hospital discharge follow up call.      Gastric tumor